# Patient Record
Sex: MALE | Race: WHITE | NOT HISPANIC OR LATINO
[De-identification: names, ages, dates, MRNs, and addresses within clinical notes are randomized per-mention and may not be internally consistent; named-entity substitution may affect disease eponyms.]

---

## 2017-03-01 ENCOUNTER — RX RENEWAL (OUTPATIENT)
Age: 57
End: 2017-03-01

## 2017-03-08 ENCOUNTER — NON-APPOINTMENT (OUTPATIENT)
Age: 57
End: 2017-03-08

## 2017-03-08 ENCOUNTER — APPOINTMENT (OUTPATIENT)
Dept: CARDIOLOGY | Facility: CLINIC | Age: 57
End: 2017-03-08

## 2017-03-08 VITALS
HEART RATE: 72 BPM | BODY MASS INDEX: 37.6 KG/M2 | OXYGEN SATURATION: 95 % | SYSTOLIC BLOOD PRESSURE: 130 MMHG | WEIGHT: 293 LBS | HEIGHT: 74 IN | DIASTOLIC BLOOD PRESSURE: 80 MMHG

## 2017-04-27 ENCOUNTER — MED ADMIN CHARGE (OUTPATIENT)
Age: 57
End: 2017-04-27

## 2017-06-09 ENCOUNTER — RX RENEWAL (OUTPATIENT)
Age: 57
End: 2017-06-09

## 2017-06-09 ENCOUNTER — MEDICATION RENEWAL (OUTPATIENT)
Age: 57
End: 2017-06-09

## 2017-07-21 ENCOUNTER — RX RENEWAL (OUTPATIENT)
Age: 57
End: 2017-07-21

## 2017-07-26 ENCOUNTER — NON-APPOINTMENT (OUTPATIENT)
Age: 57
End: 2017-07-26

## 2017-07-26 ENCOUNTER — APPOINTMENT (OUTPATIENT)
Dept: CARDIOLOGY | Facility: CLINIC | Age: 57
End: 2017-07-26

## 2017-07-26 VITALS
DIASTOLIC BLOOD PRESSURE: 76 MMHG | WEIGHT: 281 LBS | BODY MASS INDEX: 36.06 KG/M2 | SYSTOLIC BLOOD PRESSURE: 114 MMHG | HEIGHT: 74 IN | OXYGEN SATURATION: 97 % | HEART RATE: 81 BPM

## 2017-09-05 ENCOUNTER — RX RENEWAL (OUTPATIENT)
Age: 57
End: 2017-09-05

## 2017-09-15 ENCOUNTER — RX RENEWAL (OUTPATIENT)
Age: 57
End: 2017-09-15

## 2017-10-20 ENCOUNTER — RX RENEWAL (OUTPATIENT)
Age: 57
End: 2017-10-20

## 2017-10-26 ENCOUNTER — APPOINTMENT (OUTPATIENT)
Dept: CARDIOLOGY | Facility: CLINIC | Age: 57
End: 2017-10-26
Payer: COMMERCIAL

## 2017-10-26 PROCEDURE — 93306 TTE W/DOPPLER COMPLETE: CPT

## 2017-11-15 ENCOUNTER — NON-APPOINTMENT (OUTPATIENT)
Age: 57
End: 2017-11-15

## 2017-11-15 ENCOUNTER — LABORATORY RESULT (OUTPATIENT)
Age: 57
End: 2017-11-15

## 2017-11-15 ENCOUNTER — APPOINTMENT (OUTPATIENT)
Dept: CARDIOLOGY | Facility: CLINIC | Age: 57
End: 2017-11-15
Payer: COMMERCIAL

## 2017-11-15 VITALS
SYSTOLIC BLOOD PRESSURE: 118 MMHG | HEIGHT: 74 IN | WEIGHT: 280 LBS | BODY MASS INDEX: 35.94 KG/M2 | HEART RATE: 78 BPM | OXYGEN SATURATION: 95 % | DIASTOLIC BLOOD PRESSURE: 77 MMHG

## 2017-11-15 PROCEDURE — 99214 OFFICE O/P EST MOD 30 MIN: CPT | Mod: 25

## 2017-11-15 PROCEDURE — 36415 COLL VENOUS BLD VENIPUNCTURE: CPT

## 2017-11-15 PROCEDURE — 93000 ELECTROCARDIOGRAM COMPLETE: CPT

## 2017-11-16 LAB
25(OH)D3 SERPL-MCNC: 60.3 NG/ML
ALBUMIN SERPL ELPH-MCNC: 4.3 G/DL
ALP BLD-CCNC: 55 U/L
ALT SERPL-CCNC: 32 U/L
ANION GAP SERPL CALC-SCNC: 19 MMOL/L
AST SERPL-CCNC: 32 U/L
BASOPHILS # BLD AUTO: 0.03 K/UL
BASOPHILS NFR BLD AUTO: 0.2 %
BILIRUB SERPL-MCNC: 0.7 MG/DL
BUN SERPL-MCNC: 11 MG/DL
CALCIUM SERPL-MCNC: 10.3 MG/DL
CHLORIDE SERPL-SCNC: 96 MMOL/L
CHOLEST SERPL-MCNC: 173 MG/DL
CHOLEST/HDLC SERPL: 2.5 RATIO
CO2 SERPL-SCNC: 26 MMOL/L
CREAT SERPL-MCNC: 1.12 MG/DL
EOSINOPHIL # BLD AUTO: 0.08 K/UL
EOSINOPHIL NFR BLD AUTO: 0.5 %
GLUCOSE SERPL-MCNC: 100 MG/DL
HBA1C MFR BLD HPLC: 5.8 %
HCT VFR BLD CALC: 51.3 %
HDLC SERPL-MCNC: 70 MG/DL
HGB BLD-MCNC: 17.2 G/DL
IMM GRANULOCYTES NFR BLD AUTO: 0.2 %
LDLC SERPL CALC-MCNC: 83 MG/DL
LYMPHOCYTES # BLD AUTO: 2.53 K/UL
LYMPHOCYTES NFR BLD AUTO: 14.8 %
MAN DIFF?: NORMAL
MCHC RBC-ENTMCNC: 30.7 PG
MCHC RBC-ENTMCNC: 33.5 GM/DL
MCV RBC AUTO: 91.6 FL
MONOCYTES # BLD AUTO: 1.71 K/UL
MONOCYTES NFR BLD AUTO: 10 %
NEUTROPHILS # BLD AUTO: 12.69 K/UL
NEUTROPHILS NFR BLD AUTO: 74.3 %
PLATELET # BLD AUTO: 258 K/UL
POTASSIUM SERPL-SCNC: 4.7 MMOL/L
PROT SERPL-MCNC: 7.9 G/DL
RBC # BLD: 5.6 M/UL
RBC # FLD: 14.5 %
SODIUM SERPL-SCNC: 141 MMOL/L
TRIGL SERPL-MCNC: 101 MG/DL
URATE SERPL-MCNC: 6.7 MG/DL
WBC # FLD AUTO: 17.08 K/UL

## 2017-11-21 LAB
PSA SERPL-MCNC: 0.29 NG/ML
TSH SERPL-ACNC: 1.17 UIU/ML

## 2017-11-22 ENCOUNTER — APPOINTMENT (OUTPATIENT)
Dept: CARDIOLOGY | Facility: CLINIC | Age: 57
End: 2017-11-22
Payer: COMMERCIAL

## 2017-11-22 VITALS
HEIGHT: 74 IN | BODY MASS INDEX: 36.19 KG/M2 | OXYGEN SATURATION: 98 % | RESPIRATION RATE: 18 BRPM | DIASTOLIC BLOOD PRESSURE: 90 MMHG | SYSTOLIC BLOOD PRESSURE: 153 MMHG | HEART RATE: 69 BPM | TEMPERATURE: 98.6 F | WEIGHT: 282 LBS

## 2017-11-22 PROCEDURE — 90471 IMMUNIZATION ADMIN: CPT

## 2017-11-22 PROCEDURE — 93000 ELECTROCARDIOGRAM COMPLETE: CPT

## 2017-11-22 PROCEDURE — 99214 OFFICE O/P EST MOD 30 MIN: CPT | Mod: 25

## 2017-11-22 PROCEDURE — 90686 IIV4 VACC NO PRSV 0.5 ML IM: CPT

## 2017-11-27 ENCOUNTER — MED ADMIN CHARGE (OUTPATIENT)
Age: 57
End: 2017-11-27

## 2017-12-04 ENCOUNTER — RX RENEWAL (OUTPATIENT)
Age: 57
End: 2017-12-04

## 2017-12-12 ENCOUNTER — APPOINTMENT (OUTPATIENT)
Dept: CARDIOLOGY | Facility: CLINIC | Age: 57
End: 2017-12-12
Payer: COMMERCIAL

## 2017-12-12 PROCEDURE — 93880 EXTRACRANIAL BILAT STUDY: CPT

## 2017-12-15 ENCOUNTER — MEDICATION RENEWAL (OUTPATIENT)
Age: 57
End: 2017-12-15

## 2017-12-15 ENCOUNTER — RX RENEWAL (OUTPATIENT)
Age: 57
End: 2017-12-15

## 2018-01-10 ENCOUNTER — RX RENEWAL (OUTPATIENT)
Age: 58
End: 2018-01-10

## 2018-02-06 ENCOUNTER — OUTPATIENT (OUTPATIENT)
Dept: OUTPATIENT SERVICES | Facility: HOSPITAL | Age: 58
LOS: 1 days | Discharge: HOME | End: 2018-02-06
Payer: COMMERCIAL

## 2018-02-06 VITALS
OXYGEN SATURATION: 96 % | HEIGHT: 73 IN | TEMPERATURE: 98 F | DIASTOLIC BLOOD PRESSURE: 82 MMHG | SYSTOLIC BLOOD PRESSURE: 139 MMHG | WEIGHT: 296.08 LBS | HEART RATE: 69 BPM | RESPIRATION RATE: 18 BRPM

## 2018-02-06 DIAGNOSIS — R14.0 ABDOMINAL DISTENSION (GASEOUS): ICD-10-CM

## 2018-02-06 DIAGNOSIS — Z98.61 CORONARY ANGIOPLASTY STATUS: Chronic | ICD-10-CM

## 2018-02-06 DIAGNOSIS — Z98.890 OTHER SPECIFIED POSTPROCEDURAL STATES: Chronic | ICD-10-CM

## 2018-02-06 DIAGNOSIS — Z90.49 ACQUIRED ABSENCE OF OTHER SPECIFIED PARTS OF DIGESTIVE TRACT: Chronic | ICD-10-CM

## 2018-02-06 DIAGNOSIS — R19.8 OTHER SPECIFIED SYMPTOMS AND SIGNS INVOLVING THE DIGESTIVE SYSTEM AND ABDOMEN: ICD-10-CM

## 2018-02-06 LAB
ANION GAP SERPL CALC-SCNC: 7 MMOL/L — SIGNIFICANT CHANGE UP (ref 5–17)
BASOPHILS # BLD AUTO: 0.1 K/UL — SIGNIFICANT CHANGE UP (ref 0–0.2)
BASOPHILS NFR BLD AUTO: 1.1 % — SIGNIFICANT CHANGE UP (ref 0–2)
BUN SERPL-MCNC: 12 MG/DL — SIGNIFICANT CHANGE UP (ref 7–23)
CALCIUM SERPL-MCNC: 8.9 MG/DL — SIGNIFICANT CHANGE UP (ref 8.5–10.1)
CHLORIDE SERPL-SCNC: 102 MMOL/L — SIGNIFICANT CHANGE UP (ref 96–108)
CO2 SERPL-SCNC: 29 MMOL/L — SIGNIFICANT CHANGE UP (ref 22–31)
CREAT SERPL-MCNC: 0.93 MG/DL — SIGNIFICANT CHANGE UP (ref 0.5–1.3)
EOSINOPHIL # BLD AUTO: 0.4 K/UL — SIGNIFICANT CHANGE UP (ref 0–0.5)
EOSINOPHIL NFR BLD AUTO: 4 % — SIGNIFICANT CHANGE UP (ref 0–6)
GLUCOSE SERPL-MCNC: 105 MG/DL — HIGH (ref 70–99)
HCT VFR BLD CALC: 48.7 % — SIGNIFICANT CHANGE UP (ref 39–50)
HGB BLD-MCNC: 16.2 G/DL — SIGNIFICANT CHANGE UP (ref 13–17)
LYMPHOCYTES # BLD AUTO: 2.6 K/UL — SIGNIFICANT CHANGE UP (ref 1–3.3)
LYMPHOCYTES # BLD AUTO: 28.7 % — SIGNIFICANT CHANGE UP (ref 13–44)
MCHC RBC-ENTMCNC: 29.5 PG — SIGNIFICANT CHANGE UP (ref 27–34)
MCHC RBC-ENTMCNC: 33.3 GM/DL — SIGNIFICANT CHANGE UP (ref 32–36)
MCV RBC AUTO: 88.6 FL — SIGNIFICANT CHANGE UP (ref 80–100)
MONOCYTES # BLD AUTO: 0.8 K/UL — SIGNIFICANT CHANGE UP (ref 0–0.9)
MONOCYTES NFR BLD AUTO: 8.4 % — SIGNIFICANT CHANGE UP (ref 2–14)
NEUTROPHILS # BLD AUTO: 5.3 K/UL — SIGNIFICANT CHANGE UP (ref 1.8–7.4)
NEUTROPHILS NFR BLD AUTO: 57.9 % — SIGNIFICANT CHANGE UP (ref 43–77)
PLATELET # BLD AUTO: 265 K/UL — SIGNIFICANT CHANGE UP (ref 150–400)
POTASSIUM SERPL-MCNC: 4.4 MMOL/L — SIGNIFICANT CHANGE UP (ref 3.5–5.3)
POTASSIUM SERPL-SCNC: 4.4 MMOL/L — SIGNIFICANT CHANGE UP (ref 3.5–5.3)
RBC # BLD: 5.5 M/UL — SIGNIFICANT CHANGE UP (ref 4.2–5.8)
RBC # FLD: 12.8 % — SIGNIFICANT CHANGE UP (ref 10.3–14.5)
SODIUM SERPL-SCNC: 138 MMOL/L — SIGNIFICANT CHANGE UP (ref 135–145)
WBC # BLD: 9.2 K/UL — SIGNIFICANT CHANGE UP (ref 3.8–10.5)
WBC # FLD AUTO: 9.2 K/UL — SIGNIFICANT CHANGE UP (ref 3.8–10.5)

## 2018-02-06 PROCEDURE — 93010 ELECTROCARDIOGRAM REPORT: CPT

## 2018-02-06 NOTE — H&P PST ADULT - PMH
Chronic gout without tophus, unspecified cause, unspecified site    Coronary artery disease involving native coronary artery of native heart without angina pectoris    Gastroesophageal reflux disease, esophagitis presence not specified    Hyperlipidemia, unspecified hyperlipidemia type    Myocardial infarction  x 2. last 2008.  Obesity    Retinal tear of right eye

## 2018-02-06 NOTE — H&P PST ADULT - PSH
H/O eye surgery  right eye laser  H/O right inguinal hernia repair    Post PTCA  x 3. 4 stents in place  S/P appendectomy

## 2018-02-06 NOTE — H&P PST ADULT - HISTORY OF PRESENT ILLNESS
58 years old male with change in bowel movements. Denies nausea, vomiting, bloating, abdominal pain or rectal bleed. Planned colonoscopy.

## 2018-02-06 NOTE — H&P PST ADULT - ASSESSMENT
58 years old male present to PST prior to Colonoscopy with Dr. Merritt.  Plan  1. Expect a call from Endoscopy the day before your procedure between 11am and 3pm.  2. Follow the GI doctor's instructions for preparation  and day before procedure activities.  3. Follow the GI doctor's  instructions for medications.

## 2018-02-06 NOTE — H&P PST ADULT - FAMILY HISTORY
Father  Still living? No  Family history of heart failure, Age at diagnosis: Age Unknown  Family history of liver disease, Age at diagnosis: Age Unknown     Mother  Still living? No  Maternal family history of respiratory disease, Age at diagnosis: Age Unknown

## 2018-02-08 ENCOUNTER — OUTPATIENT (OUTPATIENT)
Dept: OUTPATIENT SERVICES | Facility: HOSPITAL | Age: 58
LOS: 1 days | Discharge: HOME | End: 2018-02-08
Payer: COMMERCIAL

## 2018-02-08 ENCOUNTER — RESULT REVIEW (OUTPATIENT)
Age: 58
End: 2018-02-08

## 2018-02-08 VITALS
OXYGEN SATURATION: 96 % | HEART RATE: 80 BPM | WEIGHT: 289.91 LBS | HEIGHT: 73 IN | DIASTOLIC BLOOD PRESSURE: 97 MMHG | TEMPERATURE: 97 F | RESPIRATION RATE: 16 BRPM | SYSTOLIC BLOOD PRESSURE: 138 MMHG

## 2018-02-08 DIAGNOSIS — Z98.890 OTHER SPECIFIED POSTPROCEDURAL STATES: Chronic | ICD-10-CM

## 2018-02-08 DIAGNOSIS — Z98.61 CORONARY ANGIOPLASTY STATUS: Chronic | ICD-10-CM

## 2018-02-08 DIAGNOSIS — Z90.49 ACQUIRED ABSENCE OF OTHER SPECIFIED PARTS OF DIGESTIVE TRACT: Chronic | ICD-10-CM

## 2018-02-08 PROCEDURE — 88305 TISSUE EXAM BY PATHOLOGIST: CPT | Mod: 26

## 2018-02-08 NOTE — ASU PATIENT PROFILE, ADULT - MENTAL HEALTH CONDITIONS/SYMPTOMS, PROFILE
Date: 6/12/2025    Patient Name: Alejandra Garner          To Whom it may concern:    This letter has been written at the patient's request. The above patient was seen at [facility] for treatment of a medical condition.    This patient should be excused from attending work/school from 6/10/25 through 6/20/25.       Sincerely,    Porsha Bolanos, DO     none

## 2018-02-09 LAB — SURGICAL PATHOLOGY FINAL REPORT - CH: SIGNIFICANT CHANGE UP

## 2018-02-14 DIAGNOSIS — Z82.49 FAMILY HISTORY OF ISCHEMIC HEART DISEASE AND OTHER DISEASES OF THE CIRCULATORY SYSTEM: ICD-10-CM

## 2018-02-14 DIAGNOSIS — K21.9 GASTRO-ESOPHAGEAL REFLUX DISEASE WITHOUT ESOPHAGITIS: ICD-10-CM

## 2018-02-14 DIAGNOSIS — Z87.891 PERSONAL HISTORY OF NICOTINE DEPENDENCE: ICD-10-CM

## 2018-02-14 DIAGNOSIS — R19.4 CHANGE IN BOWEL HABIT: ICD-10-CM

## 2018-02-14 DIAGNOSIS — K63.5 POLYP OF COLON: ICD-10-CM

## 2018-02-14 DIAGNOSIS — M1A.9XX0 CHRONIC GOUT, UNSPECIFIED, WITHOUT TOPHUS (TOPHI): ICD-10-CM

## 2018-02-14 DIAGNOSIS — I25.2 OLD MYOCARDIAL INFARCTION: ICD-10-CM

## 2018-02-14 DIAGNOSIS — K63.89 OTHER SPECIFIED DISEASES OF INTESTINE: ICD-10-CM

## 2018-02-14 DIAGNOSIS — E66.01 MORBID (SEVERE) OBESITY DUE TO EXCESS CALORIES: ICD-10-CM

## 2018-02-14 DIAGNOSIS — I25.10 ATHEROSCLEROTIC HEART DISEASE OF NATIVE CORONARY ARTERY WITHOUT ANGINA PECTORIS: ICD-10-CM

## 2018-02-14 DIAGNOSIS — E78.5 HYPERLIPIDEMIA, UNSPECIFIED: ICD-10-CM

## 2018-02-14 DIAGNOSIS — Z95.5 PRESENCE OF CORONARY ANGIOPLASTY IMPLANT AND GRAFT: ICD-10-CM

## 2018-02-14 DIAGNOSIS — K57.30 DIVERTICULOSIS OF LARGE INTESTINE WITHOUT PERFORATION OR ABSCESS WITHOUT BLEEDING: ICD-10-CM

## 2018-02-14 DIAGNOSIS — K64.8 OTHER HEMORRHOIDS: ICD-10-CM

## 2018-03-14 ENCOUNTER — MEDICATION RENEWAL (OUTPATIENT)
Age: 58
End: 2018-03-14

## 2018-03-15 ENCOUNTER — MEDICATION RENEWAL (OUTPATIENT)
Age: 58
End: 2018-03-15

## 2018-03-28 ENCOUNTER — APPOINTMENT (OUTPATIENT)
Dept: CARDIOLOGY | Facility: CLINIC | Age: 58
End: 2018-03-28
Payer: COMMERCIAL

## 2018-03-28 ENCOUNTER — NON-APPOINTMENT (OUTPATIENT)
Age: 58
End: 2018-03-28

## 2018-03-28 VITALS
HEART RATE: 76 BPM | OXYGEN SATURATION: 98 % | WEIGHT: 296 LBS | BODY MASS INDEX: 37.99 KG/M2 | DIASTOLIC BLOOD PRESSURE: 84 MMHG | HEIGHT: 74 IN | SYSTOLIC BLOOD PRESSURE: 146 MMHG

## 2018-03-28 PROCEDURE — 99214 OFFICE O/P EST MOD 30 MIN: CPT | Mod: 25

## 2018-03-28 PROCEDURE — 93000 ELECTROCARDIOGRAM COMPLETE: CPT

## 2018-05-29 ENCOUNTER — RX RENEWAL (OUTPATIENT)
Age: 58
End: 2018-05-29

## 2018-07-11 ENCOUNTER — APPOINTMENT (OUTPATIENT)
Dept: CARDIOLOGY | Facility: CLINIC | Age: 58
End: 2018-07-11
Payer: COMMERCIAL

## 2018-07-11 ENCOUNTER — NON-APPOINTMENT (OUTPATIENT)
Age: 58
End: 2018-07-11

## 2018-07-11 VITALS
HEART RATE: 82 BPM | DIASTOLIC BLOOD PRESSURE: 80 MMHG | BODY MASS INDEX: 37.22 KG/M2 | OXYGEN SATURATION: 98 % | HEIGHT: 74 IN | WEIGHT: 290 LBS | SYSTOLIC BLOOD PRESSURE: 140 MMHG

## 2018-07-11 PROCEDURE — 99214 OFFICE O/P EST MOD 30 MIN: CPT | Mod: 25

## 2018-07-11 PROCEDURE — 93000 ELECTROCARDIOGRAM COMPLETE: CPT

## 2018-07-11 RX ORDER — SILDENAFIL 100 MG/1
100 TABLET, FILM COATED ORAL
Qty: 6 | Refills: 5 | Status: COMPLETED | COMMUNITY
Start: 2018-01-10 | End: 2018-07-11

## 2018-07-11 RX ORDER — SILDENAFIL CITRATE 100 MG/1
100 TABLET, FILM COATED ORAL
Qty: 6 | Refills: 5 | Status: DISCONTINUED | COMMUNITY
Start: 2017-07-26 | End: 2018-07-11

## 2018-07-12 LAB
25(OH)D3 SERPL-MCNC: 41.8 NG/ML
ALBUMIN SERPL ELPH-MCNC: 4.4 G/DL
ALP BLD-CCNC: 48 U/L
ALT SERPL-CCNC: 35 U/L
ANION GAP SERPL CALC-SCNC: 12 MMOL/L
AST SERPL-CCNC: 34 U/L
BASOPHILS # BLD AUTO: 0.03 K/UL
BASOPHILS NFR BLD AUTO: 0.4 %
BILIRUB SERPL-MCNC: 0.2 MG/DL
BUN SERPL-MCNC: 15 MG/DL
CALCIUM SERPL-MCNC: 9.5 MG/DL
CHLORIDE SERPL-SCNC: 104 MMOL/L
CHOLEST SERPL-MCNC: 163 MG/DL
CHOLEST/HDLC SERPL: 2.4 RATIO
CO2 SERPL-SCNC: 27 MMOL/L
CREAT SERPL-MCNC: 0.78 MG/DL
EOSINOPHIL # BLD AUTO: 0.21 K/UL
EOSINOPHIL NFR BLD AUTO: 2.7 %
FOLATE SERPL-MCNC: 19.1 NG/ML
GLUCOSE SERPL-MCNC: 109 MG/DL
HBA1C MFR BLD HPLC: 6 %
HCT VFR BLD CALC: 48.4 %
HDLC SERPL-MCNC: 68 MG/DL
HGB BLD-MCNC: 16.1 G/DL
IMM GRANULOCYTES NFR BLD AUTO: 0.3 %
LDLC SERPL CALC-MCNC: 77 MG/DL
LYMPHOCYTES # BLD AUTO: 2.37 K/UL
LYMPHOCYTES NFR BLD AUTO: 30.6 %
MAN DIFF?: NORMAL
MCHC RBC-ENTMCNC: 30.8 PG
MCHC RBC-ENTMCNC: 33.3 GM/DL
MCV RBC AUTO: 92.5 FL
MONOCYTES # BLD AUTO: 0.69 K/UL
MONOCYTES NFR BLD AUTO: 8.9 %
NEUTROPHILS # BLD AUTO: 4.43 K/UL
NEUTROPHILS NFR BLD AUTO: 57.1 %
PLATELET # BLD AUTO: 249 K/UL
POTASSIUM SERPL-SCNC: 4.8 MMOL/L
PROT SERPL-MCNC: 7.2 G/DL
PSA SERPL-MCNC: 0.35 NG/ML
RBC # BLD: 5.23 M/UL
RBC # FLD: 14.5 %
SODIUM SERPL-SCNC: 143 MMOL/L
TRIGL SERPL-MCNC: 91 MG/DL
TSH SERPL-ACNC: 1.27 UIU/ML
VIT B12 SERPL-MCNC: 599 PG/ML
WBC # FLD AUTO: 7.75 K/UL

## 2018-09-12 ENCOUNTER — MEDICATION RENEWAL (OUTPATIENT)
Age: 58
End: 2018-09-12

## 2018-09-12 ENCOUNTER — RX RENEWAL (OUTPATIENT)
Age: 58
End: 2018-09-12

## 2018-11-29 ENCOUNTER — MEDICATION RENEWAL (OUTPATIENT)
Age: 58
End: 2018-11-29

## 2019-01-07 ENCOUNTER — NON-APPOINTMENT (OUTPATIENT)
Age: 59
End: 2019-01-07

## 2019-01-07 ENCOUNTER — APPOINTMENT (OUTPATIENT)
Dept: CARDIOLOGY | Facility: CLINIC | Age: 59
End: 2019-01-07
Payer: COMMERCIAL

## 2019-01-07 VITALS — DIASTOLIC BLOOD PRESSURE: 91 MMHG | OXYGEN SATURATION: 99 % | HEART RATE: 65 BPM | SYSTOLIC BLOOD PRESSURE: 156 MMHG

## 2019-01-07 VITALS — BODY MASS INDEX: 37.73 KG/M2 | HEIGHT: 74 IN | WEIGHT: 294 LBS

## 2019-01-07 PROCEDURE — 99214 OFFICE O/P EST MOD 30 MIN: CPT | Mod: 25

## 2019-01-07 PROCEDURE — 93000 ELECTROCARDIOGRAM COMPLETE: CPT

## 2019-01-07 NOTE — DISCUSSION/SUMMARY
[Hyperlipidemia] : hyperlipidemia [Diet Modification] : diet modification [Exercise] : exercise [Hypertension] : hypertension [Stable] : stable [Exercise Regimen] : an exercise regimen [Weight Loss] : weight loss [Sodium Restriction] : sodium restriction [Patient] : the patient [de-identified] : continue crestor 5mg ever other day [FreeTextEntry1] : Routine labs ordered \par Pt will continue current meds. He will attempt to lose weight and eat a heart healthy diet. Echo ordered to evaluate LV function . Nuclear stress ordered \par OV in 3 months

## 2019-01-07 NOTE — REVIEW OF SYSTEMS
[see HPI] : see HPI [Abdominal Pain] : abdominal pain [Change In The Stool] : change in stool [Negative] : Heme/Lymph [Nausea] : no nausea [Vomiting] : no vomiting [Heartburn] : no heartburn [Change in Appetite] : no change in appetite

## 2019-01-07 NOTE — PHYSICAL EXAM
[General Appearance - Well Developed] : well developed [Normal Appearance] : normal appearance [Well Groomed] : well groomed [General Appearance - Well Nourished] : well nourished [No Deformities] : no deformities [General Appearance - In No Acute Distress] : no acute distress [Normal Conjunctiva] : the conjunctiva exhibited no abnormalities [Normal Oral Mucosa] : normal oral mucosa [] : no respiratory distress [Respiration, Rhythm And Depth] : normal respiratory rhythm and effort [Exaggerated Use Of Accessory Muscles For Inspiration] : no accessory muscle use [Auscultation Breath Sounds / Voice Sounds] : lungs were clear to auscultation bilaterally [Heart Rate And Rhythm] : heart rate and rhythm were normal [Heart Sounds] : normal S1 and S2 [Bowel Sounds] : normal bowel sounds [Abdomen Soft] : soft [Abdomen Tenderness] : non-tender [Abnormal Walk] : normal gait [Skin Color & Pigmentation] : normal skin color and pigmentation [Oriented To Time, Place, And Person] : oriented to person, place, and time [FreeTextEntry1] : No LE Edema

## 2019-01-07 NOTE — HISTORY OF PRESENT ILLNESS
[FreeTextEntry1] : 58 year old male presents to clinic today routine follow up PMH: HTN, HLD, CAD\par \par Pt denies chest pain or shortness of breath. He has gained weight . Diet was reviewed and pt admitted. to dietary indiscretion. . From cardiac standpoint he is asymptomatic \par \par

## 2019-01-16 ENCOUNTER — APPOINTMENT (OUTPATIENT)
Dept: ORTHOPEDIC SURGERY | Facility: CLINIC | Age: 59
End: 2019-01-16
Payer: COMMERCIAL

## 2019-01-16 VITALS
HEIGHT: 74 IN | HEART RATE: 69 BPM | BODY MASS INDEX: 37.99 KG/M2 | WEIGHT: 296 LBS | SYSTOLIC BLOOD PRESSURE: 145 MMHG | DIASTOLIC BLOOD PRESSURE: 94 MMHG

## 2019-01-16 PROCEDURE — 73564 X-RAY EXAM KNEE 4 OR MORE: CPT | Mod: 50

## 2019-01-16 PROCEDURE — 99203 OFFICE O/P NEW LOW 30 MIN: CPT

## 2019-01-16 NOTE — HISTORY OF PRESENT ILLNESS
[de-identified] : This patient presents with a history of bilateral knee pain intermittently for the last 10-15 years. He states it initially the right knee was worse. He did have a history of cortisone injections to the right knee which seemed to alleviate the symptoms. He is now complaining of worsening constant pain in the left knee. Pain is localized to the medial joint. Pain is worse with activity such as walking or climbing stairs. Patient also notes some swelling about the knee. She has been using naproxen for the pain. Pain level is 7-8/10 at its worse with activity. Pain is improved with rest. The right knee has mild discomfort with climbing stairs.

## 2019-01-16 NOTE — PHYSICAL EXAM
[de-identified] : The patient appears well nourished  and in no apparent distress.  The patient is alert and oriented to person, place, and time.   Affect and mood appear normal.    The head is normocephalic and atraumatic.  The eyes reveal normal sclera and extra ocular muscles are intact.   The neck appears normal with no jugular venous distention or masses noted.   Skin shows normal turgor with no evidence of eczema or psoriasis.  No respiratory distress noted.  The patient ambulates with an antalgic gait.\par \par The left knee has range of motion from 0-130°. There is significant pain with terminal flexion localized to the medial meniscus. There is tenderness about the medial joint line. There is a positive Jefferson sign. There is a small effusion in left knee.There is no soft tissue swelling, warmth, or erythema.   There is a negative Lachman sign.  There is no instability to varus/valgus stress or anterior/posterior drawer.  There is normal strength in the in the quadriceps and hamstring muscles.  Sensation is intact to the lower estremity. There are normal pulses distally and good capillary refill. No edema or lymphadenopathy noted.  \par \par The right knee has normal range of motion.  Mild crepitations and pain noted with terminal flexion. There is no joint line tenderness. There is a negative Jefferson sign. There is no effusion. There is no soft tissue swelling, warmth, or erythema.   There is a negative Lachman sign.  There is no instability to varus/valgus stress or anterior/posterior drawer.  There is normal strength in the in the quadriceps and hamstring muscles.  Sensation is intact to the lower estremity. There are normal pulses distally and good capillary refill. No edema or lymphadenopathy noted.  \par \par \par  [de-identified] : AP, lateral, tunnel, and merchant views of the right knee were obtained. There is moderate medial joint narrowing. There is patella vika with mild to moderate patellofemoral narrowing and mild patella osteophytes. The alignment of the knee is normal.  No fractures or dislocations are noted.\par \par AP, lateral, tunnel, and merchant views of the left knee were obtained. There is mild medial joint narrowing and marginal osteophyte formation noted on the tunnel view. This patella vika with mild to moderate patellofemoral narrowing and mild osteophytes. The alignment of the knee is normal.  No fractures or dislocations are noted.\par

## 2019-01-16 NOTE — DISCUSSION/SUMMARY
[de-identified] : Patient has evidence of degenerative arthritis of both knees right worse than the left. However he has significant meniscal signs in the left knee. He has tenderness about the medial joint, positive Violetta sign, antalgic gait, pain with terminal flexion. Due to the patient's symptoms which have not improved with anti-inflammatories rest and modification of activities I have recommended an MRI left knee to rule out medial meniscal tear. I will see him back after the MRI for followup and review. He should reduce his activities and avoid excessive walking, standing, or climbing stairs

## 2019-01-17 ENCOUNTER — FORM ENCOUNTER (OUTPATIENT)
Age: 59
End: 2019-01-17

## 2019-01-18 ENCOUNTER — APPOINTMENT (OUTPATIENT)
Dept: MRI IMAGING | Facility: CLINIC | Age: 59
End: 2019-01-18
Payer: COMMERCIAL

## 2019-01-18 ENCOUNTER — OUTPATIENT (OUTPATIENT)
Dept: OUTPATIENT SERVICES | Facility: HOSPITAL | Age: 59
LOS: 1 days | End: 2019-01-18
Payer: COMMERCIAL

## 2019-01-18 DIAGNOSIS — Z90.49 ACQUIRED ABSENCE OF OTHER SPECIFIED PARTS OF DIGESTIVE TRACT: Chronic | ICD-10-CM

## 2019-01-18 DIAGNOSIS — Z00.8 ENCOUNTER FOR OTHER GENERAL EXAMINATION: ICD-10-CM

## 2019-01-18 DIAGNOSIS — Z98.61 CORONARY ANGIOPLASTY STATUS: Chronic | ICD-10-CM

## 2019-01-18 DIAGNOSIS — Z98.890 OTHER SPECIFIED POSTPROCEDURAL STATES: Chronic | ICD-10-CM

## 2019-01-18 PROCEDURE — 73721 MRI JNT OF LWR EXTRE W/O DYE: CPT | Mod: 26,LT

## 2019-01-18 PROCEDURE — 73721 MRI JNT OF LWR EXTRE W/O DYE: CPT

## 2019-01-22 ENCOUNTER — APPOINTMENT (OUTPATIENT)
Dept: CARDIOLOGY | Facility: CLINIC | Age: 59
End: 2019-01-22
Payer: COMMERCIAL

## 2019-01-22 PROCEDURE — 36415 COLL VENOUS BLD VENIPUNCTURE: CPT

## 2019-01-22 PROCEDURE — 93306 TTE W/DOPPLER COMPLETE: CPT

## 2019-01-25 LAB
25(OH)D3 SERPL-MCNC: 43.8 NG/ML
ALBUMIN SERPL ELPH-MCNC: 4.4 G/DL
ALP BLD-CCNC: 43 U/L
ALT SERPL-CCNC: 45 U/L
ANION GAP SERPL CALC-SCNC: 13 MMOL/L
AST SERPL-CCNC: 38 U/L
BASOPHILS # BLD AUTO: 0.04 K/UL
BASOPHILS NFR BLD AUTO: 0.5 %
BILIRUB SERPL-MCNC: 0.4 MG/DL
BUN SERPL-MCNC: 10 MG/DL
CALCIUM SERPL-MCNC: 9.6 MG/DL
CHLORIDE SERPL-SCNC: 100 MMOL/L
CHOLEST SERPL-MCNC: 151 MG/DL
CHOLEST/HDLC SERPL: 2.5 RATIO
CK SERPL-CCNC: 557 U/L
CO2 SERPL-SCNC: 29 MMOL/L
CREAT SERPL-MCNC: 0.83 MG/DL
EOSINOPHIL # BLD AUTO: 0.45 K/UL
EOSINOPHIL NFR BLD AUTO: 5.5 %
GLUCOSE SERPL-MCNC: 110 MG/DL
HBA1C MFR BLD HPLC: 5.9 %
HCT VFR BLD CALC: 49.4 %
HDLC SERPL-MCNC: 60 MG/DL
HGB BLD-MCNC: 16.7 G/DL
IMM GRANULOCYTES NFR BLD AUTO: 1.1 %
LDLC SERPL CALC-MCNC: 53 MG/DL
LYMPHOCYTES # BLD AUTO: 2.82 K/UL
LYMPHOCYTES NFR BLD AUTO: 34.5 %
MAN DIFF?: NORMAL
MCHC RBC-ENTMCNC: 30.4 PG
MCHC RBC-ENTMCNC: 33.8 GM/DL
MCV RBC AUTO: 90 FL
MONOCYTES # BLD AUTO: 0.93 K/UL
MONOCYTES NFR BLD AUTO: 11.4 %
NEUTROPHILS # BLD AUTO: 3.85 K/UL
NEUTROPHILS NFR BLD AUTO: 47 %
PLATELET # BLD AUTO: 242 K/UL
POTASSIUM SERPL-SCNC: 4.7 MMOL/L
PROT SERPL-MCNC: 7.1 G/DL
RBC # BLD: 5.49 M/UL
RBC # FLD: 13.6 %
SODIUM SERPL-SCNC: 142 MMOL/L
T3FREE SERPL-MCNC: 3.54 PG/ML
T4 FREE SERPL-MCNC: 1.2 NG/DL
TRIGL SERPL-MCNC: 190 MG/DL
TSH SERPL-ACNC: 1.58 UIU/ML
URATE SERPL-MCNC: 6.5 MG/DL
WBC # FLD AUTO: 8.18 K/UL

## 2019-02-12 ENCOUNTER — APPOINTMENT (OUTPATIENT)
Dept: ORTHOPEDIC SURGERY | Facility: CLINIC | Age: 59
End: 2019-02-12
Payer: COMMERCIAL

## 2019-02-12 VITALS
SYSTOLIC BLOOD PRESSURE: 144 MMHG | HEART RATE: 88 BPM | WEIGHT: 296 LBS | BODY MASS INDEX: 37.99 KG/M2 | HEIGHT: 74 IN | DIASTOLIC BLOOD PRESSURE: 96 MMHG

## 2019-02-12 PROCEDURE — 99214 OFFICE O/P EST MOD 30 MIN: CPT

## 2019-02-18 ENCOUNTER — RX RENEWAL (OUTPATIENT)
Age: 59
End: 2019-02-18

## 2019-02-18 ENCOUNTER — MEDICATION RENEWAL (OUTPATIENT)
Age: 59
End: 2019-02-18

## 2019-02-19 ENCOUNTER — APPOINTMENT (OUTPATIENT)
Dept: CARDIOLOGY | Facility: CLINIC | Age: 59
End: 2019-02-19
Payer: COMMERCIAL

## 2019-02-19 ENCOUNTER — OUTPATIENT (OUTPATIENT)
Dept: OUTPATIENT SERVICES | Facility: HOSPITAL | Age: 59
LOS: 1 days | End: 2019-02-19
Payer: COMMERCIAL

## 2019-02-19 VITALS — HEART RATE: 70 BPM | DIASTOLIC BLOOD PRESSURE: 95 MMHG | OXYGEN SATURATION: 96 % | SYSTOLIC BLOOD PRESSURE: 142 MMHG

## 2019-02-19 VITALS
HEART RATE: 73 BPM | HEIGHT: 73 IN | TEMPERATURE: 98 F | WEIGHT: 285.06 LBS | DIASTOLIC BLOOD PRESSURE: 91 MMHG | OXYGEN SATURATION: 96 % | SYSTOLIC BLOOD PRESSURE: 137 MMHG | RESPIRATION RATE: 16 BRPM

## 2019-02-19 VITALS — HEIGHT: 74 IN | WEIGHT: 292 LBS | BODY MASS INDEX: 37.47 KG/M2

## 2019-02-19 DIAGNOSIS — Z90.49 ACQUIRED ABSENCE OF OTHER SPECIFIED PARTS OF DIGESTIVE TRACT: Chronic | ICD-10-CM

## 2019-02-19 DIAGNOSIS — Z98.890 OTHER SPECIFIED POSTPROCEDURAL STATES: Chronic | ICD-10-CM

## 2019-02-19 DIAGNOSIS — Z01.818 ENCOUNTER FOR OTHER PREPROCEDURAL EXAMINATION: ICD-10-CM

## 2019-02-19 DIAGNOSIS — Z98.61 CORONARY ANGIOPLASTY STATUS: Chronic | ICD-10-CM

## 2019-02-19 DIAGNOSIS — I48.92 UNSPECIFIED ATRIAL FLUTTER: ICD-10-CM

## 2019-02-19 DIAGNOSIS — M25.562 PAIN IN LEFT KNEE: ICD-10-CM

## 2019-02-19 DIAGNOSIS — S83.249A OTHER TEAR OF MEDIAL MENISCUS, CURRENT INJURY, UNSPECIFIED KNEE, INITIAL ENCOUNTER: ICD-10-CM

## 2019-02-19 LAB
ALBUMIN SERPL ELPH-MCNC: 3.6 G/DL — SIGNIFICANT CHANGE UP (ref 3.3–5)
ALP SERPL-CCNC: 49 U/L — SIGNIFICANT CHANGE UP (ref 30–120)
ALT FLD-CCNC: 60 U/L DA — SIGNIFICANT CHANGE UP (ref 10–60)
ANION GAP SERPL CALC-SCNC: 8 MMOL/L — SIGNIFICANT CHANGE UP (ref 5–17)
AST SERPL-CCNC: 40 U/L — SIGNIFICANT CHANGE UP (ref 10–40)
BILIRUB SERPL-MCNC: 0.3 MG/DL — SIGNIFICANT CHANGE UP (ref 0.2–1.2)
BUN SERPL-MCNC: 16 MG/DL — SIGNIFICANT CHANGE UP (ref 7–23)
CALCIUM SERPL-MCNC: 9.5 MG/DL — SIGNIFICANT CHANGE UP (ref 8.4–10.5)
CHLORIDE SERPL-SCNC: 99 MMOL/L — SIGNIFICANT CHANGE UP (ref 96–108)
CO2 SERPL-SCNC: 30 MMOL/L — SIGNIFICANT CHANGE UP (ref 22–31)
CREAT SERPL-MCNC: 1.01 MG/DL — SIGNIFICANT CHANGE UP (ref 0.5–1.3)
GLUCOSE SERPL-MCNC: 112 MG/DL — HIGH (ref 70–99)
HCT VFR BLD CALC: 50.4 % — HIGH (ref 39–50)
HGB BLD-MCNC: 17.1 G/DL — HIGH (ref 13–17)
MCHC RBC-ENTMCNC: 30.2 PG — SIGNIFICANT CHANGE UP (ref 27–34)
MCHC RBC-ENTMCNC: 33.9 GM/DL — SIGNIFICANT CHANGE UP (ref 32–36)
MCV RBC AUTO: 88.9 FL — SIGNIFICANT CHANGE UP (ref 80–100)
NRBC # BLD: 0 /100 WBCS — SIGNIFICANT CHANGE UP (ref 0–0)
PLATELET # BLD AUTO: 320 K/UL — SIGNIFICANT CHANGE UP (ref 150–400)
POTASSIUM SERPL-MCNC: 4.7 MMOL/L — SIGNIFICANT CHANGE UP (ref 3.5–5.3)
POTASSIUM SERPL-SCNC: 4.7 MMOL/L — SIGNIFICANT CHANGE UP (ref 3.5–5.3)
PROT SERPL-MCNC: 7.8 G/DL — SIGNIFICANT CHANGE UP (ref 6–8.3)
RBC # BLD: 5.67 M/UL — SIGNIFICANT CHANGE UP (ref 4.2–5.8)
RBC # FLD: 12.9 % — SIGNIFICANT CHANGE UP (ref 10.3–14.5)
SODIUM SERPL-SCNC: 137 MMOL/L — SIGNIFICANT CHANGE UP (ref 135–145)
WBC # BLD: 8.83 K/UL — SIGNIFICANT CHANGE UP (ref 3.8–10.5)
WBC # FLD AUTO: 8.83 K/UL — SIGNIFICANT CHANGE UP (ref 3.8–10.5)

## 2019-02-19 PROCEDURE — 80053 COMPREHEN METABOLIC PANEL: CPT

## 2019-02-19 PROCEDURE — 93010 ELECTROCARDIOGRAM REPORT: CPT

## 2019-02-19 PROCEDURE — 93005 ELECTROCARDIOGRAM TRACING: CPT

## 2019-02-19 PROCEDURE — 85027 COMPLETE CBC AUTOMATED: CPT

## 2019-02-19 PROCEDURE — 36415 COLL VENOUS BLD VENIPUNCTURE: CPT

## 2019-02-19 PROCEDURE — 99215 OFFICE O/P EST HI 40 MIN: CPT

## 2019-02-19 PROCEDURE — G0463: CPT

## 2019-02-19 RX ORDER — OMEPRAZOLE 10 MG/1
1 CAPSULE, DELAYED RELEASE ORAL
Qty: 0 | Refills: 0 | COMMUNITY

## 2019-02-19 RX ORDER — ROSUVASTATIN CALCIUM 5 MG/1
10 TABLET ORAL
Qty: 0 | Refills: 0 | COMMUNITY

## 2019-02-19 NOTE — H&P PST ADULT - PROBLEM SELECTOR PLAN 1
"Arthroscopy partial menisectomy and any other indicated procedures left knee" on 2/28/19  Diagnostics ordered  Pending medical/cardiac clearance  Instructions reviewed and signed  Contact info given  Best wishes offered    Pending medical/cardiac clearance  Patient

## 2019-02-19 NOTE — H&P PST ADULT - NEGATIVE ENMT SYMPTOMS
no nasal congestion/no dysphagia/no hearing difficulty/no nose bleeds/no recurrent cold sores/no tinnitus/no nasal obstruction/no post-nasal discharge/no abnormal taste sensation/no gum bleeding/no vertigo/no sinus symptoms/no nasal discharge/no throat pain/no dry mouth/no ear pain

## 2019-02-19 NOTE — H&P PST ADULT - ASSESSMENT
58 yo male is scheduled for arthroscopy partial menisectomy and any other indicated procedures left knee on 2/28/19 with Dr Addison.

## 2019-02-19 NOTE — HISTORY OF PRESENT ILLNESS
[FreeTextEntry1] : Seven Jose is a 59-year-old man with a history of hypertension with moderate LVH, hyperlipidemia, coronary artery disease, LAD stent, past myocardial infarction, elevated hemoglobin A1c, mildly dilated aortic root and ascending aorta (4 cm) who presents for cardiology evaluation of an abnormal ECG. The patient was seen earlier today in the presurgical testing area of [facility] in anticipation of knee surgery and found to have atrial flutter on his resting 12-lead ECG -- subsequently sent to our office for further evaluation and management.  There is no history of atrial arrhythmia and ECG performed in this office on January 7, 2019 by Dr. Kathleen demonstrated sinus rhythm.  Describes occasional, mild fatigue / dyspnea at the onset of exercise that resolves with continued exertion.   He denies palpitations or change in exercise tolerance.

## 2019-02-19 NOTE — H&P PST ADULT - MUSCULOSKELETAL
detailed exam no joint warmth/decreased ROM due to pain/no joint swelling/no joint erythema details…

## 2019-02-19 NOTE — REVIEW OF SYSTEMS
[see HPI] : see HPI [Chest Pain] : no chest pain [Palpitations] : no palpitations [Joint Pain] : joint pain [Easy Bleeding] : no tendency for easy bleeding [Easy Bruising] : no tendency for easy bruising

## 2019-02-19 NOTE — H&P PST ADULT - NEGATIVE ALLERGY TYPES
no reactions to animals/no reactions to medicines/no outdoor environmental allergies/no indoor environmental allergies/no reactions to food

## 2019-02-19 NOTE — H&P PST ADULT - PMH
At risk for sleep apnea    Atrial flutter by electrocardiogram    BMI 37.0-37.9, adult    CAD (coronary artery disease)    Dyslipidemia    Gout    Left knee pain    Myocardial infarction  2005, 2008  Obesity (BMI 30-39.9)    Stented coronary artery  X3

## 2019-02-19 NOTE — H&P PST ADULT - PROBLEM SELECTOR PLAN 2
Spoke to Suraj Haq cardiology, instructed patient to go to 's office right now.  patient will follow up with cardiologist and will call me today with report  Asymptomatic  Questions answered and patient reassured

## 2019-02-19 NOTE — PROGRESS NOTE ADULT - SUBJECTIVE AND OBJECTIVE BOX
Spoke to patient, went jordana cardiologist, started Eliquis.  pending cardiology clearance 2/22/19  Patient will call back with outcomes.

## 2019-02-19 NOTE — PHYSICAL EXAM
[Normal Appearance] : normal appearance [Well Groomed] : well groomed [General Appearance - In No Acute Distress] : no acute distress [Eyelids - No Xanthelasma] : the eyelids demonstrated no xanthelasmas [No Oral Pallor] : no oral pallor [Respiration, Rhythm And Depth] : normal respiratory rhythm and effort [Auscultation Breath Sounds / Voice Sounds] : lungs were clear to auscultation bilaterally [Edema] : no peripheral edema present [FreeTextEntry1] : Regular [Abdomen Soft] : soft [Abdomen Tenderness] : non-tender [Abnormal Walk] : normal gait [Nail Clubbing] : no clubbing of the fingernails [Cyanosis, Localized] : no localized cyanosis [] : no rash [Oriented To Time, Place, And Person] : oriented to person, place, and time [Impaired Insight] : insight and judgment were intact [Affect] : the affect was normal [Mood] : the mood was normal

## 2019-02-19 NOTE — DISCUSSION/SUMMARY
[___ Week(s)] : [unfilled] week(s) [With ___] : with [unfilled] [FreeTextEntry1] : \par Atrial flutter: New-onset; minimal symptoms (if any) and noted incidentally during a preoperative ECG.  CHADS VASC score = 2 (HTN and vascular disease); I recommended anticoagulation and prescribed Eliquis.  Rhythm will be reassessed with returns to see Dr. Kathleen later this week.  We discussed the role of anticoagulants in great detail (indications, benefits, risks, and alternatives).\par \par Hypertension: Blood pressure has been mildly elevated on recent measurements (lower in 2017).\par \par Coronary artery disease: Stable and asymptomatic; no angina; continue aspirin and Crestor.

## 2019-02-19 NOTE — H&P PST ADULT - HISTORY OF PRESENT ILLNESS
60 yo male presents to [facility] for scheduled arthroscopy partial menisectomy and any other indicated procedures left knee on 2/28/19 with Avel Addison MD.    Reports left knee injury 11/2018 with pain and swelling occasionally.  Pain worst with descending stairs and rates 5/10.

## 2019-02-19 NOTE — H&P PST ADULT - PSH
H/O eye surgery  right eye retinal tear, 2017  H/O right inguinal hernia repair  age 13  History of appendectomy  1991  Post PTCA  x 3. 4 stents in place

## 2019-02-20 PROBLEM — K21.9 GASTRO-ESOPHAGEAL REFLUX DISEASE WITHOUT ESOPHAGITIS: Chronic | Status: INACTIVE | Noted: 2018-02-06 | Resolved: 2019-02-19

## 2019-02-20 PROBLEM — I21.9 ACUTE MYOCARDIAL INFARCTION, UNSPECIFIED: Chronic | Status: ACTIVE | Noted: 2018-02-06

## 2019-02-20 PROBLEM — E78.5 HYPERLIPIDEMIA, UNSPECIFIED: Chronic | Status: INACTIVE | Noted: 2018-02-06 | Resolved: 2019-02-19

## 2019-02-20 PROBLEM — E66.9 OBESITY, UNSPECIFIED: Chronic | Status: INACTIVE | Noted: 2018-02-06 | Resolved: 2019-02-19

## 2019-02-20 PROBLEM — H33.311 HORSESHOE TEAR OF RETINA WITHOUT DETACHMENT, RIGHT EYE: Chronic | Status: INACTIVE | Noted: 2018-02-06 | Resolved: 2019-02-19

## 2019-02-20 PROBLEM — M1A.9XX0 CHRONIC GOUT, UNSPECIFIED, WITHOUT TOPHUS (TOPHI): Chronic | Status: INACTIVE | Noted: 2018-02-06 | Resolved: 2019-02-19

## 2019-02-20 PROBLEM — I25.10 ATHEROSCLEROTIC HEART DISEASE OF NATIVE CORONARY ARTERY WITHOUT ANGINA PECTORIS: Chronic | Status: INACTIVE | Noted: 2018-02-06 | Resolved: 2019-02-19

## 2019-02-22 ENCOUNTER — APPOINTMENT (OUTPATIENT)
Dept: CARDIOLOGY | Facility: CLINIC | Age: 59
End: 2019-02-22
Payer: COMMERCIAL

## 2019-02-22 ENCOUNTER — NON-APPOINTMENT (OUTPATIENT)
Age: 59
End: 2019-02-22

## 2019-02-22 VITALS
HEART RATE: 76 BPM | DIASTOLIC BLOOD PRESSURE: 83 MMHG | BODY MASS INDEX: 37.47 KG/M2 | WEIGHT: 292 LBS | HEIGHT: 74 IN | SYSTOLIC BLOOD PRESSURE: 133 MMHG | OXYGEN SATURATION: 97 %

## 2019-02-22 PROBLEM — Z91.89 OTHER SPECIFIED PERSONAL RISK FACTORS, NOT ELSEWHERE CLASSIFIED: Chronic | Status: ACTIVE | Noted: 2019-02-19

## 2019-02-22 PROBLEM — M25.562 PAIN IN LEFT KNEE: Chronic | Status: ACTIVE | Noted: 2019-02-19

## 2019-02-22 PROBLEM — I48.92 UNSPECIFIED ATRIAL FLUTTER: Chronic | Status: ACTIVE | Noted: 2019-02-19

## 2019-02-22 PROBLEM — M10.9 GOUT, UNSPECIFIED: Chronic | Status: ACTIVE | Noted: 2019-02-19

## 2019-02-22 PROBLEM — I25.10 ATHEROSCLEROTIC HEART DISEASE OF NATIVE CORONARY ARTERY WITHOUT ANGINA PECTORIS: Chronic | Status: ACTIVE | Noted: 2019-02-19

## 2019-02-22 PROBLEM — E78.5 HYPERLIPIDEMIA, UNSPECIFIED: Chronic | Status: ACTIVE | Noted: 2019-02-19

## 2019-02-22 PROCEDURE — 99214 OFFICE O/P EST MOD 30 MIN: CPT | Mod: 25

## 2019-02-22 PROCEDURE — 93000 ELECTROCARDIOGRAM COMPLETE: CPT

## 2019-02-22 NOTE — DISCUSSION/SUMMARY
[FreeTextEntry1] : New onset AF with GRISELDA Vasc score of 2:\par remain on oral AC,\par will begin toprol XL 12.5 MG QD.\par Nuclear stress test\par EP consult possible Ablation/LINQ\par Sleep study advised \par Recent labs reviewed\par Defer knee surgery at this time\par \par CAD: No angina continue with ASA/Statin

## 2019-02-22 NOTE — HISTORY OF PRESENT ILLNESS
[FreeTextEntry1] : 60 Y/O gentleman PMH:  HTN, HLD, CAD/MI 2010 LAD stent,  moderate LVH  elevated hemoglobin A1c, mildly dilated aortic root and ascending aorta (4 cm) who presents today with recent diagnosis of new onset AF that was noted during [facility] Pre op knee surgery and found to have atrial flutter on his resting EKG  He was seen by Dr Nava  and started on Eliquis 5 MG BID\par \par Claims to be asymptomatic from cardiac standpoint. States he is still going to the gym exercising W/O limitations or restrictions. Does C/O fatigue\par \par EKG today reveals Atrial Flutter with septal Q's\par \par Echo: 1/22/19, Normal left ventricular systolic function. Moderate LVH. Normal right ventricular size and function. Mild left atrial enlargement. Mild mitral regurgitation. Aortic valve sclerosis. Mildly dilated aortic root and ascending aorta. Mild pulmonic regurgitation. Borderline pulmonary hypertension. EF 56%.

## 2019-02-22 NOTE — REASON FOR VISIT
[Follow-Up - Clinic] : a clinic follow-up of [Anticoagulation] : anticoagulation [Atrial Fibrillation] : atrial fibrillation [Coronary Artery Disease] : coronary artery disease [Hyperlipidemia] : hyperlipidemia [Hypertension] : hypertension [Medication Management] : Medication management [FreeTextEntry1] : Obesity

## 2019-02-28 ENCOUNTER — APPOINTMENT (OUTPATIENT)
Dept: ORTHOPEDIC SURGERY | Facility: HOSPITAL | Age: 59
End: 2019-02-28

## 2019-02-28 ENCOUNTER — APPOINTMENT (OUTPATIENT)
Dept: CARDIOLOGY | Facility: CLINIC | Age: 59
End: 2019-02-28
Payer: COMMERCIAL

## 2019-02-28 PROCEDURE — A9500: CPT

## 2019-02-28 PROCEDURE — 78452 HT MUSCLE IMAGE SPECT MULT: CPT

## 2019-02-28 PROCEDURE — 93015 CV STRESS TEST SUPVJ I&R: CPT

## 2019-03-01 ENCOUNTER — TRANSCRIPTION ENCOUNTER (OUTPATIENT)
Age: 59
End: 2019-03-01

## 2019-03-15 ENCOUNTER — NON-APPOINTMENT (OUTPATIENT)
Age: 59
End: 2019-03-15

## 2019-03-15 ENCOUNTER — APPOINTMENT (OUTPATIENT)
Dept: ELECTROPHYSIOLOGY | Facility: CLINIC | Age: 59
End: 2019-03-15
Payer: COMMERCIAL

## 2019-03-15 VITALS — HEART RATE: 88 BPM | SYSTOLIC BLOOD PRESSURE: 148 MMHG | DIASTOLIC BLOOD PRESSURE: 93 MMHG | HEIGHT: 62 IN

## 2019-03-15 PROCEDURE — 93000 ELECTROCARDIOGRAM COMPLETE: CPT

## 2019-03-15 PROCEDURE — 99205 OFFICE O/P NEW HI 60 MIN: CPT

## 2019-03-29 ENCOUNTER — OUTPATIENT (OUTPATIENT)
Dept: OUTPATIENT SERVICES | Facility: HOSPITAL | Age: 59
LOS: 1 days | Discharge: HOME | End: 2019-03-29
Payer: COMMERCIAL

## 2019-03-29 VITALS
DIASTOLIC BLOOD PRESSURE: 91 MMHG | RESPIRATION RATE: 18 BRPM | SYSTOLIC BLOOD PRESSURE: 137 MMHG | HEART RATE: 82 BPM | TEMPERATURE: 98 F | OXYGEN SATURATION: 97 %

## 2019-03-29 DIAGNOSIS — Z98.890 OTHER SPECIFIED POSTPROCEDURAL STATES: Chronic | ICD-10-CM

## 2019-03-29 DIAGNOSIS — Z90.49 ACQUIRED ABSENCE OF OTHER SPECIFIED PARTS OF DIGESTIVE TRACT: Chronic | ICD-10-CM

## 2019-03-29 DIAGNOSIS — Z98.61 CORONARY ANGIOPLASTY STATUS: Chronic | ICD-10-CM

## 2019-03-29 LAB
ANION GAP SERPL CALC-SCNC: 5 MMOL/L — SIGNIFICANT CHANGE UP (ref 5–17)
APPEARANCE UR: CLEAR — SIGNIFICANT CHANGE UP
BACTERIA # UR AUTO: ABNORMAL
BASOPHILS # BLD AUTO: 0.05 K/UL — SIGNIFICANT CHANGE UP (ref 0–0.2)
BASOPHILS NFR BLD AUTO: 0.6 % — SIGNIFICANT CHANGE UP (ref 0–2)
BILIRUB UR-MCNC: NEGATIVE — SIGNIFICANT CHANGE UP
BUN SERPL-MCNC: 16 MG/DL — SIGNIFICANT CHANGE UP (ref 7–23)
CALCIUM SERPL-MCNC: 9.1 MG/DL — SIGNIFICANT CHANGE UP (ref 8.5–10.1)
CHLORIDE SERPL-SCNC: 102 MMOL/L — SIGNIFICANT CHANGE UP (ref 96–108)
CO2 SERPL-SCNC: 29 MMOL/L — SIGNIFICANT CHANGE UP (ref 22–31)
COLOR SPEC: YELLOW — SIGNIFICANT CHANGE UP
CREAT SERPL-MCNC: 1.08 MG/DL — SIGNIFICANT CHANGE UP (ref 0.5–1.3)
DIFF PNL FLD: ABNORMAL
EOSINOPHIL # BLD AUTO: 0.21 K/UL — SIGNIFICANT CHANGE UP (ref 0–0.5)
EOSINOPHIL NFR BLD AUTO: 2.4 % — SIGNIFICANT CHANGE UP (ref 0–6)
EPI CELLS # UR: SIGNIFICANT CHANGE UP
GLUCOSE SERPL-MCNC: 100 MG/DL — HIGH (ref 70–99)
GLUCOSE UR QL: NEGATIVE MG/DL — SIGNIFICANT CHANGE UP
HCT VFR BLD CALC: 49.3 % — SIGNIFICANT CHANGE UP (ref 39–50)
HGB BLD-MCNC: 16.4 G/DL — SIGNIFICANT CHANGE UP (ref 13–17)
IMM GRANULOCYTES NFR BLD AUTO: 0.3 % — SIGNIFICANT CHANGE UP (ref 0–1.5)
KETONES UR-MCNC: ABNORMAL
LEUKOCYTE ESTERASE UR-ACNC: NEGATIVE — SIGNIFICANT CHANGE UP
LYMPHOCYTES # BLD AUTO: 2.97 K/UL — SIGNIFICANT CHANGE UP (ref 1–3.3)
LYMPHOCYTES # BLD AUTO: 34.4 % — SIGNIFICANT CHANGE UP (ref 13–44)
MCHC RBC-ENTMCNC: 29.9 PG — SIGNIFICANT CHANGE UP (ref 27–34)
MCHC RBC-ENTMCNC: 33.3 GM/DL — SIGNIFICANT CHANGE UP (ref 32–36)
MCV RBC AUTO: 89.8 FL — SIGNIFICANT CHANGE UP (ref 80–100)
MONOCYTES # BLD AUTO: 0.76 K/UL — SIGNIFICANT CHANGE UP (ref 0–0.9)
MONOCYTES NFR BLD AUTO: 8.8 % — SIGNIFICANT CHANGE UP (ref 2–14)
NEUTROPHILS # BLD AUTO: 4.62 K/UL — SIGNIFICANT CHANGE UP (ref 1.8–7.4)
NEUTROPHILS NFR BLD AUTO: 53.5 % — SIGNIFICANT CHANGE UP (ref 43–77)
NITRITE UR-MCNC: NEGATIVE — SIGNIFICANT CHANGE UP
NRBC # BLD: 0 /100 WBCS — SIGNIFICANT CHANGE UP (ref 0–0)
PH UR: 5 — SIGNIFICANT CHANGE UP (ref 5–8)
PLATELET # BLD AUTO: 267 K/UL — SIGNIFICANT CHANGE UP (ref 150–400)
POTASSIUM SERPL-MCNC: 3.8 MMOL/L — SIGNIFICANT CHANGE UP (ref 3.5–5.3)
POTASSIUM SERPL-SCNC: 3.8 MMOL/L — SIGNIFICANT CHANGE UP (ref 3.5–5.3)
PROT UR-MCNC: 15 MG/DL
RBC # BLD: 5.49 M/UL — SIGNIFICANT CHANGE UP (ref 4.2–5.8)
RBC # FLD: 13.2 % — SIGNIFICANT CHANGE UP (ref 10.3–14.5)
RBC CASTS # UR COMP ASSIST: ABNORMAL /HPF (ref 0–4)
SODIUM SERPL-SCNC: 136 MMOL/L — SIGNIFICANT CHANGE UP (ref 135–145)
SP GR SPEC: 1.02 — SIGNIFICANT CHANGE UP (ref 1.01–1.02)
UROBILINOGEN FLD QL: NEGATIVE MG/DL — SIGNIFICANT CHANGE UP
WBC # BLD: 8.64 K/UL — SIGNIFICANT CHANGE UP (ref 3.8–10.5)
WBC # FLD AUTO: 8.64 K/UL — SIGNIFICANT CHANGE UP (ref 3.8–10.5)
WBC UR QL: SIGNIFICANT CHANGE UP

## 2019-03-29 PROCEDURE — 93010 ELECTROCARDIOGRAM REPORT: CPT

## 2019-03-29 PROCEDURE — 71046 X-RAY EXAM CHEST 2 VIEWS: CPT | Mod: 26

## 2019-03-29 RX ORDER — NIACIN 50 MG
0 TABLET ORAL
Qty: 0 | Refills: 0 | COMMUNITY

## 2019-03-29 RX ORDER — ROSUVASTATIN CALCIUM 5 MG/1
1 TABLET ORAL
Qty: 0 | Refills: 0 | COMMUNITY

## 2019-03-29 NOTE — H&P PST ADULT - FUNCTIONAL SCREEN CURRENT LEVEL: EATING, MLM
Pt. needs refill on Warfarin 5mg medication and he has only 1 week of supply left. Please call his [facility] at [phone]   Please call pt. back at [phone] to notify him that the medication was refilled.    0 = independent

## 2019-03-29 NOTE — H&P PST ADULT - NSICDXPASTMEDICALHX_GEN_ALL_CORE_FT
PAST MEDICAL HISTORY:  At risk for sleep apnea     Atrial flutter by electrocardiogram     BMI 37.0-37.9, adult     CAD (coronary artery disease)     Dyslipidemia     Gout     HTN (hypertension)     Hyperlipidemia     Left knee pain     Myocardial infarction 2005, 2008    Obesity (BMI 30-39.9)     Stented coronary artery X4- last 2010

## 2019-03-29 NOTE — H&P PST ADULT - HISTORY OF PRESENT ILLNESS
59 y.o male presents for PST with hx of going for PST for left knee arthroscopy, EKG noted to be Aflutter, followed with cardio, hx significant for MI, CAD-stents x4, HTN, Hyperlipidemia, now scheduled for EPS w/Aflutter Ablation with anesthesia

## 2019-03-29 NOTE — H&P PST ADULT - NSICDXFAMILYHX_GEN_ALL_CORE_FT
FAMILY HISTORY:  Father  Still living? No  Family history of heart failure, Age at diagnosis: Age Unknown  Family history of liver disease, Age at diagnosis: Age Unknown    Mother  Still living? No  Maternal family history of respiratory disease, Age at diagnosis: Age Unknown

## 2019-03-29 NOTE — ASU PATIENT PROFILE, ADULT - PMH
At risk for sleep apnea    Atrial flutter by electrocardiogram    BMI 37.0-37.9, adult    CAD (coronary artery disease)    Dyslipidemia    Gout    HTN (hypertension)    Hyperlipidemia    Left knee pain    Myocardial infarction  2005, 2008  Obesity (BMI 30-39.9)    Stented coronary artery  X4- last 2010

## 2019-03-29 NOTE — H&P PST ADULT - TEACHING/LEARNING LEARNING PREFERENCES
written material/group instruction/verbal instruction/audio/computer/internet/individual instruction/pictorial/skill demonstration/video

## 2019-03-29 NOTE — H&P PST ADULT - NSICDXPASTSURGICALHX_GEN_ALL_CORE_FT
PAST SURGICAL HISTORY:  H/O eye surgery right eye retinal tear, 2017    H/O right inguinal hernia repair age 13    History of appendectomy 1991    Post PTCA x 4 stents in place- last 2010

## 2019-03-29 NOTE — H&P PST ADULT - ASSESSMENT
59 y.o male scheduled  EPS w/Aflutter Ablation with anesthesia 59 y.o male scheduled  EPS w/Aflutter Ablation with anesthesia   Instructions as per Cardio  NPO after midnight except for medication  May continue all routine meds with sip of water including morning of procedure  NOAC instructions; Hold Eliquis 48hrs prior to procedure

## 2019-03-29 NOTE — ASU PATIENT PROFILE, ADULT - PSH
H/O eye surgery  right eye retinal tear, 2017  H/O right inguinal hernia repair  age 13  History of appendectomy  1991  Post PTCA  x 4 stents in place- last 2010

## 2019-03-30 PROBLEM — Z95.5 PRESENCE OF CORONARY ANGIOPLASTY IMPLANT AND GRAFT: Chronic | Status: ACTIVE | Noted: 2019-02-19

## 2019-03-30 LAB
MRSA PCR RESULT.: SIGNIFICANT CHANGE UP
S AUREUS DNA NOSE QL NAA+PROBE: SIGNIFICANT CHANGE UP

## 2019-04-01 ENCOUNTER — OUTPATIENT (OUTPATIENT)
Dept: OUTPATIENT SERVICES | Facility: HOSPITAL | Age: 59
LOS: 1 days | End: 2019-04-01
Payer: COMMERCIAL

## 2019-04-01 VITALS
HEART RATE: 74 BPM | OXYGEN SATURATION: 100 % | DIASTOLIC BLOOD PRESSURE: 95 MMHG | TEMPERATURE: 97 F | HEIGHT: 73 IN | RESPIRATION RATE: 18 BRPM | SYSTOLIC BLOOD PRESSURE: 138 MMHG | WEIGHT: 285.06 LBS

## 2019-04-01 DIAGNOSIS — I48.92 UNSPECIFIED ATRIAL FLUTTER: ICD-10-CM

## 2019-04-01 DIAGNOSIS — Z90.49 ACQUIRED ABSENCE OF OTHER SPECIFIED PARTS OF DIGESTIVE TRACT: Chronic | ICD-10-CM

## 2019-04-01 DIAGNOSIS — I25.2 OLD MYOCARDIAL INFARCTION: ICD-10-CM

## 2019-04-01 DIAGNOSIS — E78.5 HYPERLIPIDEMIA, UNSPECIFIED: ICD-10-CM

## 2019-04-01 DIAGNOSIS — Z95.5 PRESENCE OF CORONARY ANGIOPLASTY IMPLANT AND GRAFT: ICD-10-CM

## 2019-04-01 DIAGNOSIS — Z98.890 OTHER SPECIFIED POSTPROCEDURAL STATES: Chronic | ICD-10-CM

## 2019-04-01 DIAGNOSIS — E66.9 OBESITY, UNSPECIFIED: ICD-10-CM

## 2019-04-01 DIAGNOSIS — Z98.61 CORONARY ANGIOPLASTY STATUS: Chronic | ICD-10-CM

## 2019-04-01 DIAGNOSIS — I10 ESSENTIAL (PRIMARY) HYPERTENSION: ICD-10-CM

## 2019-04-01 DIAGNOSIS — I25.10 ATHEROSCLEROTIC HEART DISEASE OF NATIVE CORONARY ARTERY WITHOUT ANGINA PECTORIS: ICD-10-CM

## 2019-04-01 DIAGNOSIS — M10.9 GOUT, UNSPECIFIED: ICD-10-CM

## 2019-04-01 PROCEDURE — 93613 INTRACARDIAC EPHYS 3D MAPG: CPT

## 2019-04-01 PROCEDURE — 93621 COMP EP EVL L PAC&REC C SINS: CPT | Mod: 26

## 2019-04-01 PROCEDURE — 93653 COMPRE EP EVAL TX SVT: CPT

## 2019-04-01 PROCEDURE — 93010 ELECTROCARDIOGRAM REPORT: CPT

## 2019-04-01 RX ORDER — ACETAMINOPHEN 500 MG
650 TABLET ORAL EVERY 6 HOURS
Qty: 0 | Refills: 0 | Status: DISCONTINUED | OUTPATIENT
Start: 2019-04-01 | End: 2019-04-02

## 2019-04-01 RX ORDER — ASPIRIN/CALCIUM CARB/MAGNESIUM 324 MG
81 TABLET ORAL DAILY
Qty: 0 | Refills: 0 | Status: DISCONTINUED | OUTPATIENT
Start: 2019-04-01 | End: 2019-04-02

## 2019-04-01 RX ORDER — ALLOPURINOL 300 MG
100 TABLET ORAL AT BEDTIME
Qty: 0 | Refills: 0 | Status: DISCONTINUED | OUTPATIENT
Start: 2019-04-01 | End: 2019-04-02

## 2019-04-01 RX ORDER — APIXABAN 2.5 MG/1
5 TABLET, FILM COATED ORAL EVERY 12 HOURS
Qty: 0 | Refills: 0 | Status: DISCONTINUED | OUTPATIENT
Start: 2019-04-01 | End: 2019-04-02

## 2019-04-01 RX ORDER — ATORVASTATIN CALCIUM 80 MG/1
20 TABLET, FILM COATED ORAL AT BEDTIME
Qty: 0 | Refills: 0 | Status: DISCONTINUED | OUTPATIENT
Start: 2019-04-01 | End: 2019-04-02

## 2019-04-01 RX ORDER — METOPROLOL TARTRATE 50 MG
12.5 TABLET ORAL DAILY
Qty: 0 | Refills: 0 | Status: DISCONTINUED | OUTPATIENT
Start: 2019-04-01 | End: 2019-04-02

## 2019-04-01 RX ADMIN — Medication 100 MILLIGRAM(S): at 21:52

## 2019-04-01 RX ADMIN — APIXABAN 5 MILLIGRAM(S): 2.5 TABLET, FILM COATED ORAL at 21:51

## 2019-04-01 RX ADMIN — ATORVASTATIN CALCIUM 20 MILLIGRAM(S): 80 TABLET, FILM COATED ORAL at 21:51

## 2019-04-01 NOTE — PACU DISCHARGE NOTE - COMMENTS
Vss, awake and alert and oriented. no complaints of any pain. S/p ablation with right groin dressing clean, dry and intact. No evidence of any bleeding or hematoma. Patient stable for discharge to [facility]. Report given to Brittney KNAPP and michael placed onto telemetry monitor. Rhythm confirmed by Brittney KNAPP on [facility] telemetry monitor. Post-procedure education provided and all questions and concerns addressed appropriately.

## 2019-04-02 ENCOUNTER — TRANSCRIPTION ENCOUNTER (OUTPATIENT)
Age: 59
End: 2019-04-02

## 2019-04-02 VITALS
SYSTOLIC BLOOD PRESSURE: 140 MMHG | HEART RATE: 74 BPM | OXYGEN SATURATION: 95 % | TEMPERATURE: 98 F | DIASTOLIC BLOOD PRESSURE: 82 MMHG | RESPIRATION RATE: 18 BRPM

## 2019-04-02 RX ADMIN — APIXABAN 5 MILLIGRAM(S): 2.5 TABLET, FILM COATED ORAL at 05:33

## 2019-04-02 RX ADMIN — Medication 12.5 MILLIGRAM(S): at 05:33

## 2019-04-02 NOTE — DISCHARGE NOTE NURSING/CASE MANAGEMENT/SOCIAL WORK - NSDCPEEMAIL_GEN_ALL_CORE
[facility] for Tobacco Control email [email or web address]

## 2019-04-02 NOTE — ASSESSMENT
[FreeTextEntry1] : This is a 59-year-old gentleman with typical type I atrial flutter. He has some symptoms related to fatigue and exercise intolerance. Discussed at length. The options for management of his arrhythmia including rate control and anticoagulation versus rhythm control with antiarrhythmic drugs as well as catheter ablation. Explained the risks, benefits, alternatives, and expected outcomes of catheter ablation, as well as the other therapies, and he consents for ablation.

## 2019-04-02 NOTE — DISCHARGE NOTE PROVIDER - CARE PROVIDER_API CALL
Phi Bazan (MD)  Cardiac Electrophysiology; Cardiovascular Disease  [address] [place] [address]  Phone: [phone]  Fax: [phone]  Follow Up Time:

## 2019-04-02 NOTE — PHYSICAL EXAM
[General Appearance - Well Developed] : well developed [Normal Appearance] : normal appearance [Well Groomed] : well groomed [General Appearance - Well Nourished] : well nourished [No Deformities] : no deformities [General Appearance - In No Acute Distress] : no acute distress [Normal Conjunctiva] : the conjunctiva exhibited no abnormalities [Eyelids - No Xanthelasma] : the eyelids demonstrated no xanthelasmas [Normal Oral Mucosa] : normal oral mucosa [No Oral Pallor] : no oral pallor [No Oral Cyanosis] : no oral cyanosis [Normal Jugular Venous A Waves Present] : normal jugular venous A waves present [Normal Jugular Venous V Waves Present] : normal jugular venous V waves present [No Jugular Venous Black A Waves] : no jugular venous black A waves [Normal] : normal [Normal Rate] : normal [Rhythm Regular] : regular [Normal S1] : normal S1 [Normal S2] : normal S2 [S3] : no S3 [S4] : no S4 [II] : a grade 2 [Right Carotid Bruit] : no bruit heard over the right carotid [Left Carotid Bruit] : no bruit heard over the left carotid [Right Femoral Bruit] : no bruit heard over the right femoral artery [Left Femoral Bruit] : no bruit heard over the left femoral artery [2+] : left 2+ [No Abnormalities] : the abdominal aorta was not enlarged and no bruit was heard [No Pitting Edema] : no pitting edema present [Respiration, Rhythm And Depth] : normal respiratory rhythm and effort [Exaggerated Use Of Accessory Muscles For Inspiration] : no accessory muscle use [Auscultation Breath Sounds / Voice Sounds] : lungs were clear to auscultation bilaterally [Abdomen Soft] : soft [Abdomen Tenderness] : non-tender [Abdomen Mass (___ Cm)] : no abdominal mass palpated [Abnormal Walk] : normal gait [Gait - Sufficient For Exercise Testing] : the gait was sufficient for exercise testing [Nail Clubbing] : no clubbing of the fingernails [Cyanosis, Localized] : no localized cyanosis [Petechial Hemorrhages (___cm)] : no petechial hemorrhages [Skin Color & Pigmentation] : normal skin color and pigmentation [] : no rash [No Venous Stasis] : no venous stasis [Skin Lesions] : no skin lesions [No Skin Ulcers] : no skin ulcer [No Xanthoma] : no  xanthoma was observed [Oriented To Time, Place, And Person] : oriented to person, place, and time [Affect] : the affect was normal [Mood] : the mood was normal [No Anxiety] : not feeling anxious

## 2019-04-02 NOTE — DISCHARGE NOTE NURSING/CASE MANAGEMENT/SOCIAL WORK - NSDCPEWEB_GEN_ALL_CORE
[facility] for Tobacco Control website --- [email or web address] website --- [email or web address]

## 2019-04-02 NOTE — DISCHARGE NOTE PROVIDER - NSDCCPCAREPLAN_GEN_ALL_CORE_FT
PRINCIPAL DISCHARGE DIAGNOSIS  Diagnosis: Typical atrial flutter  Assessment and Plan of Treatment: Pt to continue with present medications and to have f/u in 2 weeks in the office

## 2019-04-02 NOTE — DISCHARGE NOTE PROVIDER - HOSPITAL COURSE
59yMale with known CAD S/P PCI x 4 who presents with At. Flutter        [id number] At Flutter with VR 74 BPM ( 4:1 AV Block),  Post procedure :  SR 80 BPM with First    Degree HB. TELE:  SR with First Degree HB        MEDICATIONS  (STANDING):    allopurinol 100 milliGRAM(s) Oral at bedtime    apixaban 5 milliGRAM(s) Oral every 12 hours    aspirin  chewable 81 milliGRAM(s) Oral daily    atorvastatin 20 milliGRAM(s) Oral at bedtime    metoprolol succinate ER 12.5 milliGRAM(s) Oral daily        MEDICATIONS  (PRN):    acetaminophen   Tablet .. 650 milliGRAM(s) Oral every 6 hours PRN Mild Pain (1 - 3        No Known Allergies                Vital Signs Last 24 Hrs    T(C): 36.7 (2019 05:32), Max: 36.8 (2019 20:43)    T(F): 98.1 (2019 05:32), Max: 98.2 (2019 20:43)    HR: 78 (2019 05:32) (74 - 94)    BP: 146/93 (2019 05:32) (124/97 - 157/84)    BP(mean): --    RR: 18 (2019 05:32) (16 - 20)    SpO2: 96% (2019 05:32) (96% - 100%)        PHYSICAL EXAMINATION:        GENERAL APPEARANCE:  Pt. is not currently dyspneic, in no distress. Pt. is alert, oriented, and pleasant.    HEENT:  Pupils are normal and react normally. No icterus. Mucous membranes well colored.    NECK:  Supple. No lymphadenopathy. Jugular venous pressure not elevated. Carotids equal.     HEART:   The cardiac impulse has a normal quality. There are no murmurs, rubs or gallops noted    CHEST:  Chest is clear to auscultation. Normal respiratory effort.    ABDOMEN:  Soft and nontender.     EXTREMITIES:  There is no edema, no hematoma, no bruits    SKIN:  No rash or significant lesions are noted.            ASSESSMENT & PLAN: Pt is stable for discharge    To continue with Eliquis and Toprol    Will have f/u appointment in 2 weeks    Discharge instuctions given to pt and questions answered

## 2019-04-02 NOTE — CARDIOLOGY SUMMARY
[___] : [unfilled] [LVEF ___%] : LVEF [unfilled]% [None] : normal LV function [Enlarged] : enlarged LA size [Mild] : mild mitral regurgitation

## 2019-04-02 NOTE — REASON FOR VISIT
[Consultation] : a consultation regarding [Supraventricular Tachycardia] : supraventricular tachycardia [FreeTextEntry1] : Atrial flutter

## 2019-04-02 NOTE — HISTORY OF PRESENT ILLNESS
[FreeTextEntry1] : This is a 59-year-old gentleman with a history of hypertension, hyperlipidemia, coronary artery disease, status post a stent who presents with new onset typical atrial flutter. She was found to be in his arrhythmia during presurgical testing for knee surgery. He was started on anticoagulant. He is minimally symptomatic with his arrhythmia though he does report a decrease in his exercise capacity at the gym and increasing fatigue. STANISLAV GRAMMERSTORF  denies chest pain, chest pressure, shortness of breath, lightheadedness, dizziness, palpitations, syncope, presyncope, orthopnea, PND, or edema.

## 2019-04-02 NOTE — DISCHARGE NOTE NURSING/CASE MANAGEMENT/SOCIAL WORK - NSDCDPATPORTLINK_GEN_ALL_CORE
You can access the [facility] Portal, offered by [facility], by registering with the following website: [email or web address]

## 2019-04-04 ENCOUNTER — APPOINTMENT (OUTPATIENT)
Dept: CARDIOLOGY | Facility: CLINIC | Age: 59
End: 2019-04-04

## 2019-04-05 DIAGNOSIS — I48.92 UNSPECIFIED ATRIAL FLUTTER: ICD-10-CM

## 2019-04-18 ENCOUNTER — RX RENEWAL (OUTPATIENT)
Age: 59
End: 2019-04-18

## 2019-05-02 ENCOUNTER — APPOINTMENT (OUTPATIENT)
Dept: ELECTROPHYSIOLOGY | Facility: CLINIC | Age: 59
End: 2019-05-02
Payer: COMMERCIAL

## 2019-05-02 ENCOUNTER — NON-APPOINTMENT (OUTPATIENT)
Age: 59
End: 2019-05-02

## 2019-05-02 VITALS — DIASTOLIC BLOOD PRESSURE: 87 MMHG | SYSTOLIC BLOOD PRESSURE: 133 MMHG | HEART RATE: 73 BPM | OXYGEN SATURATION: 98 %

## 2019-05-02 PROCEDURE — 99214 OFFICE O/P EST MOD 30 MIN: CPT

## 2019-05-02 PROCEDURE — 93000 ELECTROCARDIOGRAM COMPLETE: CPT

## 2019-05-02 NOTE — HISTORY OF PRESENT ILLNESS
[FreeTextEntry1] : This is a 59-year-old gentleman with a history of hypertension, hyperlipidemia, coronary artery disease, status post a stent who presents with new onset typical atrial flutter. She was found to be in his arrhythmia during presurgical testing for knee surgery. He was started on anticoagulant. He is minimally symptomatic with his arrhythmia though he does report a decrease in his exercise capacity at the gym and increasing fatigue. \par  He feels well after his ablation increasing his activity. \par \par STANISLAV GRAMMERSTORF  denies chest pain, chest pressure, shortness of breath, lightheadedness, dizziness, palpitations, syncope, presyncope, orthopnea, PND, or edema. \par

## 2019-05-02 NOTE — PHYSICAL EXAM
[Normal Appearance] : normal appearance [Well Groomed] : well groomed [General Appearance - Well Developed] : well developed [No Deformities] : no deformities [General Appearance - Well Nourished] : well nourished [General Appearance - In No Acute Distress] : no acute distress [Normal Conjunctiva] : the conjunctiva exhibited no abnormalities [Eyelids - No Xanthelasma] : the eyelids demonstrated no xanthelasmas [No Oral Pallor] : no oral pallor [Normal Oral Mucosa] : normal oral mucosa [No Oral Cyanosis] : no oral cyanosis [Normal Jugular Venous A Waves Present] : normal jugular venous A waves present [Normal Jugular Venous V Waves Present] : normal jugular venous V waves present [No Jugular Venous Black A Waves] : no jugular venous black A waves [Respiration, Rhythm And Depth] : normal respiratory rhythm and effort [Auscultation Breath Sounds / Voice Sounds] : lungs were clear to auscultation bilaterally [Exaggerated Use Of Accessory Muscles For Inspiration] : no accessory muscle use [Abdomen Soft] : soft [Abdomen Tenderness] : non-tender [Abdomen Mass (___ Cm)] : no abdominal mass palpated [Gait - Sufficient For Exercise Testing] : the gait was sufficient for exercise testing [Abnormal Walk] : normal gait [Nail Clubbing] : no clubbing of the fingernails [Cyanosis, Localized] : no localized cyanosis [Petechial Hemorrhages (___cm)] : no petechial hemorrhages [Skin Color & Pigmentation] : normal skin color and pigmentation [] : no rash [No Venous Stasis] : no venous stasis [Skin Lesions] : no skin lesions [No Skin Ulcers] : no skin ulcer [No Xanthoma] : no  xanthoma was observed [Oriented To Time, Place, And Person] : oriented to person, place, and time [Mood] : the mood was normal [Affect] : the affect was normal [No Anxiety] : not feeling anxious [Normal] : normal [Normal Rate] : normal [Rhythm Regular] : regular [Normal S1] : normal S1 [S3] : no S3 [S4] : no S4 [Normal S2] : normal S2 [II] : a grade 2 [Right Carotid Bruit] : no bruit heard over the right carotid [Left Carotid Bruit] : no bruit heard over the left carotid [Right Femoral Bruit] : no bruit heard over the right femoral artery [Left Femoral Bruit] : no bruit heard over the left femoral artery [2+] : left 2+ [No Pitting Edema] : no pitting edema present [No Abnormalities] : the abdominal aorta was not enlarged and no bruit was heard

## 2019-05-02 NOTE — ASSESSMENT
[FreeTextEntry1] : This is a 59-year-old gentleman with typical type I atrial flutter. He has some symptoms related to fatigue and exercise intolerance. \par S/p ablation with improvement in symptoms. \par \par Will continue anticoagulation for 3 months.

## 2019-05-29 ENCOUNTER — RX RENEWAL (OUTPATIENT)
Age: 59
End: 2019-05-29

## 2019-05-30 ENCOUNTER — NON-APPOINTMENT (OUTPATIENT)
Age: 59
End: 2019-05-30

## 2019-05-30 ENCOUNTER — MEDICATION RENEWAL (OUTPATIENT)
Age: 59
End: 2019-05-30

## 2019-05-30 ENCOUNTER — APPOINTMENT (OUTPATIENT)
Dept: CARDIOLOGY | Facility: CLINIC | Age: 59
End: 2019-05-30
Payer: COMMERCIAL

## 2019-05-30 VITALS
OXYGEN SATURATION: 94 % | DIASTOLIC BLOOD PRESSURE: 71 MMHG | HEART RATE: 74 BPM | RESPIRATION RATE: 18 BRPM | SYSTOLIC BLOOD PRESSURE: 130 MMHG | WEIGHT: 288 LBS | HEIGHT: 74 IN | BODY MASS INDEX: 36.96 KG/M2

## 2019-05-30 PROCEDURE — 99214 OFFICE O/P EST MOD 30 MIN: CPT | Mod: 25

## 2019-05-30 PROCEDURE — 93000 ELECTROCARDIOGRAM COMPLETE: CPT

## 2019-06-13 NOTE — HISTORY OF PRESENT ILLNESS
[FreeTextEntry1] : 60 Y/O gentleman PMH:  HTN, HLD, CAD/MI 2010 LAD stent,  moderate LVH  elevated hemoglobin A1c, mildly dilated aortic root and ascending aorta (4 cm) who presents today with recent diagnosis of atrial flutter which was ablated by Dr. Bazan Pt is exercising at the gym 4 x week. \par \par Echo: 1/22/19, Normal left ventricular systolic function. Moderate LVH. Normal right ventricular size and function. Mild left atrial enlargement. Mild mitral regurgitation. Aortic valve sclerosis. Mildly dilated aortic root and ascending aorta. Mild pulmonic regurgitation. Borderline pulmonary hypertension. EF 56%. \par \par Pt is anticipating knee surgery soon and requests clearance.

## 2019-06-13 NOTE — DISCUSSION/SUMMARY
[FreeTextEntry1] : Pt will continue to try to lose weight and eat a heart healthy diet. \par \par CAD: No angina continue with ASA/Statin\par \par There are no cardiology or medical  contraindication for planned knee surgery. He will stop Eliquis for 48 hours prior to the surgery. He will restart Eliquis as soon as possible after surgery.

## 2019-06-17 ENCOUNTER — OUTPATIENT (OUTPATIENT)
Dept: OUTPATIENT SERVICES | Facility: HOSPITAL | Age: 59
LOS: 1 days | End: 2019-06-17
Payer: COMMERCIAL

## 2019-06-17 VITALS
WEIGHT: 291.89 LBS | TEMPERATURE: 99 F | OXYGEN SATURATION: 98 % | HEIGHT: 73 IN | HEART RATE: 68 BPM | SYSTOLIC BLOOD PRESSURE: 142 MMHG | RESPIRATION RATE: 18 BRPM | DIASTOLIC BLOOD PRESSURE: 88 MMHG

## 2019-06-17 DIAGNOSIS — S83.249A OTHER TEAR OF MEDIAL MENISCUS, CURRENT INJURY, UNSPECIFIED KNEE, INITIAL ENCOUNTER: ICD-10-CM

## 2019-06-17 DIAGNOSIS — Z98.890 OTHER SPECIFIED POSTPROCEDURAL STATES: Chronic | ICD-10-CM

## 2019-06-17 DIAGNOSIS — Z90.49 ACQUIRED ABSENCE OF OTHER SPECIFIED PARTS OF DIGESTIVE TRACT: Chronic | ICD-10-CM

## 2019-06-17 DIAGNOSIS — Z01.818 ENCOUNTER FOR OTHER PREPROCEDURAL EXAMINATION: ICD-10-CM

## 2019-06-17 DIAGNOSIS — Z98.61 CORONARY ANGIOPLASTY STATUS: Chronic | ICD-10-CM

## 2019-06-17 LAB
ANION GAP SERPL CALC-SCNC: 7 MMOL/L — SIGNIFICANT CHANGE UP (ref 5–17)
APTT BLD: 34.8 SEC — SIGNIFICANT CHANGE UP (ref 28.5–37)
BUN SERPL-MCNC: 15 MG/DL — SIGNIFICANT CHANGE UP (ref 7–23)
CALCIUM SERPL-MCNC: 9.5 MG/DL — SIGNIFICANT CHANGE UP (ref 8.4–10.5)
CHLORIDE SERPL-SCNC: 101 MMOL/L — SIGNIFICANT CHANGE UP (ref 96–108)
CO2 SERPL-SCNC: 30 MMOL/L — SIGNIFICANT CHANGE UP (ref 22–31)
CREAT SERPL-MCNC: 0.94 MG/DL — SIGNIFICANT CHANGE UP (ref 0.5–1.3)
GLUCOSE SERPL-MCNC: 97 MG/DL — SIGNIFICANT CHANGE UP (ref 70–99)
HCT VFR BLD CALC: 45.1 % — SIGNIFICANT CHANGE UP (ref 39–50)
HGB BLD-MCNC: 15.3 G/DL — SIGNIFICANT CHANGE UP (ref 13–17)
INR BLD: 1.19 RATIO — HIGH (ref 0.88–1.16)
MCHC RBC-ENTMCNC: 30.5 PG — SIGNIFICANT CHANGE UP (ref 27–34)
MCHC RBC-ENTMCNC: 33.9 GM/DL — SIGNIFICANT CHANGE UP (ref 32–36)
MCV RBC AUTO: 89.8 FL — SIGNIFICANT CHANGE UP (ref 80–100)
NRBC # BLD: 0 /100 WBCS — SIGNIFICANT CHANGE UP (ref 0–0)
PLATELET # BLD AUTO: 240 K/UL — SIGNIFICANT CHANGE UP (ref 150–400)
POTASSIUM SERPL-MCNC: 3.9 MMOL/L — SIGNIFICANT CHANGE UP (ref 3.5–5.3)
POTASSIUM SERPL-SCNC: 3.9 MMOL/L — SIGNIFICANT CHANGE UP (ref 3.5–5.3)
PROTHROM AB SERPL-ACNC: 13 SEC — HIGH (ref 10–12.9)
RBC # BLD: 5.02 M/UL — SIGNIFICANT CHANGE UP (ref 4.2–5.8)
RBC # FLD: 13.9 % — SIGNIFICANT CHANGE UP (ref 10.3–14.5)
SODIUM SERPL-SCNC: 138 MMOL/L — SIGNIFICANT CHANGE UP (ref 135–145)
WBC # BLD: 8.75 K/UL — SIGNIFICANT CHANGE UP (ref 3.8–10.5)
WBC # FLD AUTO: 8.75 K/UL — SIGNIFICANT CHANGE UP (ref 3.8–10.5)

## 2019-06-17 PROCEDURE — 85027 COMPLETE CBC AUTOMATED: CPT

## 2019-06-17 PROCEDURE — 80048 BASIC METABOLIC PNL TOTAL CA: CPT

## 2019-06-17 PROCEDURE — G0463: CPT

## 2019-06-17 PROCEDURE — 85610 PROTHROMBIN TIME: CPT

## 2019-06-17 PROCEDURE — 36415 COLL VENOUS BLD VENIPUNCTURE: CPT

## 2019-06-17 PROCEDURE — 85730 THROMBOPLASTIN TIME PARTIAL: CPT

## 2019-06-17 PROCEDURE — 84550 ASSAY OF BLOOD/URIC ACID: CPT

## 2019-06-17 RX ORDER — METOPROLOL TARTRATE 50 MG
0.5 TABLET ORAL
Qty: 0 | Refills: 0 | DISCHARGE

## 2019-06-17 RX ORDER — ROSUVASTATIN CALCIUM 5 MG/1
1 TABLET ORAL
Qty: 0 | Refills: 0 | DISCHARGE

## 2019-06-17 NOTE — H&P PST ADULT - NSICDXPASTMEDICALHX_GEN_ALL_CORE_FT
PAST MEDICAL HISTORY:  At risk for sleep apnea     Atrial flutter by electrocardiogram     CAD (coronary artery disease)     Class 2 obesity with body mass index (BMI) of 38.0 to 38.9 in adult     Dyslipidemia     Gout     HTN (hypertension)     Hyperlipidemia     Left knee pain     Myocardial infarction 2005, 2008    Stented coronary artery X4- last 2010 PAST MEDICAL HISTORY:  At risk for sleep apnea     Atrial flutter by electrocardiogram     CAD (coronary artery disease)     Class 2 obesity with body mass index (BMI) of 38.0 to 38.9 in adult     Dyslipidemia     Gout     HTN (hypertension)     Hyperlipidemia     Left knee pain     Myocardial infarction 2005, 2008    Osteoarthritis     Stented coronary artery X4- last 2010    Vitamin D deficiency PAST MEDICAL HISTORY:  At risk for sleep apnea     Atrial fibrillation     Atrial flutter by electrocardiogram     CAD (coronary artery disease)     Class 2 obesity with body mass index (BMI) of 38.0 to 38.9 in adult     Dyslipidemia     Gout     HTN (hypertension)     Hyperlipidemia     Left knee pain     Myocardial infarction 2005, 2008    Osteoarthritis     Stented coronary artery X4- last 2010    Vitamin D deficiency

## 2019-06-17 NOTE — H&P PST ADULT - ASSESSMENT
58yo male patient scheduled for surgery on 6/21/19. He has obtained Medical/Cardiac Clearance from his PMD.He will hold Eliquis for 48yrs pre-op. He will discuss continuing Aspirin with him. He will be NPO as per Anesthesia and will not take any meds on AM of surgery. All other pre-op instructions reviewed with patient. 58yo male patient scheduled for surgery on 6/21/19. He has obtained Medical/Cardiac Clearance from his PMD.He will hold Eliquis for 48yrs pre-op. He will discuss continuing Aspirin with him. He will be NPO as per Anesthesia and will not take any meds on AM of surgery. All other pre-op instructions reviewed with patient. Pts STOP BANG score if 5. He has a sleep study upcoming. Will request HOPE precautions be observed.

## 2019-06-17 NOTE — H&P PST ADULT - NSANTHOSAYNRD_GEN_A_CORE
No. HOPE screening performed.  STOP BANG Legend: 0-2 = LOW Risk; 3-4 = INTERMEDIATE Risk; 5-8 = HIGH Risk/sleep study scheduled

## 2019-06-17 NOTE — H&P PST ADULT - HISTORY OF PRESENT ILLNESS
58yo male patient with approximately 6 month history of progressively worsening left knee pain. He states that he has some knee pain and then it worsened acutely when he kicked something sideways and it was fixed rather than movable. He rates the pain at 0/10 at rest, but it can go as high as 9/10 with certain movements. He is not taking anything for pain. He has not had any treatment yet. His MRI revealed a torn meniscus and surgery was recommended. He presents today for PSTs.

## 2019-06-18 LAB — URATE SERPL-MCNC: 6.1 MG/DL — SIGNIFICANT CHANGE UP (ref 3.4–8.8)

## 2019-06-20 ENCOUNTER — TRANSCRIPTION ENCOUNTER (OUTPATIENT)
Age: 59
End: 2019-06-20

## 2019-06-20 NOTE — ASU PATIENT PROFILE, ADULT - PMH
At risk for sleep apnea    Atrial fibrillation    Atrial flutter by electrocardiogram    CAD (coronary artery disease)    Class 2 obesity with body mass index (BMI) of 38.0 to 38.9 in adult    Dyslipidemia    Gout    HTN (hypertension)    Hyperlipidemia    Left knee pain    Myocardial infarction  2005, 2008  Osteoarthritis    Stented coronary artery  X4- last 2010  Vitamin D deficiency

## 2019-06-21 ENCOUNTER — APPOINTMENT (OUTPATIENT)
Dept: ORTHOPEDIC SURGERY | Facility: HOSPITAL | Age: 59
End: 2019-06-21

## 2019-06-21 ENCOUNTER — RESULT REVIEW (OUTPATIENT)
Age: 59
End: 2019-06-21

## 2019-06-21 ENCOUNTER — OUTPATIENT (OUTPATIENT)
Dept: OUTPATIENT SERVICES | Facility: HOSPITAL | Age: 59
LOS: 1 days | End: 2019-06-21
Payer: COMMERCIAL

## 2019-06-21 VITALS
OXYGEN SATURATION: 100 % | HEART RATE: 61 BPM | DIASTOLIC BLOOD PRESSURE: 83 MMHG | RESPIRATION RATE: 16 BRPM | SYSTOLIC BLOOD PRESSURE: 143 MMHG

## 2019-06-21 VITALS
OXYGEN SATURATION: 100 % | RESPIRATION RATE: 13 BRPM | TEMPERATURE: 98 F | HEART RATE: 66 BPM | HEIGHT: 78 IN | WEIGHT: 284.4 LBS | DIASTOLIC BLOOD PRESSURE: 88 MMHG | SYSTOLIC BLOOD PRESSURE: 161 MMHG

## 2019-06-21 DIAGNOSIS — Z98.61 CORONARY ANGIOPLASTY STATUS: Chronic | ICD-10-CM

## 2019-06-21 DIAGNOSIS — Z01.818 ENCOUNTER FOR OTHER PREPROCEDURAL EXAMINATION: ICD-10-CM

## 2019-06-21 DIAGNOSIS — Z90.49 ACQUIRED ABSENCE OF OTHER SPECIFIED PARTS OF DIGESTIVE TRACT: Chronic | ICD-10-CM

## 2019-06-21 DIAGNOSIS — Z98.890 OTHER SPECIFIED POSTPROCEDURAL STATES: Chronic | ICD-10-CM

## 2019-06-21 DIAGNOSIS — S83.249A OTHER TEAR OF MEDIAL MENISCUS, CURRENT INJURY, UNSPECIFIED KNEE, INITIAL ENCOUNTER: ICD-10-CM

## 2019-06-21 LAB
APTT BLD: 31.7 SEC — SIGNIFICANT CHANGE UP (ref 28.5–37)
INR BLD: 1.07 RATIO — SIGNIFICANT CHANGE UP (ref 0.88–1.16)
PROTHROM AB SERPL-ACNC: 11.7 SEC — SIGNIFICANT CHANGE UP (ref 10–12.9)

## 2019-06-21 PROCEDURE — 85730 THROMBOPLASTIN TIME PARTIAL: CPT

## 2019-06-21 PROCEDURE — 85610 PROTHROMBIN TIME: CPT

## 2019-06-21 PROCEDURE — 88304 TISSUE EXAM BY PATHOLOGIST: CPT | Mod: 26

## 2019-06-21 PROCEDURE — 29881 ARTHRS KNE SRG MNISECTMY M/L: CPT | Mod: LT

## 2019-06-21 PROCEDURE — 97161 PT EVAL LOW COMPLEX 20 MIN: CPT

## 2019-06-21 PROCEDURE — 88304 TISSUE EXAM BY PATHOLOGIST: CPT

## 2019-06-21 PROCEDURE — 36415 COLL VENOUS BLD VENIPUNCTURE: CPT

## 2019-06-21 RX ORDER — HYDROMORPHONE HYDROCHLORIDE 2 MG/ML
0.5 INJECTION INTRAMUSCULAR; INTRAVENOUS; SUBCUTANEOUS
Refills: 0 | Status: DISCONTINUED | OUTPATIENT
Start: 2019-06-21 | End: 2019-06-21

## 2019-06-21 RX ORDER — OMEGA-3 ACID ETHYL ESTERS 1 G
1 CAPSULE ORAL
Qty: 0 | Refills: 0 | DISCHARGE

## 2019-06-21 RX ORDER — CEFAZOLIN SODIUM 1 G
3000 VIAL (EA) INJECTION ONCE
Refills: 0 | Status: COMPLETED | OUTPATIENT
Start: 2019-06-21 | End: 2019-06-21

## 2019-06-21 RX ORDER — ONDANSETRON 8 MG/1
4 TABLET, FILM COATED ORAL ONCE
Refills: 0 | Status: DISCONTINUED | OUTPATIENT
Start: 2019-06-21 | End: 2019-06-21

## 2019-06-21 RX ORDER — CEFAZOLIN SODIUM 1 G
2000 VIAL (EA) INJECTION ONCE
Refills: 0 | Status: DISCONTINUED | OUTPATIENT
Start: 2019-06-21 | End: 2019-06-21

## 2019-06-21 RX ORDER — CHLORHEXIDINE GLUCONATE 213 G/1000ML
1 SOLUTION TOPICAL ONCE
Refills: 0 | Status: COMPLETED | OUTPATIENT
Start: 2019-06-21 | End: 2019-06-21

## 2019-06-21 RX ORDER — OXYCODONE HYDROCHLORIDE 5 MG/1
5 TABLET ORAL ONCE
Refills: 0 | Status: DISCONTINUED | OUTPATIENT
Start: 2019-06-21 | End: 2019-06-21

## 2019-06-21 RX ORDER — SODIUM CHLORIDE 9 MG/ML
1000 INJECTION, SOLUTION INTRAVENOUS
Refills: 0 | Status: DISCONTINUED | OUTPATIENT
Start: 2019-06-21 | End: 2019-06-21

## 2019-06-21 RX ADMIN — CHLORHEXIDINE GLUCONATE 1 APPLICATION(S): 213 SOLUTION TOPICAL at 14:44

## 2019-06-21 NOTE — PHYSICAL THERAPY INITIAL EVALUATION ADULT - RANGE OF MOTION EXAMINATION, REHAB EVAL
bilateral lower extremity ROM was WFL (within functional limits)/bilateral upper extremity ROM was WFL (within functional limits)/except left knee flexion observed to 70 degrees

## 2019-06-21 NOTE — ASU DISCHARGE PLAN (ADULT/PEDIATRIC) - CALL YOUR DOCTOR IF YOU HAVE ANY OF THE FOLLOWING:
Wound/Surgical Site with redness, or foul smelling discharge or pus/Numbness, tingling, color or temperature change to extremity/Pain not relieved by Medications/Bleeding that does not stop

## 2019-06-21 NOTE — PHYSICAL THERAPY INITIAL EVALUATION ADULT - MANUAL MUSCLE TESTING RESULTS, REHAB EVAL
Pt c/o suprapubic pain and vomiting since yesterday, denies diarrhea, denies pain/burning upon urination grossly assessed due to/s/p above surgery; LLE grossly 3-/5, RLE grossly 5/5

## 2019-06-21 NOTE — ASU DISCHARGE PLAN (ADULT/PEDIATRIC) - ASU DC SPECIAL INSTRUCTIONSFT
Call MD for severe pain/fever/chills    Keep knee elevated to decrease swelling    Ice to knee 20 min on and off first 48 hrs while awake to decrease swelling    Take pain medication as needed.  Increase fluids and take senna or colace while taking pain medication to decrease constipation

## 2019-06-21 NOTE — PHYSICAL THERAPY INITIAL EVALUATION ADULT - ADDITIONAL COMMENTS
Pt lives in a house with 2 dogs and a cat. There are 2 steps to enter without a handrail and 12 steps inside. Pt was independent with all mobility prior to surgery and didn't use an AD.

## 2019-06-21 NOTE — PHYSICAL THERAPY INITIAL EVALUATION ADULT - GAIT DEVIATIONS NOTED, PT EVAL
decreased step length/decreased stride length/decreased weight-shifting ability/decreased steffen/increased time in double stance

## 2019-06-21 NOTE — ASU DISCHARGE PLAN (ADULT/PEDIATRIC) - CARE PROVIDER_API CALL
Avel Addison)  Orthopaedic Surgery  [address] [facility], [facility] [address]  [place] [address]  Phone: [phone]  Fax: [phone]  Follow Up Time:

## 2019-06-25 LAB — SURGICAL PATHOLOGY STUDY: SIGNIFICANT CHANGE UP

## 2019-06-27 PROBLEM — M19.90 UNSPECIFIED OSTEOARTHRITIS, UNSPECIFIED SITE: Chronic | Status: ACTIVE | Noted: 2019-06-17

## 2019-06-27 PROBLEM — I48.91 UNSPECIFIED ATRIAL FIBRILLATION: Chronic | Status: ACTIVE | Noted: 2019-06-17

## 2019-06-27 PROBLEM — E66.9 OBESITY, UNSPECIFIED: Chronic | Status: ACTIVE | Noted: 2019-06-17

## 2019-06-27 PROBLEM — E55.9 VITAMIN D DEFICIENCY, UNSPECIFIED: Chronic | Status: ACTIVE | Noted: 2019-06-17

## 2019-07-01 ENCOUNTER — APPOINTMENT (OUTPATIENT)
Dept: ORTHOPEDIC SURGERY | Facility: CLINIC | Age: 59
End: 2019-07-01
Payer: COMMERCIAL

## 2019-07-01 VITALS
SYSTOLIC BLOOD PRESSURE: 111 MMHG | DIASTOLIC BLOOD PRESSURE: 68 MMHG | WEIGHT: 288 LBS | BODY MASS INDEX: 36.96 KG/M2 | HEART RATE: 65 BPM | HEIGHT: 74 IN

## 2019-07-01 PROCEDURE — 99024 POSTOP FOLLOW-UP VISIT: CPT

## 2019-07-01 PROCEDURE — 73562 X-RAY EXAM OF KNEE 3: CPT | Mod: LT

## 2019-08-06 ENCOUNTER — APPOINTMENT (OUTPATIENT)
Dept: ELECTROPHYSIOLOGY | Facility: CLINIC | Age: 59
End: 2019-08-06
Payer: COMMERCIAL

## 2019-08-06 VITALS — WEIGHT: 288 LBS | BODY MASS INDEX: 36.98 KG/M2

## 2019-08-06 VITALS — DIASTOLIC BLOOD PRESSURE: 84 MMHG | SYSTOLIC BLOOD PRESSURE: 124 MMHG | HEIGHT: 74 IN | HEART RATE: 66 BPM

## 2019-08-06 DIAGNOSIS — Z86.79 PERSONAL HISTORY OF OTHER DISEASES OF THE CIRCULATORY SYSTEM: ICD-10-CM

## 2019-08-06 PROCEDURE — 93000 ELECTROCARDIOGRAM COMPLETE: CPT

## 2019-08-06 PROCEDURE — 99215 OFFICE O/P EST HI 40 MIN: CPT

## 2019-08-06 RX ORDER — APIXABAN 5 MG/1
5 TABLET, FILM COATED ORAL
Qty: 180 | Refills: 3 | Status: DISCONTINUED | COMMUNITY
Start: 2019-02-19 | End: 2019-08-06

## 2019-08-06 RX ORDER — CEFUROXIME AXETIL 500 MG/1
500 TABLET ORAL
Qty: 20 | Refills: 0 | Status: DISCONTINUED | COMMUNITY
Start: 2019-04-26 | End: 2019-08-06

## 2019-08-06 RX ORDER — PREDNISONE 10 MG/1
10 TABLET ORAL
Qty: 20 | Refills: 0 | Status: DISCONTINUED | COMMUNITY
Start: 2019-04-26 | End: 2019-08-06

## 2019-08-06 RX ORDER — AMOXICILLIN 875 MG/1
875 TABLET, FILM COATED ORAL
Qty: 20 | Refills: 0 | Status: DISCONTINUED | COMMUNITY
Start: 2018-12-23 | End: 2019-08-06

## 2019-08-09 ENCOUNTER — APPOINTMENT (OUTPATIENT)
Dept: INTERNAL MEDICINE | Facility: CLINIC | Age: 59
End: 2019-08-09
Payer: COMMERCIAL

## 2019-08-09 VITALS
HEART RATE: 68 BPM | WEIGHT: 280 LBS | SYSTOLIC BLOOD PRESSURE: 120 MMHG | HEIGHT: 74 IN | DIASTOLIC BLOOD PRESSURE: 72 MMHG | TEMPERATURE: 98.3 F | RESPIRATION RATE: 18 BRPM | OXYGEN SATURATION: 96 % | BODY MASS INDEX: 35.94 KG/M2

## 2019-08-09 DIAGNOSIS — Z23 ENCOUNTER FOR IMMUNIZATION: ICD-10-CM

## 2019-08-09 DIAGNOSIS — Z87.19 PERSONAL HISTORY OF OTHER DISEASES OF THE DIGESTIVE SYSTEM: ICD-10-CM

## 2019-08-09 PROCEDURE — 94060 EVALUATION OF WHEEZING: CPT

## 2019-08-09 PROCEDURE — 94727 GAS DIL/WSHOT DETER LNG VOL: CPT

## 2019-08-09 PROCEDURE — 90715 TDAP VACCINE 7 YRS/> IM: CPT

## 2019-08-09 PROCEDURE — 99204 OFFICE O/P NEW MOD 45 MIN: CPT | Mod: 25

## 2019-08-09 PROCEDURE — ZZZZZ: CPT

## 2019-08-09 PROCEDURE — 94729 DIFFUSING CAPACITY: CPT

## 2019-08-09 PROCEDURE — 90471 IMMUNIZATION ADMIN: CPT

## 2019-08-09 NOTE — PHYSICAL EXAM
[General Appearance - Well Developed] : well developed [Normal Appearance] : normal appearance [Well Groomed] : well groomed [General Appearance - Well Nourished] : well nourished [General Appearance - In No Acute Distress] : no acute distress [No Deformities] : no deformities [Normal Conjunctiva] : the conjunctiva exhibited no abnormalities [Normal Oropharynx] : normal oropharynx [Eyelids - No Xanthelasma] : the eyelids demonstrated no xanthelasmas [Neck Appearance] : the appearance of the neck was normal [Neck Cervical Mass (___cm)] : no neck mass was observed [Thyroid Diffuse Enlargement] : the thyroid was not enlarged [Jugular Venous Distention Increased] : there was no jugular-venous distention [Heart Rate And Rhythm] : heart rate and rhythm were normal [Thyroid Nodule] : there were no palpable thyroid nodules [Heart Sounds] : normal S1 and S2 [Murmurs] : no murmurs present [Respiration, Rhythm And Depth] : normal respiratory rhythm and effort [Exaggerated Use Of Accessory Muscles For Inspiration] : no accessory muscle use [Abdomen Soft] : soft [Auscultation Breath Sounds / Voice Sounds] : lungs were clear to auscultation bilaterally [Abdomen Tenderness] : non-tender [Abdomen Mass (___ Cm)] : no abdominal mass palpated [Gait - Sufficient For Exercise Testing] : the gait was sufficient for exercise testing [Abnormal Walk] : normal gait [Nail Clubbing] : no clubbing of the fingernails [Petechial Hemorrhages (___cm)] : no petechial hemorrhages [Cyanosis, Localized] : no localized cyanosis [Skin Color & Pigmentation] : normal skin color and pigmentation [] : no rash [No Venous Stasis] : no venous stasis [No Skin Ulcers] : no skin ulcer [No Xanthoma] : no  xanthoma was observed [Skin Lesions] : no skin lesions [Deep Tendon Reflexes (DTR)] : deep tendon reflexes were 2+ and symmetric [Sensation] : the sensory exam was normal to light touch and pinprick [No Focal Deficits] : no focal deficits [Oriented To Time, Place, And Person] : oriented to person, place, and time [Affect] : the affect was normal [Impaired Insight] : insight and judgment were intact

## 2019-08-14 ENCOUNTER — RX RENEWAL (OUTPATIENT)
Age: 59
End: 2019-08-14

## 2019-08-15 PROBLEM — Z86.79 HISTORY OF ATRIAL FLUTTER: Status: RESOLVED | Noted: 2019-02-19 | Resolved: 2019-08-15

## 2019-08-15 NOTE — PHYSICAL EXAM
[General Appearance - Well Developed] : well developed [Normal Appearance] : normal appearance [Well Groomed] : well groomed [General Appearance - Well Nourished] : well nourished [No Deformities] : no deformities [General Appearance - In No Acute Distress] : no acute distress [Normal Conjunctiva] : the conjunctiva exhibited no abnormalities [Eyelids - No Xanthelasma] : the eyelids demonstrated no xanthelasmas [Normal Oral Mucosa] : normal oral mucosa [No Oral Pallor] : no oral pallor [No Oral Cyanosis] : no oral cyanosis [Normal Jugular Venous A Waves Present] : normal jugular venous A waves present [Normal Jugular Venous V Waves Present] : normal jugular venous V waves present [No Jugular Venous Black A Waves] : no jugular venous black A waves [Respiration, Rhythm And Depth] : normal respiratory rhythm and effort [Exaggerated Use Of Accessory Muscles For Inspiration] : no accessory muscle use [Auscultation Breath Sounds / Voice Sounds] : lungs were clear to auscultation bilaterally [Abdomen Soft] : soft [Abdomen Tenderness] : non-tender [Abdomen Mass (___ Cm)] : no abdominal mass palpated [Abnormal Walk] : normal gait [Gait - Sufficient For Exercise Testing] : the gait was sufficient for exercise testing [Nail Clubbing] : no clubbing of the fingernails [Cyanosis, Localized] : no localized cyanosis [Petechial Hemorrhages (___cm)] : no petechial hemorrhages [Skin Color & Pigmentation] : normal skin color and pigmentation [] : no rash [No Venous Stasis] : no venous stasis [Skin Lesions] : no skin lesions [No Skin Ulcers] : no skin ulcer [No Xanthoma] : no  xanthoma was observed [Oriented To Time, Place, And Person] : oriented to person, place, and time [Affect] : the affect was normal [Mood] : the mood was normal [No Anxiety] : not feeling anxious [Normal] : normal [Normal Rate] : normal [Rhythm Regular] : regular [Normal S1] : normal S1 [Normal S2] : normal S2 [II] : a grade 2 [2+] : left 2+ [No Abnormalities] : the abdominal aorta was not enlarged and no bruit was heard [No Pitting Edema] : no pitting edema present [S3] : no S3 [S4] : no S4 [Right Carotid Bruit] : no bruit heard over the right carotid [Left Carotid Bruit] : no bruit heard over the left carotid [Right Femoral Bruit] : no bruit heard over the right femoral artery [Left Femoral Bruit] : no bruit heard over the left femoral artery

## 2019-08-15 NOTE — HISTORY OF PRESENT ILLNESS
[FreeTextEntry1] : This is a 59-year-old gentleman with a history of hypertension, hyperlipidemia, coronary artery disease, status post a stent who presents with new onset typical atrial flutter. He was found to be in his arrhythmia during presurgical testing for knee surgery. He was started on anticoagulant. He is minimally symptomatic with his arrhythmia though he does report a decrease in his exercise capacity at the gym and increasing fatigue. \par  He feels well after his ablation increasing his activity. \par STANISLAV GRAMMERSTORF  denies chest pain, chest pressure, shortness of breath, lightheadedness, dizziness, palpitations, syncope, presyncope, orthopnea, PND, or edema. \par

## 2019-08-15 NOTE — REASON FOR VISIT
[Follow-Up - Clinic] : a clinic follow-up of [Supraventricular Tachycardia] : supraventricular tachycardia [FreeTextEntry1] : Atrial flutter

## 2019-08-15 NOTE — ASSESSMENT
[FreeTextEntry1] : This is a 59-year-old gentleman with typical type I atrial flutter. He has some symptoms related to fatigue and exercise intolerance. \par S/p ablation with improvement in symptoms. \par He has had no recurrent atrial flutter. Can discontinue Eliquis. \par He is following up with a sleep/pulmonary specialist regarding his sleep apnea. \par \par

## 2019-08-29 ENCOUNTER — RX RENEWAL (OUTPATIENT)
Age: 59
End: 2019-08-29

## 2019-09-17 ENCOUNTER — APPOINTMENT (OUTPATIENT)
Dept: ORTHOPEDIC SURGERY | Facility: CLINIC | Age: 59
End: 2019-09-17
Payer: COMMERCIAL

## 2019-09-17 VITALS — HEIGHT: 74 IN | WEIGHT: 280 LBS | BODY MASS INDEX: 35.94 KG/M2

## 2019-09-17 PROCEDURE — 99024 POSTOP FOLLOW-UP VISIT: CPT

## 2019-09-25 ENCOUNTER — APPOINTMENT (OUTPATIENT)
Dept: ORTHOPEDIC SURGERY | Facility: CLINIC | Age: 59
End: 2019-09-25
Payer: COMMERCIAL

## 2019-09-25 PROCEDURE — 20610 DRAIN/INJ JOINT/BURSA W/O US: CPT | Mod: LT

## 2019-09-26 RX ORDER — HYALURONATE SODIUM 30 MG/2 ML
30 SYRINGE (ML) INTRAARTICULAR
Refills: 0 | Status: COMPLETED | OUTPATIENT
Start: 2019-09-26

## 2019-09-26 RX ADMIN — Medication 2 MG/2ML: at 00:00

## 2019-10-07 ENCOUNTER — APPOINTMENT (OUTPATIENT)
Dept: ORTHOPEDIC SURGERY | Facility: CLINIC | Age: 59
End: 2019-10-07
Payer: COMMERCIAL

## 2019-10-07 VITALS — HEART RATE: 61 BPM | SYSTOLIC BLOOD PRESSURE: 143 MMHG | DIASTOLIC BLOOD PRESSURE: 91 MMHG

## 2019-10-07 PROCEDURE — 20610 DRAIN/INJ JOINT/BURSA W/O US: CPT | Mod: LT

## 2019-10-07 RX ADMIN — Medication 2 MG/2ML: at 00:00

## 2019-10-11 RX ORDER — HYALURONATE SODIUM 20 MG/2 ML
20 SYRINGE (ML) INTRAARTICULAR
Refills: 0 | Status: COMPLETED | OUTPATIENT
Start: 2019-10-07

## 2019-10-11 RX ORDER — HYALURONATE SODIUM 30 MG/2 ML
30 SYRINGE (ML) INTRAARTICULAR
Refills: 0 | Status: COMPLETED | OUTPATIENT
Start: 2019-10-07

## 2019-10-21 ENCOUNTER — APPOINTMENT (OUTPATIENT)
Dept: ORTHOPEDIC SURGERY | Facility: CLINIC | Age: 59
End: 2019-10-21
Payer: COMMERCIAL

## 2019-10-21 VITALS — DIASTOLIC BLOOD PRESSURE: 83 MMHG | HEART RATE: 55 BPM | SYSTOLIC BLOOD PRESSURE: 124 MMHG

## 2019-10-21 PROCEDURE — 20610 DRAIN/INJ JOINT/BURSA W/O US: CPT | Mod: LT

## 2019-10-21 RX ADMIN — Medication 2 MG/2ML: at 00:00

## 2019-10-24 RX ORDER — HYALURONATE SODIUM 30 MG/2 ML
30 SYRINGE (ML) INTRAARTICULAR
Refills: 0 | Status: COMPLETED | OUTPATIENT
Start: 2019-10-21

## 2019-10-28 ENCOUNTER — APPOINTMENT (OUTPATIENT)
Dept: ORTHOPEDIC SURGERY | Facility: CLINIC | Age: 59
End: 2019-10-28
Payer: COMMERCIAL

## 2019-10-28 VITALS
HEIGHT: 74 IN | SYSTOLIC BLOOD PRESSURE: 129 MMHG | BODY MASS INDEX: 35.94 KG/M2 | WEIGHT: 280 LBS | HEART RATE: 95 BPM | DIASTOLIC BLOOD PRESSURE: 78 MMHG

## 2019-10-28 PROCEDURE — 20610 DRAIN/INJ JOINT/BURSA W/O US: CPT | Mod: LT

## 2019-10-28 PROCEDURE — 99214 OFFICE O/P EST MOD 30 MIN: CPT | Mod: 25

## 2019-10-28 RX ORDER — HYALURONATE SODIUM 30 MG/2 ML
30 SYRINGE (ML) INTRAARTICULAR
Refills: 0 | Status: COMPLETED | OUTPATIENT
Start: 2019-10-28

## 2019-10-28 RX ADMIN — Medication 2 MG/2ML: at 00:00

## 2019-11-13 ENCOUNTER — APPOINTMENT (OUTPATIENT)
Dept: UROLOGY | Facility: CLINIC | Age: 59
End: 2019-11-13
Payer: COMMERCIAL

## 2019-11-13 VITALS
SYSTOLIC BLOOD PRESSURE: 127 MMHG | DIASTOLIC BLOOD PRESSURE: 84 MMHG | HEIGHT: 74 IN | OXYGEN SATURATION: 98 % | TEMPERATURE: 98.4 F | WEIGHT: 267 LBS | HEART RATE: 57 BPM | BODY MASS INDEX: 34.27 KG/M2 | RESPIRATION RATE: 18 BRPM

## 2019-11-13 PROCEDURE — 99244 OFF/OP CNSLTJ NEW/EST MOD 40: CPT

## 2019-11-13 NOTE — PHYSICAL EXAM
[General Appearance - Well Developed] : well developed [General Appearance - Well Nourished] : well nourished [Normal Appearance] : normal appearance [Well Groomed] : well groomed [General Appearance - In No Acute Distress] : no acute distress [Edema] : no peripheral edema [Respiration, Rhythm And Depth] : normal respiratory rhythm and effort [Exaggerated Use Of Accessory Muscles For Inspiration] : no accessory muscle use [Abdomen Tenderness] : non-tender [Abdomen Soft] : soft [Costovertebral Angle Tenderness] : no ~M costovertebral angle tenderness [Urethral Meatus] : meatus normal [Scrotum] : the scrotum was normal [Urinary Bladder Findings] : the bladder was normal on palpation [Testes Mass (___cm)] : there were no testicular masses [No Prostate Nodules] : no prostate nodules [Normal Station and Gait] : the gait and station were normal for the patient's age [] : no rash [No Focal Deficits] : no focal deficits [Oriented To Time, Place, And Person] : oriented to person, place, and time [Affect] : the affect was normal [Mood] : the mood was normal [Not Anxious] : not anxious [No Palpable Adenopathy] : no palpable adenopathy [FreeTextEntry1] : The prostate is small and palpably benign

## 2019-11-13 NOTE — END OF VISIT
[FreeTextEntry3] : I advised him to try taking 100 mg of Viagra and blood and urine studies are ordered. He will follow up with a duplex scan of the penis with plans to follow

## 2019-11-13 NOTE — HISTORY OF PRESENT ILLNESS
[FreeTextEntry1] : This patient presents for the evaluation of intermittent but progressive erectile dysfunction noted over the past several months. He is in the middle of a divorce and developing a new relationship. He has had experience with Viagra 50 mg and states that this does not make much of a difference. He states he is voiding well and is not concerned with genitourinary issues at this

## 2019-11-14 ENCOUNTER — APPOINTMENT (OUTPATIENT)
Dept: INTERNAL MEDICINE | Facility: CLINIC | Age: 59
End: 2019-11-14

## 2019-11-15 ENCOUNTER — LABORATORY RESULT (OUTPATIENT)
Age: 59
End: 2019-11-15

## 2019-11-16 LAB
APPEARANCE: CLEAR
BACTERIA: NEGATIVE
BILIRUBIN URINE: NEGATIVE
BLOOD URINE: ABNORMAL
CALCIUM OXALATE CRYSTALS: ABNORMAL
COLOR: YELLOW
GLUCOSE QUALITATIVE U: NEGATIVE
HYALINE CASTS: 0 /LPF
KETONES URINE: NEGATIVE
LEUKOCYTE ESTERASE URINE: NEGATIVE
MICROSCOPIC-UA: NORMAL
NITRITE URINE: NEGATIVE
PH URINE: 6
PROLACTIN SERPL-MCNC: 6.6 NG/ML
PROTEIN URINE: NORMAL
RED BLOOD CELLS URINE: 2 /HPF
SPECIFIC GRAVITY URINE: 1.03
SQUAMOUS EPITHELIAL CELLS: 0 /HPF
TESTOST SERPL-MCNC: 224 NG/DL
UROBILINOGEN URINE: NORMAL
WHITE BLOOD CELLS URINE: 1 /HPF

## 2019-11-17 LAB
25(OH)D3 SERPL-MCNC: 52.6 NG/ML
ALBUMIN SERPL ELPH-MCNC: 4.4 G/DL
ALP BLD-CCNC: 56 U/L
ALT SERPL-CCNC: 28 U/L
ANION GAP SERPL CALC-SCNC: 13 MMOL/L
AST SERPL-CCNC: 31 U/L
BASOPHILS # BLD AUTO: 0.05 K/UL
BASOPHILS NFR BLD AUTO: 0.5 %
BILIRUB SERPL-MCNC: 0.3 MG/DL
BUN SERPL-MCNC: 18 MG/DL
CALCIUM SERPL-MCNC: 9.8 MG/DL
CHLORIDE SERPL-SCNC: 97 MMOL/L
CHOLEST SERPL-MCNC: 158 MG/DL
CHOLEST/HDLC SERPL: 3.2 RATIO
CO2 SERPL-SCNC: 28 MMOL/L
CREAT SERPL-MCNC: 0.94 MG/DL
EOSINOPHIL # BLD AUTO: 0.31 K/UL
EOSINOPHIL NFR BLD AUTO: 3.4 %
ESTIMATED AVERAGE GLUCOSE: 120 MG/DL
FOLATE SERPL-MCNC: 16.8 NG/ML
GLUCOSE SERPL-MCNC: 94 MG/DL
HBA1C MFR BLD HPLC: 5.8 %
HCT VFR BLD CALC: 48.3 %
HDLC SERPL-MCNC: 50 MG/DL
HGB BLD-MCNC: 15.9 G/DL
IMM GRANULOCYTES NFR BLD AUTO: 0.3 %
LDLC SERPL CALC-MCNC: 45 MG/DL
LYMPHOCYTES # BLD AUTO: 2.61 K/UL
LYMPHOCYTES NFR BLD AUTO: 28.4 %
MAN DIFF?: NORMAL
MCHC RBC-ENTMCNC: 30.2 PG
MCHC RBC-ENTMCNC: 32.9 GM/DL
MCV RBC AUTO: 91.8 FL
MONOCYTES # BLD AUTO: 0.85 K/UL
MONOCYTES NFR BLD AUTO: 9.2 %
NEUTROPHILS # BLD AUTO: 5.34 K/UL
NEUTROPHILS NFR BLD AUTO: 58.2 %
PLATELET # BLD AUTO: 261 K/UL
POTASSIUM SERPL-SCNC: 4.7 MMOL/L
PROT SERPL-MCNC: 7 G/DL
RBC # BLD: 5.26 M/UL
RBC # FLD: 13.6 %
SODIUM SERPL-SCNC: 138 MMOL/L
T3FREE SERPL-MCNC: 2.95 PG/ML
T4 FREE SERPL-MCNC: 1.2 NG/DL
TRIGL SERPL-MCNC: 315 MG/DL
VIT B12 SERPL-MCNC: 541 PG/ML
WBC # FLD AUTO: 9.19 K/UL

## 2019-11-18 ENCOUNTER — MEDICATION RENEWAL (OUTPATIENT)
Age: 59
End: 2019-11-18

## 2019-11-18 ENCOUNTER — RX RENEWAL (OUTPATIENT)
Age: 59
End: 2019-11-18

## 2019-11-19 LAB — URINE CYTOLOGY: NORMAL

## 2019-11-26 ENCOUNTER — RX RENEWAL (OUTPATIENT)
Age: 59
End: 2019-11-26

## 2019-12-03 ENCOUNTER — APPOINTMENT (OUTPATIENT)
Dept: UROLOGY | Facility: CLINIC | Age: 59
End: 2019-12-03
Payer: COMMERCIAL

## 2019-12-03 VITALS
SYSTOLIC BLOOD PRESSURE: 124 MMHG | WEIGHT: 267 LBS | HEART RATE: 64 BPM | OXYGEN SATURATION: 96 % | BODY MASS INDEX: 34.27 KG/M2 | TEMPERATURE: 98 F | HEIGHT: 74 IN | DIASTOLIC BLOOD PRESSURE: 94 MMHG

## 2019-12-03 PROCEDURE — 93980 PENILE VASCULAR STUDY: CPT

## 2019-12-03 PROCEDURE — 54235 NJX CORPORA CAVERNOSA RX AGT: CPT

## 2019-12-11 ENCOUNTER — APPOINTMENT (OUTPATIENT)
Dept: UROLOGY | Facility: CLINIC | Age: 59
End: 2019-12-11
Payer: COMMERCIAL

## 2019-12-11 PROCEDURE — 96372 THER/PROPH/DIAG INJ SC/IM: CPT | Mod: 59

## 2019-12-11 PROCEDURE — 54235 NJX CORPORA CAVERNOSA RX AGT: CPT

## 2019-12-11 RX ORDER — TESTOSTERONE CYPIONATE 200 MG/ML
200 INJECTION, SOLUTION INTRAMUSCULAR
Refills: 0 | Status: COMPLETED | OUTPATIENT
Start: 2019-12-11

## 2019-12-11 RX ADMIN — TESTOSTERONE CYPIONATE 0 MG/ML: 200 INJECTION INTRAMUSCULAR at 00:00

## 2019-12-16 ENCOUNTER — MEDICATION RENEWAL (OUTPATIENT)
Age: 59
End: 2019-12-16

## 2020-02-11 ENCOUNTER — APPOINTMENT (OUTPATIENT)
Dept: UROLOGY | Facility: CLINIC | Age: 60
End: 2020-02-11
Payer: COMMERCIAL

## 2020-02-11 PROCEDURE — 96372 THER/PROPH/DIAG INJ SC/IM: CPT

## 2020-02-12 ENCOUNTER — APPOINTMENT (OUTPATIENT)
Dept: ELECTROPHYSIOLOGY | Facility: CLINIC | Age: 60
End: 2020-02-12
Payer: COMMERCIAL

## 2020-02-12 ENCOUNTER — NON-APPOINTMENT (OUTPATIENT)
Age: 60
End: 2020-02-12

## 2020-02-12 VITALS
HEIGHT: 74 IN | SYSTOLIC BLOOD PRESSURE: 132 MMHG | HEART RATE: 75 BPM | BODY MASS INDEX: 22.07 KG/M2 | WEIGHT: 172 LBS | OXYGEN SATURATION: 95 % | DIASTOLIC BLOOD PRESSURE: 78 MMHG

## 2020-02-12 DIAGNOSIS — Z98.890 OTHER SPECIFIED POSTPROCEDURAL STATES: ICD-10-CM

## 2020-02-12 DIAGNOSIS — Z86.79 OTHER SPECIFIED POSTPROCEDURAL STATES: ICD-10-CM

## 2020-02-12 PROCEDURE — 99215 OFFICE O/P EST HI 40 MIN: CPT

## 2020-02-12 PROCEDURE — 93000 ELECTROCARDIOGRAM COMPLETE: CPT

## 2020-02-12 RX ORDER — NIACIN 100 MG
100 TABLET ORAL
Refills: 0 | Status: DISCONTINUED | COMMUNITY
End: 2020-02-12

## 2020-02-12 RX ORDER — TESTOSTERONE CYPIONATE 200 MG/ML
200 INJECTION, SOLUTION INTRAMUSCULAR
Refills: 0 | Status: COMPLETED | COMMUNITY
Start: 2020-02-11

## 2020-02-12 RX ORDER — MELOXICAM 15 MG/1
15 TABLET ORAL DAILY
Qty: 30 | Refills: 0 | Status: DISCONTINUED | COMMUNITY
Start: 2019-09-17 | End: 2020-02-12

## 2020-02-19 PROBLEM — Z98.890 STATUS POST ABLATION OF ATRIAL FIBRILLATION: Status: RESOLVED | Noted: 2019-08-09 | Resolved: 2020-02-19

## 2020-02-19 PROBLEM — Z98.890 STATUS POST CATHETER ABLATION OF ATRIAL FLUTTER: Status: ACTIVE | Noted: 2020-02-19

## 2020-02-19 NOTE — PHYSICAL EXAM
[General Appearance - Well Developed] : well developed [Normal Appearance] : normal appearance [Well Groomed] : well groomed [No Deformities] : no deformities [General Appearance - Well Nourished] : well nourished [General Appearance - In No Acute Distress] : no acute distress [Normal Conjunctiva] : the conjunctiva exhibited no abnormalities [Eyelids - No Xanthelasma] : the eyelids demonstrated no xanthelasmas [Normal Oral Mucosa] : normal oral mucosa [No Oral Pallor] : no oral pallor [No Oral Cyanosis] : no oral cyanosis [Normal Jugular Venous A Waves Present] : normal jugular venous A waves present [No Jugular Venous Black A Waves] : no jugular venous black A waves [Normal Jugular Venous V Waves Present] : normal jugular venous V waves present [Respiration, Rhythm And Depth] : normal respiratory rhythm and effort [Exaggerated Use Of Accessory Muscles For Inspiration] : no accessory muscle use [Auscultation Breath Sounds / Voice Sounds] : lungs were clear to auscultation bilaterally [Abdomen Tenderness] : non-tender [Abdomen Soft] : soft [Gait - Sufficient For Exercise Testing] : the gait was sufficient for exercise testing [Abdomen Mass (___ Cm)] : no abdominal mass palpated [Abnormal Walk] : normal gait [Nail Clubbing] : no clubbing of the fingernails [Cyanosis, Localized] : no localized cyanosis [Skin Color & Pigmentation] : normal skin color and pigmentation [Petechial Hemorrhages (___cm)] : no petechial hemorrhages [] : no rash [Skin Lesions] : no skin lesions [No Venous Stasis] : no venous stasis [No Xanthoma] : no  xanthoma was observed [Oriented To Time, Place, And Person] : oriented to person, place, and time [No Skin Ulcers] : no skin ulcer [Mood] : the mood was normal [Affect] : the affect was normal [Normal Rate] : normal [Normal] : normal [No Anxiety] : not feeling anxious [Rhythm Regular] : regular [Normal S1] : normal S1 [Normal S2] : normal S2 [II] : a grade 2 [2+] : left 2+ [No Pitting Edema] : no pitting edema present [No Abnormalities] : the abdominal aorta was not enlarged and no bruit was heard [S3] : no S3 [S4] : no S4 [Right Carotid Bruit] : no bruit heard over the right carotid [Left Carotid Bruit] : no bruit heard over the left carotid [Left Femoral Bruit] : no bruit heard over the left femoral artery [Right Femoral Bruit] : no bruit heard over the right femoral artery

## 2020-02-19 NOTE — HISTORY OF PRESENT ILLNESS
[FreeTextEntry1] : This is a 60-year-old gentleman with a history of hypertension, hyperlipidemia, coronary artery disease, status post a stent who presents with new onset typical atrial flutter. He was found to be in his arrhythmia during presurgical testing for knee surgery. He was started on anticoagulant. He is minimally symptomatic with his arrhythmia though he does report a decrease in his exercise capacity at the gym and increasing fatigue. \par  He feels well after his ablation increasing his activity.  He recently started testosterone. \par \par STANISLAV GRAMMERSTORF  denies chest pain, chest pressure, shortness of breath, lightheadedness, dizziness, palpitations, syncope, presyncope, orthopnea, PND, or edema. \par

## 2020-02-19 NOTE — ASSESSMENT
[FreeTextEntry1] : This is a 60 -year-old gentleman with typical type I atrial flutter. He had some symptoms related to fatigue and exercise intolerance. \par S/p ablation with improvement in symptoms. He has been exercising \par He has had no recurrent atrial flutter.\par \par

## 2020-03-17 LAB
25(OH)D3 SERPL-MCNC: 54.1 NG/ML
ALBUMIN SERPL ELPH-MCNC: 4.3 G/DL
ALP BLD-CCNC: 51 U/L
ALT SERPL-CCNC: 28 U/L
ANION GAP SERPL CALC-SCNC: 12 MMOL/L
AST SERPL-CCNC: 29 U/L
BASOPHILS # BLD AUTO: 0.05 K/UL
BASOPHILS NFR BLD AUTO: 0.5 %
BILIRUB SERPL-MCNC: 0.3 MG/DL
BUN SERPL-MCNC: 14 MG/DL
CALCIUM SERPL-MCNC: 9.7 MG/DL
CHLORIDE SERPL-SCNC: 101 MMOL/L
CHOLEST SERPL-MCNC: 157 MG/DL
CHOLEST/HDLC SERPL: 2.9 RATIO
CK SERPL-CCNC: 370 U/L
CO2 SERPL-SCNC: 28 MMOL/L
CREAT SERPL-MCNC: 0.95 MG/DL
EOSINOPHIL # BLD AUTO: 0.22 K/UL
EOSINOPHIL NFR BLD AUTO: 2.2 %
ESTIMATED AVERAGE GLUCOSE: 117 MG/DL
GLUCOSE SERPL-MCNC: 86 MG/DL
HBA1C MFR BLD HPLC: 5.7 %
HCT VFR BLD CALC: 50 %
HDLC SERPL-MCNC: 55 MG/DL
HGB BLD-MCNC: 16.6 G/DL
IMM GRANULOCYTES NFR BLD AUTO: 0.3 %
LDLC SERPL CALC-MCNC: 51 MG/DL
LYMPHOCYTES # BLD AUTO: 3.17 K/UL
LYMPHOCYTES NFR BLD AUTO: 32.1 %
MAN DIFF?: NORMAL
MCHC RBC-ENTMCNC: 30.2 PG
MCHC RBC-ENTMCNC: 33.2 GM/DL
MCV RBC AUTO: 91.1 FL
MONOCYTES # BLD AUTO: 0.88 K/UL
MONOCYTES NFR BLD AUTO: 8.9 %
NEUTROPHILS # BLD AUTO: 5.53 K/UL
NEUTROPHILS NFR BLD AUTO: 56 %
PLATELET # BLD AUTO: 251 K/UL
POTASSIUM SERPL-SCNC: 4.8 MMOL/L
PROT SERPL-MCNC: 7 G/DL
RBC # BLD: 5.49 M/UL
RBC # FLD: 13.7 %
SODIUM SERPL-SCNC: 141 MMOL/L
TESTOST SERPL-MCNC: 302 NG/DL
TRIGL SERPL-MCNC: 253 MG/DL
TSH SERPL-ACNC: 1.42 UIU/ML
URATE SERPL-MCNC: 6.3 MG/DL
WBC # FLD AUTO: 9.88 K/UL

## 2020-03-26 ENCOUNTER — EMERGENCY (EMERGENCY)
Facility: HOSPITAL | Age: 60
LOS: 0 days | Discharge: HOME | End: 2020-03-26
Attending: EMERGENCY MEDICINE
Payer: COMMERCIAL

## 2020-03-26 VITALS
RESPIRATION RATE: 16 BRPM | TEMPERATURE: 98 F | DIASTOLIC BLOOD PRESSURE: 82 MMHG | OXYGEN SATURATION: 98 % | HEART RATE: 88 BPM | SYSTOLIC BLOOD PRESSURE: 140 MMHG

## 2020-03-26 VITALS — WEIGHT: 220.02 LBS | HEIGHT: 74 IN

## 2020-03-26 DIAGNOSIS — I48.92 UNSPECIFIED ATRIAL FLUTTER: ICD-10-CM
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
## 2020-03-26 DIAGNOSIS — S09.90XA UNSPECIFIED INJURY OF HEAD, INITIAL ENCOUNTER: ICD-10-CM

## 2020-03-26 DIAGNOSIS — E78.5 HYPERLIPIDEMIA, UNSPECIFIED: ICD-10-CM

## 2020-03-26 DIAGNOSIS — Z98.890 OTHER SPECIFIED POSTPROCEDURAL STATES: Chronic | ICD-10-CM

## 2020-03-26 DIAGNOSIS — M19.90 UNSPECIFIED OSTEOARTHRITIS, UNSPECIFIED SITE: ICD-10-CM

## 2020-03-26 DIAGNOSIS — E66.9 OBESITY, UNSPECIFIED: ICD-10-CM

## 2020-03-26 DIAGNOSIS — I25.2 OLD MYOCARDIAL INFARCTION: ICD-10-CM

## 2020-03-26 DIAGNOSIS — Z79.82 LONG TERM (CURRENT) USE OF ASPIRIN: ICD-10-CM

## 2020-03-26 DIAGNOSIS — Z95.5 PRESENCE OF CORONARY ANGIOPLASTY IMPLANT AND GRAFT: ICD-10-CM
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
## 2020-03-26 DIAGNOSIS — I48.91 UNSPECIFIED ATRIAL FIBRILLATION: ICD-10-CM

## 2020-03-26 DIAGNOSIS — I25.10 ATHEROSCLEROTIC HEART DISEASE OF NATIVE CORONARY ARTERY WITHOUT ANGINA PECTORIS: ICD-10-CM

## 2020-03-26 DIAGNOSIS — Z90.49 ACQUIRED ABSENCE OF OTHER SPECIFIED PARTS OF DIGESTIVE TRACT: Chronic | ICD-10-CM

## 2020-03-26 DIAGNOSIS — S01.01XA LACERATION WITHOUT FOREIGN BODY OF SCALP, INITIAL ENCOUNTER: ICD-10-CM

## 2020-03-26 DIAGNOSIS — Z98.61 CORONARY ANGIOPLASTY STATUS: Chronic | ICD-10-CM

## 2020-03-26 DIAGNOSIS — Z79.01 LONG TERM (CURRENT) USE OF ANTICOAGULANTS: ICD-10-CM

## 2020-03-26 DIAGNOSIS — I10 ESSENTIAL (PRIMARY) HYPERTENSION: ICD-10-CM

## 2020-03-26 DIAGNOSIS — M10.9 GOUT, UNSPECIFIED: ICD-10-CM

## 2020-03-26 PROCEDURE — 99283 EMERGENCY DEPT VISIT LOW MDM: CPT

## 2020-03-26 PROCEDURE — 12001 RPR S/N/AX/GEN/TRNK 2.5CM/<: CPT

## 2020-03-26 PROCEDURE — 70450 CT HEAD/BRAIN W/O DYE: CPT

## 2020-03-26 PROCEDURE — 70450 CT HEAD/BRAIN W/O DYE: CPT | Mod: 26

## 2020-03-26 PROCEDURE — 99284 EMERGENCY DEPT VISIT MOD MDM: CPT | Mod: 25

## 2020-03-26 NOTE — ED ADULT NURSE NOTE - CHIEF COMPLAINT QUOTE
Pt presents to ER s/p metal object falling on head 45 minutes PTA. Pt reports object fell a few feet onto head. Denies LOC. 1.5cm lac on top of head, bleeding controlled. On Aspirin

## 2020-03-26 NOTE — ED ADULT NURSE NOTE - CHPI ED NUR SYMPTOMS NEG
no loss of consciousness/no vomiting/no blurred vision/no change in level of consciousness/no dizziness

## 2020-03-26 NOTE — ED PROVIDER NOTE - SKIN, MLM
Skin normal color for race, warm, dry; 1.5 cm laceration to top of head.  Superficial and not bleeding.

## 2020-03-26 NOTE — ED PROVIDER NOTE - OBJECTIVE STATEMENT
61 yo male with h/o afib, CAD, hNT, HLD, MI c/o head injury. Pt was moving a piece of exercise equipment when a piece of metal fell against his head.  No LOC.  +laceration to the top of his head.  +bleeding stopped.  Tetanus UTD.  No headache.  No n/v

## 2020-03-26 NOTE — ED PROVIDER NOTE - NSFOLLOWUPINSTRUCTIONS_ED_ALL_ED_FT
Follow up with your PMD this week  Ice to reduce swelling  Tylenol 1 gram every 6 hours for pain if needed  Keep wound dry x 24 hours, then keep as clean as possible.  Do not pick at the glue  Return to ED for any severe pain, vomiting, or signs of infection, or any other concerns    Laceration    A laceration is a cut that goes through all of the layers of the skin and into the tissue that is right under the skin. Some lacerations heal on their own. Others need to be closed with skin adhesive strips, skin glue, stitches (sutures), or staples. Proper laceration care minimizes the risk of infection and helps the laceration to heal better.  If non-absorbable stitches or staples have been placed, they must be taken out within the time frame instructed by your healthcare provider.    SEEK IMMEDIATE MEDICAL CARE IF YOU HAVE ANY OF THE FOLLOWING SYMPTOMS: swelling around the wound, worsening pain, drainage from the wound, red streaking going away from your wound, inability to move finger or toe near the laceration, or discoloration of skin near the laceration.

## 2020-03-26 NOTE — ED PROVIDER NOTE - PATIENT PORTAL LINK FT
You can access the FollowMyHealth Patient Portal offered by [facility] by registering at the following website: [email or web address]. By joining [facility]’s FollowMyHealth portal, you will also be able to view your health information using other applications (apps) compatible with our system.

## 2020-05-07 ENCOUNTER — APPOINTMENT (OUTPATIENT)
Dept: INTERNAL MEDICINE | Facility: CLINIC | Age: 60
End: 2020-05-07
Payer: COMMERCIAL

## 2020-05-07 PROCEDURE — 99213 OFFICE O/P EST LOW 20 MIN: CPT | Mod: 95

## 2020-05-07 NOTE — ASSESSMENT
[FreeTextEntry1] : Mr. Jose is a 51-year-old male presents for a pulmonary evaluation. Home polysomnography examination demonstrates mild obstructive sleep apnea. The apnea/hypoxia index was 11 events per hour. Complete pulmonary function tests show no evidence of significant restrictive or obstructive lung disease. There is no sawtooth pattern on flow volume loop. The patient will be referred to orthodontist for evaluation for an oral dental appliance as treatment for his obstructive sleep apnea. He will followup in 2 months.

## 2020-05-07 NOTE — HISTORY OF PRESENT ILLNESS
[FreeTextEntry1] : Mr. Jose is a 51-year-old male who presents for initial pulmonary evaluation. Patient has a history of atrial flutter and is status post cardiac ablation in March 2019. He does have some symptoms of daytime tiredness. Mr. Jose had a home polysomnography examination which demonstrated mild obstructive sleep apnea. He at least has had apnea/hypopnea index was 11 events per hour. Patient denies any chest pains or palpitations. He does get some shortness of breath with exertion, however, symptoms resolved with rest.

## 2020-05-07 NOTE — REASON FOR VISIT
[Home] : at home, [unfilled] , at the time of the visit. [Medical Office: ([facility])___] : at the medical office located in  [Patient] : the patient [Self] : self

## 2020-05-14 ENCOUNTER — APPOINTMENT (OUTPATIENT)
Dept: CARDIOLOGY | Facility: CLINIC | Age: 60
End: 2020-05-14
Payer: COMMERCIAL

## 2020-05-14 PROCEDURE — 99214 OFFICE O/P EST MOD 30 MIN: CPT | Mod: 95

## 2020-05-19 NOTE — HISTORY OF PRESENT ILLNESS
[Home] : at home, [unfilled] , at the time of the visit. [Other Location: e.g. Home (Enter Location, City,State)___] : at [unfilled] [Patient] : the patient [Self] : self [FreeTextEntry1] : 59 Y/O gentleman PMH: HTN, HLD, AF S/P ablation followed with EP, CAD/MI 2010 LAD stent, moderate LVH, HOPE, mildly dilated aortic root \par \par Medications/history reviewed \par Pressure 128/80 HR 70's [FreeTextEntry2] : Seven Grammerstorf

## 2020-05-19 NOTE — DISCUSSION/SUMMARY
[FreeTextEntry1] : HTN: Controlled \par \par HLD: Continue statin recent labs show adequate lipid control\par \par AF: S/P ablation followed with EP\par \par CAD/MI:  2010 LAD stent no active cardiac complaints \par \par LVH: Moderate by Echo\par \par HOPE: utilizing CPAP followed with Pulmonary\par \par Dilated AO: mildly dilated aortic root will return for F/U Echo/US Aorta when covid crisis eases \par \par Total time spent with patient 23 Min\par \par TEB 3 months

## 2020-05-21 ENCOUNTER — APPOINTMENT (OUTPATIENT)
Dept: CARDIOLOGY | Facility: CLINIC | Age: 60
End: 2020-05-21

## 2020-06-16 NOTE — HISTORY OF PRESENT ILLNESS
[TextBox_4] : Mr. Jose has a history of obstructive sleep apnea. He is currently not on CPAP therapy. Patient feels well however, He Does have some daytime tiredness.

## 2020-06-16 NOTE — DISCUSSION/SUMMARY
[FreeTextEntry1] : Patient has a history of obstructive sleep apnea. He is currently not on CPAP therapy. Patient will be started on auto Pap therapy based on results of previous polysomnography examination. Followup is scheduled.

## 2020-07-06 ENCOUNTER — RX RENEWAL (OUTPATIENT)
Age: 60
End: 2020-07-06

## 2020-07-16 ENCOUNTER — APPOINTMENT (OUTPATIENT)
Dept: UROLOGY | Facility: CLINIC | Age: 60
End: 2020-07-16
Payer: COMMERCIAL

## 2020-07-16 VITALS
HEIGHT: 74 IN | TEMPERATURE: 97.5 F | WEIGHT: 268 LBS | HEART RATE: 58 BPM | DIASTOLIC BLOOD PRESSURE: 88 MMHG | SYSTOLIC BLOOD PRESSURE: 132 MMHG | OXYGEN SATURATION: 96 % | BODY MASS INDEX: 34.39 KG/M2

## 2020-07-16 PROCEDURE — 96372 THER/PROPH/DIAG INJ SC/IM: CPT

## 2020-07-16 PROCEDURE — 99212 OFFICE O/P EST SF 10 MIN: CPT | Mod: 25

## 2020-07-16 RX ADMIN — TESTOSTERONE CYPIONATE 0 MG/ML: 200 INJECTION, SOLUTION INTRAMUSCULAR at 00:00

## 2020-07-16 NOTE — ASSESSMENT
[FreeTextEntry1] : I advised him to keep his medications and the actual syringes and put them in the freezer for more longevity.

## 2020-07-16 NOTE — HISTORY OF PRESENT ILLNESS
[FreeTextEntry1] : This patient is here for a testosterone injection and has questions regarding his penile injection medication

## 2020-07-16 NOTE — REVIEW OF SYSTEMS
[see HPI] : see HPI [Joint Pain] : joint pain [Joint Swelling] : joint swelling [Joint Stiffness] : joint stiffness [Negative] : Endocrine

## 2020-07-16 NOTE — PHYSICAL EXAM
[Normal Appearance] : normal appearance [General Appearance - Well Developed] : well developed [General Appearance - Well Nourished] : well nourished [General Appearance - In No Acute Distress] : no acute distress [Abdomen Soft] : soft [Well Groomed] : well groomed [Costovertebral Angle Tenderness] : no ~M costovertebral angle tenderness [Abdomen Tenderness] : non-tender [Urethral Meatus] : meatus normal [Testes Mass (___cm)] : there were no testicular masses [No Prostate Nodules] : no prostate nodules [Scrotum] : the scrotum was normal [Urinary Bladder Findings] : the bladder was normal on palpation [FreeTextEntry1] : The prostate is small and palpably benign [Edema] : no peripheral edema [Respiration, Rhythm And Depth] : normal respiratory rhythm and effort [Exaggerated Use Of Accessory Muscles For Inspiration] : no accessory muscle use [] : no respiratory distress [Mood] : the mood was normal [Affect] : the affect was normal [Oriented To Time, Place, And Person] : oriented to person, place, and time [Not Anxious] : not anxious [Normal Station and Gait] : the gait and station were normal for the patient's age [No Palpable Adenopathy] : no palpable adenopathy [No Focal Deficits] : no focal deficits

## 2020-09-08 ENCOUNTER — RX RENEWAL (OUTPATIENT)
Age: 60
End: 2020-09-08

## 2020-10-12 DIAGNOSIS — Z00.00 ENCOUNTER FOR GENERAL ADULT MEDICAL EXAMINATION W/OUT ABNORMAL FINDINGS: ICD-10-CM

## 2020-10-15 LAB
ALBUMIN SERPL ELPH-MCNC: 4.7 G/DL
ALP BLD-CCNC: 47 U/L
ALT SERPL-CCNC: 32 U/L
ANION GAP SERPL CALC-SCNC: 13 MMOL/L
AST SERPL-CCNC: 31 U/L
BASOPHILS # BLD AUTO: 0.04 K/UL
BASOPHILS NFR BLD AUTO: 0.5 %
BILIRUB SERPL-MCNC: 0.5 MG/DL
BUN SERPL-MCNC: 17 MG/DL
CALCIUM SERPL-MCNC: 9.6 MG/DL
CHLORIDE SERPL-SCNC: 98 MMOL/L
CHOLEST SERPL-MCNC: 168 MG/DL
CHOLEST/HDLC SERPL: 2.8 RATIO
CO2 SERPL-SCNC: 26 MMOL/L
CREAT SERPL-MCNC: 0.87 MG/DL
EOSINOPHIL # BLD AUTO: 0.18 K/UL
EOSINOPHIL NFR BLD AUTO: 2.3 %
ESTIMATED AVERAGE GLUCOSE: 131 MG/DL
GLUCOSE SERPL-MCNC: 103 MG/DL
HBA1C MFR BLD HPLC: 6.2 %
HCT VFR BLD CALC: 50.9 %
HDLC SERPL-MCNC: 61 MG/DL
HGB BLD-MCNC: 17.2 G/DL
IMM GRANULOCYTES NFR BLD AUTO: 0.3 %
LDLC SERPL CALC-MCNC: 84 MG/DL
LYMPHOCYTES # BLD AUTO: 2.43 K/UL
LYMPHOCYTES NFR BLD AUTO: 30.8 %
MAN DIFF?: NORMAL
MCHC RBC-ENTMCNC: 30.2 PG
MCHC RBC-ENTMCNC: 33.8 GM/DL
MCV RBC AUTO: 89.3 FL
MONOCYTES # BLD AUTO: 0.76 K/UL
MONOCYTES NFR BLD AUTO: 9.6 %
NEUTROPHILS # BLD AUTO: 4.47 K/UL
NEUTROPHILS NFR BLD AUTO: 56.5 %
PLATELET # BLD AUTO: 247 K/UL
POTASSIUM SERPL-SCNC: 4.8 MMOL/L
PROT SERPL-MCNC: 7.2 G/DL
RBC # BLD: 5.7 M/UL
RBC # FLD: 13.3 %
SODIUM SERPL-SCNC: 137 MMOL/L
TRIGL SERPL-MCNC: 115 MG/DL
URATE SERPL-MCNC: 6.5 MG/DL
WBC # FLD AUTO: 7.9 K/UL

## 2020-10-16 ENCOUNTER — NON-APPOINTMENT (OUTPATIENT)
Age: 60
End: 2020-10-16

## 2020-10-16 ENCOUNTER — APPOINTMENT (OUTPATIENT)
Dept: CARDIOLOGY | Facility: CLINIC | Age: 60
End: 2020-10-16
Payer: COMMERCIAL

## 2020-10-16 VITALS
RESPIRATION RATE: 18 BRPM | HEIGHT: 74 IN | BODY MASS INDEX: 35.04 KG/M2 | SYSTOLIC BLOOD PRESSURE: 128 MMHG | DIASTOLIC BLOOD PRESSURE: 84 MMHG | WEIGHT: 273 LBS | OXYGEN SATURATION: 96 % | TEMPERATURE: 98.3 F | HEART RATE: 69 BPM

## 2020-10-16 LAB
25(OH)D3 SERPL-MCNC: 56.3 NG/ML
TSH SERPL-ACNC: 1.3 UIU/ML

## 2020-10-16 PROCEDURE — 99214 OFFICE O/P EST MOD 30 MIN: CPT | Mod: 25

## 2020-10-16 PROCEDURE — 93000 ELECTROCARDIOGRAM COMPLETE: CPT

## 2020-10-19 NOTE — HISTORY OF PRESENT ILLNESS
[FreeTextEntry1] : 59 Y/O gentleman PMH: HTN, HLD, AF S/P ablation followed with EP, CAD/MI 2010 LAD stent, moderate LVH, HOPE, mildly dilated aortic root \par \par Recent labs reviewed LDL mildly elevated\par Cardiac testing reviewed \par Patient is feeling well

## 2020-10-19 NOTE — PHYSICAL EXAM
[General Appearance - Well Developed] : well developed [Normal Appearance] : normal appearance [Well Groomed] : well groomed [No Deformities] : no deformities [General Appearance - Well Nourished] : well nourished [General Appearance - In No Acute Distress] : no acute distress [Normal Conjunctiva] : the conjunctiva exhibited no abnormalities [Exaggerated Use Of Accessory Muscles For Inspiration] : no accessory muscle use [Respiration, Rhythm And Depth] : normal respiratory rhythm and effort [] : no respiratory distress [Auscultation Breath Sounds / Voice Sounds] : lungs were clear to auscultation bilaterally [Heart Rate And Rhythm] : heart rate and rhythm were normal [Heart Sounds] : normal S1 and S2 [Abdomen Soft] : soft [Abnormal Walk] : normal gait [Oriented To Time, Place, And Person] : oriented to person, place, and time [Skin Color & Pigmentation] : normal skin color and pigmentation [FreeTextEntry1] : No LE Edema

## 2020-10-22 ENCOUNTER — TRANSCRIPTION ENCOUNTER (OUTPATIENT)
Age: 60
End: 2020-10-22

## 2020-10-27 ENCOUNTER — APPOINTMENT (OUTPATIENT)
Dept: ORTHOPEDIC SURGERY | Facility: CLINIC | Age: 60
End: 2020-10-27
Payer: COMMERCIAL

## 2020-10-27 VITALS — DIASTOLIC BLOOD PRESSURE: 88 MMHG | SYSTOLIC BLOOD PRESSURE: 132 MMHG | TEMPERATURE: 97.7 F | HEART RATE: 82 BPM

## 2020-10-27 PROCEDURE — 99072 ADDL SUPL MATRL&STAF TM PHE: CPT

## 2020-10-27 PROCEDURE — 73080 X-RAY EXAM OF ELBOW: CPT | Mod: LT

## 2020-10-27 PROCEDURE — 99215 OFFICE O/P EST HI 40 MIN: CPT

## 2020-10-27 RX ORDER — CLINDAMYCIN HYDROCHLORIDE 300 MG/1
300 CAPSULE ORAL
Qty: 21 | Refills: 0 | Status: DISCONTINUED | COMMUNITY
Start: 2020-06-05

## 2020-11-03 LAB — TESTOST SERPL-MCNC: 236 NG/DL

## 2020-11-06 NOTE — DISCUSSION/SUMMARY
----- Message from Belle Singh CNM sent at 11/5/2020  4:31 PM CST -----  Please call this patient and notify her that she has bacterial vaginosis.  This is a shift in the good and bad bacteria that naturally exist in the vagina.    Please treat her with metronizazole 500mg, 1 po BID x 7 days.    Please advise the patient to take the medication with food.    Thank you!    Belle   [FreeTextEntry1] : HTN: Controlled advised weight reduction and low sodium diet \par \par HLD: LDL sub optimal control with LDL of 84 in setting of CAD he will change statin to QD ( wad taking QOD ) repeat labs 6 weeks  \par \par AF: Noted during PST for knee surgery, S/P Ablation currently SR, followed with EP.  Took oral  AC for 3 months and was stopped VIA EP.  Continue ASA \par \par CAD/MI: 2010 LAD stent no active cardiac complaints \par \par LVH: Moderate by Echo.  Advised blood pressure control/weigh loss.  F/U Echo ordered \par \par HOPE: utilizing CPAP followed with Pulmonary\par \par Dilated AO: mildly dilated aortic root will return for F/U Echo/US Aorta

## 2020-11-10 ENCOUNTER — APPOINTMENT (OUTPATIENT)
Dept: CARDIOLOGY | Facility: CLINIC | Age: 60
End: 2020-11-10

## 2020-11-16 ENCOUNTER — NON-APPOINTMENT (OUTPATIENT)
Age: 60
End: 2020-11-16

## 2020-11-21 ENCOUNTER — EMERGENCY (EMERGENCY)
Facility: HOSPITAL | Age: 60
LOS: 0 days | Discharge: HOME | End: 2020-11-21
Attending: EMERGENCY MEDICINE
Payer: COMMERCIAL

## 2020-11-21 VITALS
DIASTOLIC BLOOD PRESSURE: 71 MMHG | TEMPERATURE: 98 F | RESPIRATION RATE: 18 BRPM | HEART RATE: 65 BPM | SYSTOLIC BLOOD PRESSURE: 121 MMHG | OXYGEN SATURATION: 95 %

## 2020-11-21 VITALS — HEIGHT: 74 IN | WEIGHT: 210.1 LBS

## 2020-11-21 DIAGNOSIS — K40.90 UNILATERAL INGUINAL HERNIA, WITHOUT OBSTRUCTION OR GANGRENE, NOT SPECIFIED AS RECURRENT: ICD-10-CM

## 2020-11-21 DIAGNOSIS — I48.91 UNSPECIFIED ATRIAL FIBRILLATION: ICD-10-CM

## 2020-11-21 DIAGNOSIS — E66.9 OBESITY, UNSPECIFIED: ICD-10-CM

## 2020-11-21 DIAGNOSIS — Z90.49 ACQUIRED ABSENCE OF OTHER SPECIFIED PARTS OF DIGESTIVE TRACT: Chronic | ICD-10-CM

## 2020-11-21 DIAGNOSIS — Z79.01 LONG TERM (CURRENT) USE OF ANTICOAGULANTS: ICD-10-CM

## 2020-11-21 DIAGNOSIS — I25.2 OLD MYOCARDIAL INFARCTION: ICD-10-CM

## 2020-11-21 DIAGNOSIS — I10 ESSENTIAL (PRIMARY) HYPERTENSION: ICD-10-CM

## 2020-11-21 DIAGNOSIS — E78.5 HYPERLIPIDEMIA, UNSPECIFIED: ICD-10-CM

## 2020-11-21 DIAGNOSIS — R10.32 LEFT LOWER QUADRANT PAIN: ICD-10-CM

## 2020-11-21 DIAGNOSIS — Z98.890 OTHER SPECIFIED POSTPROCEDURAL STATES: Chronic | ICD-10-CM

## 2020-11-21 DIAGNOSIS — Z95.5 PRESENCE OF CORONARY ANGIOPLASTY IMPLANT AND GRAFT: ICD-10-CM

## 2020-11-21 DIAGNOSIS — I25.10 ATHEROSCLEROTIC HEART DISEASE OF NATIVE CORONARY ARTERY WITHOUT ANGINA PECTORIS: ICD-10-CM

## 2020-11-21 DIAGNOSIS — Z98.61 CORONARY ANGIOPLASTY STATUS: Chronic | ICD-10-CM

## 2020-11-21 DIAGNOSIS — K57.32 DIVERTICULITIS OF LARGE INTESTINE WITHOUT PERFORATION OR ABSCESS WITHOUT BLEEDING: ICD-10-CM

## 2020-11-21 DIAGNOSIS — M10.9 GOUT, UNSPECIFIED: ICD-10-CM

## 2020-11-21 DIAGNOSIS — I48.92 UNSPECIFIED ATRIAL FLUTTER: ICD-10-CM

## 2020-11-21 LAB
ALBUMIN SERPL ELPH-MCNC: 4.1 G/DL — SIGNIFICANT CHANGE UP (ref 3.3–5)
ALP SERPL-CCNC: 47 U/L — SIGNIFICANT CHANGE UP (ref 40–120)
ALT FLD-CCNC: 52 U/L — SIGNIFICANT CHANGE UP (ref 12–78)
ANION GAP SERPL CALC-SCNC: 2 MMOL/L — LOW (ref 5–17)
APPEARANCE UR: CLEAR — SIGNIFICANT CHANGE UP
AST SERPL-CCNC: 51 U/L — HIGH (ref 15–37)
BASOPHILS # BLD AUTO: 0.06 K/UL — SIGNIFICANT CHANGE UP (ref 0–0.2)
BASOPHILS NFR BLD AUTO: 0.7 % — SIGNIFICANT CHANGE UP (ref 0–2)
BILIRUB SERPL-MCNC: 0.5 MG/DL — SIGNIFICANT CHANGE UP (ref 0.2–1.2)
BILIRUB UR-MCNC: NEGATIVE — SIGNIFICANT CHANGE UP
BUN SERPL-MCNC: 16 MG/DL — SIGNIFICANT CHANGE UP (ref 7–23)
CALCIUM SERPL-MCNC: 9 MG/DL — SIGNIFICANT CHANGE UP (ref 8.5–10.1)
CHLORIDE SERPL-SCNC: 106 MMOL/L — SIGNIFICANT CHANGE UP (ref 96–108)
CO2 SERPL-SCNC: 30 MMOL/L — SIGNIFICANT CHANGE UP (ref 22–31)
COLOR SPEC: YELLOW — SIGNIFICANT CHANGE UP
CREAT SERPL-MCNC: 1.03 MG/DL — SIGNIFICANT CHANGE UP (ref 0.5–1.3)
DIFF PNL FLD: ABNORMAL
EOSINOPHIL # BLD AUTO: 0.24 K/UL — SIGNIFICANT CHANGE UP (ref 0–0.5)
EOSINOPHIL NFR BLD AUTO: 2.7 % — SIGNIFICANT CHANGE UP (ref 0–6)
GLUCOSE SERPL-MCNC: 89 MG/DL — SIGNIFICANT CHANGE UP (ref 70–99)
GLUCOSE UR QL: NEGATIVE MG/DL — SIGNIFICANT CHANGE UP
HCT VFR BLD CALC: 47.1 % — SIGNIFICANT CHANGE UP (ref 39–50)
HGB BLD-MCNC: 15.7 G/DL — SIGNIFICANT CHANGE UP (ref 13–17)
IMM GRANULOCYTES NFR BLD AUTO: 0.3 % — SIGNIFICANT CHANGE UP (ref 0–1.5)
KETONES UR-MCNC: NEGATIVE — SIGNIFICANT CHANGE UP
LEUKOCYTE ESTERASE UR-ACNC: NEGATIVE — SIGNIFICANT CHANGE UP
LYMPHOCYTES # BLD AUTO: 2.81 K/UL — SIGNIFICANT CHANGE UP (ref 1–3.3)
LYMPHOCYTES # BLD AUTO: 31.4 % — SIGNIFICANT CHANGE UP (ref 13–44)
MCHC RBC-ENTMCNC: 30 PG — SIGNIFICANT CHANGE UP (ref 27–34)
MCHC RBC-ENTMCNC: 33.3 GM/DL — SIGNIFICANT CHANGE UP (ref 32–36)
MCV RBC AUTO: 89.9 FL — SIGNIFICANT CHANGE UP (ref 80–100)
MONOCYTES # BLD AUTO: 0.88 K/UL — SIGNIFICANT CHANGE UP (ref 0–0.9)
MONOCYTES NFR BLD AUTO: 9.8 % — SIGNIFICANT CHANGE UP (ref 2–14)
NEUTROPHILS # BLD AUTO: 4.94 K/UL — SIGNIFICANT CHANGE UP (ref 1.8–7.4)
NEUTROPHILS NFR BLD AUTO: 55.1 % — SIGNIFICANT CHANGE UP (ref 43–77)
NITRITE UR-MCNC: NEGATIVE — SIGNIFICANT CHANGE UP
PH UR: 6 — SIGNIFICANT CHANGE UP (ref 5–8)
PLATELET # BLD AUTO: 224 K/UL — SIGNIFICANT CHANGE UP (ref 150–400)
POTASSIUM SERPL-MCNC: 4.1 MMOL/L — SIGNIFICANT CHANGE UP (ref 3.5–5.3)
POTASSIUM SERPL-SCNC: 4.1 MMOL/L — SIGNIFICANT CHANGE UP (ref 3.5–5.3)
PROT SERPL-MCNC: 7.9 GM/DL — SIGNIFICANT CHANGE UP (ref 6–8.3)
PROT UR-MCNC: 15 MG/DL
RBC # BLD: 5.24 M/UL — SIGNIFICANT CHANGE UP (ref 4.2–5.8)
RBC # FLD: 13.7 % — SIGNIFICANT CHANGE UP (ref 10.3–14.5)
SODIUM SERPL-SCNC: 138 MMOL/L — SIGNIFICANT CHANGE UP (ref 135–145)
SP GR SPEC: 1.02 — SIGNIFICANT CHANGE UP (ref 1.01–1.02)
UROBILINOGEN FLD QL: NEGATIVE MG/DL — SIGNIFICANT CHANGE UP
WBC # BLD: 8.96 K/UL — SIGNIFICANT CHANGE UP (ref 3.8–10.5)
WBC # FLD AUTO: 8.96 K/UL — SIGNIFICANT CHANGE UP (ref 3.8–10.5)

## 2020-11-21 PROCEDURE — 36415 COLL VENOUS BLD VENIPUNCTURE: CPT

## 2020-11-21 PROCEDURE — 81001 URINALYSIS AUTO W/SCOPE: CPT

## 2020-11-21 PROCEDURE — 85025 COMPLETE CBC W/AUTO DIFF WBC: CPT

## 2020-11-21 PROCEDURE — 99284 EMERGENCY DEPT VISIT MOD MDM: CPT | Mod: 25

## 2020-11-21 PROCEDURE — 80053 COMPREHEN METABOLIC PANEL: CPT

## 2020-11-21 PROCEDURE — 99283 EMERGENCY DEPT VISIT LOW MDM: CPT

## 2020-11-21 PROCEDURE — 74177 CT ABD & PELVIS W/CONTRAST: CPT | Mod: 26

## 2020-11-21 PROCEDURE — 74177 CT ABD & PELVIS W/CONTRAST: CPT

## 2020-11-21 NOTE — ED STATDOCS - PHYSICAL EXAMINATION
GEN - NAD; well appearing; A+O x3   HEAD - NC/AT     EYES - EOMI, no conjunctival pallor, no scleral icterus  ENT -   mucous membranes  moist , no discharge      NECK - Neck supple  PULM - CTA b/l,  symmetric breath sounds  COR -  RRR, S1 S2, no murmurs  ABD - , ND, NT, soft, no guarding, no rebound, no masses   - normal  exam, no e/o hernia, no rash, no erythema   BACK - no CVA tenderness, nontender spine     EXTREMS - no edema, no deformity, warm and well perfused    SKIN - no rash or bruising      NEUROLOGIC - alert, sensation nl, motor 5/5 RUE/LUE/RLE/LLE

## 2020-11-21 NOTE — ED STATDOCS - NS ED ROS FT
Gen: No fever, normal appetite  Eyes: No eye irritation or discharge  ENT: No ear pain, congestion, sore throat  Resp: No cough or trouble breathing  Cardiovascular: No chest pain or palpitation  Gastroenteric: groin discomfort   : groin pain   MS: No joint or muscle pain  Skin: No rashes  Neuro: No headache; no abnormal movements  Remainder negative, except as per the HPI

## 2020-11-21 NOTE — ED STATDOCS - OBJECTIVE STATEMENT
61yo m pmh CAD, aflutter, p/w left groin pain. intermittent pain x 3 days. pain described as sharp, burning. 59yo m pmh CAD, aflutter, p/w left groin pain. intermittent pain x 3 days. pain described as sharp, burning, intermittent however becoming more frequent. pt also describes some numbness to area Pt recently started testosterone injections, however injected on the right thigh.  Pt also notes taking Cialis more frequently this week as well.  The patient denies any back pain, weakness, or parasthesias in the legs,  IVDU, fever, chills, weight loss, history of cancer, urinary or fecal incontinence, trauma or predominant night-time pain.

## 2020-11-21 NOTE — ED STATDOCS - CARE PLAN
Principal Discharge DX:	Diverticulitis of large intestine without perforation or abscess without bleeding  Secondary Diagnosis:	Unilateral inguinal hernia without obstruction or gangrene, recurrence not specified

## 2020-11-21 NOTE — ED STATDOCS - PROGRESS NOTE DETAILS
59 y/o M presents with groin pain x 3 days. Pt reports intermittent burning pain to L inguinal area that started 3 days ago. Pt also reports warmth and numbness to the area. Denies fever/chills, n/v/d, trauma, urinary retention/incontience, saddle anesthesias, weakness, or other complaints at this time  : No erythema or rashes. No inguinal hernia. Mildly ttp L inguinal ligamanet. Dec sensation of L inguinal ligament compared with R. Remainder of sensation intact. LE muscle strength equal B/L. -Alberto Alcaraz PA-C Pt signed out to me by CHIN Alcaraz. CT reports diverticulitis and left inguinal hernia. Patient made aware. Will dc with augmentin and surgical follow up. - Josemanuel Andersen PA-C

## 2020-11-21 NOTE — ED ADULT NURSE NOTE - NSIMPLEMENTINTERV_GEN_ALL_ED
Implemented All Universal Safety Interventions:  [place] to call system. Call bell, personal items and telephone within reach. Instruct patient to call for assistance. Room bathroom lighting operational. Non-slip footwear when patient is off stretcher. Physically safe environment: no spills, clutter or unnecessary equipment. Stretcher in lowest position, wheels locked, appropriate side rails in place.

## 2020-11-21 NOTE — ED STATDOCS - PATIENT PORTAL LINK FT
You can access the FollowMyHealth Patient Portal offered by [facility] by registering at the following website: [email or web address]. By joining [facility]’s FollowMyHealth portal, you will also be able to view your health information using other applications (apps) compatible with our system.

## 2020-11-21 NOTE — ED ADULT TRIAGE NOTE - CHIEF COMPLAINT QUOTE
PT to ed for intermittent groin pain that radiates to upper abdomen. PT reports 2 episodes of pain over the last week. Denies NV, Chest pain, SOB , fever and chills. PT ambulatory w/o distress.

## 2020-11-21 NOTE — ED STATDOCS - NSFOLLOWUPINSTRUCTIONS_ED_ALL_ED_FT
TAKE ANTIBIOTIC TO COMPLETION. FOLLOW UP WITH SURGERY FOR MANAGEMENT OF HERNIA. RETURN TO ED IF SYMPTOMS WORSEN.     Diverticulitis    WHAT YOU NEED TO KNOW:    Diverticulitis is a condition that causes small pockets along your intestine called diverticula to become inflamed or infected. This is caused by hard bowel movements, food, or bacteria that get stuck in the pockets.         DISCHARGE INSTRUCTIONS:    Return to the emergency department if:     You have bowel movement or foul-smelling discharge leaking from your vagina or in your urine.      You have severe diarrhea.      You urinate less than usual or not at all.      You are not able to have a bowel movement.      You cannot stop vomiting.       You have severe abdominal pain, a fever, and your abdomen is larger than usual.       You have new or increased blood in your bowel movements.     Contact your healthcare provider if:     You have pain when you urinate.      Your symptoms get worse or do not go away.       You have questions or concerns about your condition or care.     Medicines:     Antibiotics may be given to help treat a bacterial infection.      Prescription pain medicine may be given. Ask your healthcare provider how to take this medicine safely. Some prescription pain medicines contain acetaminophen. Do not take other medicines that contain acetaminophen without talking to your healthcare provider. Too much acetaminophen may cause liver damage. Prescription pain medicine may cause constipation. Ask your healthcare provider how to prevent or treat constipation.       Take your medicine as directed. Contact your healthcare provider if you think your medicine is not helping or if you have side effects. Tell him or her if you are allergic to any medicine. Keep a list of the medicines, vitamins, and herbs you take. Include the amounts, and when and why you take them. Bring the list or the pill bottles to follow-up visits. Carry your medicine list with you in case of an emergency.    Clear liquid diet: A clear liquid diet includes any liquids that you can see through. Examples include water, ginger-ranjan, cranberry or apple juice, frozen fruit ice, or broth. Stay on a clear liquid diet until your symptoms are gone, or as directed.     Follow up with your healthcare provider as directed: You may need to return for a colonoscopy. When your symptoms are gone, you may need a low-fat, high-fiber diet to prevent diverticulitis from developing again. Your healthcare provider or dietitian can help you create meal plans. Write down your questions so you remember to ask them during your visits.      Inguinal Hernia    WHAT YOU NEED TO KNOW:    What is an inguinal hernia? An inguinal hernia happens when organs or abdominal tissue push through a weak spot in the abdominal wall. The abdominal wall is made of fat and muscle. It holds the intestines in place. The hernia may contain fluid, tissue from the abdomen, or part of an organ (such as an intestine).     Inguinal Hernia         What causes an inguinal hernia? The cause of your hernia may not be known. You may have been born with a weak spot or opening in the abdominal wall. The area may have become weak from surgery or an injury. You may get a hernia after you lift something heavy or strain during a bowel movement. Your risk for a hernia may be increased if you smoke or you are overweight. Inguinal hernias are more common in males. A family history of hernias increases your risk for an inguinal hernia.     What are the signs and symptoms of an inguinal hernia? A hernia may happen over time or it may happen suddenly. Some movements can make symptoms worse. Examples include when you cough, sneeze, strain to have a bowel movement, lift, or stand for a long time. You may have any of the following:   •A soft lump or bulge in your groin, lower abdomen, or scrotum       •Pain or burning in your abdomen       How is an inguinal hernia diagnosed? Your healthcare provider may ask you to bend or cough to see if he can feel your hernia. You may need blood or urine tests to check your kidney function or find signs of infection. X-ray, MRI, CT scan, or ultrasound pictures may show blockage in the intestines or lack of blood flow to organs. You may be given contrast liquid to help the organs show up better in the pictures. Tell the healthcare provider if you have ever had an allergic reaction to contrast liquid. Do not enter the MRI room with anything metal. Metal can cause serious injury. Tell the healthcare provider if you have any metal in or on your body.     How is an inguinal hernia treated?   •A manual reduction of the hernia may be needed. Manual reduction means your healthcare provider will use his hands to put firm, steady pressure on your hernia. He will continue until the hernia disappears inside the abdominal wall.       •Surgery may be needed if the hernia stops blood flow to any of the organs. Surgery may also be needed if the hernia causes a hole in the intestines, or blocks the intestines.       How can I manage my symptoms and prevent another hernia?   •Do not lift anything heavy. Heavy lifting can make your hernia worse or cause another hernia. Ask your healthcare provider how much is safe for you to lift.       •Drink liquids as directed. Liquids may prevent constipation and straining during a bowel movement. Ask how much liquid to drink each day and which liquids are best for you.       •Eat foods high in fiber. Fiber may prevent constipation and straining during a bowel movement. Foods that contain fiber include fruits, vegetables, beans, lentils, and whole grains.       •Maintain a healthy weight. If you are overweight, weight loss may prevent your hernia from getting worse. It may also prevent another hernia. Talk to your healthcare provider about exercise and how to lose weight safely if you are overweight.       •Do not smoke. Nicotine and other chemicals in cigarettes and cigars can weaken the abdominal wall. This may increase your risk for another hernia. Ask your healthcare provider for information if you currently smoke and need help to quit. E-cigarettes or smokeless tobacco still contain nicotine. Talk to your healthcare provider before you use these products.       •Take NSAIDs as directed. NSAIDs, such as ibuprofen, help decrease swelling, pain, and fever. NSAIDs can cause stomach bleeding or kidney problems in certain people. If you take blood thinner medicine, always ask your healthcare provider if NSAIDs are safe for you. Always read the medicine label and follow directions.       When should I seek immediate care?   •You have severe abdominal pain with nausea and vomiting.       •Your abdomen is larger than usual.       •Your hernia gets bigger or is purple or blue.       •You see blood in your bowel movements.      •You feel weak, dizzy, or faint.       When should I contact my healthcare provider?   •You have a fever.      •You have questions or concerns about your condition or care.      CARE AGREEMENT:    You have the right to help plan your care. Learn about your health condition and how it may be treated. Discuss treatment options with your healthcare providers to decide what care you want to receive. You always have the right to refuse treatment.        © Copyright [employer] 2020

## 2020-11-23 ENCOUNTER — NON-APPOINTMENT (OUTPATIENT)
Age: 60
End: 2020-11-23

## 2020-12-15 ENCOUNTER — APPOINTMENT (OUTPATIENT)
Dept: INTERNAL MEDICINE | Facility: CLINIC | Age: 60
End: 2020-12-15
Payer: COMMERCIAL

## 2020-12-15 ENCOUNTER — OUTPATIENT (OUTPATIENT)
Dept: OUTPATIENT SERVICES | Facility: HOSPITAL | Age: 60
LOS: 1 days | End: 2020-12-15
Payer: COMMERCIAL

## 2020-12-15 VITALS
DIASTOLIC BLOOD PRESSURE: 88 MMHG | HEART RATE: 59 BPM | HEIGHT: 74 IN | OXYGEN SATURATION: 99 % | RESPIRATION RATE: 18 BRPM | TEMPERATURE: 97.2 F | SYSTOLIC BLOOD PRESSURE: 140 MMHG | BODY MASS INDEX: 36.57 KG/M2 | WEIGHT: 285 LBS

## 2020-12-15 DIAGNOSIS — Z98.890 OTHER SPECIFIED POSTPROCEDURAL STATES: Chronic | ICD-10-CM

## 2020-12-15 DIAGNOSIS — Z90.49 ACQUIRED ABSENCE OF OTHER SPECIFIED PARTS OF DIGESTIVE TRACT: Chronic | ICD-10-CM

## 2020-12-15 DIAGNOSIS — Z98.61 CORONARY ANGIOPLASTY STATUS: Chronic | ICD-10-CM

## 2020-12-15 DIAGNOSIS — Z01.818 ENCOUNTER FOR OTHER PREPROCEDURAL EXAMINATION: ICD-10-CM

## 2020-12-15 DIAGNOSIS — K40.90 UNILATERAL INGUINAL HERNIA, WITHOUT OBSTRUCTION OR GANGRENE, NOT SPECIFIED AS RECURRENT: ICD-10-CM

## 2020-12-15 LAB
ANION GAP SERPL CALC-SCNC: 3 MMOL/L — LOW (ref 5–17)
APTT BLD: 31.6 SEC — SIGNIFICANT CHANGE UP (ref 27.5–35.5)
BASOPHILS # BLD AUTO: 0.05 K/UL — SIGNIFICANT CHANGE UP (ref 0–0.2)
BASOPHILS NFR BLD AUTO: 0.6 % — SIGNIFICANT CHANGE UP (ref 0–2)
BUN SERPL-MCNC: 15 MG/DL — SIGNIFICANT CHANGE UP (ref 7–23)
CALCIUM SERPL-MCNC: 9.5 MG/DL — SIGNIFICANT CHANGE UP (ref 8.5–10.1)
CHLORIDE SERPL-SCNC: 103 MMOL/L — SIGNIFICANT CHANGE UP (ref 96–108)
CO2 SERPL-SCNC: 31 MMOL/L — SIGNIFICANT CHANGE UP (ref 22–31)
CREAT SERPL-MCNC: 1 MG/DL — SIGNIFICANT CHANGE UP (ref 0.5–1.3)
EOSINOPHIL # BLD AUTO: 0.28 K/UL — SIGNIFICANT CHANGE UP (ref 0–0.5)
EOSINOPHIL NFR BLD AUTO: 3.2 % — SIGNIFICANT CHANGE UP (ref 0–6)
GLUCOSE SERPL-MCNC: 91 MG/DL — SIGNIFICANT CHANGE UP (ref 70–99)
HCT VFR BLD CALC: 50.9 % — HIGH (ref 39–50)
HGB BLD-MCNC: 17 G/DL — SIGNIFICANT CHANGE UP (ref 13–17)
IMM GRANULOCYTES NFR BLD AUTO: 0.2 % — SIGNIFICANT CHANGE UP (ref 0–1.5)
INR BLD: 1.04 RATIO — SIGNIFICANT CHANGE UP (ref 0.88–1.16)
LYMPHOCYTES # BLD AUTO: 2.65 K/UL — SIGNIFICANT CHANGE UP (ref 1–3.3)
LYMPHOCYTES # BLD AUTO: 30.3 % — SIGNIFICANT CHANGE UP (ref 13–44)
MCHC RBC-ENTMCNC: 30.4 PG — SIGNIFICANT CHANGE UP (ref 27–34)
MCHC RBC-ENTMCNC: 33.4 GM/DL — SIGNIFICANT CHANGE UP (ref 32–36)
MCV RBC AUTO: 91.1 FL — SIGNIFICANT CHANGE UP (ref 80–100)
MONOCYTES # BLD AUTO: 0.93 K/UL — HIGH (ref 0–0.9)
MONOCYTES NFR BLD AUTO: 10.6 % — SIGNIFICANT CHANGE UP (ref 2–14)
NEUTROPHILS # BLD AUTO: 4.81 K/UL — SIGNIFICANT CHANGE UP (ref 1.8–7.4)
NEUTROPHILS NFR BLD AUTO: 55.1 % — SIGNIFICANT CHANGE UP (ref 43–77)
PLATELET # BLD AUTO: 235 K/UL — SIGNIFICANT CHANGE UP (ref 150–400)
POTASSIUM SERPL-MCNC: 4.4 MMOL/L — SIGNIFICANT CHANGE UP (ref 3.5–5.3)
POTASSIUM SERPL-SCNC: 4.4 MMOL/L — SIGNIFICANT CHANGE UP (ref 3.5–5.3)
PROTHROM AB SERPL-ACNC: 12 SEC — SIGNIFICANT CHANGE UP (ref 10.6–13.6)
RBC # BLD: 5.59 M/UL — SIGNIFICANT CHANGE UP (ref 4.2–5.8)
RBC # FLD: 14.2 % — SIGNIFICANT CHANGE UP (ref 10.3–14.5)
SODIUM SERPL-SCNC: 137 MMOL/L — SIGNIFICANT CHANGE UP (ref 135–145)
WBC # BLD: 8.74 K/UL — SIGNIFICANT CHANGE UP (ref 3.8–10.5)
WBC # FLD AUTO: 8.74 K/UL — SIGNIFICANT CHANGE UP (ref 3.8–10.5)

## 2020-12-15 PROCEDURE — 85610 PROTHROMBIN TIME: CPT

## 2020-12-15 PROCEDURE — 86900 BLOOD TYPING SEROLOGIC ABO: CPT

## 2020-12-15 PROCEDURE — 80048 BASIC METABOLIC PNL TOTAL CA: CPT

## 2020-12-15 PROCEDURE — 85025 COMPLETE CBC W/AUTO DIFF WBC: CPT

## 2020-12-15 PROCEDURE — 93010 ELECTROCARDIOGRAM REPORT: CPT

## 2020-12-15 PROCEDURE — 99215 OFFICE O/P EST HI 40 MIN: CPT

## 2020-12-15 PROCEDURE — 86850 RBC ANTIBODY SCREEN: CPT

## 2020-12-15 PROCEDURE — 36415 COLL VENOUS BLD VENIPUNCTURE: CPT

## 2020-12-15 PROCEDURE — 86901 BLOOD TYPING SEROLOGIC RH(D): CPT

## 2020-12-15 PROCEDURE — 99072 ADDL SUPL MATRL&STAF TM PHE: CPT

## 2020-12-15 PROCEDURE — 85730 THROMBOPLASTIN TIME PARTIAL: CPT

## 2020-12-15 PROCEDURE — 93005 ELECTROCARDIOGRAM TRACING: CPT

## 2020-12-15 RX ORDER — HYALURONATE SODIUM 30 MG/2 ML
30 SYRINGE (ML) INTRAARTICULAR
Qty: 2 | Refills: 0 | Status: DISCONTINUED | OUTPATIENT
Start: 2019-09-18 | End: 2020-12-15

## 2020-12-15 RX ORDER — DICLOFENAC SODIUM 10 MG/G
1 GEL TOPICAL DAILY
Qty: 1 | Refills: 6 | Status: COMPLETED | COMMUNITY
Start: 2019-09-17 | End: 2020-12-15

## 2020-12-15 RX ORDER — APIXABAN 2.5 MG/1
1 TABLET, FILM COATED ORAL
Qty: 0 | Refills: 0 | DISCHARGE

## 2020-12-15 RX ORDER — NIACIN 50 MG
0 TABLET ORAL
Qty: 0 | Refills: 0 | DISCHARGE

## 2020-12-15 RX ORDER — IBUPROFEN 600 MG/1
600 TABLET, FILM COATED ORAL
Qty: 20 | Refills: 0 | Status: COMPLETED | COMMUNITY
Start: 2020-06-05 | End: 2020-12-15

## 2020-12-15 RX ORDER — MELOXICAM 15 MG/1
15 TABLET ORAL DAILY
Qty: 30 | Refills: 0 | Status: COMPLETED | COMMUNITY
Start: 2020-10-27 | End: 2020-12-15

## 2020-12-15 NOTE — CHART NOTE - NSCHARTNOTEFT_GEN_A_CORE
60 year old man with history of cad, HTN. presents to PST for preprocedure exam. Patient is for planned open inguinal hernia repair with mesh with Dr Marques. Patient complaining of some discomfort on inguinal hernia worse when he is walking. Pain started last month. He presented to the ED a few weeks ago for abdominal pain and was treated for diverticulitis with oral antibiotic and he was also found to have the hernia on imaging.     Vital Signs Last 24 Hrs    T(F): -- 98.3  HR: -- 86  BP: -- 146/86  BP(mean): --  RR: -- 16  SpO2: -- 98        Plan:  - PST instructions given ; NPO status instructions to be given by ASU .  - Pt instructed to take following meds with sip of water : does not take meds in am  - Pt instructed to take routine evening medications unless indicated .  -  Stop NSAIDS ( Aspirin Alev Motrin Mobic Diclofenac), herbal supplements , MVI , Vitamin fish oil 7 days prior to surgery  unless   directed by surgeon or cardiologist;   - Medical Optimization  with Dr Person   - EZ wash instructions given & mupirocin instructions given.  - Labs EKG  as per surgeon request.   -  Pt instructed to self quarantine .  - Covid Testing scheduled on _____12/18____.  Pt notified and aware.  - Pt denies covid symptoms shortness of breath fever cough .

## 2020-12-15 NOTE — ASU PATIENT PROFILE, ADULT - MEDICATIONS BROUGHT TO HOSPITAL, PROFILE
Alondra was last seen by Dr. Keating on 2/1 for breast cancer.  Patient received C1D1 of Carboplatin & Taxol that day.  She did not receive Neulasta.  Per patient, she has been previously treated for breast cancer in 2010 and had similar side effects with Neulasta so she was not receiving it this time.      Telephone call placed to Alondra regarding pain.  She states that around 1600 on Saturday 2/3 the pains that she had got really bad.  She called the answering service and talked to the  On call.  She was told to alternate Tylenol (2 tabs) and ibuprofen (3 tabs) and she had no relief.  When she had a ride, she went to the ER for evaluation.  She received a one time dose of IV morphine and was sent home with a script for Vicodin 1 tab every 6 hours prn #12.  When she got home, she got nauseous so she had to take her Compazine.  Since, Alondra has been taking Vicodin and Compazine together.  She still feels the pain but it is manageable.  She wants Dr. Keating to know for future.  She has a follow up and C2 of chemo scheduled on 2/22.        
Patient had first chemo last Thursday and stated that she had a terrible weekend. She ended up going to the ER 2/4.    They D/C her with morphine to help with the pain.     She would like the RN to give her a call back to see if she should see Dr. Cavazos sooner than 2/22 or what she should do about her health.     She can be reached at [phone]  
Reviewed with Dr. Keating.  No additional orders at this time.  Plan will be addressed at upcoming appt.    
no

## 2020-12-15 NOTE — ASU PATIENT PROFILE, ADULT - PSH
H/O cardiac radiofrequency ablation    H/O eye surgery  right eye retinal tear, 2017  H/O right inguinal hernia repair  age 13  History of appendectomy  1991  Post PTCA  x 4 stents in place- last 2010  S/P meniscectomy

## 2020-12-15 NOTE — ASU PATIENT PROFILE, ADULT - PMH
At risk for sleep apnea    Atrial fibrillation    Atrial flutter by electrocardiogram    CAD (coronary artery disease)    Class 2 obesity with body mass index (BMI) of 38.0 to 38.9 in adult    Diverticulitis    Dyslipidemia    Gout    Hernia  inguinal hernia  HTN (hypertension)    Hyperlipidemia    Left knee pain    Myocardial infarction  2005, 2008  Osteoarthritis    Sleep apnea, unspecified type    Stented coronary artery  X4- last 2010  Vitamin D deficiency

## 2020-12-15 NOTE — PHYSICAL EXAM
[No Acute Distress] : no acute distress [Well Nourished] : well nourished [Well Developed] : well developed [Normal Oropharynx] : the oropharynx was normal [Normal TMs] : both tympanic membranes were normal [No Lymphadenopathy] : no lymphadenopathy [Supple] : supple [No Respiratory Distress] : no respiratory distress  [No Accessory Muscle Use] : no accessory muscle use [Clear to Auscultation] : lungs were clear to auscultation bilaterally [Normal Rate] : normal rate  [Regular Rhythm] : with a regular rhythm [Normal S1, S2] : normal S1 and S2 [Pedal Pulses Present] : the pedal pulses are present [No Extremity Clubbing/Cyanosis] : no extremity clubbing/cyanosis [Soft] : abdomen soft [Non Tender] : non-tender [Non-distended] : non-distended [Normal Bowel Sounds] : normal bowel sounds [Normal Posterior Cervical Nodes] : no posterior cervical lymphadenopathy [Normal Anterior Cervical Nodes] : no anterior cervical lymphadenopathy [Coordination Grossly Intact] : coordination grossly intact [No Focal Deficits] : no focal deficits [Normal Gait] : normal gait [Normal Affect] : the affect was normal [Alert and Oriented x3] : oriented to person, place, and time [Normal Insight/Judgement] : insight and judgment were intact

## 2020-12-16 ENCOUNTER — NON-APPOINTMENT (OUTPATIENT)
Age: 60
End: 2020-12-16

## 2020-12-16 DIAGNOSIS — Z01.818 ENCOUNTER FOR OTHER PREPROCEDURAL EXAMINATION: ICD-10-CM

## 2020-12-16 DIAGNOSIS — K40.90 UNILATERAL INGUINAL HERNIA, WITHOUT OBSTRUCTION OR GANGRENE, NOT SPECIFIED AS RECURRENT: ICD-10-CM

## 2020-12-16 PROBLEM — K57.92 DIVERTICULITIS OF INTESTINE, PART UNSPECIFIED, WITHOUT PERFORATION OR ABSCESS WITHOUT BLEEDING: Chronic | Status: ACTIVE | Noted: 2020-12-15

## 2020-12-16 PROBLEM — K46.9 UNSPECIFIED ABDOMINAL HERNIA WITHOUT OBSTRUCTION OR GANGRENE: Chronic | Status: ACTIVE | Noted: 2020-12-15

## 2020-12-16 PROBLEM — G47.30 SLEEP APNEA, UNSPECIFIED: Chronic | Status: ACTIVE | Noted: 2020-12-15

## 2020-12-16 NOTE — HISTORY OF PRESENT ILLNESS
[Coronary Artery Disease] : coronary artery disease [Sleep Apnea] : sleep apnea [No Adverse Anesthesia Reaction] : no adverse anesthesia reaction in self or family member [Chronic Anticoagulation] : chronic anticoagulation [(Patient denies any chest pain, claudication, dyspnea on exertion, orthopnea, palpitations or syncope)] : Patient denies any chest pain, claudication, dyspnea on exertion, orthopnea, palpitations or syncope [Aortic Stenosis] : no aortic stenosis [Atrial Fibrillation] : no atrial fibrillation [Recent Myocardial Infarction] : no recent myocardial infarction [Implantable Device/Pacemaker] : no implantable device/pacemaker [Asthma] : no asthma [COPD] : no COPD [Smoker] : not a smoker [Chronic Kidney Disease] : no chronic kidney disease [Diabetes] : no diabetes [FreeTextEntry1] : inguinal hernia surgery  [FreeTextEntry2] : 12/21/20 [FreeTextEntry3] : Dr. Marques  [FreeTextEntry4] : Pt is a 60 yr. old .male with a h/ of HTN, GERD, Cardiac stents, BPH , sleep apnea ( does not use CPAP). He is scheduled for planed open inguinal hernia repair with mesh on  12/21/20 with Dr. Marques . Pt complains of some discomfort especially when walking  x 1 month. \par He feels well. Denies shortness of breath, wheezing, cough, chest tightness , abdominal pain, nausea or vomiting.  [FreeTextEntry8] : Able to walk 1 flight of steps without difficulty.

## 2020-12-16 NOTE — RESULTS/DATA
[] : not indicated [de-identified] : WBC 8.74, HBG 17.0, HCT 50.9 [de-identified] : INR- 1.04, PT 17.0, PTT 31.6 [de-identified] : , K=4.4, Creat 1.0  [de-identified] : RSR 61, incomplete right bundle branch,  new changes in septal leads- reviewed by Dr. Person  [de-identified] : Pt needs cardiac clearance per / Zahida , call ed pt to inform and called Dr. Marques office , spoke to Naty

## 2020-12-16 NOTE — ASSESSMENT
[As per surgery] : as per surgery [FreeTextEntry4] : COVID PCR per surgery\par \par Advised to hold all vitamins , NSAIDS, including Motrin , Advil, Aleve, ASA, fish oil, supplements 7 days prior to surgery. \par \par pending cardiac clearance with Dr. Kathleen , friday 12/18  [FreeTextEntry2] : Close  airway monitoring and oxygen saturation is required.

## 2020-12-16 NOTE — RESULTS/DATA
[] : not indicated [de-identified] : WBC 8.74, HBG 17.0, HCT 50.9 [de-identified] : INR- 1.04, PT 17.0, PTT 31.6 [de-identified] : , K=4.4, Creat 1.0  [de-identified] : RSR 61, incomplete right bundle branch,  new changes in septal leads- reviewed by Dr. Person  [de-identified] : Pt needs cardiac clearance per / Zahida , call ed pt to inform and called Dr. Marques office , spoke to Naty

## 2020-12-18 ENCOUNTER — OUTPATIENT (OUTPATIENT)
Dept: OUTPATIENT SERVICES | Facility: HOSPITAL | Age: 60
LOS: 1 days | End: 2020-12-18
Payer: COMMERCIAL

## 2020-12-18 ENCOUNTER — APPOINTMENT (OUTPATIENT)
Dept: CARDIOLOGY | Facility: CLINIC | Age: 60
End: 2020-12-18
Payer: COMMERCIAL

## 2020-12-18 ENCOUNTER — NON-APPOINTMENT (OUTPATIENT)
Age: 60
End: 2020-12-18

## 2020-12-18 VITALS
OXYGEN SATURATION: 96 % | SYSTOLIC BLOOD PRESSURE: 142 MMHG | HEART RATE: 69 BPM | TEMPERATURE: 98.6 F | HEIGHT: 74 IN | WEIGHT: 284 LBS | BODY MASS INDEX: 36.45 KG/M2 | DIASTOLIC BLOOD PRESSURE: 85 MMHG

## 2020-12-18 DIAGNOSIS — Z98.890 OTHER SPECIFIED POSTPROCEDURAL STATES: Chronic | ICD-10-CM

## 2020-12-18 DIAGNOSIS — Z98.61 CORONARY ANGIOPLASTY STATUS: Chronic | ICD-10-CM

## 2020-12-18 DIAGNOSIS — Z90.49 ACQUIRED ABSENCE OF OTHER SPECIFIED PARTS OF DIGESTIVE TRACT: Chronic | ICD-10-CM

## 2020-12-18 DIAGNOSIS — Z20.828 CONTACT WITH AND (SUSPECTED) EXPOSURE TO OTHER VIRAL COMMUNICABLE DISEASES: ICD-10-CM

## 2020-12-18 LAB — SARS-COV-2 RNA SPEC QL NAA+PROBE: SIGNIFICANT CHANGE UP

## 2020-12-18 PROCEDURE — U0003: CPT

## 2020-12-18 PROCEDURE — 99072 ADDL SUPL MATRL&STAF TM PHE: CPT

## 2020-12-18 PROCEDURE — 93000 ELECTROCARDIOGRAM COMPLETE: CPT

## 2020-12-18 PROCEDURE — 99214 OFFICE O/P EST MOD 30 MIN: CPT

## 2020-12-18 RX ORDER — SODIUM CHLORIDE 9 MG/ML
1000 INJECTION, SOLUTION INTRAVENOUS
Refills: 0 | Status: DISCONTINUED | OUTPATIENT
Start: 2020-12-21 | End: 2020-12-21

## 2020-12-18 RX ORDER — SILDENAFIL 100 MG/1
100 TABLET, FILM COATED ORAL
Qty: 6 | Refills: 5 | Status: DISCONTINUED | COMMUNITY
Start: 2019-09-29 | End: 2020-12-18

## 2020-12-18 RX ORDER — ALBUTEROL SULFATE 90 UG/1
108 (90 BASE) AEROSOL, METERED RESPIRATORY (INHALATION) EVERY 6 HOURS
Qty: 1 | Refills: 5 | Status: DISCONTINUED | COMMUNITY
Start: 2019-08-09 | End: 2020-12-18

## 2020-12-18 RX ORDER — HYDROMORPHONE HYDROCHLORIDE 2 MG/ML
0.5 INJECTION INTRAMUSCULAR; INTRAVENOUS; SUBCUTANEOUS
Refills: 0 | Status: DISCONTINUED | OUTPATIENT
Start: 2020-12-21 | End: 2020-12-21

## 2020-12-18 RX ORDER — FENTANYL CITRATE 50 UG/ML
50 INJECTION INTRAVENOUS
Refills: 0 | Status: DISCONTINUED | OUTPATIENT
Start: 2020-12-21 | End: 2020-12-21

## 2020-12-18 RX ORDER — TADALAFIL 5 MG/1
5 TABLET ORAL
Qty: 90 | Refills: 3 | Status: DISCONTINUED | COMMUNITY
Start: 2020-07-29 | End: 2020-12-18

## 2020-12-18 RX ORDER — OXYCODONE HYDROCHLORIDE 5 MG/1
5 TABLET ORAL ONCE
Refills: 0 | Status: DISCONTINUED | OUTPATIENT
Start: 2020-12-21 | End: 2020-12-21

## 2020-12-18 NOTE — PHYSICAL EXAM
[General Appearance - Well Developed] : well developed [Normal Appearance] : normal appearance [Well Groomed] : well groomed [General Appearance - Well Nourished] : well nourished [No Deformities] : no deformities [General Appearance - In No Acute Distress] : no acute distress [Normal Conjunctiva] : the conjunctiva exhibited no abnormalities [Normal Oral Mucosa] : normal oral mucosa [] : no respiratory distress [Respiration, Rhythm And Depth] : normal respiratory rhythm and effort [Exaggerated Use Of Accessory Muscles For Inspiration] : no accessory muscle use [Auscultation Breath Sounds / Voice Sounds] : lungs were clear to auscultation bilaterally [Heart Rate And Rhythm] : heart rate and rhythm were normal [Heart Sounds] : normal S1 and S2 [Abdomen Soft] : soft [Abnormal Walk] : normal gait [FreeTextEntry1] : No lE Edema  [Skin Color & Pigmentation] : normal skin color and pigmentation [Oriented To Time, Place, And Person] : oriented to person, place, and time

## 2020-12-18 NOTE — HISTORY OF PRESENT ILLNESS
[Preoperative Visit] : for a medical evaluation prior to surgery [Scheduled Procedure ___] : a [unfilled] [Date of Surgery ___] : on [unfilled] [Surgeon Name ___] : surgeon: [unfilled] [Good] : Good [Fever] : no fever [Chills] : no chills [Fatigue] : no fatigue [Chest Pain] : no chest pain [Cough] : no cough [Dyspnea] : no dyspnea [Dysuria] : no dysuria [Urinary Frequency] : no urinary frequency [Nausea] : no nausea [Vomiting] : no vomiting [Diarrhea] : no diarrhea [Abdominal Pain] : no abdominal pain [Easy Bruising] : no easy bruising [Lower Extremity Swelling] : no lower extremity swelling [Poor Exercise Tolerance] : no poor exercise tolerance [Diabetes] : no diabetes [Cardiovascular Disease] : cardiovascular disease [Pulmonary Disease] : no pulmonary disease [Anti-Platelet Agents] : anti-platelet agents [Nicotine Dependence] : no nicotine dependence [Alcohol Use] : no  alcohol use [Renal Disease] : no renal disease [GI Disease] : no gastrointestinal disease [Sleep Apnea] : no sleep apnea [Thromboembolic Problems] : no thromboembolic problems [Frequent use of NSAIDs] : no use of NSAIDs [Transfusion Reaction] : no transfusion reaction [Impaired Immunity] : no impaired immunity [Steroid Use in Last 6 Months] : no steroid use in the last six months [Frequent Aspirin Use] : no frequent aspirin use [Prior Anesthesia] : Prior anesthesia [Prev Anesthesia Reaction] : no previous anesthesia reaction [Electrocardiogram] : ~T an ECG ~C was performed [Echocardiogram] : ~T an echocardiogram ~C was performed [Cardiac Catheterization  (Diagnostic)] : cardiac catheterization ~T ~C was performed [Cardiovascular Stress Test] : a cardiac stress test ~T ~C was performed [Anesthesia Reaction] : no anesthesia reaction [Sudden Death] : no sudden death [Clotting Disorder] : no clotting disorder [Bleeding Disorder] : no bleeding disorder

## 2020-12-19 DIAGNOSIS — Z20.828 CONTACT WITH AND (SUSPECTED) EXPOSURE TO OTHER VIRAL COMMUNICABLE DISEASES: ICD-10-CM

## 2020-12-21 ENCOUNTER — OUTPATIENT (OUTPATIENT)
Dept: INPATIENT UNIT | Facility: HOSPITAL | Age: 60
LOS: 1 days | Discharge: HOME | End: 2020-12-21
Payer: COMMERCIAL

## 2020-12-21 ENCOUNTER — NON-APPOINTMENT (OUTPATIENT)
Age: 60
End: 2020-12-21

## 2020-12-21 ENCOUNTER — RESULT REVIEW (OUTPATIENT)
Age: 60
End: 2020-12-21

## 2020-12-21 VITALS
HEART RATE: 66 BPM | DIASTOLIC BLOOD PRESSURE: 84 MMHG | RESPIRATION RATE: 16 BRPM | TEMPERATURE: 98 F | OXYGEN SATURATION: 97 % | SYSTOLIC BLOOD PRESSURE: 140 MMHG

## 2020-12-21 VITALS
OXYGEN SATURATION: 100 % | RESPIRATION RATE: 16 BRPM | SYSTOLIC BLOOD PRESSURE: 133 MMHG | TEMPERATURE: 98 F | HEIGHT: 74 IN | DIASTOLIC BLOOD PRESSURE: 88 MMHG | HEART RATE: 58 BPM | WEIGHT: 283.96 LBS

## 2020-12-21 DIAGNOSIS — Z90.49 ACQUIRED ABSENCE OF OTHER SPECIFIED PARTS OF DIGESTIVE TRACT: Chronic | ICD-10-CM

## 2020-12-21 DIAGNOSIS — Z98.890 OTHER SPECIFIED POSTPROCEDURAL STATES: Chronic | ICD-10-CM

## 2020-12-21 DIAGNOSIS — K40.90 UNILATERAL INGUINAL HERNIA, WITHOUT OBSTRUCTION OR GANGRENE, NOT SPECIFIED AS RECURRENT: ICD-10-CM

## 2020-12-21 DIAGNOSIS — Z98.61 CORONARY ANGIOPLASTY STATUS: Chronic | ICD-10-CM

## 2020-12-21 PROCEDURE — C9290: CPT

## 2020-12-21 PROCEDURE — C1781: CPT

## 2020-12-21 PROCEDURE — 88304 TISSUE EXAM BY PATHOLOGIST: CPT | Mod: 26

## 2020-12-21 PROCEDURE — 88304 TISSUE EXAM BY PATHOLOGIST: CPT

## 2020-12-21 RX ORDER — IBUPROFEN 200 MG
1 TABLET ORAL
Qty: 20 | Refills: 0
Start: 2020-12-21

## 2020-12-21 RX ORDER — OXYCODONE HYDROCHLORIDE 5 MG/1
1 TABLET ORAL
Qty: 12 | Refills: 0
Start: 2020-12-21 | End: 2020-12-22

## 2020-12-21 RX ORDER — ACETAMINOPHEN 500 MG
2 TABLET ORAL
Qty: 30 | Refills: 0
Start: 2020-12-21

## 2020-12-21 RX ADMIN — FENTANYL CITRATE 50 MICROGRAM(S): 50 INJECTION INTRAVENOUS at 13:36

## 2020-12-21 RX ADMIN — OXYCODONE HYDROCHLORIDE 5 MILLIGRAM(S): 5 TABLET ORAL at 14:06

## 2020-12-21 RX ADMIN — FENTANYL CITRATE 50 MICROGRAM(S): 50 INJECTION INTRAVENOUS at 14:06

## 2020-12-21 RX ADMIN — OXYCODONE HYDROCHLORIDE 5 MILLIGRAM(S): 5 TABLET ORAL at 13:45

## 2020-12-21 RX ADMIN — SODIUM CHLORIDE 75 MILLILITER(S): 9 INJECTION, SOLUTION INTRAVENOUS at 13:37

## 2020-12-21 NOTE — ASU DISCHARGE PLAN (ADULT/PEDIATRIC) - CARE PROVIDER_API CALL
[facility]  [address] [place] [address]  Phone: [phone]  Fax: [phone]  Follow Up Time: 2 weeks

## 2020-12-21 NOTE — ASU DISCHARGE PLAN (ADULT/PEDIATRIC) - NURSING INSTRUCTIONS
Begin with liquids and light food ( tea, toast, Jello, soups). Advance to what you normally eat. Liquids should taken in adequate amounts today.Refer to multi color post op discharge instruction sheet     CALL the DOCTOR:    -Fever greater than  101F  - Signs  of infection such as : increase pain,swelling,redness,or a bad  smell coming from the wound.  -Excessive amount of bleeding.  - Any pain that appears to be getting worse.  - Vomiting  -  If you have  not urinated 8 hours after surgery or have any difficulty urinating.     A responsible adult should be with you for the rest of the day and night for your safety and to help you if you needed.    Review attached FACT SHEET if applicable. Review home care instructions For any problems or concerns,contact your doctor. [facility] patients should call the [facility]. If you cannot reach the doctor or clinic, call [facility] Emergency Department at [phone] or go to your local Emergency Department.  A responsible adult should be with you for the rest of the day and night for your safety and to help you if you needed. Resume your medications as listed on the attached Medication Record.

## 2020-12-28 DIAGNOSIS — Z95.5 PRESENCE OF CORONARY ANGIOPLASTY IMPLANT AND GRAFT: ICD-10-CM

## 2020-12-28 DIAGNOSIS — N52.9 MALE ERECTILE DYSFUNCTION, UNSPECIFIED: ICD-10-CM

## 2020-12-28 DIAGNOSIS — I25.10 ATHEROSCLEROTIC HEART DISEASE OF NATIVE CORONARY ARTERY WITHOUT ANGINA PECTORIS: ICD-10-CM

## 2020-12-28 DIAGNOSIS — I10 ESSENTIAL (PRIMARY) HYPERTENSION: ICD-10-CM

## 2020-12-28 DIAGNOSIS — K40.90 UNILATERAL INGUINAL HERNIA, WITHOUT OBSTRUCTION OR GANGRENE, NOT SPECIFIED AS RECURRENT: ICD-10-CM

## 2020-12-28 DIAGNOSIS — D17.6 BENIGN LIPOMATOUS NEOPLASM OF SPERMATIC CORD: ICD-10-CM

## 2020-12-28 DIAGNOSIS — K21.9 GASTRO-ESOPHAGEAL REFLUX DISEASE WITHOUT ESOPHAGITIS: ICD-10-CM

## 2020-12-28 DIAGNOSIS — Z87.891 PERSONAL HISTORY OF NICOTINE DEPENDENCE: ICD-10-CM

## 2020-12-28 DIAGNOSIS — E66.9 OBESITY, UNSPECIFIED: ICD-10-CM

## 2020-12-28 DIAGNOSIS — I25.2 OLD MYOCARDIAL INFARCTION: ICD-10-CM

## 2020-12-28 DIAGNOSIS — G47.33 OBSTRUCTIVE SLEEP APNEA (ADULT) (PEDIATRIC): ICD-10-CM

## 2020-12-28 DIAGNOSIS — M10.9 GOUT, UNSPECIFIED: ICD-10-CM

## 2020-12-28 DIAGNOSIS — Z79.82 LONG TERM (CURRENT) USE OF ASPIRIN: ICD-10-CM

## 2020-12-28 DIAGNOSIS — E78.5 HYPERLIPIDEMIA, UNSPECIFIED: ICD-10-CM

## 2020-12-28 DIAGNOSIS — M17.0 BILATERAL PRIMARY OSTEOARTHRITIS OF KNEE: ICD-10-CM

## 2020-12-28 DIAGNOSIS — N40.0 BENIGN PROSTATIC HYPERPLASIA WITHOUT LOWER URINARY TRACT SYMPTOMS: ICD-10-CM

## 2021-01-04 ENCOUNTER — APPOINTMENT (OUTPATIENT)
Dept: CARDIOLOGY | Facility: CLINIC | Age: 61
End: 2021-01-04
Payer: COMMERCIAL

## 2021-01-04 PROCEDURE — 93306 TTE W/DOPPLER COMPLETE: CPT

## 2021-01-04 PROCEDURE — 99072 ADDL SUPL MATRL&STAF TM PHE: CPT

## 2021-01-27 ENCOUNTER — APPOINTMENT (OUTPATIENT)
Dept: UROLOGY | Facility: CLINIC | Age: 61
End: 2021-01-27
Payer: COMMERCIAL

## 2021-01-27 DIAGNOSIS — R10.30 LOWER ABDOMINAL PAIN, UNSPECIFIED: ICD-10-CM

## 2021-01-27 PROCEDURE — 99213 OFFICE O/P EST LOW 20 MIN: CPT

## 2021-01-27 PROCEDURE — 99072 ADDL SUPL MATRL&STAF TM PHE: CPT

## 2021-01-27 NOTE — HISTORY OF PRESENT ILLNESS
[FreeTextEntry1] : This patient recently had left inguinal hernia surgery and is experiencing some testicular pain and inguinal discomfort with sexual activity. He states it is intermittent but not too bothersome. He has no other urinary or other complaints.

## 2021-01-27 NOTE — ASSESSMENT
[FreeTextEntry1] : Patient was recommended to take over the counter pain medications and follow up as needed.

## 2021-01-27 NOTE — PHYSICAL EXAM
[General Appearance - Well Developed] : well developed [General Appearance - Well Nourished] : well nourished [Normal Appearance] : normal appearance [Well Groomed] : well groomed [Abdomen Soft] : soft [General Appearance - In No Acute Distress] : no acute distress [Abdomen Tenderness] : non-tender [Costovertebral Angle Tenderness] : no ~M costovertebral angle tenderness [Urethral Meatus] : meatus normal [Urinary Bladder Findings] : the bladder was normal on palpation [Scrotum] : the scrotum was normal [Testes Tenderness] : no tenderness of the testes [Testes Mass (___cm)] : there were no testicular masses [FreeTextEntry1] : Left inguinal incision scar healing well.  [Edema] : no peripheral edema [] : no respiratory distress [Respiration, Rhythm And Depth] : normal respiratory rhythm and effort [Exaggerated Use Of Accessory Muscles For Inspiration] : no accessory muscle use [Oriented To Time, Place, And Person] : oriented to person, place, and time [Affect] : the affect was normal [Mood] : the mood was normal [Not Anxious] : not anxious [Normal Station and Gait] : the gait and station were normal for the patient's age [No Focal Deficits] : no focal deficits [No Palpable Adenopathy] : no palpable adenopathy

## 2021-02-11 ENCOUNTER — APPOINTMENT (OUTPATIENT)
Dept: CARDIOLOGY | Facility: CLINIC | Age: 61
End: 2021-02-11
Payer: COMMERCIAL

## 2021-02-11 ENCOUNTER — NON-APPOINTMENT (OUTPATIENT)
Age: 61
End: 2021-02-11

## 2021-02-11 VITALS
HEIGHT: 74 IN | HEART RATE: 77 BPM | BODY MASS INDEX: 35.94 KG/M2 | SYSTOLIC BLOOD PRESSURE: 165 MMHG | DIASTOLIC BLOOD PRESSURE: 94 MMHG | WEIGHT: 280 LBS | OXYGEN SATURATION: 97 %

## 2021-02-11 PROCEDURE — 93000 ELECTROCARDIOGRAM COMPLETE: CPT

## 2021-02-11 PROCEDURE — 99214 OFFICE O/P EST MOD 30 MIN: CPT

## 2021-02-11 PROCEDURE — 99072 ADDL SUPL MATRL&STAF TM PHE: CPT

## 2021-02-11 NOTE — DISCUSSION/SUMMARY
[FreeTextEntry1] : Pt presents with atypical chest discomfort. ETT in 19 revealed no ischemia. Echo reviewed which revealed normal EF without segmental abnormalities. Pt will notify us if there is a recurrence. He will follow up in 3 months. PMH: HTN, HLD, AF S/P ablation followed with EP, CAD/MI 2010 LAD stent, moderate LVH, HOPE, mildly dilated aortic root

## 2021-02-11 NOTE — HISTORY OF PRESENT ILLNESS
[FreeTextEntry1] : 60 yo male presents for cardiac evaluation. Pt reports episodes of chest band like discomfort which seems musculoskeletal to the patient. Pt denies exertional discomfort. He denies shortness of breath. Past ETT noted from 2019. PMH: HTN, HLD, AF S/P ablation followed with EP, CAD/MI 2010 LAD stent, moderate LVH, HOPE, mildly dilated aortic root

## 2021-02-18 ENCOUNTER — NON-APPOINTMENT (OUTPATIENT)
Age: 61
End: 2021-02-18

## 2021-02-26 ENCOUNTER — NON-APPOINTMENT (OUTPATIENT)
Age: 61
End: 2021-02-26

## 2021-03-20 ENCOUNTER — INPATIENT (INPATIENT)
Facility: HOSPITAL | Age: 61
LOS: 1 days | Discharge: ANOTHER HOSPITAL | DRG: 281 | End: 2021-03-22
Attending: INTERNAL MEDICINE | Admitting: INTERNAL MEDICINE
Payer: COMMERCIAL

## 2021-03-20 VITALS — WEIGHT: 279.99 LBS | HEIGHT: 74 IN

## 2021-03-20 DIAGNOSIS — I25.110 ATHEROSCLEROTIC HEART DISEASE OF NATIVE CORONARY ARTERY WITH UNSTABLE ANGINA PECTORIS: ICD-10-CM

## 2021-03-20 DIAGNOSIS — I21.4 NON-ST ELEVATION (NSTEMI) MYOCARDIAL INFARCTION: ICD-10-CM

## 2021-03-20 DIAGNOSIS — I10 ESSENTIAL (PRIMARY) HYPERTENSION: ICD-10-CM

## 2021-03-20 DIAGNOSIS — Z98.890 OTHER SPECIFIED POSTPROCEDURAL STATES: Chronic | ICD-10-CM

## 2021-03-20 DIAGNOSIS — Z90.49 ACQUIRED ABSENCE OF OTHER SPECIFIED PARTS OF DIGESTIVE TRACT: Chronic | ICD-10-CM

## 2021-03-20 DIAGNOSIS — Z98.61 CORONARY ANGIOPLASTY STATUS: Chronic | ICD-10-CM

## 2021-03-20 DIAGNOSIS — R94.31 ABNORMAL ELECTROCARDIOGRAM [ECG] [EKG]: ICD-10-CM

## 2021-03-20 LAB
A1C WITH ESTIMATED AVERAGE GLUCOSE RESULT: 6.1 % — HIGH (ref 4–5.6)
ALBUMIN SERPL ELPH-MCNC: 3.6 G/DL — SIGNIFICANT CHANGE UP (ref 3.3–5)
ALP SERPL-CCNC: 56 U/L — SIGNIFICANT CHANGE UP (ref 40–120)
ALT FLD-CCNC: 45 U/L — SIGNIFICANT CHANGE UP (ref 12–78)
ANION GAP SERPL CALC-SCNC: 6 MMOL/L — SIGNIFICANT CHANGE UP (ref 5–17)
APPEARANCE UR: CLEAR — SIGNIFICANT CHANGE UP
APTT BLD: 34.1 SEC — SIGNIFICANT CHANGE UP (ref 27.5–35.5)
APTT BLD: 36.9 SEC — HIGH (ref 27.5–35.5)
APTT BLD: 60 SEC — HIGH (ref 27.5–35.5)
AST SERPL-CCNC: 41 U/L — HIGH (ref 15–37)
BASOPHILS # BLD AUTO: 0.05 K/UL — SIGNIFICANT CHANGE UP (ref 0–0.2)
BASOPHILS NFR BLD AUTO: 0.6 % — SIGNIFICANT CHANGE UP (ref 0–2)
BILIRUB SERPL-MCNC: 0.4 MG/DL — SIGNIFICANT CHANGE UP (ref 0.2–1.2)
BILIRUB UR-MCNC: NEGATIVE — SIGNIFICANT CHANGE UP
BUN SERPL-MCNC: 14 MG/DL — SIGNIFICANT CHANGE UP (ref 7–23)
CALCIUM SERPL-MCNC: 8.7 MG/DL — SIGNIFICANT CHANGE UP (ref 8.5–10.1)
CHLORIDE SERPL-SCNC: 105 MMOL/L — SIGNIFICANT CHANGE UP (ref 96–108)
CHOLEST SERPL-MCNC: 142 MG/DL — SIGNIFICANT CHANGE UP
CO2 SERPL-SCNC: 27 MMOL/L — SIGNIFICANT CHANGE UP (ref 22–31)
COLOR SPEC: YELLOW — SIGNIFICANT CHANGE UP
CREAT SERPL-MCNC: 0.97 MG/DL — SIGNIFICANT CHANGE UP (ref 0.5–1.3)
DIFF PNL FLD: ABNORMAL
EOSINOPHIL # BLD AUTO: 0.35 K/UL — SIGNIFICANT CHANGE UP (ref 0–0.5)
EOSINOPHIL NFR BLD AUTO: 4.4 % — SIGNIFICANT CHANGE UP (ref 0–6)
ESTIMATED AVERAGE GLUCOSE: 128 MG/DL — HIGH (ref 68–114)
GLUCOSE SERPL-MCNC: 102 MG/DL — HIGH (ref 70–99)
GLUCOSE UR QL: NEGATIVE MG/DL — SIGNIFICANT CHANGE UP
HCT VFR BLD CALC: 50 % — SIGNIFICANT CHANGE UP (ref 39–50)
HCT VFR BLD CALC: 50 % — SIGNIFICANT CHANGE UP (ref 39–50)
HCT VFR BLD CALC: 50.3 % — HIGH (ref 39–50)
HCV AB S/CO SERPL IA: 0.37 S/CO — SIGNIFICANT CHANGE UP (ref 0–0.99)
HCV AB SERPL-IMP: SIGNIFICANT CHANGE UP
HDLC SERPL-MCNC: 55 MG/DL — SIGNIFICANT CHANGE UP
HGB BLD-MCNC: 16.7 G/DL — SIGNIFICANT CHANGE UP (ref 13–17)
HGB BLD-MCNC: 17.1 G/DL — HIGH (ref 13–17)
HGB BLD-MCNC: 17.2 G/DL — HIGH (ref 13–17)
IMM GRANULOCYTES NFR BLD AUTO: 0.3 % — SIGNIFICANT CHANGE UP (ref 0–1.5)
INR BLD: 1.05 RATIO — SIGNIFICANT CHANGE UP (ref 0.88–1.16)
KETONES UR-MCNC: NEGATIVE — SIGNIFICANT CHANGE UP
LEUKOCYTE ESTERASE UR-ACNC: NEGATIVE — SIGNIFICANT CHANGE UP
LIPID PNL WITH DIRECT LDL SERPL: 76 MG/DL — SIGNIFICANT CHANGE UP
LYMPHOCYTES # BLD AUTO: 2.26 K/UL — SIGNIFICANT CHANGE UP (ref 1–3.3)
LYMPHOCYTES # BLD AUTO: 28.4 % — SIGNIFICANT CHANGE UP (ref 13–44)
MAGNESIUM SERPL-MCNC: 2.1 MG/DL — SIGNIFICANT CHANGE UP (ref 1.6–2.6)
MCHC RBC-ENTMCNC: 29.6 PG — SIGNIFICANT CHANGE UP (ref 27–34)
MCHC RBC-ENTMCNC: 30.1 PG — SIGNIFICANT CHANGE UP (ref 27–34)
MCHC RBC-ENTMCNC: 30.1 PG — SIGNIFICANT CHANGE UP (ref 27–34)
MCHC RBC-ENTMCNC: 33.4 GM/DL — SIGNIFICANT CHANGE UP (ref 32–36)
MCHC RBC-ENTMCNC: 34.2 GM/DL — SIGNIFICANT CHANGE UP (ref 32–36)
MCHC RBC-ENTMCNC: 34.2 GM/DL — SIGNIFICANT CHANGE UP (ref 32–36)
MCV RBC AUTO: 87.9 FL — SIGNIFICANT CHANGE UP (ref 80–100)
MCV RBC AUTO: 88.1 FL — SIGNIFICANT CHANGE UP (ref 80–100)
MCV RBC AUTO: 88.7 FL — SIGNIFICANT CHANGE UP (ref 80–100)
MONOCYTES # BLD AUTO: 0.89 K/UL — SIGNIFICANT CHANGE UP (ref 0–0.9)
MONOCYTES NFR BLD AUTO: 11.2 % — SIGNIFICANT CHANGE UP (ref 2–14)
NEUTROPHILS # BLD AUTO: 4.39 K/UL — SIGNIFICANT CHANGE UP (ref 1.8–7.4)
NEUTROPHILS NFR BLD AUTO: 55.1 % — SIGNIFICANT CHANGE UP (ref 43–77)
NITRITE UR-MCNC: NEGATIVE — SIGNIFICANT CHANGE UP
NON HDL CHOLESTEROL: 87 MG/DL — SIGNIFICANT CHANGE UP
PH UR: 6.5 — SIGNIFICANT CHANGE UP (ref 5–8)
PHOSPHATE SERPL-MCNC: 3.2 MG/DL — SIGNIFICANT CHANGE UP (ref 2.5–4.5)
PLATELET # BLD AUTO: 243 K/UL — SIGNIFICANT CHANGE UP (ref 150–400)
PLATELET # BLD AUTO: 254 K/UL — SIGNIFICANT CHANGE UP (ref 150–400)
PLATELET # BLD AUTO: 264 K/UL — SIGNIFICANT CHANGE UP (ref 150–400)
POTASSIUM SERPL-MCNC: 4.2 MMOL/L — SIGNIFICANT CHANGE UP (ref 3.5–5.3)
POTASSIUM SERPL-SCNC: 4.2 MMOL/L — SIGNIFICANT CHANGE UP (ref 3.5–5.3)
PROT SERPL-MCNC: 7.6 GM/DL — SIGNIFICANT CHANGE UP (ref 6–8.3)
PROT UR-MCNC: 15 MG/DL
PROTHROM AB SERPL-ACNC: 12.2 SEC — SIGNIFICANT CHANGE UP (ref 10.6–13.6)
RBC # BLD: 5.64 M/UL — SIGNIFICANT CHANGE UP (ref 4.2–5.8)
RBC # BLD: 5.69 M/UL — SIGNIFICANT CHANGE UP (ref 4.2–5.8)
RBC # BLD: 5.71 M/UL — SIGNIFICANT CHANGE UP (ref 4.2–5.8)
RBC # FLD: 13.2 % — SIGNIFICANT CHANGE UP (ref 10.3–14.5)
RBC # FLD: 13.3 % — SIGNIFICANT CHANGE UP (ref 10.3–14.5)
RBC # FLD: 13.3 % — SIGNIFICANT CHANGE UP (ref 10.3–14.5)
SARS-COV-2 RNA SPEC QL NAA+PROBE: SIGNIFICANT CHANGE UP
SODIUM SERPL-SCNC: 138 MMOL/L — SIGNIFICANT CHANGE UP (ref 135–145)
SP GR SPEC: 1.01 — SIGNIFICANT CHANGE UP (ref 1.01–1.02)
TRIGL SERPL-MCNC: 53 MG/DL — SIGNIFICANT CHANGE UP
TROPONIN I SERPL-MCNC: 0.05 NG/ML — HIGH (ref 0.01–0.04)
TROPONIN I SERPL-MCNC: 0.07 NG/ML — HIGH (ref 0.01–0.04)
TSH SERPL-MCNC: 1.17 UU/ML — SIGNIFICANT CHANGE UP (ref 0.34–4.82)
UROBILINOGEN FLD QL: NEGATIVE MG/DL — SIGNIFICANT CHANGE UP
WBC # BLD: 7.96 K/UL — SIGNIFICANT CHANGE UP (ref 3.8–10.5)
WBC # BLD: 8.04 K/UL — SIGNIFICANT CHANGE UP (ref 3.8–10.5)
WBC # BLD: 8.69 K/UL — SIGNIFICANT CHANGE UP (ref 3.8–10.5)
WBC # FLD AUTO: 7.96 K/UL — SIGNIFICANT CHANGE UP (ref 3.8–10.5)
WBC # FLD AUTO: 8.04 K/UL — SIGNIFICANT CHANGE UP (ref 3.8–10.5)
WBC # FLD AUTO: 8.69 K/UL — SIGNIFICANT CHANGE UP (ref 3.8–10.5)

## 2021-03-20 PROCEDURE — C1769: CPT

## 2021-03-20 PROCEDURE — 84484 ASSAY OF TROPONIN QUANT: CPT

## 2021-03-20 PROCEDURE — 71045 X-RAY EXAM CHEST 1 VIEW: CPT | Mod: 26

## 2021-03-20 PROCEDURE — 99152 MOD SED SAME PHYS/QHP 5/>YRS: CPT

## 2021-03-20 PROCEDURE — 99497 ADVNCD CARE PLAN 30 MIN: CPT | Mod: 25

## 2021-03-20 PROCEDURE — 80048 BASIC METABOLIC PNL TOTAL CA: CPT

## 2021-03-20 PROCEDURE — 81001 URINALYSIS AUTO W/SCOPE: CPT

## 2021-03-20 PROCEDURE — U0003: CPT

## 2021-03-20 PROCEDURE — 84100 ASSAY OF PHOSPHORUS: CPT

## 2021-03-20 PROCEDURE — 86900 BLOOD TYPING SEROLOGIC ABO: CPT

## 2021-03-20 PROCEDURE — U0005: CPT

## 2021-03-20 PROCEDURE — 83735 ASSAY OF MAGNESIUM: CPT

## 2021-03-20 PROCEDURE — 85027 COMPLETE CBC AUTOMATED: CPT

## 2021-03-20 PROCEDURE — 99223 1ST HOSP IP/OBS HIGH 75: CPT

## 2021-03-20 PROCEDURE — 86850 RBC ANTIBODY SCREEN: CPT

## 2021-03-20 PROCEDURE — 36415 COLL VENOUS BLD VENIPUNCTURE: CPT

## 2021-03-20 PROCEDURE — 99291 CRITICAL CARE FIRST HOUR: CPT

## 2021-03-20 PROCEDURE — 12345: CPT | Mod: NC

## 2021-03-20 PROCEDURE — C1894: CPT

## 2021-03-20 PROCEDURE — 86901 BLOOD TYPING SEROLOGIC RH(D): CPT

## 2021-03-20 PROCEDURE — 93010 ELECTROCARDIOGRAM REPORT: CPT

## 2021-03-20 PROCEDURE — 84443 ASSAY THYROID STIM HORMONE: CPT

## 2021-03-20 PROCEDURE — 93306 TTE W/DOPPLER COMPLETE: CPT

## 2021-03-20 PROCEDURE — 83036 HEMOGLOBIN GLYCOSYLATED A1C: CPT

## 2021-03-20 PROCEDURE — 80061 LIPID PANEL: CPT

## 2021-03-20 PROCEDURE — C1887: CPT

## 2021-03-20 PROCEDURE — 85610 PROTHROMBIN TIME: CPT

## 2021-03-20 PROCEDURE — 99153 MOD SED SAME PHYS/QHP EA: CPT

## 2021-03-20 PROCEDURE — 93005 ELECTROCARDIOGRAM TRACING: CPT

## 2021-03-20 PROCEDURE — 93306 TTE W/DOPPLER COMPLETE: CPT | Mod: 26

## 2021-03-20 PROCEDURE — 86803 HEPATITIS C AB TEST: CPT

## 2021-03-20 PROCEDURE — 85730 THROMBOPLASTIN TIME PARTIAL: CPT

## 2021-03-20 PROCEDURE — 93458 L HRT ARTERY/VENTRICLE ANGIO: CPT

## 2021-03-20 RX ORDER — CHOLECALCIFEROL (VITAMIN D3) 125 MCG
1000 CAPSULE ORAL DAILY
Refills: 0 | Status: DISCONTINUED | OUTPATIENT
Start: 2021-03-20 | End: 2021-03-22

## 2021-03-20 RX ORDER — HEPARIN SODIUM 5000 [USP'U]/ML
5000 INJECTION INTRAVENOUS; SUBCUTANEOUS ONCE
Refills: 0 | Status: COMPLETED | OUTPATIENT
Start: 2021-03-20 | End: 2021-03-20

## 2021-03-20 RX ORDER — HEPARIN SODIUM 5000 [USP'U]/ML
INJECTION INTRAVENOUS; SUBCUTANEOUS
Qty: 25000 | Refills: 0 | Status: DISCONTINUED | OUTPATIENT
Start: 2021-03-20 | End: 2021-03-22

## 2021-03-20 RX ORDER — FAMOTIDINE 10 MG/ML
20 INJECTION INTRAVENOUS DAILY
Refills: 0 | Status: DISCONTINUED | OUTPATIENT
Start: 2021-03-20 | End: 2021-03-22

## 2021-03-20 RX ORDER — ONDANSETRON 8 MG/1
4 TABLET, FILM COATED ORAL EVERY 6 HOURS
Refills: 0 | Status: DISCONTINUED | OUTPATIENT
Start: 2021-03-20 | End: 2021-03-22

## 2021-03-20 RX ORDER — ATORVASTATIN CALCIUM 80 MG/1
20 TABLET, FILM COATED ORAL AT BEDTIME
Refills: 0 | Status: DISCONTINUED | OUTPATIENT
Start: 2021-03-20 | End: 2021-03-22

## 2021-03-20 RX ORDER — HEPARIN SODIUM 5000 [USP'U]/ML
INJECTION INTRAVENOUS; SUBCUTANEOUS
Qty: 25000 | Refills: 0 | Status: DISCONTINUED | OUTPATIENT
Start: 2021-03-20 | End: 2021-03-20

## 2021-03-20 RX ORDER — METOPROLOL TARTRATE 50 MG
12.5 TABLET ORAL DAILY
Refills: 0 | Status: DISCONTINUED | OUTPATIENT
Start: 2021-03-21 | End: 2021-03-22

## 2021-03-20 RX ORDER — SODIUM CHLORIDE 9 MG/ML
1000 INJECTION INTRAMUSCULAR; INTRAVENOUS; SUBCUTANEOUS ONCE
Refills: 0 | Status: COMPLETED | OUTPATIENT
Start: 2021-03-20 | End: 2021-03-20

## 2021-03-20 RX ORDER — OMEGA-3 ACID ETHYL ESTERS 1 G
1 CAPSULE ORAL DAILY
Refills: 0 | Status: DISCONTINUED | OUTPATIENT
Start: 2021-03-20 | End: 2021-03-22

## 2021-03-20 RX ORDER — NITROGLYCERIN 6.5 MG
0.4 CAPSULE, EXTENDED RELEASE ORAL
Refills: 0 | Status: DISCONTINUED | OUTPATIENT
Start: 2021-03-20 | End: 2021-03-22

## 2021-03-20 RX ORDER — HEPARIN SODIUM 5000 [USP'U]/ML
6000 INJECTION INTRAVENOUS; SUBCUTANEOUS EVERY 6 HOURS
Refills: 0 | Status: DISCONTINUED | OUTPATIENT
Start: 2021-03-20 | End: 2021-03-20

## 2021-03-20 RX ORDER — MULTIVIT-MIN/FERROUS GLUCONATE 9 MG/15 ML
1 LIQUID (ML) ORAL DAILY
Refills: 0 | Status: DISCONTINUED | OUTPATIENT
Start: 2021-03-20 | End: 2021-03-22

## 2021-03-20 RX ORDER — ACETAMINOPHEN 500 MG
650 TABLET ORAL EVERY 6 HOURS
Refills: 0 | Status: DISCONTINUED | OUTPATIENT
Start: 2021-03-20 | End: 2021-03-22

## 2021-03-20 RX ORDER — HEPARIN SODIUM 5000 [USP'U]/ML
6000 INJECTION INTRAVENOUS; SUBCUTANEOUS EVERY 6 HOURS
Refills: 0 | Status: DISCONTINUED | OUTPATIENT
Start: 2021-03-20 | End: 2021-03-22

## 2021-03-20 RX ORDER — ZINC SULFATE TAB 220 MG (50 MG ZINC EQUIVALENT) 220 (50 ZN) MG
220 TAB ORAL DAILY
Refills: 0 | Status: DISCONTINUED | OUTPATIENT
Start: 2021-03-20 | End: 2021-03-22

## 2021-03-20 RX ORDER — AMOXICILLIN 250 MG/5ML
500 SUSPENSION, RECONSTITUTED, ORAL (ML) ORAL THREE TIMES A DAY
Refills: 0 | Status: DISCONTINUED | OUTPATIENT
Start: 2021-03-20 | End: 2021-03-22

## 2021-03-20 RX ORDER — ALLOPURINOL 300 MG
100 TABLET ORAL AT BEDTIME
Refills: 0 | Status: DISCONTINUED | OUTPATIENT
Start: 2021-03-20 | End: 2021-03-22

## 2021-03-20 RX ORDER — ASPIRIN/CALCIUM CARB/MAGNESIUM 324 MG
324 TABLET ORAL ONCE
Refills: 0 | Status: COMPLETED | OUTPATIENT
Start: 2021-03-20 | End: 2021-03-20

## 2021-03-20 RX ORDER — ASPIRIN/CALCIUM CARB/MAGNESIUM 324 MG
81 TABLET ORAL DAILY
Refills: 0 | Status: DISCONTINUED | OUTPATIENT
Start: 2021-03-21 | End: 2021-03-22

## 2021-03-20 RX ORDER — HEPARIN SODIUM 5000 [USP'U]/ML
5000 INJECTION INTRAVENOUS; SUBCUTANEOUS ONCE
Refills: 0 | Status: DISCONTINUED | OUTPATIENT
Start: 2021-03-20 | End: 2021-03-20

## 2021-03-20 RX ADMIN — ZINC SULFATE TAB 220 MG (50 MG ZINC EQUIVALENT) 220 MILLIGRAM(S): 220 (50 ZN) TAB at 09:47

## 2021-03-20 RX ADMIN — HEPARIN SODIUM 1300 UNIT(S)/HR: 5000 INJECTION INTRAVENOUS; SUBCUTANEOUS at 14:09

## 2021-03-20 RX ADMIN — HEPARIN SODIUM 5000 UNIT(S): 5000 INJECTION INTRAVENOUS; SUBCUTANEOUS at 06:44

## 2021-03-20 RX ADMIN — Medication 324 MILLIGRAM(S): at 04:21

## 2021-03-20 RX ADMIN — HEPARIN SODIUM 1300 UNIT(S)/HR: 5000 INJECTION INTRAVENOUS; SUBCUTANEOUS at 21:24

## 2021-03-20 RX ADMIN — Medication 1 GRAM(S): at 09:47

## 2021-03-20 RX ADMIN — Medication 500 MILLIGRAM(S): at 09:50

## 2021-03-20 RX ADMIN — Medication 500 MILLIGRAM(S): at 21:24

## 2021-03-20 RX ADMIN — Medication 30 MILLILITER(S): at 13:42

## 2021-03-20 RX ADMIN — Medication 1000 UNIT(S): at 09:47

## 2021-03-20 RX ADMIN — HEPARIN SODIUM 1000 UNIT(S)/HR: 5000 INJECTION INTRAVENOUS; SUBCUTANEOUS at 06:42

## 2021-03-20 RX ADMIN — HEPARIN SODIUM 6000 UNIT(S): 5000 INJECTION INTRAVENOUS; SUBCUTANEOUS at 14:10

## 2021-03-20 RX ADMIN — Medication 500 MILLIGRAM(S): at 14:09

## 2021-03-20 RX ADMIN — Medication 650 MILLIGRAM(S): at 14:15

## 2021-03-20 RX ADMIN — SODIUM CHLORIDE 1000 MILLILITER(S): 9 INJECTION INTRAMUSCULAR; INTRAVENOUS; SUBCUTANEOUS at 04:50

## 2021-03-20 RX ADMIN — Medication 30 MILLILITER(S): at 18:57

## 2021-03-20 RX ADMIN — Medication 100 MILLIGRAM(S): at 21:24

## 2021-03-20 RX ADMIN — ATORVASTATIN CALCIUM 20 MILLIGRAM(S): 80 TABLET, FILM COATED ORAL at 21:24

## 2021-03-20 RX ADMIN — FAMOTIDINE 20 MILLIGRAM(S): 10 INJECTION INTRAVENOUS at 21:24

## 2021-03-20 RX ADMIN — Medication 1 TABLET(S): at 09:47

## 2021-03-20 NOTE — H&P ADULT - NSICDXPASTMEDICALHX_GEN_ALL_CORE_FT
PAST MEDICAL HISTORY:  At risk for sleep apnea     Atrial fibrillation     Atrial flutter by electrocardiogram     CAD (coronary artery disease)     Class 2 obesity with body mass index (BMI) of 38.0 to 38.9 in adult     Diverticulitis     Dyslipidemia     Gout     Hernia inguinal hernia    HTN (hypertension)     Hyperlipidemia     Left knee pain     Myocardial infarction 2005, 2008    Osteoarthritis     Sleep apnea, unspecified type     Stented coronary artery X4- last 2010    Vitamin D deficiency

## 2021-03-20 NOTE — ED PROVIDER NOTE - PROGRESS NOTE DETAILS
pt with elevated troponin, will start heparin, nstemi, spoke with Dr. Lei Silver DO spoke with cardiology Dr. Nava, pt cards is Dr. Erwin agrees with plan B Nadeem DWYER

## 2021-03-20 NOTE — CONSULT NOTE ADULT - PROBLEM SELECTOR RECOMMENDATION 9
Recent exertional chest discomfort with more severe episode prior to admission; minimally elevated serum troponin but no significant increase on serial assays so far; recommend coronary angiography; reasonable to continue heparin + antiplt agents; monitor on telemetry.

## 2021-03-20 NOTE — ED PROVIDER NOTE - PHYSICAL EXAMINATION
Gen:  Well appearing in NAD  Head:  NC/AT  Cardiac: S1S2, RRR  Abd: Soft, non tender  Resp: No distress, CTA   Ext: no deformities  Skin: warm and dry as visualized

## 2021-03-20 NOTE — ED PROVIDER NOTE - CRITICAL CARE ATTENDING CONTRIBUTION TO CARE
taking more detailed history 15 minutes, discussing  diagnosis with pt 10 minutes, discussing with other physicians 6 minutes B Nadeem DWYER

## 2021-03-20 NOTE — ED ADULT TRIAGE NOTE - CHIEF COMPLAINT QUOTE
Pt presents to ED c/o chest pain. pain started 4 hrs PTA, took 4 chewable ASA 1 hr PTA and Metropolol 30min PTA. pt states his BP was elevated to 220/138. HX 4 stents and two MI

## 2021-03-20 NOTE — CONSULT NOTE ADULT - PROBLEM SELECTOR RECOMMENDATION 2
BP elevated on presentation; he only takes a low dose of Toprol XL at home; observe - titrate over next 24-48 hours as appropriate.

## 2021-03-20 NOTE — H&P ADULT - HISTORY OF PRESENT ILLNESS
Pt is a 62 yo male with a pmh/o gout, obesity, retinal tear, inguinal hernia, a.flutter s/p ablation, MI, CAD s/p PCI x 4 stents, HTN, HLD, who presented to ED due to chest pain which has now been present for several hours which is centralized, pressure like in nature, non radiating, not quantifiable on scale, no a/a factors. Pt states prior to arrival he was hypertensive to 220/138 and took four baby aspirin and double his dose of metoprolol however states machine may be unreliable. On arrival pt hypertensive to 160/100. Currently denies any active chest pain or pressure. Denies diaphoresis, cough, fever, chills, n/v/d, constipation, abdominal pain, HA, paresthesias, leg swelling.

## 2021-03-20 NOTE — PROGRESS NOTE ADULT - ASSESSMENT
61M hx gout, obesity, retinal tear, inguinal hernia, a.flutter s/p ablation, MI, CAD s/p PCI x 4 stents, HTN, HLD, who presented to ED due to chest pain, admitted for c/f NSTEMI.    #NSTEMI  -trops downtrended  -no CP currently  -A1c 6.1, lipids done  -tele  -echo as above  -heparin drip, ASA, statin, BB  -appreciate cards input  -cath Monday likely  -known to Dr. Kathleen  -lipid panel ordered  -f/u mg, phos, coags, cbc, bmp in AM  -cardiology consult  -TTE ordered  -SCD for dvt ppx    #Hypertensive urgency  -improved  -continue home meds    #Urinary frequency- UA neg for infection    #HL- statin    #Dental implant  -cont abx to total 10 days as directed    #DVT ppx- full a/c with hep drip    #dispo- pending cath Monday  -PCP: Dr. Person

## 2021-03-20 NOTE — CONSULT NOTE ADULT - SUBJECTIVE AND OBJECTIVE BOX
CHIEF COMPLAINT: Patient is a 61y old  Male who presents with a chief complaint of chest discomfort    HPI:  61 year old man with a history of CAD, MI, multiple coronary stents, HTN, HLD, obesity, gout, retinal tear, inguinal hernia, AFL s/p ablation, who presented to the ER with complaints of chest discomfort that started earlier this day during sex.  He describes pain at lower aspect of chest in the midline that is non-radiating and not associated with dyspnea; unable to identify exacerbating or alleviating factors.   He describes similar, less intense discomfort during exertion in recent months (for example, during yard work).  He presently feels well.  He was found to have a minimally elevated troponin on initial labs and was prescribed IV heparin by ER physician.    PAST MEDICAL & SURGICAL HISTORY:  Diverticulitis  Hernia inguinal hernia  Sleep apnea, unspecified type  Atrial fibrillation  Vitamin D deficiency  Osteoarthritis  Class 2 obesity with body mass index (BMI) of 38.0 to 38.9 in adult  Hyperlipidemia  HTN (hypertension)  Atrial flutter by electrocardiogram  At risk for sleep apnea  Stented coronary artery X4- last 2010  Left knee pain  Gout  Dyslipidemia  CAD (coronary artery disease)  Myocardial infarction 2005, 2008  S/P meniscectomy  H/O cardiac radiofrequency ablation  History of [id number]  H/O eye surgery right eye retinal tear, 2017  H/O right inguinal hernia repair age 13  Post PTCA x 4 stents in place- last 2010    SOCIAL HISTORY:  Nonsmoker    FAMILY HISTORY:   Maternal family history of respiratory disease (Mother)  Family history of liver disease (Father)  Family history of heart failure (Father)    MEDICATIONS  (STANDING):  allopurinol 100 milliGRAM(s) Oral at bedtime  amoxicillin 500 milliGRAM(s) Oral three times a day  atorvastatin 20 milliGRAM(s) Oral at bedtime  cholecalciferol 1000 Unit(s) Oral daily  heparin  Infusion.  Unit(s)/Hr (10 mL/Hr) IV Continuous <Continuous>  multivitamin/minerals 1 Tablet(s) Oral daily  omega-3-Acid Ethyl Esters 1 Gram(s) Oral daily  zinc sulfate 220 milliGRAM(s) Oral daily    MEDICATIONS  (PRN):  acetaminophen   Tablet .. 650 milliGRAM(s) Oral every 6 hours PRN Temp greater or equal to 38C (100.4F), Mild Pain (1 - 3)  heparin   Injectable 6000 Unit(s) IV Push every 6 hours PRN For aPTT less than 40  nitroglycerin     SubLingual 0.4 milliGRAM(s) SubLingual every 5 minutes PRN Chest Pain  ondansetron Injectable 4 milliGRAM(s) IV Push every 6 hours PRN Nausea    Allergies: No Known Allergies    REVIEW OF SYSTEMS:  CONSTITUTIONAL: No fevers or chills  Eyes: No visual changes  NECK: No pain or stiffness  RESPIRATORY: No cough, wheezing, hemoptysis; No shortness of breath  CARDIOVASCULAR: + chest pain (see HPI)  GASTROINTESTINAL: No abdominal pain. No nausea, vomiting  GENITOURINARY: No dysuria, frequency or hematuria  NEUROLOGICAL: No numbness.  SKIN: No itching or rash  All other review of systems is negative unless indicated above    VITAL SIGNS:   Vital Signs Last 24 Hrs  T(C): 36.9 (20 Mar 2021 06:52), Max: 37.2 (20 Mar 2021 03:43)  T(F): 98.5 (20 Mar 2021 06:52), Max: 99 (20 Mar 2021 03:43)  HR: 70 (20 Mar 2021 06:52) (65 - 70)  BP: 153/89 (20 Mar 2021 06:52) (153/89 - 167/100)  BP(mean): 104 (20 Mar 2021 06:52) (104 - 121)  RR: 22 (20 Mar 2021 06:52) (14 - 22)  SpO2: 96% (20 Mar 2021 06:52) (96% - 98%)    PHYSICAL EXAM:  Constitutional: NAD, awake and alert  HEENT:  EOMI,  Pupils round, No oral cyanosis.  Pulmonary: Non-labored, breath sounds are clear bilaterally, No wheezing, rales or rhonchi  Cardiovascular: S1 and S2, regular rate and rhythm, no Murmurs, gallops or rubs  Gastrointestinal: Bowel Sounds present, soft, nontender. Central obesity  Lymph: No peripheral edema. No cervical lymphadenopathy.  Neurological: Alert, no focal deficits  Skin: No rashes.  Psych:  Mood & affect appropriate    LABS:                      17.1   7.96  )-----------( 243      ( 20 Mar 2021 03:55 )             50.0     138    |  105    |  14     ----------------------------<  102    4.2     |  27     |  0.97     Ca    8.7        20 Mar 2021 03:55    TPro  7.6    /  Alb  3.6    /  TBili  0.4    /  DBili  x      /  AST  41     /  ALT  45     /  AlkPhos  56     20 Mar 2021 03:55    PT/INR - ( 20 Mar 2021 06:03 )   PT: 12.2 sec;   INR: 1.05 ratio    PTT - ( 20 Mar 2021 06:03 )  PTT:34.1 sec    CARDIAC MARKERS ( 20 Mar 2021 06:03 ) 0.054 ng/mL / x     / x     / x     / x      CARDIAC MARKERS ( 20 Mar 2021 03:55 ) 0.065 ng/mL / x     / x     / x     / x        ECG:  Sinus rhythm, septal infarct, nonspecific ST/T abnormalities

## 2021-03-20 NOTE — H&P ADULT - ASSESSMENT
62 yo male with a pmh/o gout, obesity, retinal tear, inguinal hernia, a.flutter s/p ablation, MI, CAD s/p PCI x 4 stents, HTN, HLD, who presented to ED due to chest pain, admitted with:    NSTEMI  -admit to telemetry  -bed rest  -serial troponin  -NTG sl prn cp  -EKG prn cp  -NPO except meds  -BB, ASA, statin continued  -coags ordered STAT  -obtain true weight  -heparin gtt  -lipid panel ordered  -f/u mg, phos, coags, cbc, bmp in AM  -cardiology consult  -TTE ordered  -SCD for dvt ppx    HTN urgency  -slow titration of bp, reported 220/138->167/100  -pt s/p metoprololx2 at home 1 hr prior to arrival, hold further and monitor  -recieved 1L IVF in ED, hold further IVF  -cont antiHtN med    HLD, mild transaminitis  -f/u lipid panel  -cont statin every other day as taken at home  -pt states AST at his baseline    Hyperglycemia, Obesity  -f/u HbA1c  -once diet advanced, would start low fat/DASH/TLC diet    Dental implant  -cont abx to total 10 days as directed

## 2021-03-20 NOTE — H&P ADULT - NSICDXPASTSURGICALHX_GEN_ALL_CORE_FT
PAST SURGICAL HISTORY:  H/O cardiac radiofrequency ablation     H/O eye surgery right eye retinal tear, 2017    H/O right inguinal hernia repair age 13    History of appendectomy 1991    Post PTCA x 4 stents in place- last 2010    S/P meniscectomy

## 2021-03-21 LAB
ANION GAP SERPL CALC-SCNC: 6 MMOL/L — SIGNIFICANT CHANGE UP (ref 5–17)
APTT BLD: 33 SEC — SIGNIFICANT CHANGE UP (ref 27.5–35.5)
APTT BLD: 52 SEC — HIGH (ref 27.5–35.5)
APTT BLD: 52.3 SEC — HIGH (ref 27.5–35.5)
BLD GP AB SCN SERPL QL: SIGNIFICANT CHANGE UP
BUN SERPL-MCNC: 14 MG/DL — SIGNIFICANT CHANGE UP (ref 7–23)
CALCIUM SERPL-MCNC: 9.3 MG/DL — SIGNIFICANT CHANGE UP (ref 8.5–10.1)
CHLORIDE SERPL-SCNC: 103 MMOL/L — SIGNIFICANT CHANGE UP (ref 96–108)
CO2 SERPL-SCNC: 27 MMOL/L — SIGNIFICANT CHANGE UP (ref 22–31)
CREAT SERPL-MCNC: 0.99 MG/DL — SIGNIFICANT CHANGE UP (ref 0.5–1.3)
GLUCOSE SERPL-MCNC: 115 MG/DL — HIGH (ref 70–99)
HCT VFR BLD CALC: 53.5 % — HIGH (ref 39–50)
HGB BLD-MCNC: 18 G/DL — HIGH (ref 13–17)
MCHC RBC-ENTMCNC: 30.4 PG — SIGNIFICANT CHANGE UP (ref 27–34)
MCHC RBC-ENTMCNC: 33.6 GM/DL — SIGNIFICANT CHANGE UP (ref 32–36)
MCV RBC AUTO: 90.4 FL — SIGNIFICANT CHANGE UP (ref 80–100)
PLATELET # BLD AUTO: 221 K/UL — SIGNIFICANT CHANGE UP (ref 150–400)
POTASSIUM SERPL-MCNC: 4 MMOL/L — SIGNIFICANT CHANGE UP (ref 3.5–5.3)
POTASSIUM SERPL-SCNC: 4 MMOL/L — SIGNIFICANT CHANGE UP (ref 3.5–5.3)
RBC # BLD: 5.92 M/UL — HIGH (ref 4.2–5.8)
RBC # FLD: 13.5 % — SIGNIFICANT CHANGE UP (ref 10.3–14.5)
SARS-COV-2 RNA SPEC QL NAA+PROBE: SIGNIFICANT CHANGE UP
SODIUM SERPL-SCNC: 136 MMOL/L — SIGNIFICANT CHANGE UP (ref 135–145)
WBC # BLD: 6.59 K/UL — SIGNIFICANT CHANGE UP (ref 3.8–10.5)
WBC # FLD AUTO: 6.59 K/UL — SIGNIFICANT CHANGE UP (ref 3.8–10.5)

## 2021-03-21 PROCEDURE — 99232 SBSQ HOSP IP/OBS MODERATE 35: CPT

## 2021-03-21 PROCEDURE — 99233 SBSQ HOSP IP/OBS HIGH 50: CPT

## 2021-03-21 RX ADMIN — HEPARIN SODIUM 2000 UNIT(S)/HR: 5000 INJECTION INTRAVENOUS; SUBCUTANEOUS at 18:32

## 2021-03-21 RX ADMIN — Medication 12.5 MILLIGRAM(S): at 09:58

## 2021-03-21 RX ADMIN — Medication 1 TABLET(S): at 09:58

## 2021-03-21 RX ADMIN — Medication 100 MILLIGRAM(S): at 22:37

## 2021-03-21 RX ADMIN — Medication 500 MILLIGRAM(S): at 22:37

## 2021-03-21 RX ADMIN — HEPARIN SODIUM 6000 UNIT(S): 5000 INJECTION INTRAVENOUS; SUBCUTANEOUS at 18:36

## 2021-03-21 RX ADMIN — ATORVASTATIN CALCIUM 20 MILLIGRAM(S): 80 TABLET, FILM COATED ORAL at 22:37

## 2021-03-21 RX ADMIN — Medication 500 MILLIGRAM(S): at 06:21

## 2021-03-21 RX ADMIN — Medication 81 MILLIGRAM(S): at 09:59

## 2021-03-21 RX ADMIN — HEPARIN SODIUM 1500 UNIT(S)/HR: 5000 INJECTION INTRAVENOUS; SUBCUTANEOUS at 04:28

## 2021-03-21 RX ADMIN — ZINC SULFATE TAB 220 MG (50 MG ZINC EQUIVALENT) 220 MILLIGRAM(S): 220 (50 ZN) TAB at 09:58

## 2021-03-21 RX ADMIN — FAMOTIDINE 20 MILLIGRAM(S): 10 INJECTION INTRAVENOUS at 09:59

## 2021-03-21 RX ADMIN — HEPARIN SODIUM 1700 UNIT(S)/HR: 5000 INJECTION INTRAVENOUS; SUBCUTANEOUS at 11:16

## 2021-03-21 RX ADMIN — Medication 1 GRAM(S): at 09:58

## 2021-03-21 RX ADMIN — Medication 500 MILLIGRAM(S): at 13:39

## 2021-03-21 RX ADMIN — Medication 1000 UNIT(S): at 09:58

## 2021-03-21 NOTE — PROGRESS NOTE ADULT - ASSESSMENT
61M hx gout, obesity, retinal tear, inguinal hernia, a.flutter s/p ablation, MI, CAD s/p PCI x 4 stents, HTN, HLD, who presented to ED due to chest pain, admitted for c/f NSTEMI.    #NSTEMI  -trops downtrended  -no CP currently  -A1c 6.1, lipids done  -tele  -echo as above  -heparin drip, ASA, statin, BB  -appreciate cards input  -cath Monday  -known to Dr. Kathleen    #Hypertensive urgency  -improved  -continue home meds    #Urinary frequency- UA neg for infection    #HL- statin    #Dental implant  -cont abx to total 10 days as directed    #DVT ppx- full a/c with hep drip    #dispo- pending cath Monday  -PCP: Dr. Person

## 2021-03-22 ENCOUNTER — TRANSCRIPTION ENCOUNTER (OUTPATIENT)
Age: 61
End: 2021-03-22

## 2021-03-22 ENCOUNTER — INPATIENT (INPATIENT)
Facility: HOSPITAL | Age: 61
LOS: 6 days | Discharge: HOME | DRG: 235 | End: 2021-03-29
Attending: THORACIC SURGERY (CARDIOTHORACIC VASCULAR SURGERY) | Admitting: THORACIC SURGERY (CARDIOTHORACIC VASCULAR SURGERY)
Payer: COMMERCIAL

## 2021-03-22 VITALS
DIASTOLIC BLOOD PRESSURE: 93 MMHG | RESPIRATION RATE: 17 BRPM | HEART RATE: 70 BPM | TEMPERATURE: 98 F | HEIGHT: 74 IN | SYSTOLIC BLOOD PRESSURE: 150 MMHG | OXYGEN SATURATION: 95 % | WEIGHT: 281.97 LBS

## 2021-03-22 VITALS
DIASTOLIC BLOOD PRESSURE: 96 MMHG | OXYGEN SATURATION: 98 % | HEART RATE: 74 BPM | TEMPERATURE: 99 F | SYSTOLIC BLOOD PRESSURE: 136 MMHG | RESPIRATION RATE: 19 BRPM

## 2021-03-22 DIAGNOSIS — Z98.890 OTHER SPECIFIED POSTPROCEDURAL STATES: Chronic | ICD-10-CM

## 2021-03-22 DIAGNOSIS — I25.10 ATHEROSCLEROTIC HEART DISEASE OF NATIVE CORONARY ARTERY WITHOUT ANGINA PECTORIS: ICD-10-CM

## 2021-03-22 DIAGNOSIS — K08.9 DISORDER OF TEETH AND SUPPORTING STRUCTURES, UNSPECIFIED: ICD-10-CM

## 2021-03-22 DIAGNOSIS — Z79.01 LONG TERM (CURRENT) USE OF ANTICOAGULANTS: ICD-10-CM

## 2021-03-22 DIAGNOSIS — M10.9 GOUT, UNSPECIFIED: ICD-10-CM

## 2021-03-22 DIAGNOSIS — Z98.61 CORONARY ANGIOPLASTY STATUS: Chronic | ICD-10-CM

## 2021-03-22 DIAGNOSIS — Z90.49 ACQUIRED ABSENCE OF OTHER SPECIFIED PARTS OF DIGESTIVE TRACT: Chronic | ICD-10-CM

## 2021-03-22 DIAGNOSIS — G47.30 SLEEP APNEA, UNSPECIFIED: ICD-10-CM

## 2021-03-22 DIAGNOSIS — Z29.9 ENCOUNTER FOR PROPHYLACTIC MEASURES, UNSPECIFIED: ICD-10-CM

## 2021-03-22 DIAGNOSIS — Z01.818 ENCOUNTER FOR OTHER PREPROCEDURAL EXAMINATION: ICD-10-CM

## 2021-03-22 LAB
ANION GAP SERPL CALC-SCNC: 4 MMOL/L — LOW (ref 5–17)
APTT BLD: 102.9 SEC — HIGH (ref 27.5–35.5)
APTT BLD: 60.7 SEC — HIGH (ref 27.5–35.5)
BLD GP AB SCN SERPL QL: SIGNIFICANT CHANGE UP
BUN SERPL-MCNC: 14 MG/DL — SIGNIFICANT CHANGE UP (ref 7–23)
CALCIUM SERPL-MCNC: 9.3 MG/DL — SIGNIFICANT CHANGE UP (ref 8.5–10.1)
CHLORIDE SERPL-SCNC: 104 MMOL/L — SIGNIFICANT CHANGE UP (ref 96–108)
CO2 SERPL-SCNC: 29 MMOL/L — SIGNIFICANT CHANGE UP (ref 22–31)
CREAT SERPL-MCNC: 1 MG/DL — SIGNIFICANT CHANGE UP (ref 0.5–1.3)
GLUCOSE SERPL-MCNC: 99 MG/DL — SIGNIFICANT CHANGE UP (ref 70–99)
HCT VFR BLD CALC: 52.7 % — HIGH (ref 39–50)
HGB BLD-MCNC: 17.4 G/DL — HIGH (ref 13–17)
INR BLD: 1.05 RATIO — SIGNIFICANT CHANGE UP (ref 0.88–1.16)
MCHC RBC-ENTMCNC: 29.6 PG — SIGNIFICANT CHANGE UP (ref 27–34)
MCHC RBC-ENTMCNC: 33 GM/DL — SIGNIFICANT CHANGE UP (ref 32–36)
MCV RBC AUTO: 89.8 FL — SIGNIFICANT CHANGE UP (ref 80–100)
PLATELET # BLD AUTO: 211 K/UL — SIGNIFICANT CHANGE UP (ref 150–400)
POTASSIUM SERPL-MCNC: 4.6 MMOL/L — SIGNIFICANT CHANGE UP (ref 3.5–5.3)
POTASSIUM SERPL-SCNC: 4.6 MMOL/L — SIGNIFICANT CHANGE UP (ref 3.5–5.3)
PROTHROM AB SERPL-ACNC: 12.1 SEC — SIGNIFICANT CHANGE UP (ref 10.6–13.6)
RBC # BLD: 5.87 M/UL — HIGH (ref 4.2–5.8)
RBC # FLD: 13.2 % — SIGNIFICANT CHANGE UP (ref 10.3–14.5)
SODIUM SERPL-SCNC: 137 MMOL/L — SIGNIFICANT CHANGE UP (ref 135–145)
WBC # BLD: 7.68 K/UL — SIGNIFICANT CHANGE UP (ref 3.8–10.5)
WBC # FLD AUTO: 7.68 K/UL — SIGNIFICANT CHANGE UP (ref 3.8–10.5)

## 2021-03-22 PROCEDURE — 99222 1ST HOSP IP/OBS MODERATE 55: CPT

## 2021-03-22 PROCEDURE — 99238 HOSP IP/OBS DSCHRG MGMT 30/<: CPT

## 2021-03-22 PROCEDURE — 93010 ELECTROCARDIOGRAM REPORT: CPT

## 2021-03-22 PROCEDURE — 71045 X-RAY EXAM CHEST 1 VIEW: CPT | Mod: 26

## 2021-03-22 PROCEDURE — 99233 SBSQ HOSP IP/OBS HIGH 50: CPT

## 2021-03-22 RX ORDER — HEPARIN SODIUM 5000 [USP'U]/ML
1000 INJECTION INTRAVENOUS; SUBCUTANEOUS
Qty: 25000 | Refills: 0 | Status: DISCONTINUED | OUTPATIENT
Start: 2021-03-22 | End: 2021-03-23

## 2021-03-22 RX ORDER — METOPROLOL TARTRATE 50 MG
12.5 TABLET ORAL DAILY
Refills: 0 | Status: DISCONTINUED | OUTPATIENT
Start: 2021-03-22 | End: 2021-03-22

## 2021-03-22 RX ORDER — ALLOPURINOL 300 MG
1 TABLET ORAL
Qty: 0 | Refills: 0 | DISCHARGE

## 2021-03-22 RX ORDER — ALLOPURINOL 300 MG
100 TABLET ORAL AT BEDTIME
Refills: 0 | Status: DISCONTINUED | OUTPATIENT
Start: 2021-03-22 | End: 2021-03-24

## 2021-03-22 RX ORDER — ACETAMINOPHEN 500 MG
650 TABLET ORAL EVERY 6 HOURS
Refills: 0 | Status: DISCONTINUED | OUTPATIENT
Start: 2021-03-22 | End: 2021-03-24

## 2021-03-22 RX ORDER — ASPIRIN/CALCIUM CARB/MAGNESIUM 324 MG
1 TABLET ORAL
Qty: 0 | Refills: 0 | DISCHARGE
Start: 2021-03-22

## 2021-03-22 RX ORDER — FAMOTIDINE 10 MG/ML
20 INJECTION INTRAVENOUS DAILY
Refills: 0 | Status: DISCONTINUED | OUTPATIENT
Start: 2021-03-22 | End: 2021-03-24

## 2021-03-22 RX ORDER — ACETAMINOPHEN 500 MG
2 TABLET ORAL
Qty: 0 | Refills: 0 | DISCHARGE
Start: 2021-03-22

## 2021-03-22 RX ORDER — ATORVASTATIN CALCIUM 80 MG/1
1 TABLET, FILM COATED ORAL
Qty: 0 | Refills: 0 | DISCHARGE
Start: 2021-03-22

## 2021-03-22 RX ORDER — METOPROLOL TARTRATE 50 MG
12.5 TABLET ORAL
Qty: 0 | Refills: 0 | DISCHARGE
Start: 2021-03-22

## 2021-03-22 RX ORDER — ATORVASTATIN CALCIUM 80 MG/1
20 TABLET, FILM COATED ORAL AT BEDTIME
Refills: 0 | Status: DISCONTINUED | OUTPATIENT
Start: 2021-03-22 | End: 2021-03-24

## 2021-03-22 RX ORDER — METOPROLOL TARTRATE 50 MG
0.5 TABLET ORAL
Qty: 0 | Refills: 0 | DISCHARGE

## 2021-03-22 RX ORDER — ASPIRIN/CALCIUM CARB/MAGNESIUM 324 MG
81 TABLET ORAL DAILY
Refills: 0 | Status: DISCONTINUED | OUTPATIENT
Start: 2021-03-22 | End: 2021-03-24

## 2021-03-22 RX ORDER — SODIUM CHLORIDE 9 MG/ML
3 INJECTION INTRAMUSCULAR; INTRAVENOUS; SUBCUTANEOUS EVERY 8 HOURS
Refills: 0 | Status: DISCONTINUED | OUTPATIENT
Start: 2021-03-22 | End: 2021-03-24

## 2021-03-22 RX ORDER — MAGNESIUM CARBONATE 54 MG/5 ML
0 LIQUID (ML) ORAL
Qty: 0 | Refills: 0 | DISCHARGE

## 2021-03-22 RX ORDER — ONDANSETRON 8 MG/1
4 TABLET, FILM COATED ORAL
Qty: 0 | Refills: 0 | DISCHARGE
Start: 2021-03-22

## 2021-03-22 RX ORDER — HEPARIN SODIUM 5000 [USP'U]/ML
1000 INJECTION INTRAVENOUS; SUBCUTANEOUS
Qty: 0 | Refills: 0 | DISCHARGE
Start: 2021-03-22

## 2021-03-22 RX ORDER — ALLOPURINOL 300 MG
1 TABLET ORAL
Qty: 0 | Refills: 0 | DISCHARGE
Start: 2021-03-22

## 2021-03-22 RX ORDER — ROSUVASTATIN CALCIUM 5 MG/1
1 TABLET ORAL
Qty: 0 | Refills: 0 | DISCHARGE

## 2021-03-22 RX ORDER — HEPARIN SODIUM 5000 [USP'U]/ML
INJECTION INTRAVENOUS; SUBCUTANEOUS
Qty: 25000 | Refills: 0 | Status: DISCONTINUED | OUTPATIENT
Start: 2021-03-22 | End: 2021-03-22

## 2021-03-22 RX ORDER — NITROGLYCERIN 6.5 MG
0.4 CAPSULE, EXTENDED RELEASE ORAL
Qty: 0 | Refills: 0 | DISCHARGE
Start: 2021-03-22

## 2021-03-22 RX ORDER — ASPIRIN/CALCIUM CARB/MAGNESIUM 324 MG
1 TABLET ORAL
Qty: 0 | Refills: 0 | DISCHARGE

## 2021-03-22 RX ORDER — METOPROLOL TARTRATE 50 MG
12.5 TABLET ORAL DAILY
Refills: 0 | Status: DISCONTINUED | OUTPATIENT
Start: 2021-03-22 | End: 2021-03-24

## 2021-03-22 RX ORDER — FAMOTIDINE 10 MG/ML
1 INJECTION INTRAVENOUS
Qty: 0 | Refills: 0 | DISCHARGE
Start: 2021-03-22

## 2021-03-22 RX ADMIN — HEPARIN SODIUM 1700 UNIT(S)/HR: 5000 INJECTION INTRAVENOUS; SUBCUTANEOUS at 02:46

## 2021-03-22 RX ADMIN — Medication 12.5 MILLIGRAM(S): at 10:11

## 2021-03-22 RX ADMIN — HEPARIN SODIUM 10 UNIT(S)/HR: 5000 INJECTION INTRAVENOUS; SUBCUTANEOUS at 23:43

## 2021-03-22 RX ADMIN — FAMOTIDINE 20 MILLIGRAM(S): 10 INJECTION INTRAVENOUS at 10:12

## 2021-03-22 RX ADMIN — Medication 500 MILLIGRAM(S): at 15:31

## 2021-03-22 RX ADMIN — HEPARIN SODIUM 1000 UNIT(S)/HR: 5000 INJECTION INTRAVENOUS; SUBCUTANEOUS at 17:11

## 2021-03-22 RX ADMIN — Medication 30 MILLILITER(S): at 20:31

## 2021-03-22 RX ADMIN — SODIUM CHLORIDE 3 MILLILITER(S): 9 INJECTION INTRAMUSCULAR; INTRAVENOUS; SUBCUTANEOUS at 23:43

## 2021-03-22 RX ADMIN — Medication 81 MILLIGRAM(S): at 10:11

## 2021-03-22 RX ADMIN — Medication 500 MILLIGRAM(S): at 05:56

## 2021-03-22 RX ADMIN — HEPARIN SODIUM 0 UNIT(S)/HR: 5000 INJECTION INTRAVENOUS; SUBCUTANEOUS at 01:36

## 2021-03-22 NOTE — DISCHARGE NOTE NURSING/CASE MANAGEMENT/SOCIAL WORK - PATIENT PORTAL LINK FT
You can access the FollowMyHealth Patient Portal offered by [facility] by registering at the following website: [email or web address]. By joining [facility]’s FollowMyHealth portal, you will also be able to view your health information using other applications (apps) compatible with our system.

## 2021-03-22 NOTE — PROGRESS NOTE ADULT - SUBJECTIVE AND OBJECTIVE BOX
HPI:  62 yo male with a pmh/o gout, obesity, retinal tear, inguinal hernia, a.flutter s/p ablation, MI 2005, CAD s/p PCI x 4 stents, HTN, HLD, who presented to ED due to chest pain which has now been present for several hours which is centralized, pressure like in nature, non radiating, not quantifiable on scale, no a/a factors. Pt states prior to arrival he was hypertensive to 220/138 and took four baby aspirin and double his dose of metoprolol however states machine may be unreliable.   In ED, Troponin mildly elevated at 0.065, no significant ST or T wave changes.  Pt brought to cath lab for further ischemic evaluation.     Now, Pt is s/p LHC, revealed severe 3VD.   During recovery time, Pt reported Lt vision changes and B/L LE tightness. Pt was assessed with Dr. Deleon, full strength on B/L LE and B/L UE with normal motor and sensory function on quick exam, no focal neuro deficit noted, VSS. Eye vision changes improved (almost back to normal) after IV hydration.     ROS: as above, denies chest pain/ pressure, SOB or palpitation     Vital Signs;  T(C): 36.6 (03-22-21 @ 09:32), Max: 36.7 (03-22-21 @ 06:01)  HR: 63 (03-22-21 @ 14:25) (63 - 84)  BP: 150/82 (03-22-21 @ 14:25) (141/84 - 169/97)  RR: 18 (03-22-21 @ 14:25) (17 - 18)  SpO2: 97% (03-22-21 @ 14:25) (95% - 100%)    Physical Exam:   General: awake, no acute distress   HEENT: NCAT, neck supple   CV: RRR, normal S1S2, no murmur/ rub   Pulmonary: clear, no wheezing or rales   GI: +BS, soft, non-tender, non-distended   : voiding freely   Extremities: no edema, 2+ pedal pulses   Skin: no rashes or lesion. Rt. radial access site (s/p radial band off at 2:30pm): no hematoma or bleeding     LABS: All Labs Reviewed:                        17.4   7.68  )-----------( 211      ( 22 Mar 2021 08:35 )             52.7     03-22    137  |  104  |  14  ----------------------------<  99  4.6   |  29  |  1.00    Ca    9.3      22 Mar 2021 08:35  PT/INR - ( 22 Mar 2021 08:35 )   PT: 12.1 sec;   INR: 1.05 ratio    PTT - ( 22 Mar 2021 08:35 )  PTT:60.7 sec    Cath report: pending official report    Medications:  acetaminophen   Tablet .. 650 milliGRAM(s) Oral every 6 hours PRN  allopurinol 100 milliGRAM(s) Oral at bedtime  aluminum hydroxide/magnesium hydroxide/simethicone Suspension 30 milliLiter(s) Oral every 4 hours PRN  amoxicillin 500 milliGRAM(s) Oral three times a day  aspirin enteric coated 81 milliGRAM(s) Oral daily  atorvastatin 20 milliGRAM(s) Oral at bedtime  cholecalciferol 1000 Unit(s) Oral daily  famotidine    Tablet 20 milliGRAM(s) Oral daily  heparin   Injectable 6000 Unit(s) IV Push every 6 hours PRN  heparin  Infusion.  Unit(s)/Hr IV Continuous <Continuous>  metoprolol succinate ER 12.5 milliGRAM(s) Oral daily  multivitamin/minerals 1 Tablet(s) Oral daily  nitroglycerin     SubLingual 0.4 milliGRAM(s) SubLingual every 5 minutes PRN  omega-3-Acid Ethyl Esters 1 Gram(s) Oral daily  ondansetron Injectable 4 milliGRAM(s) IV Push every 6 hours PRN  zinc sulfate 220 milliGRAM(s) Oral daily    # s/p LHC: severe 3VD  - return to his unit  - continue ASA 81 mg daily   - start Heparin gtt at 1000 units/ hr at 4: 30 pm   - continue BB  - continue statin  - post procedure, outcome and follow up care reviewed with patient/Dr. Deleon  - Plan for transferring to [facility] for surgical evaluation with Dr. Thomason.   - transfer form signed and placed in his chart  - follow up with Dr. Zaragoza      Discussed the plan with Dr. Deleon, Dr. Cook, Pt and cath RN 
61 year old man with a history of CAD, MI, multiple coronary stents, HTN, HLD, obesity, gout, retinal tear, inguinal hernia, AFL s/p ablation, who presented to the ER with complaints of chest discomfort that started earlier this day during sex.  He describes pain at lower aspect of chest in the midline that is non-radiating and not associated with dyspnea; unable to identify exacerbating or alleviating factors.   He describes similar, less intense discomfort during exertion in recent months (for example, during yard work).  He presently feels well.  He was found to have a minimally elevated troponin on initial labs and was prescribed IV heparin by ER physician.    3/21/21:  Feels well; no recurrence of chest pain.  3/22/'21: no chest pain overnight. discussed indication for cath.    MEDICATIONS:  OUTPATIENT  Home Medications:  allopurinol 100 mg oral tablet: 1 tab(s) orally once a day (at bedtime) (20 Mar 2021 06:09)  aspirin 81 mg oral tablet: 1 tab(s) orally once a day (20 Mar 2021 06:09)  Centrum Adults oral tablet: 1 tab(s) orally once a day (20 Mar 2021 06:09)  Co Q-10 100 mg oral capsule: 3 cap(s) orally once a day (20 Mar 2021 06:09)  Crestor 5 mg oral tablet: 1 tab(s) orally once a day (at bedtime)EVERY OTHER DAY (20 Mar 2021 06:09)  Fish Oil 1000 mg oral capsule: 1 cap(s) orally once a day (20 Mar 2021 06:09)  magnesium carbonate 250 mg oral capsule: orally once a day (20 Mar 2021 06:09)  Metoprolol Succinate ER 25 mg oral tablet, extended release: 0.5 tab(s) orally once a day (at bedtime) (20 Mar 2021 06:09)  Vitamin D3 1000 intl units oral capsule: 1 cap(s) orally once a day (20 Mar 2021 06:09)  Zinc 140 mg (as elemental zinc 50 mg) oral tablet: 1 tab(s) orally once a day (20 Mar 2021 06:09)      INPATIENT  MEDICATIONS  (STANDING):  allopurinol 100 milliGRAM(s) Oral at bedtime  amoxicillin 500 milliGRAM(s) Oral three times a day  aspirin enteric coated 81 milliGRAM(s) Oral daily  atorvastatin 20 milliGRAM(s) Oral at bedtime  cholecalciferol 1000 Unit(s) Oral daily  famotidine    Tablet 20 milliGRAM(s) Oral daily  heparin  Infusion.  Unit(s)/Hr (10 mL/Hr) IV Continuous <Continuous>  metoprolol succinate ER 12.5 milliGRAM(s) Oral daily  multivitamin/minerals 1 Tablet(s) Oral daily  omega-3-Acid Ethyl Esters 1 Gram(s) Oral daily  zinc sulfate 220 milliGRAM(s) Oral daily    MEDICATIONS  (PRN):  acetaminophen   Tablet .. 650 milliGRAM(s) Oral every 6 hours PRN Temp greater or equal to 38C (100.4F), Mild Pain (1 - 3)  aluminum hydroxide/magnesium hydroxide/simethicone Suspension 30 milliLiter(s) Oral every 4 hours PRN Dyspepsia  heparin   Injectable 6000 Unit(s) IV Push every 6 hours PRN For aPTT less than 40  nitroglycerin     SubLingual 0.4 milliGRAM(s) SubLingual every 5 minutes PRN Chest Pain  ondansetron Injectable 4 milliGRAM(s) IV Push every 6 hours PRN Nausea    Vital Signs Last 24 Hrs  T(C): 36.6 (22 Mar 2021 09:32), Max: 36.7 (22 Mar 2021 06:01)  T(F): 97.8 (22 Mar 2021 09:32), Max: 98 (22 Mar 2021 06:01)  HR: 64 (22 Mar 2021 09:32) (64 - 84)  BP: 169/97 (22 Mar 2021 09:32) (141/84 - 169/97)  BP(mean): 96 (22 Mar 2021 06:01) (96 - 96)  RR: 18 (22 Mar 2021 09:32) (17 - 18)  SpO2: 100% (22 Mar 2021 09:32) (95% - 100%)Daily     Daily I&O's Summary    PHYSICAL EXAM:  Constitutional: NAD, awake and alert, appears well  Pulmonary: Non-labored, breath sounds are clear bilaterally, No wheezing, rales or rhonchi  Cardiovascular: S1 and S2, regular rate and rhythm  Gastrointestinal: Bowel Sounds present, soft, nontender. Central obesity  Psych:  Mood & affect appropriate      LABS: All Labs Reviewed:                        17.4   7.68  )-----------( 211      ( 22 Mar 2021 08:35 )             52.7                         18.0   6.59  )-----------( 221      ( 21 Mar 2021 10:29 )             53.5                         16.7   8.04  )-----------( 254      ( 20 Mar 2021 12:50 )             50.0     22 Mar 2021 08:35    137    |  104    |  14     ----------------------------<  99     4.6     |  29     |  1.00   21 Mar 2021 10:29    136    |  103    |  14     ----------------------------<  115    4.0     |  27     |  0.99   20 Mar 2021 08:00    138    |  106    |  12     ----------------------------<  107    4.4     |  22     |  0.96     Ca    9.3        22 Mar 2021 08:35  Ca    9.3        21 Mar 2021 10:29  Ca    9.1        20 Mar 2021 08:00  Phos  3.2       20 Mar 2021 08:00  Mg     2.1       20 Mar 2021 08:00    TPro  7.6    /  Alb  3.6    /  TBili  0.4    /  DBili  x      /  AST  41     /  ALT  45     /  AlkPhos  56     20 Mar 2021 03:55    PT/INR - ( 22 Mar 2021 08:35 )   PT: 12.1 sec;   INR: 1.05 ratio    PTT - ( 22 Mar 2021 08:35 )  PTT:60.7 sec      Blood Culture:     03-20 @ 08:00  TSH: 1.17      ECG:  Sinus rhythm, septal infarct, nonspecific ST/T abnormalities    Tele: SR    TTE Echo Complete w/o Contrast w/ Doppler (03.20.21 @ 10:31) >   The left ventricle is normal in size, wall thickness, wall motion and   contractility. The apex appears mildly hypokinetic. Estimated left   ventricular ejection fraction is 55%.   Mild mitral regurgitation.   Mild aortic regurgitation.   Mild tricuspid regurgitation.   Borderline pulmonary hypertension.
HOSPITALIST ATTENDING PROGRESS NOTE    Chart and meds reviewed.  Patient seen and examined.    CC: CP    Subjective:  3/20/21: Admitted this am for CP, c/f NSTEMI. Pt denies CP currently, likely cath Monday.    All 10 systems reviewed and found to be negative with the exception of what has been described above.    MEDICATIONS  (STANDING):  allopurinol 100 milliGRAM(s) Oral at bedtime  amoxicillin 500 milliGRAM(s) Oral three times a day  atorvastatin 20 milliGRAM(s) Oral at bedtime  cholecalciferol 1000 Unit(s) Oral daily  famotidine    Tablet 20 milliGRAM(s) Oral daily  heparin  Infusion.  Unit(s)/Hr (10 mL/Hr) IV Continuous <Continuous>  multivitamin/minerals 1 Tablet(s) Oral daily  omega-3-Acid Ethyl Esters 1 Gram(s) Oral daily  zinc sulfate 220 milliGRAM(s) Oral daily    MEDICATIONS  (PRN):  acetaminophen   Tablet .. 650 milliGRAM(s) Oral every 6 hours PRN Temp greater or equal to 38C (100.4F), Mild Pain (1 - 3)  aluminum hydroxide/magnesium hydroxide/simethicone Suspension 30 milliLiter(s) Oral every 4 hours PRN Dyspepsia  heparin   Injectable 6000 Unit(s) IV Push every 6 hours PRN For aPTT less than 40  nitroglycerin     SubLingual 0.4 milliGRAM(s) SubLingual every 5 minutes PRN Chest Pain  ondansetron Injectable 4 milliGRAM(s) IV Push every 6 hours PRN Nausea      VITALS:  T(F): 97.4 (21 @ 17:30), Max: 99 (21 @ 03:43)  HR: 69 (21 @ 17:30) (65 - 77)  BP: 149/93 (21 @ 17:30) (142/80 - 167/100)  RR: 16 (21 @ 17:30) (13 - 22)  SpO2: 96% (21 @ 17:30) (96% - 98%)  Wt(kg): --    I&O's Summary      CAPILLARY BLOOD GLUCOSE          PHYSICAL EXAM:    HEENT:  pupils equal and reactive, EOMI, no oropharyngeal lesions, erythema, exudates, oral thrush  NECK:   supple, no carotid bruits, no palpable lymph nodes, no thyromegaly  CV:  +S1, +S2, regular, no murmurs or rubs  RESP:   lungs clear to auscultation bilaterally, no wheezing, rales, rhonchi, good air entry bilaterally  BREAST:  not examined  GI:  abdomen soft, non-tender, non-distended, normal BS, no bruits, no abdominal masses, no palpable masses  RECTAL:  not examined  :  not examined  MSK:   normal muscle tone, no atrophy, no rigidity, no contractions  EXT:  no clubbing, no cyanosis, no edema, no calf pain, swelling or erythema  VASCULAR:  pulses equal and symmetric in the upper and lower extremities  NEURO:  AAOX3, no focal neurological deficits, follows all commands, able to move extremities spontaneously  SKIN:  no ulcers, lesions or rashes    LABS:                            16.7   8.04  )-----------( 254      ( 20 Mar 2021 12:50 )             50.0     03-20    138  |  106  |  12  ----------------------------<  107<H>  4.4   |  22  |  0.96    Ca    9.1      20 Mar 2021 08:00  Phos  3.2     03-20  Mg     2.1     03-20    TPro  7.6  /  Alb  3.6  /  TBili  0.4  /  DBili  x   /  AST  41<H>  /  ALT  45  /  AlkPhos  56  03-20    CARDIAC MARKERS ( 20 Mar 2021 08:00 )  0.047 ng/mL / x     / x     / x     / x      CARDIAC MARKERS ( 20 Mar 2021 06:03 )  0.054 ng/mL / x     / x     / x     / x      CARDIAC MARKERS ( 20 Mar 2021 03:55 )  0.065 ng/mL / x     / x     / x     / x          LIVER FUNCTIONS - ( 20 Mar 2021 03:55 )  Alb: 3.6 g/dL / Pro: 7.6 gm/dL / ALK PHOS: 56 U/L / ALT: 45 U/L / AST: 41 U/L / GGT: x           PT/INR - ( 20 Mar 2021 06:03 )   PT: 12.2 sec;   INR: 1.05 ratio         PTT - ( 20 Mar 2021 20:21 )  PTT:60.0 sec  Urinalysis Basic - ( 20 Mar 2021 15:45 )    Color: Yellow / Appearance: Clear / S.015 / pH: x  Gluc: x / Ketone: Negative  / Bili: Negative / Urobili: Negative mg/dL   Blood: x / Protein: 15 mg/dL / Nitrite: Negative   Leuk Esterase: Negative / RBC: 11-25 /HPF / WBC Negative   Sq Epi: x / Non Sq Epi: Occasional / Bacteria: Negative          Blood, Urine: Moderate (03-20 @ 15:45)    CULTURES:  Blood, Urine: Moderate (03-20 @ 15:45)      Additional results/Imaging:  Echo 3/20/21:  The left ventricle is normal in size, wall thickness, wall motion and   contractility. The apex appears mildly hypokinetic. Estimated left   ventricular ejection fraction is 55%.   Mild mitral regurgitation.   Mild aortic regurgitation.   Mild tricuspid regurgitation.   Borderline pulmonary hypertension.    CXR 3/20/21: Clear lungs.
HOSPITALIST ATTENDING PROGRESS NOTE    Chart and meds reviewed.  Patient seen and examined.  CC: CP    Subjective:  3/20/21: Admitted this am for CP, c/f NSTEMI. Pt denies CP currently, likely cath Monday.    3/21/21: Fleeting twinges of CP, no sustained CP or chest pressure. Of note, pt returned from trip to [place] on 3/14/21. Planned for cath tmrw.    All 10 systems reviewed and found to be negative with the exception of what has been described above.    MEDICATIONS  (STANDING):  allopurinol 100 milliGRAM(s) Oral at bedtime  amoxicillin 500 milliGRAM(s) Oral three times a day  aspirin enteric coated 81 milliGRAM(s) Oral daily  atorvastatin 20 milliGRAM(s) Oral at bedtime  cholecalciferol 1000 Unit(s) Oral daily  famotidine    Tablet 20 milliGRAM(s) Oral daily  heparin  Infusion.  Unit(s)/Hr (10 mL/Hr) IV Continuous <Continuous>  metoprolol succinate ER 12.5 milliGRAM(s) Oral daily  multivitamin/minerals 1 Tablet(s) Oral daily  omega-3-Acid Ethyl Esters 1 Gram(s) Oral daily  zinc sulfate 220 milliGRAM(s) Oral daily    MEDICATIONS  (PRN):  acetaminophen   Tablet .. 650 milliGRAM(s) Oral every 6 hours PRN Temp greater or equal to 38C (100.4F), Mild Pain (1 - 3)  aluminum hydroxide/magnesium hydroxide/simethicone Suspension 30 milliLiter(s) Oral every 4 hours PRN Dyspepsia  heparin   Injectable 6000 Unit(s) IV Push every 6 hours PRN For aPTT less than 40  nitroglycerin     SubLingual 0.4 milliGRAM(s) SubLingual every 5 minutes PRN Chest Pain  ondansetron Injectable 4 milliGRAM(s) IV Push every 6 hours PRN Nausea      VITALS:  T(F): 98 (21 @ 08:31), Max: 98.1 (21 @ 21:34)  HR: 62 (21 @ 08:31) (62 - 69)  BP: 139/86 (21 @ 08:31) (139/86 - 149/93)  RR: 20 (21 @ 08:31) (16 - 20)  SpO2: 98% (03-21-21 @ 08:31) (96% - 98%)  Wt(kg): --    I&O's Summary      CAPILLARY BLOOD GLUCOSE          PHYSICAL EXAM:    HEENT:  pupils equal and reactive, EOMI, no oropharyngeal lesions, erythema, exudates, oral thrush  NECK:   supple, no carotid bruits, no palpable lymph nodes, no thyromegaly  CV:  +S1, +S2, regular, no murmurs or rubs  RESP:   lungs clear to auscultation bilaterally, no wheezing, rales, rhonchi, good air entry bilaterally  BREAST:  not examined  GI:  abdomen soft, non-tender, non-distended, normal BS, no bruits, no abdominal masses, no palpable masses  RECTAL:  not examined  :  not examined  MSK:   normal muscle tone, no atrophy, no rigidity, no contractions  EXT:  no clubbing, no cyanosis, no edema, no calf pain, swelling or erythema  VASCULAR:  pulses equal and symmetric in the upper and lower extremities  NEURO:  AAOX3, no focal neurological deficits, follows all commands, able to move extremities spontaneously  SKIN:  no ulcers, lesions or rashes    LABS:                            18.0   6.59  )-----------( 221      ( 21 Mar 2021 10:29 )             53.5     03-21    136  |  103  |  14  ----------------------------<  115<H>  4.0   |  27  |  0.99    Ca    9.3      21 Mar 2021 10:29  Phos  3.2     03-20  Mg     2.1     03-20    TPro  7.6  /  Alb  3.6  /  TBili  0.4  /  DBili  x   /  AST  41<H>  /  ALT  45  /  AlkPhos  56  03-20    CARDIAC MARKERS ( 20 Mar 2021 08:00 )  0.047 ng/mL / x     / x     / x     / x      CARDIAC MARKERS ( 20 Mar 2021 06:03 )  0.054 ng/mL / x     / x     / x     / x      CARDIAC MARKERS ( 20 Mar 2021 03:55 )  0.065 ng/mL / x     / x     / x     / x          LIVER FUNCTIONS - ( 20 Mar 2021 03:55 )  Alb: 3.6 g/dL / Pro: 7.6 gm/dL / ALK PHOS: 56 U/L / ALT: 45 U/L / AST: 41 U/L / GGT: x           PT/INR - ( 20 Mar 2021 06:03 )   PT: 12.2 sec;   INR: 1.05 ratio         PTT - ( 21 Mar 2021 10:29 )  PTT:52.0 sec  Urinalysis Basic - ( 20 Mar 2021 15:45 )    Color: Yellow / Appearance: Clear / S.015 / pH: x  Gluc: x / Ketone: Negative  / Bili: Negative / Urobili: Negative mg/dL   Blood: x / Protein: 15 mg/dL / Nitrite: Negative   Leuk Esterase: Negative / RBC: 11-25 /HPF / WBC Negative   Sq Epi: x / Non Sq Epi: Occasional / Bacteria: Negative          Blood, Urine: Moderate (- @ 15:45)        CULTURES:  Blood, Urine: Moderate (- @ 15:45)    Additional results/Imaging:  Echo 3/20/21:  The left ventricle is normal in size, wall thickness, wall motion and   contractility. The apex appears mildly hypokinetic. Estimated left   ventricular ejection fraction is 55%.   Mild mitral regurgitation.   Mild aortic regurgitation.   Mild tricuspid regurgitation.   Borderline pulmonary hypertension.    CXR 3/20/21: Clear lungs.    
  REASON FOR VISIT:  CP, Elev troponin    HPI:  61 year old man with a history of CAD, MI, multiple coronary stents, HTN, HLD, obesity, gout, retinal tear, inguinal hernia, AFL s/p ablation, who presented to the ER with complaints of chest discomfort that started earlier this day during sex.  He describes pain at lower aspect of chest in the midline that is non-radiating and not associated with dyspnea; unable to identify exacerbating or alleviating factors.   He describes similar, less intense discomfort during exertion in recent months (for example, during yard work).  He presently feels well.  He was found to have a minimally elevated troponin on initial labs and was prescribed IV heparin by ER physician.    3/21/21:  Feels well; no recurrence of chest pain.    MEDICATIONS  (STANDING):  allopurinol 100 milliGRAM(s) Oral at bedtime  amoxicillin 500 milliGRAM(s) Oral three times a day  aspirin enteric coated 81 milliGRAM(s) Oral daily  atorvastatin 20 milliGRAM(s) Oral at bedtime  cholecalciferol 1000 Unit(s) Oral daily  famotidine    Tablet 20 milliGRAM(s) Oral daily  heparin  Infusion.  Unit(s)/Hr (10 mL/Hr) IV Continuous <Continuous>  metoprolol succinate ER 12.5 milliGRAM(s) Oral daily  multivitamin/minerals 1 Tablet(s) Oral daily  omega-3-Acid Ethyl Esters 1 Gram(s) Oral daily  zinc sulfate 220 milliGRAM(s) Oral daily    Vital Signs Last 24 Hrs  T(C): 36.7 (20 Mar 2021 21:34), Max: 37.1 (20 Mar 2021 12:15)  T(F): 98.1 (20 Mar 2021 21:34), Max: 98.7 (20 Mar 2021 12:15)  HR: 64 (20 Mar 2021 21:34) (64 - 77)  BP: 143/75 (20 Mar 2021 21:34) (142/80 - 153/101)  BP(mean): 92 (20 Mar 2021 21:34) (92 - 112)  RR: 18 (20 Mar 2021 21:34) (13 - 18)  SpO2: 96% (20 Mar 2021 21:34) (96% - 96%)    PHYSICAL EXAM:  Constitutional: NAD, awake and alert, appears well  Pulmonary: Non-labored, breath sounds are clear bilaterally, No wheezing, rales or rhonchi  Cardiovascular: S1 and S2, regular rate and rhythm  Gastrointestinal: Bowel Sounds present, soft, nontender. Central obesity  Psych:  Mood & affect appropriate    LABS:           CARDIAC MARKERS ( 20 Mar 2021 08:00 ) 0.047 ng/mL / x     / x     / x     / x      CARDIAC MARKERS ( 20 Mar 2021 06:03 ) 0.054 ng/mL / x     / x     / x     / x      CARDIAC MARKERS ( 20 Mar 2021 03:55 ) 0.065 ng/mL / x     / x     / x     / x                           16.7   8.04  )-----------( 254      ( 20 Mar 2021 12:50 )             50.0     138  |  106  |  12  ----------------------------<  107<H>  4.4   |  22  |  0.96    Ca    9.1      20 Mar 2021 08:00  Phos  3.2     03-20  Mg     2.1     03-20    TPro  7.6  /  Alb  3.6  /  TBili  0.4  /  DBili  x   /  AST  41<H>  /  ALT  45  /  AlkPhos  56  03-20    ECG:  Sinus rhythm, septal infarct, nonspecific ST/T abnormalities    Tele: SR    TTE Echo Complete w/o Contrast w/ Doppler (03.20.21 @ 10:31) >   The left ventricle is normal in size, wall thickness, wall motion and   contractility. The apex appears mildly hypokinetic. Estimated left   ventricular ejection fraction is 55%.   Mild mitral regurgitation.   Mild aortic regurgitation.   Mild tricuspid regurgitation.   Borderline pulmonary hypertension.

## 2021-03-22 NOTE — PACU DISCHARGE NOTE - COMMENTS
Report given to Edith KNAPP.  Pt. instructed not to lift, push or pull with right hand.  No further c/o blurriness in left eye. No c/o chest pain or SOB. Dsg to right wrist intact, no s/s bleeding noted.  Pt placed on telemetry and confirmed with 3E. Pt transferred to  via stretcher in Stable condition.

## 2021-03-22 NOTE — PROGRESS NOTE ADULT - PROBLEM SELECTOR PLAN 3
ECG abnormal but similar to prior tracings; repeat today.
ECG abnormal but similar to prior tracings; repeat today.

## 2021-03-22 NOTE — H&P ADULT - HISTORY OF PRESENT ILLNESS
61M hx gout, obesity, retinal tear, inguinal hernia, a.flutter s/p ablation, MI, CAD s/p PCI x 4 stents, HTN, HLD, who presented to ED due to chest pain, admitted for c/f NSTEMI. Managed with heparin drip, ASA, statin, BB. Trops downtrended, underwent cath on 3/22/21, showing 3 vessel disease, transferred to [facility] for surgical eval. 62 yo male with a pmh/o gout, obesity, retinal tear, inguinal hernia, a.flutter s/p ablation, MI, CAD s/p PCI x 4 stents, HTN, HLD, who presented to ED due to chest pain which has now been present for several hours which is centralized, pressure like in nature, non radiating, not quantifiable on scale, no a/a factors and hypertensive to 160/100. Admitted for c/f NSTEMI. Managed with heparin drip, ASA, statin, BB. Trops downtrended, underwent cath on 3/22/21, showing 3 vessel disease, transferred to [facility] for surgical eval.  Currently denies any active chest pain or pressure. Denies diaphoresis, cough, fever, chills, n/v/d, constipation, abdominal pain, HA, paresthesias, leg swelling. 62 yo male with a pmh/o gout, obesity, retinal tear, inguinal hernia, a.flutter s/p ablation, MI, CAD s/p PCI x 4 stents, HTN, HLD, who presented to ED due to chest pain which has now been present for several hours which is centralized, pressure like in nature, non radiating, not quantifiable on scale, no a/a factors and hypertensive to 160/100. Admitted for c/f NSTEMI. Managed with heparin drip, ASA, statin, BB. Trops downtrended, underwent cath on 3/22/21, showing 3 vessel disease. During post procedure recovery, Pt reported Lt vision changes and B/L LE tightness. Pt was assessed with Dr. Deleon, full strength on B/L LE and B/L UE with normal motor and sensory function on quick exam, no focal neuro deficit noted,  Eye vision changes improved (almost back to normal) after IV hydration.  He was transferred to [facility] for surgical eval with Dr. Thomason. Currently denies any active chest pain or pressure. Denies diaphoresis, cough, fever, chills, n/v/d, constipation, abdominal pain, HA, paresthesias, leg swelling, vision changes. 60 yo male with a pmh/o gout, obesity, retinal tear, inguinal hernia, a.flutter s/p ablation, MI, CAD s/p PCI x 4 stents, HTN, HLD, who presented to ED due to chest pain which has now been present for several hours which is centralized, pressure like in nature, non radiating, not quantifiable on scale, no a/a factors and hypertensive to 160/100. Admitted for c/f NSTEMI. Managed with heparin drip, ASA, statin, BB. Trops downtrended, underwent cath on 3/22/21, showing 3 vessel disease. During post procedure recovery, Pt reported Lt vision changes and B/L LE tightness. Pt was assessed with Dr. Deleon, full strength on B/L LE and B/L UE with normal motor and sensory function on quick exam, no focal neuro deficit noted,  Eye vision changes improved (almost back to normal) after IV hydration.  He was transferred to [facility] for surgical eval with Dr. Thomason. Currently denies any active chest pain or pressure. Denies diaphoresis, cough, fever, chills, n/v/d, constipation, abdominal pain, HA, paresthesias, leg swelling, vision changes.    Dental implant 3/16  Received Pfizer COVID vaccine #1

## 2021-03-22 NOTE — PROGRESS NOTE ADULT - ATTENDING COMMENTS
Eitan Avilez M.D.  Cardiology, [facility]  Cell: [phone]  Offices:    ([facility] Office)  [phone] ([facility] Office)

## 2021-03-22 NOTE — PROGRESS NOTE ADULT - PROBLEM SELECTOR PLAN 1
No chest pain during this hospitalization -- downtrending (but still elevated) troponin (?  recent event); he has multivessel CAD and prior MI; cardiac cath tomorrow; continue IV heparin, aspirin
No chest pain during this hospitalization -- downtrending (but still elevated) troponin (?  recent event); he has multivessel CAD and prior MI;  Plan for cath today. cont. ASA, statin, BB, hold hep gtt prior to cath.

## 2021-03-22 NOTE — H&P ADULT - NSHPSOCIALHISTORY_GEN_ALL_CORE
SOCIAL HISTORY:  Smoker: [ ] Yes  [ ] No        PACK YEARS:          Quit date:  ETOH use: [ ] Yes  [ ] No              FREQUENCY / QUANTITY:  Ilicit Drug use:  [ ] Yes  [ ] No  Occupation:   Living arrangements:   Assist device use: SOCIAL HISTORY:  Smoker: [ ] Yes  [ x] No        PACK YEARS:          Quit date: 2000  ETOH use: [ ] Yes  [x ] No              FREQUENCY / QUANTITY:  Ilicit Drug use:  [ ] Yes  [ x] No  Occupation: Business owner: [employer]  Living arrangements: With girlfriend  Assist device use: none

## 2021-03-22 NOTE — DISCHARGE NOTE PROVIDER - NSDCMRMEDTOKEN_GEN_ALL_CORE_FT
acetaminophen 325 mg oral tablet: 2 tab(s) orally every 6 hours, As needed, Temp greater or equal to 38C (100.4F), Mild Pain (1 - 3)  allopurinol 100 mg oral tablet: 1 tab(s) orally once a day (at bedtime)  aluminum hydroxide-magnesium hydroxide 200 mg-200 mg/5 mL oral suspension: 30 milliliter(s) orally every 4 hours, As needed, Dyspepsia  aspirin 81 mg oral delayed release tablet: 1 tab(s) orally once a day  atorvastatin 20 mg oral tablet: 1 tab(s) orally once a day (at bedtime)  Centrum Adults oral tablet: 1 tab(s) orally once a day  Co Q-10 100 mg oral capsule: 3 cap(s) orally once a day  famotidine 20 mg oral tablet: 1 tab(s) orally once a day  Fish Oil 1000 mg oral capsule: 1 cap(s) orally once a day  heparin 100 units/mL-D5% intravenous solution: 1000 unit(s) intravenous every hour  metoprolol: 12.5 milligram(s) orally once a day  nitroglycerin: 0.4 milligram(s) sublingual every 5 minutes  ondansetron 2 mg/mL injectable solution: 4 milligram(s) injectable every 6 hours, As Needed  Vitamin D3 1000 intl units oral capsule: 1 cap(s) orally once a day  Zinc 140 mg (as elemental zinc 50 mg) oral tablet: 1 tab(s) orally once a day

## 2021-03-22 NOTE — H&P ADULT - NSHPREVIEWOFSYSTEMS_GEN_ALL_CORE
Review of Systems  Constitutional: [ ] fever, [ ]weight loss,  [ ]fatigue  Eyes: [ ] visual changes  Respiratory: [x ]shortness of breath;  [ ] cough, [ ]wheezing, [ ]chills, [ ]hemoptysis  Cardiovascular: [x ] chest pain, [ ]palpitations, [ ]dizziness,  [ ]leg swelling[ ]orthopnea[ ]PND  Gastrointestinal: [ ] abdominal pain, [ ]nausea, [ ]vomiting,  [ ]diarrhea   Genitourinary: [ ] dysuria, [ ] hematuria  Neurologic: [ ] headaches [ ] tremors[ ]weakness  Skin: [ ] itching, [ ]burning, [ ] rashes  Endocrine: [ ] heat or cold intolerance  Musculoskeletal: [ ] joint pain or swelling; [ ] muscle, back, or extremity pain  Psychiatric: [ ] depression, [ ]anxiety, [ ]mood swings, or [ ]difficulty sleeping  Hematologic: [ ] easy bruising, [ ] bleeding gums  All other systems negative except as noted Review of Systems  Constitutional: [ ] fever, [ ]weight loss,  [ ]fatigue  Eyes: [ ] visual changes  ENT: R ear sensation of fluid/fullness  Respiratory: [x ]shortness of breath;  [ ] cough, [ ]wheezing, [ ]chills, [ ]hemoptysis  Cardiovascular: [x ] chest pain, [ ]palpitations, [ ]dizziness,  [ ]leg swelling[ ]orthopnea[ ]PND  Gastrointestinal: [ ] abdominal pain, [ ]nausea, [ ]vomiting,  [ ]diarrhea   Genitourinary: [ ] dysuria, [ ] hematuria  Neurologic: [ ] headaches [ ] tremors[ ]weakness  Skin: [ ] itching, [ ]burning, [ ] rashes  Endocrine: [ ] heat or cold intolerance  Musculoskeletal: [ ] joint pain or swelling; [ ] muscle, back, or extremity pain  Psychiatric: [ ] depression, [ ]anxiety, [ ]mood swings, or [ ]difficulty sleeping  Hematologic: [ ] easy bruising, [ ] bleeding gums  All other systems negative except as noted

## 2021-03-22 NOTE — DISCHARGE NOTE PROVIDER - NSDCCPCAREPLAN_GEN_ALL_CORE_FT
PRINCIPAL DISCHARGE DIAGNOSIS  Diagnosis: NSTEMI (non-ST elevated myocardial infarction)  Assessment and Plan of Treatment:

## 2021-03-22 NOTE — DISCHARGE NOTE PROVIDER - HOSPITAL COURSE
CC: CP    HPI and Hospital Course:  61M hx gout, obesity, retinal tear, inguinal hernia, a.flutter s/p ablation, MI, CAD s/p PCI x 4 stents, HTN, HLD, who presented to ED due to chest pain, admitted for c/f NSTEMI. Managed with heparin drip, ASA, statin, BB. Trops downtrended, underwent cath on 3/22/21, showing 3 vessel disease, transferred to [facility] for surgical eval.    VITALS:  T(F): 97.8 (03-22-21 @ 09:32), Max: 98 (03-22-21 @ 06:01)  HR: 63 (03-22-21 @ 14:25) (63 - 84)  BP: 150/82 (03-22-21 @ 14:25) (141/84 - 169/97)  RR: 18 (03-22-21 @ 14:25) (17 - 18)  SpO2: 97% (03-22-21 @ 14:25) (95% - 100%)    PHYSICAL EXAM:  General: NAD, lying in bed  HEENT:  pupils equal and reactive, EOMI, no oropharyngeal lesions, erythema, exudates, oral thrush  NECK:   supple, no carotid bruits, no palpable lymph nodes, no thyromegaly  CV:  +S1, +S2, regular, no murmurs or rubs  RESP:   lungs clear to auscultation bilaterally, no wheezing, rales, rhonchi, good air entry bilaterally  BREAST:  not examined  GI:  abdomen soft, non-tender, non-distended, normal BS, no bruits, no abdominal masses, no palpable masses  RECTAL:  not examined  :  not examined  MSK:   normal muscle tone, no atrophy, no rigidity, no contractions  EXT:  no clubbing, no cyanosis, no edema, no calf pain, swelling or erythema  VASCULAR:  pulses equal and symmetric in the upper and lower extremities  NEURO:  AAOX3, no focal neurological deficits, follows all commands, able to move extremities spontaneously  SKIN:  no ulcers, lesions or rashes    Echo 3/20/21:  The left ventricle is normal in size, wall thickness, wall motion and   contractility. The apex appears mildly hypokinetic. Estimated left   ventricular ejection fraction is 55%.   Mild mitral regurgitation.   Mild aortic regurgitation.   Mild tricuspid regurgitation.   Borderline pulmonary hypertension.    #NSTEMI  -trops downtrended  -no CP currently  -A1c 6.1, lipids done  -tele  -echo as above  -heparin drip, ASA, statin, BB  -appreciate cards input  -cath with 3 vessel disease  -known to Dr. Kathleen    #Hypertensive urgency  -improved  -continue home meds    #Urinary frequency- UA neg for infection    #HL- statin    #Dental implant  -cont abx to total 10 days as directed (completed 3/22)    #DVT ppx- full a/c with hep drip    #dispo- transfer for surgical eval  -PCP: Dr. Person   CC: CP    HPI and Hospital Course:  61M hx gout, obesity, retinal tear, inguinal hernia, a.flutter s/p ablation, MI, CAD s/p PCI x 4 stents, HTN, HLD, who presented to ED due to chest pain, admitted for c/f NSTEMI. Managed with heparin drip, ASA, statin, BB. Trops downtrended, underwent cath on 3/22/21, showing 3 vessel disease, transferred to [facility] for surgical eval.    VITALS:  T(F): 97.8 (03-22-21 @ 09:32), Max: 98 (03-22-21 @ 06:01)  HR: 63 (03-22-21 @ 14:25) (63 - 84)  BP: 150/82 (03-22-21 @ 14:25) (141/84 - 169/97)  RR: 18 (03-22-21 @ 14:25) (17 - 18)  SpO2: 97% (03-22-21 @ 14:25) (95% - 100%)    PHYSICAL EXAM:  General: NAD, lying in bed  HEENT:  pupils equal and reactive, EOMI, no oropharyngeal lesions, erythema, exudates, oral thrush  NECK:   supple, no carotid bruits, no palpable lymph nodes, no thyromegaly  CV:  +S1, +S2, regular, no murmurs or rubs  RESP:   lungs clear to auscultation bilaterally, no wheezing, rales, rhonchi, good air entry bilaterally  BREAST:  not examined  GI:  abdomen soft, non-tender, non-distended, normal BS, no bruits, no abdominal masses, no palpable masses  RECTAL:  not examined  :  not examined  MSK:   normal muscle tone, no atrophy, no rigidity, no contractions  EXT:  no clubbing, no cyanosis, no edema, no calf pain, swelling or erythema  VASCULAR:  pulses equal and symmetric in the upper and lower extremities  NEURO:  AAOX3, no focal neurological deficits, follows all commands, able to move extremities spontaneously  SKIN:  no ulcers, lesions or rashes    Echo 3/20/21:  The left ventricle is normal in size, wall thickness, wall motion and   contractility. The apex appears mildly hypokinetic. Estimated left   ventricular ejection fraction is 55%.   Mild mitral regurgitation.   Mild aortic regurgitation.   Mild tricuspid regurgitation.   Borderline pulmonary hypertension.    #NSTEMI  -trops downtrended  -no CP currently  -A1c 6.1, lipids done  -tele  -echo as above  -heparin drip, ASA, statin, BB  -appreciate cards input  -cath with 3 vessel disease  -known to Dr. Kathleen    #Hypertensive urgency  -improved  -continue home meds    #Urinary frequency- UA neg for infection    #HL- statin    #Dental implant  -cont abx to total 10 days as directed (completed 3/22)    #DVT ppx- full a/c with hep drip    #dispo- transfer for surgical eval  -PCP: Dr. Person

## 2021-03-22 NOTE — H&P ADULT - NSHPPHYSICALEXAM_GEN_ALL_CORE
PHYSICAL EXAM:  General: NAD, lying in bed  HEENT:  pupils equal and reactive, EOMI, no oropharyngeal lesions, erythema, exudates, oral thrush  NECK:   supple, no carotid bruits, no palpable lymph nodes, no thyromegaly  CV:  +S1, +S2, regular, no murmurs or rubs  RESP:   lungs clear to auscultation bilaterally, no wheezing, rales, rhonchi, good air entry bilaterally  BREAST:  not examined  GI:  abdomen soft, non-tender, non-distended, normal BS, no bruits, no abdominal masses, no palpable masses  MSK:   normal muscle tone, no atrophy, no rigidity, no contractions  EXT:  no clubbing, no cyanosis, no edema, no calf pain, swelling or erythema  VASCULAR:  pulses equal and symmetric in the upper and lower extremities  NEURO:  AAOX3, no focal neurological deficits, follows all commands, able to move extremities spontaneously  SKIN:  no ulcers, lesions or rashes PHYSICAL EXAM:  General: NAD, lying in bed  HEENT:  pupils equal and reactive, EOMI, no oropharyngeal lesions, erythema, exudates, oral thrush  NECK:   supple, no carotid bruits, no palpable lymph nodes, no thyromegaly  CV:  +S1, +S2, regular, no murmurs or rubs  RESP:   lungs clear to auscultation bilaterally, no wheezing, rales, rhonchi, good air entry bilaterally  GI:  abdomen soft, non-tender, non-distended, normal BS, no bruits, no abdominal masses, no palpable masses  MSK:   normal muscle tone, no atrophy, no rigidity, no contractions  EXT:  no clubbing, no cyanosis, no edema, no calf pain, swelling or erythema  VASCULAR:  pulses equal and symmetric in the upper and lower extremities.  R radial cath site CDI.   NEURO:  AAOX3, no focal neurological deficits, follows all commands, able to move extremities spontaneously  SKIN:  no ulcers, lesions or rashes

## 2021-03-22 NOTE — H&P ADULT - PROBLEM SELECTOR PLAN 1
admit to 2 c Telemetry  disc on Dr. Sellers desk  continue beta blocker up titrate as BP and HR allow  continue statin  continue ASA

## 2021-03-22 NOTE — H&P ADULT - ASSESSMENT
61M hx gout, obesity, retinal tear, inguinal hernia, a.flutter s/p ablation, MI, CAD s/p PCI x 4 stents, HTN, HLD, who presented to ED due to chest pain, admitted for c/f NSTEMI. Managed with heparin drip, ASA, statin, BB. Trops downtrended, underwent cath on 3/22/21, showing 3 vessel disease, transferred to [facility] for surgical eval.   61M hx gout, obesity, retinal tear, inguinal hernia, a.flutter s/p ablation, MI, CAD s/p PCI x 4 stents, HTN, HLD, who presented to ED due to chest pain, admitted for c/f NSTEMI. Underwent cath on 3/22/21, showing 3 vessel disease, transferred to [facility] for surgical eval.

## 2021-03-22 NOTE — CHART NOTE - NSCHARTNOTEFT_GEN_A_CORE
61 year old male with PMHx CAD, MI, PCI, Aflutter S/P ablation presented with CP during activities.  + Trop  Scheduled for LHC and possible PCI. Risks and benefits of procedure explained. Informed consent signed by pt    A:Ox3  CV: S1S2 reg  Respiratory: CTAB    T(C): 36.6 (22 Mar 2021 09:32), Max: 36.7 (22 Mar 2021 06:01)  T(F): 97.8 (22 Mar 2021 09:32), Max: 98 (22 Mar 2021 06:01)  HR: 64 (22 Mar 2021 09:32) (64 - 84)  BP: 169/97 (22 Mar 2021 09:32) (141/84 - 169/97)  BP(mean): 96 (22 Mar 2021 06:01) (96 - 96)  RR: 18 (22 Mar 2021 09:32) (17 - 18)  SpO2: 100% (22 Mar 2021 09:32) (95% - 100%)    ASA:II  Bleeding  Risk score:  Creatinine:  GFR: 61 year old male with PMHx CAD, MI, PCI, Aflutter S/P ablation presented with CP during activities.  + Trop  Scheduled for LHC and possible PCI. Risks and benefits of procedure explained. Informed consent signed by pt    A:Ox3  CV: S1S2 reg  Respiratory: CTAB    T(C): 36.6 (22 Mar 2021 09:32), Max: 36.7 (22 Mar 2021 06:01)  T(F): 97.8 (22 Mar 2021 09:32), Max: 98 (22 Mar 2021 06:01)  HR: 64 (22 Mar 2021 09:32) (64 - 84)  BP: 169/97 (22 Mar 2021 09:32) (141/84 - 169/97)  BP(mean): 96 (22 Mar 2021 06:01) (96 - 96)  RR: 18 (22 Mar 2021 09:32) (17 - 18)  SpO2: 100% (22 Mar 2021 09:32) (95% - 100%)    ASA:II  Bleeding  Risk score:1.3%  Creatinine:0.9  GFR:82

## 2021-03-22 NOTE — PROGRESS NOTE ADULT - PROBLEM SELECTOR PROBLEM 1
Coronary artery disease involving native coronary artery of native heart with unstable angina pectoris
Coronary artery disease involving native coronary artery of native heart with unstable angina pectoris

## 2021-03-22 NOTE — H&P ADULT - NSHPLABSRESULTS_GEN_ALL_CORE
17.4   7.68  )-----------( 211      ( 22 Mar 2021 08:35 )             52.7   03-22    137  |  104  |  14  ----------------------------<  99  4.6   |  29  |  1.00    Ca    9.3      22 Mar 2021 08:35    PT/INR - ( 22 Mar 2021 08:35 )   PT: 12.1 sec;   INR: 1.05 ratio    PTT - ( 22 Mar 2021 08:35 )  PTT:60.7 sec    TTE Echo Complete w/o Contrast w/ Doppler (03.20.21 @ 10:31)   The left ventricle is normal in size, wall thickness, wall motion and   contractility. The apex appears mildly hypokinetic. Estimated left   ventricular ejection fraction is 55%.   Mild mitral regurgitation.   Mild aortic regurgitation.   Mild tricuspid regurgitation.   Borderline pulmonary hypertension.

## 2021-03-22 NOTE — DISCHARGE NOTE PROVIDER - CARE PROVIDER_API CALL
Phi Kathleen (MD)  Cardiovascular Disease  [address], [address]  [place] [address]  Phone: [phone]  Fax: [phone]  Follow Up Time: 1 week

## 2021-03-23 ENCOUNTER — NON-APPOINTMENT (OUTPATIENT)
Age: 61
End: 2021-03-23

## 2021-03-23 DIAGNOSIS — I25.10 ATHEROSCLEROTIC HEART DISEASE OF NATIVE CORONARY ARTERY WITHOUT ANGINA PECTORIS: ICD-10-CM

## 2021-03-23 DIAGNOSIS — H92.01 OTALGIA, RIGHT EAR: ICD-10-CM

## 2021-03-23 DIAGNOSIS — H69.80 OTHER SPECIFIED DISORDERS OF EUSTACHIAN TUBE, UNSPECIFIED EAR: ICD-10-CM

## 2021-03-23 LAB
A1C WITH ESTIMATED AVERAGE GLUCOSE RESULT: 6 % — HIGH (ref 4–5.6)
ALBUMIN SERPL ELPH-MCNC: 4 G/DL — SIGNIFICANT CHANGE UP (ref 3.3–5)
ALP SERPL-CCNC: 54 U/L — SIGNIFICANT CHANGE UP (ref 40–120)
ALT FLD-CCNC: 40 U/L — SIGNIFICANT CHANGE UP (ref 10–45)
ANION GAP SERPL CALC-SCNC: 14 MMOL/L — SIGNIFICANT CHANGE UP (ref 5–17)
APPEARANCE UR: CLEAR — SIGNIFICANT CHANGE UP
APTT BLD: 33.7 SEC — SIGNIFICANT CHANGE UP (ref 27.5–35.5)
APTT BLD: 46.9 SEC — HIGH (ref 27.5–35.5)
APTT BLD: 53.8 SEC — HIGH (ref 27.5–35.5)
AST SERPL-CCNC: 38 U/L — SIGNIFICANT CHANGE UP (ref 10–40)
BACTERIA # UR AUTO: NEGATIVE — SIGNIFICANT CHANGE UP
BASOPHILS # BLD AUTO: 0.04 K/UL — SIGNIFICANT CHANGE UP (ref 0–0.2)
BASOPHILS NFR BLD AUTO: 0.5 % — SIGNIFICANT CHANGE UP (ref 0–2)
BILIRUB SERPL-MCNC: 0.3 MG/DL — SIGNIFICANT CHANGE UP (ref 0.2–1.2)
BILIRUB UR-MCNC: NEGATIVE — SIGNIFICANT CHANGE UP
BLD GP AB SCN SERPL QL: NEGATIVE — SIGNIFICANT CHANGE UP
BUN SERPL-MCNC: 20 MG/DL — SIGNIFICANT CHANGE UP (ref 7–23)
CALCIUM SERPL-MCNC: 9.3 MG/DL — SIGNIFICANT CHANGE UP (ref 8.4–10.5)
CHLORIDE SERPL-SCNC: 98 MMOL/L — SIGNIFICANT CHANGE UP (ref 96–108)
CO2 SERPL-SCNC: 23 MMOL/L — SIGNIFICANT CHANGE UP (ref 22–31)
COLOR SPEC: YELLOW — SIGNIFICANT CHANGE UP
CREAT SERPL-MCNC: 0.96 MG/DL — SIGNIFICANT CHANGE UP (ref 0.5–1.3)
DIFF PNL FLD: ABNORMAL
EOSINOPHIL # BLD AUTO: 0.25 K/UL — SIGNIFICANT CHANGE UP (ref 0–0.5)
EOSINOPHIL NFR BLD AUTO: 3 % — SIGNIFICANT CHANGE UP (ref 0–6)
EPI CELLS # UR: 0 /HPF — SIGNIFICANT CHANGE UP
ESTIMATED AVERAGE GLUCOSE: 126 MG/DL — HIGH (ref 68–114)
FIBRINOGEN PPP-MCNC: 424 MG/DL — SIGNIFICANT CHANGE UP (ref 290–520)
GLUCOSE SERPL-MCNC: 113 MG/DL — HIGH (ref 70–99)
GLUCOSE UR QL: NEGATIVE — SIGNIFICANT CHANGE UP
HCT VFR BLD CALC: 48.4 % — SIGNIFICANT CHANGE UP (ref 39–50)
HGB BLD-MCNC: 16.4 G/DL — SIGNIFICANT CHANGE UP (ref 13–17)
HYALINE CASTS # UR AUTO: 0 /LPF — SIGNIFICANT CHANGE UP (ref 0–2)
IMM GRANULOCYTES NFR BLD AUTO: 0.4 % — SIGNIFICANT CHANGE UP (ref 0–1.5)
INR BLD: 1.02 RATIO — SIGNIFICANT CHANGE UP (ref 0.88–1.16)
KETONES UR-MCNC: NEGATIVE — SIGNIFICANT CHANGE UP
LEUKOCYTE ESTERASE UR-ACNC: NEGATIVE — SIGNIFICANT CHANGE UP
LYMPHOCYTES # BLD AUTO: 2.48 K/UL — SIGNIFICANT CHANGE UP (ref 1–3.3)
LYMPHOCYTES # BLD AUTO: 29.8 % — SIGNIFICANT CHANGE UP (ref 13–44)
MCHC RBC-ENTMCNC: 30 PG — SIGNIFICANT CHANGE UP (ref 27–34)
MCHC RBC-ENTMCNC: 33.9 GM/DL — SIGNIFICANT CHANGE UP (ref 32–36)
MCV RBC AUTO: 88.6 FL — SIGNIFICANT CHANGE UP (ref 80–100)
MONOCYTES # BLD AUTO: 0.78 K/UL — SIGNIFICANT CHANGE UP (ref 0–0.9)
MONOCYTES NFR BLD AUTO: 9.4 % — SIGNIFICANT CHANGE UP (ref 2–14)
MRSA PCR RESULT.: SIGNIFICANT CHANGE UP
NEUTROPHILS # BLD AUTO: 4.73 K/UL — SIGNIFICANT CHANGE UP (ref 1.8–7.4)
NEUTROPHILS NFR BLD AUTO: 56.9 % — SIGNIFICANT CHANGE UP (ref 43–77)
NITRITE UR-MCNC: NEGATIVE — SIGNIFICANT CHANGE UP
NRBC # BLD: 0 /100 WBCS — SIGNIFICANT CHANGE UP (ref 0–0)
NT-PROBNP SERPL-SCNC: 214 PG/ML — SIGNIFICANT CHANGE UP (ref 0–300)
PA ADP PRP-ACNC: 199 PRU — SIGNIFICANT CHANGE UP (ref 194–417)
PH UR: 6.5 — SIGNIFICANT CHANGE UP (ref 5–8)
PLATELET # BLD AUTO: 236 K/UL — SIGNIFICANT CHANGE UP (ref 150–400)
POTASSIUM SERPL-MCNC: 3.9 MMOL/L — SIGNIFICANT CHANGE UP (ref 3.5–5.3)
POTASSIUM SERPL-SCNC: 3.9 MMOL/L — SIGNIFICANT CHANGE UP (ref 3.5–5.3)
PROT SERPL-MCNC: 7.3 G/DL — SIGNIFICANT CHANGE UP (ref 6–8.3)
PROT UR-MCNC: ABNORMAL
PROTHROM AB SERPL-ACNC: 12.2 SEC — SIGNIFICANT CHANGE UP (ref 10.6–13.6)
RBC # BLD: 5.46 M/UL — SIGNIFICANT CHANGE UP (ref 4.2–5.8)
RBC # FLD: 13.5 % — SIGNIFICANT CHANGE UP (ref 10.3–14.5)
RBC CASTS # UR COMP ASSIST: 5 /HPF — HIGH (ref 0–4)
RH IG SCN BLD-IMP: POSITIVE — SIGNIFICANT CHANGE UP
S AUREUS DNA NOSE QL NAA+PROBE: SIGNIFICANT CHANGE UP
SARS-COV-2 RNA SPEC QL NAA+PROBE: SIGNIFICANT CHANGE UP
SODIUM SERPL-SCNC: 135 MMOL/L — SIGNIFICANT CHANGE UP (ref 135–145)
SP GR SPEC: 1.03 — HIGH (ref 1.01–1.02)
T4 FREE SERPL-MCNC: 1.7 NG/DL — SIGNIFICANT CHANGE UP (ref 0.9–1.8)
TSH SERPL-MCNC: 3.5 UIU/ML — SIGNIFICANT CHANGE UP (ref 0.27–4.2)
UROBILINOGEN FLD QL: NEGATIVE — SIGNIFICANT CHANGE UP
WBC # BLD: 8.31 K/UL — SIGNIFICANT CHANGE UP (ref 3.8–10.5)
WBC # FLD AUTO: 8.31 K/UL — SIGNIFICANT CHANGE UP (ref 3.8–10.5)
WBC UR QL: 1 /HPF — SIGNIFICANT CHANGE UP (ref 0–5)

## 2021-03-23 PROCEDURE — 93880 EXTRACRANIAL BILAT STUDY: CPT | Mod: 26

## 2021-03-23 PROCEDURE — 99024 POSTOP FOLLOW-UP VISIT: CPT

## 2021-03-23 PROCEDURE — 99254 IP/OBS CNSLTJ NEW/EST MOD 60: CPT

## 2021-03-23 PROCEDURE — 93010 ELECTROCARDIOGRAM REPORT: CPT

## 2021-03-23 RX ORDER — CEFUROXIME AXETIL 250 MG
1500 TABLET ORAL ONCE
Refills: 0 | Status: DISCONTINUED | OUTPATIENT
Start: 2021-03-24 | End: 2021-03-24

## 2021-03-23 RX ORDER — ALPRAZOLAM 0.25 MG
0.5 TABLET ORAL AT BEDTIME
Refills: 0 | Status: DISCONTINUED | OUTPATIENT
Start: 2021-03-23 | End: 2021-03-24

## 2021-03-23 RX ORDER — AMOXICILLIN 250 MG/5ML
500 SUSPENSION, RECONSTITUTED, ORAL (ML) ORAL EVERY 8 HOURS
Refills: 0 | Status: DISCONTINUED | OUTPATIENT
Start: 2021-03-23 | End: 2021-03-24

## 2021-03-23 RX ORDER — FLUTICASONE PROPIONATE 50 MCG
1 SPRAY, SUSPENSION NASAL
Refills: 0 | Status: DISCONTINUED | OUTPATIENT
Start: 2021-03-23 | End: 2021-03-24

## 2021-03-23 RX ORDER — HEPARIN SODIUM 5000 [USP'U]/ML
1500 INJECTION INTRAVENOUS; SUBCUTANEOUS
Qty: 25000 | Refills: 0 | Status: DISCONTINUED | OUTPATIENT
Start: 2021-03-23 | End: 2021-03-24

## 2021-03-23 RX ADMIN — SODIUM CHLORIDE 3 MILLILITER(S): 9 INJECTION INTRAMUSCULAR; INTRAVENOUS; SUBCUTANEOUS at 05:43

## 2021-03-23 RX ADMIN — Medication 12.5 MILLIGRAM(S): at 05:48

## 2021-03-23 RX ADMIN — Medication 100 MILLIGRAM(S): at 22:27

## 2021-03-23 RX ADMIN — Medication 500 MILLIGRAM(S): at 05:48

## 2021-03-23 RX ADMIN — HEPARIN SODIUM 14 UNIT(S)/HR: 5000 INJECTION INTRAVENOUS; SUBCUTANEOUS at 03:02

## 2021-03-23 RX ADMIN — Medication 1 SPRAY(S): at 20:28

## 2021-03-23 RX ADMIN — Medication 0.5 MILLIGRAM(S): at 00:50

## 2021-03-23 RX ADMIN — FAMOTIDINE 20 MILLIGRAM(S): 10 INJECTION INTRAVENOUS at 12:52

## 2021-03-23 RX ADMIN — SODIUM CHLORIDE 3 MILLILITER(S): 9 INJECTION INTRAMUSCULAR; INTRAVENOUS; SUBCUTANEOUS at 14:12

## 2021-03-23 RX ADMIN — ATORVASTATIN CALCIUM 20 MILLIGRAM(S): 80 TABLET, FILM COATED ORAL at 22:27

## 2021-03-23 RX ADMIN — HEPARIN SODIUM 15 UNIT(S)/HR: 5000 INJECTION INTRAVENOUS; SUBCUTANEOUS at 12:53

## 2021-03-23 RX ADMIN — Medication 0.5 MILLIGRAM(S): at 23:05

## 2021-03-23 RX ADMIN — Medication 81 MILLIGRAM(S): at 12:52

## 2021-03-23 RX ADMIN — Medication 500 MILLIGRAM(S): at 14:21

## 2021-03-23 RX ADMIN — Medication 500 MILLIGRAM(S): at 22:27

## 2021-03-23 RX ADMIN — HEPARIN SODIUM 16 UNIT(S)/HR: 5000 INJECTION INTRAVENOUS; SUBCUTANEOUS at 19:17

## 2021-03-23 RX ADMIN — SODIUM CHLORIDE 3 MILLILITER(S): 9 INJECTION INTRAMUSCULAR; INTRAVENOUS; SUBCUTANEOUS at 22:18

## 2021-03-23 NOTE — CONSULT NOTE ADULT - ASSESSMENT
60 y/o male with multiple medical problems admitted for c/f NSTEMI.  ENT consulted as pt c/o of popping of right ear. Patient also noted with recent dental implant 3/16 on the right side. Per Patient, the ear popping/bubbling from the right ear started yesterday, subsided overnight and restarted again this morning. Denies tinnitus, vertigo or hearing loss. On physical exam, pt noted with exostosis bilaterally in the ear canal. Patient with effusion on the right side.    60 y/o male with multiple medical problems admitted for c/f NSTEMI.  ENT consulted as pt c/o of popping of right ear. Patient also noted with recent dental implant 3/16 on the right side. Per Patient, the ear popping/bubbling from the right ear started yesterday, subsided overnight and restarted again this morning. Denies tinnitus, vertigo or hearing loss. On physical exam, pt noted with exostosis bilaterally in the ear canal. No signs of infection. Patient reports nasal congestion

## 2021-03-23 NOTE — PROGRESS NOTE ADULT - PROBLEM SELECTOR PLAN 1
npo after midnight for CABG wednesday  BB npo after midnight for CABG wednesday  BB   f/ up covid stat results

## 2021-03-23 NOTE — PROGRESS NOTE ADULT - ASSESSMENT
61M hx gout, obesity, retinal tear, inguinal hernia, a.flutter s/p ablation, MI, CAD s/p PCI x 4 stents, HTN, HLD, who presented to ED due to chest pain, admitted for c/f NSTEMI. Underwent cath on 3/22/21, showing 3 vessel disease, transferred to [facility] for surgical eval.  3/23   co rt ear pain  ENT cslt called,   carotis study done and negative   covid ordered stat

## 2021-03-23 NOTE — CONSULT NOTE ADULT - ATTENDING COMMENTS
agree with above. pt with ear popping c/w ETD. no evidence of effusion on exam. recommend flonase and f/u as outpatient in 1-2 weeks.

## 2021-03-23 NOTE — CONSULT NOTE ADULT - SUBJECTIVE AND OBJECTIVE BOX
CC: right ear popping     HPI: 60 y/o male with a pmh/o gout, obesity, retinal tear, inguinal hernia, a.flutter s/p ablation, MI, CAD s/p PCI x 4 stents, HTN, HLD, who presented to ED due to chest pain. Admitted for c/f NSTEMI. Managed with heparin drip, ASA, statin, BB. Trops downtrended, underwent cath on 3/22/21, showing 3 vessel disease. During post procedure recovery, Pt reported Lt vision changes and B/L LE tightness. Pt was assessed with Dr. Deleon, full strength on B/L LE and B/L UE with normal motor and sensory function on quick exam, no focal neuro deficit noted,  Eye vision changes improved (almost back to normal) after IV hydration.  He was transferred to [facility] for surgical eval with Dr. Thomason. Currently denies any active chest pain or pressure. Denies diaphoresis, cough, fever, chills, n/v/d, constipation, abdominal pain, HA, paresthesias, leg swelling, vision changes. Patient now c/o of popping of right ear. Dental implant 3/16      PAST MEDICAL & SURGICAL HISTORY:  Diverticulitis    Hernia  inguinal hernia    Sleep apnea, unspecified type    Atrial fibrillation    Vitamin D deficiency    Osteoarthritis    Class 2 obesity with body mass index (BMI) of 38.0 to 38.9 in adult    Hyperlipidemia    HTN (hypertension)    Atrial flutter by electrocardiogram    At risk for sleep apnea    Stented coronary artery  X4- last 2010    Left knee pain    Gout    Dyslipidemia    CAD (coronary artery disease)    Myocardial infarction  2005, 2008    S/P meniscectomy    H/O cardiac radiofrequency ablation    History of appendectomy  1991    H/O eye surgery  right eye retinal tear, 2017    H/O right inguinal hernia repair  age 13    Post PTCA  x 4 stents in place- last 2010      Allergies    No Known Allergies    Intolerances      MEDICATIONS  (STANDING):  allopurinol 100 milliGRAM(s) Oral at bedtime  amoxicillin 500 milliGRAM(s) Oral every 8 hours  aspirin enteric coated 81 milliGRAM(s) Oral daily  atorvastatin 20 milliGRAM(s) Oral at bedtime  famotidine    Tablet 20 milliGRAM(s) Oral daily  heparin  Infusion 1500 Unit(s)/Hr (15 mL/Hr) IV Continuous <Continuous>  metoprolol tartrate 12.5 milliGRAM(s) Oral daily  sodium chloride 0.9% lock flush 3 milliLiter(s) IV Push every 8 hours    MEDICATIONS  (PRN):  acetaminophen   Tablet .. 650 milliGRAM(s) Oral every 6 hours PRN Temp greater or equal to 38.5C (101.3F), Mild Pain (1 - 3)  ALPRAZolam 0.5 milliGRAM(s) Oral at bedtime PRN insomnia  aluminum hydroxide/magnesium hydroxide/simethicone Suspension 30 milliLiter(s) Oral every 4 hours PRN Dyspepsia      Social History: (-) smoking, (-) etoh     Family history: no pertinent family history     ROS:   ENT: all negative except as noted in HPI   CV: denies palpitations  Pulm: denies SOB, cough, hemoptysis  GI: denies change in apetite, indigestion, n/v  : denies pertinent urinary symptoms, urgency  Neuro: denies numbness/tingling, loss of sensation  Psych: denies anxiety  MS: denies muscle weakness, instability  Heme: denies easy bruising or bleeding  Endo: denies heat/cold intolerance, excessive sweating  Vascular: denies LE edema    Vital Signs Last 24 Hrs  T(C): 36.4 (23 Mar 2021 05:19), Max: 37.2 (22 Mar 2021 21:04)  T(F): 97.6 (23 Mar 2021 05:19), Max: 98.9 (22 Mar 2021 21:04)  HR: 60 (23 Mar 2021 05:19) (60 - 80)  BP: 129/83 (23 Mar 2021 05:19) (125/81 - 159/92)  BP(mean): 99 (23 Mar 2021 05:19) (99 - 99)  RR: 18 (23 Mar 2021 05:19) (17 - 19)  SpO2: 95% (23 Mar 2021 05:19) (95% - 98%)                          16.4   8.31  )-----------( 236      ( 23 Mar 2021 00:47 )             48.4    03-23    135  |  98  |  20  ----------------------------<  113<H>  3.9   |  23  |  0.96    Ca    9.3      23 Mar 2021 00:47    TPro  7.3  /  Alb  4.0  /  TBili  0.3  /  DBili  x   /  AST  38  /  ALT  40  /  AlkPhos  54  03-23   PT/INR - ( 23 Mar 2021 00:47 )   PT: 12.2 sec;   INR: 1.02 ratio         PTT - ( 23 Mar 2021 09:13 )  PTT:46.9 sec    PHYSICAL EXAM:  Gen: NAD  Skin: No rashes, bruises, or lesions  Head: Normocephalic, Atraumatic  Face: no edema, erythema, or fluctuance. Parotid glands soft without mass  Eyes: no scleral injection  Ears: Right - ear canal clear, TM intact without effusion or erythema. No evidence of any fluid drainage. No mastoid tenderness, erythema, or ear bulging            Left - ear canal clear, TM intact without effusion or erythema. No evidence of any fluid drainage. No mastoid tenderness, erythema, or ear bulging  Nose: Nares bilaterally patent, no discharge  Mouth: No Stridor / Drooling / Trismus.  Mucosa moist, tongue/uvula midline, oropharynx clear  Neck: Flat, supple, no lymphadenopathy, trachea midline, no masses  Lymphatic: No lymphadenopathy  Resp: breathing easily, no stridor  CV: no peripheral edema/cyanosis  GI: nondistended   Peripheral vascular: no JVD or edema  Neuro: facial nerve intact, no facial droop      IMAGING/ADDITIONAL STUDIES:  CC: right ear popping     HPI: 60 y/o male with a pmh/o gout, obesity, retinal tear, inguinal hernia, a.flutter s/p ablation, MI, CAD s/p PCI x 4 stents, HTN, HLD, who presented to ED due to chest pain. Admitted for c/f NSTEMI. Managed with heparin drip, ASA, statin, BB. Trops downtrended, underwent cath on 3/22/21, showing 3 vessel disease. During post procedure recovery, Pt reported Lt vision changes and B/L LE tightness. Pt was assessed with Dr. Deleon, full strength on B/L LE and B/L UE with normal motor and sensory function on quick exam, no focal neuro deficit noted,  Eye vision changes improved (almost back to normal) after IV hydration.  He was transferred to [facility] for surgical eval with Dr. Thomason. Currently denies any active chest pain or pressure. Denies diaphoresis, cough, fever, chills, n/v/d, constipation, abdominal pain, HA, paresthesias, leg swelling, vision changes. Patient now c/o of popping of right ear. Dental implant 3/16 on the right side. Per Patient, the ear popping/bubbling from the right ear started yesterday, subsided overnight and restarted again this morning. Denies tinnitus, vertigo or hearing loss        PAST MEDICAL & SURGICAL HISTORY:  Diverticulitis    Hernia  inguinal hernia    Sleep apnea, unspecified type    Atrial fibrillation    Vitamin D deficiency    Osteoarthritis    Class 2 obesity with body mass index (BMI) of 38.0 to 38.9 in adult    Hyperlipidemia    HTN (hypertension)    Atrial flutter by electrocardiogram    At risk for sleep apnea    Stented coronary artery  X4- last 2010    Left knee pain    Gout    Dyslipidemia    CAD (coronary artery disease)    Myocardial infarction  2005, 2008    S/P meniscectomy    H/O cardiac radiofrequency ablation    History of appendectomy  1991    H/O eye surgery  right eye retinal tear, 2017    H/O right inguinal hernia repair  age 13    Post PTCA  x 4 stents in place- last 2010      Allergies    No Known Allergies    Intolerances      MEDICATIONS  (STANDING):  allopurinol 100 milliGRAM(s) Oral at bedtime  amoxicillin 500 milliGRAM(s) Oral every 8 hours  aspirin enteric coated 81 milliGRAM(s) Oral daily  atorvastatin 20 milliGRAM(s) Oral at bedtime  famotidine    Tablet 20 milliGRAM(s) Oral daily  heparin  Infusion 1500 Unit(s)/Hr (15 mL/Hr) IV Continuous <Continuous>  metoprolol tartrate 12.5 milliGRAM(s) Oral daily  sodium chloride 0.9% lock flush 3 milliLiter(s) IV Push every 8 hours    MEDICATIONS  (PRN):  acetaminophen   Tablet .. 650 milliGRAM(s) Oral every 6 hours PRN Temp greater or equal to 38.5C (101.3F), Mild Pain (1 - 3)  ALPRAZolam 0.5 milliGRAM(s) Oral at bedtime PRN insomnia  aluminum hydroxide/magnesium hydroxide/simethicone Suspension 30 milliLiter(s) Oral every 4 hours PRN Dyspepsia      Social History: (-) smoking, (-) etoh     Family history: no pertinent family history     ROS:   ENT: all negative except as noted in HPI   CV: denies palpitations  Pulm: denies SOB, cough, hemoptysis  GI: denies change in apetite, indigestion, n/v  : denies pertinent urinary symptoms, urgency  Neuro: denies numbness/tingling, loss of sensation  Psych: denies anxiety  MS: denies muscle weakness, instability  Heme: denies easy bruising or bleeding  Endo: denies heat/cold intolerance, excessive sweating  Vascular: denies LE edema    Vital Signs Last 24 Hrs  T(C): 36.4 (23 Mar 2021 05:19), Max: 37.2 (22 Mar 2021 21:04)  T(F): 97.6 (23 Mar 2021 05:19), Max: 98.9 (22 Mar 2021 21:04)  HR: 60 (23 Mar 2021 05:19) (60 - 80)  BP: 129/83 (23 Mar 2021 05:19) (125/81 - 159/92)  BP(mean): 99 (23 Mar 2021 05:19) (99 - 99)  RR: 18 (23 Mar 2021 05:19) (17 - 19)  SpO2: 95% (23 Mar 2021 05:19) (95% - 98%)                          16.4   8.31  )-----------( 236      ( 23 Mar 2021 00:47 )             48.4    03-23    135  |  98  |  20  ----------------------------<  113<H>  3.9   |  23  |  0.96    Ca    9.3      23 Mar 2021 00:47    TPro  7.3  /  Alb  4.0  /  TBili  0.3  /  DBili  x   /  AST  38  /  ALT  40  /  AlkPhos  54  03-23   PT/INR - ( 23 Mar 2021 00:47 )   PT: 12.2 sec;   INR: 1.02 ratio         PTT - ( 23 Mar 2021 09:13 )  PTT:46.9 sec    PHYSICAL EXAM:  Gen: NAD  Skin: No rashes, bruises, or lesions  Head: Normocephalic, Atraumatic  Face: no edema, erythema, or fluctuance. Parotid glands soft without mass  Eyes: no scleral injection  Ears: Right - ear canal with notable exostosis, no erythema, TM intact with effusion, no erythema. No evidence of any fluid drainage. No mastoid tenderness, erythema, or ear bulging            Left - ear canal with notable exostosis/ slight erythema in the canal, TM intact without effusion or erythema. No evidence of any fluid drainage. No mastoid tenderness, erythema, or ear bulging  Nose: Nares bilaterally patent, no discharge  Mouth: No Stridor / Drooling / Trismus.  Mucosa moist, tongue/uvula midline, oropharynx clear  Neck: Flat, supple, no lymphadenopathy, trachea midline, no masses  Lymphatic: No lymphadenopathy  Resp: breathing easily, no stridor  CV: no peripheral edema/cyanosis  GI: nondistended   Peripheral vascular: no JVD or edema  Neuro: facial nerve intact, no facial droop     CC: right ear popping     HPI: 62 y/o male with a pmh/o gout, obesity, retinal tear, inguinal hernia, a.flutter s/p ablation, MI, CAD s/p PCI x 4 stents, HTN, HLD, who presented to ED due to chest pain. Admitted for c/f NSTEMI. Managed with heparin drip, ASA, statin, BB. Trops downtrended, underwent cath on 3/22/21, showing 3 vessel disease. During post procedure recovery, Pt reported Lt vision changes and B/L LE tightness. Pt was assessed with Dr. Deleon, full strength on B/L LE and B/L UE with normal motor and sensory function on quick exam, no focal neuro deficit noted,  Eye vision changes improved (almost back to normal) after IV hydration.  He was transferred to [facility] for surgical eval with Dr. Thomason. Currently denies any active chest pain or pressure. Denies diaphoresis, cough, fever, chills, n/v/d, constipation, abdominal pain, HA, paresthesias, leg swelling, vision changes. Patient now c/o of popping of right ear. Dental implant 3/16 on the right side. Per Patient, the ear popping/bubbling from the right ear started yesterday, subsided overnight and restarted again this morning. Denies tinnitus, vertigo or hearing loss        PAST MEDICAL & SURGICAL HISTORY:  Diverticulitis    Hernia  inguinal hernia    Sleep apnea, unspecified type    Atrial fibrillation    Vitamin D deficiency    Osteoarthritis    Class 2 obesity with body mass index (BMI) of 38.0 to 38.9 in adult    Hyperlipidemia    HTN (hypertension)    Atrial flutter by electrocardiogram    At risk for sleep apnea    Stented coronary artery  X4- last 2010    Left knee pain    Gout    Dyslipidemia    CAD (coronary artery disease)    Myocardial infarction  2005, 2008    S/P meniscectomy    H/O cardiac radiofrequency ablation    History of appendectomy  1991    H/O eye surgery  right eye retinal tear, 2017    H/O right inguinal hernia repair  age 13    Post PTCA  x 4 stents in place- last 2010      Allergies    No Known Allergies    Intolerances      MEDICATIONS  (STANDING):  allopurinol 100 milliGRAM(s) Oral at bedtime  amoxicillin 500 milliGRAM(s) Oral every 8 hours  aspirin enteric coated 81 milliGRAM(s) Oral daily  atorvastatin 20 milliGRAM(s) Oral at bedtime  famotidine    Tablet 20 milliGRAM(s) Oral daily  heparin  Infusion 1500 Unit(s)/Hr (15 mL/Hr) IV Continuous <Continuous>  metoprolol tartrate 12.5 milliGRAM(s) Oral daily  sodium chloride 0.9% lock flush 3 milliLiter(s) IV Push every 8 hours    MEDICATIONS  (PRN):  acetaminophen   Tablet .. 650 milliGRAM(s) Oral every 6 hours PRN Temp greater or equal to 38.5C (101.3F), Mild Pain (1 - 3)  ALPRAZolam 0.5 milliGRAM(s) Oral at bedtime PRN insomnia  aluminum hydroxide/magnesium hydroxide/simethicone Suspension 30 milliLiter(s) Oral every 4 hours PRN Dyspepsia      Social History: (-) smoking, (-) etoh     Family history: no pertinent family history     ROS:   ENT: all negative except as noted in HPI   CV: denies palpitations  Pulm: denies SOB, cough, hemoptysis  GI: denies change in apetite, indigestion, n/v  : denies pertinent urinary symptoms, urgency  Neuro: denies numbness/tingling, loss of sensation  Psych: denies anxiety  MS: denies muscle weakness, instability  Heme: denies easy bruising or bleeding  Endo: denies heat/cold intolerance, excessive sweating  Vascular: denies LE edema    Vital Signs Last 24 Hrs  T(C): 36.4 (23 Mar 2021 05:19), Max: 37.2 (22 Mar 2021 21:04)  T(F): 97.6 (23 Mar 2021 05:19), Max: 98.9 (22 Mar 2021 21:04)  HR: 60 (23 Mar 2021 05:19) (60 - 80)  BP: 129/83 (23 Mar 2021 05:19) (125/81 - 159/92)  BP(mean): 99 (23 Mar 2021 05:19) (99 - 99)  RR: 18 (23 Mar 2021 05:19) (17 - 19)  SpO2: 95% (23 Mar 2021 05:19) (95% - 98%)                          16.4   8.31  )-----------( 236      ( 23 Mar 2021 00:47 )             48.4    03-23    135  |  98  |  20  ----------------------------<  113<H>  3.9   |  23  |  0.96    Ca    9.3      23 Mar 2021 00:47    TPro  7.3  /  Alb  4.0  /  TBili  0.3  /  DBili  x   /  AST  38  /  ALT  40  /  AlkPhos  54  03-23   PT/INR - ( 23 Mar 2021 00:47 )   PT: 12.2 sec;   INR: 1.02 ratio         PTT - ( 23 Mar 2021 09:13 )  PTT:46.9 sec    PHYSICAL EXAM:  Gen: NAD  Skin: No rashes, bruises, or lesions  Head: Normocephalic, Atraumatic  Face: no edema, erythema, or fluctuance. Parotid glands soft without mass  Eyes: no scleral injection  Ears: Right - ear canal with notable exostosis, no erythema, TM intact without effusion, no erythema. No evidence of any fluid drainage. No mastoid tenderness, erythema, or ear bulging            Left - ear canal with notable exostosis, no erthyema, TM intact without effusion or erythema. No evidence of any fluid drainage. No mastoid tenderness, erythema, or ear bulging  Nose: Nares bilaterally patent, no discharge  Mouth: No Stridor / Drooling / Trismus.  Mucosa moist, tongue/uvula midline, oropharynx clear  Neck: Flat, supple, no lymphadenopathy, trachea midline, no masses  Lymphatic: No lymphadenopathy  Resp: breathing easily, no stridor  CV: no peripheral edema/cyanosis  GI: nondistended   Peripheral vascular: no JVD or edema  Neuro: facial nerve intact, no facial droop

## 2021-03-23 NOTE — PROGRESS NOTE ADULT - SUBJECTIVE AND OBJECTIVE BOX
Cardiac Surgery Pre-op Note:    CC: Patient is a 61y old  Male who presents with a chief complaint of cardiac surgery (23 Mar 2021 12:37)      Referring Physician:                                                                                                           Surgeon:    Procedure: (Date) (Procedure)    Allergies    No Known Allergies    Intolerances        HPI:  60 yo male with a pmh/o gout, obesity, retinal tear, inguinal hernia, a.flutter s/p ablation, MI, CAD s/p PCI x 4 stents, HTN, HLD, who presented to ED due to chest pain which has now been present for several hours which is centralized, pressure like in nature, non radiating, not quantifiable on scale, no a/a factors and hypertensive to 160/100. Admitted for c/f NSTEMI. Managed with heparin drip, ASA, statin, BB. Trops downtrended, underwent cath on 3/22/21, showing 3 vessel disease. During post procedure recovery, Pt reported Lt vision changes and B/L LE tightness. Pt was assessed with Dr. Deleon, full strength on B/L LE and B/L UE with normal motor and sensory function on quick exam, no focal neuro deficit noted,  Eye vision changes improved (almost back to normal) after IV hydration.  He was transferred to [facility] for surgical eval with Dr. Thomason. Currently denies any active chest pain or pressure. Denies diaphoresis, cough, fever, chills, n/v/d, constipation, abdominal pain, HA, paresthesias, leg swelling, vision changes.    Dental implant 3/16  Received Pfizer COVID vaccine #1 (22 Mar 2021 22:25)      PAST MEDICAL & SURGICAL HISTORY:  Diverticulitis    Hernia  inguinal hernia    Sleep apnea, unspecified type    Atrial fibrillation    Vitamin D deficiency    Osteoarthritis    Class 2 obesity with body mass index (BMI) of 38.0 to 38.9 in adult    Hyperlipidemia    HTN (hypertension)    Atrial flutter by electrocardiogram    At risk for sleep apnea    Stented coronary artery  X4- last     Left knee pain    Gout    Dyslipidemia    CAD (coronary artery disease)    Myocardial infarction  ,     S/P meniscectomy    H/O cardiac radiofrequency ablation    History of appendectomy      H/O eye surgery  right eye retinal tear, 2017    H/O right inguinal hernia repair  age 13    Post PTCA  x 4 stents in place- last         MEDICATIONS  (STANDING):  allopurinol 100 milliGRAM(s) Oral at bedtime  amoxicillin 500 milliGRAM(s) Oral every 8 hours  aspirin enteric coated 81 milliGRAM(s) Oral daily  atorvastatin 20 milliGRAM(s) Oral at bedtime  famotidine    Tablet 20 milliGRAM(s) Oral daily  heparin  Infusion 1500 Unit(s)/Hr (15 mL/Hr) IV Continuous <Continuous>  metoprolol tartrate 12.5 milliGRAM(s) Oral daily  sodium chloride 0.9% lock flush 3 milliLiter(s) IV Push every 8 hours    MEDICATIONS  (PRN):  acetaminophen   Tablet .. 650 milliGRAM(s) Oral every 6 hours PRN Temp greater or equal to 38.5C (101.3F), Mild Pain (1 - 3)  ALPRAZolam 0.5 milliGRAM(s) Oral at bedtime PRN insomnia  aluminum hydroxide/magnesium hydroxide/simethicone Suspension 30 milliLiter(s) Oral every 4 hours PRN Dyspepsia        Labs:                        16.4   8.31  )-----------( 236      ( 23 Mar 2021 00:47 )             48.4         135  |  98  |  20  ----------------------------<  113<H>  3.9   |  23  |  0.96    Ca    9.3      23 Mar 2021 00:47    TPro  7.3  /  Alb  4.0  /  TBili  0.3  /  DBili  x   /  AST  38  /  ALT  40  /  AlkPhos  54      PT/INR - ( 23 Mar 2021 00:47 )   PT: 12.2 sec;   INR: 1.02 ratio         PTT - ( 23 Mar 2021 09:13 )  PTT:46.9 sec    Blood Type: ABO Interpretation: A ( @ 00:45)    HGB A1C:   Prealbumin:   Pro-BNP: Serum Pro-Brain Natriuretic Peptide: 214 pg/mL ( @ 00:47)    Thyroid Panel:  @ 08:00/1.17  --/--/--    MRSA: MRSA PCR Result.: NotDetec ( @ 05:35)   / MSSA:   Urinalysis Basic - ( 23 Mar 2021 05:46 )    Color: Yellow / Appearance: Clear / S.035 / pH: x  Gluc: x / Ketone: Negative  / Bili: Negative / Urobili: Negative   Blood: x / Protein: 30 mg/dL / Nitrite: Negative   Leuk Esterase: Negative / RBC: 5 /hpf / WBC 1 /HPF   Sq Epi: x / Non Sq Epi: 0 /hpf / Bacteria: Negative        CXR:     EKG:    Carotid Duplex:      PFT's:    Echocardiogram:    Cardiac catheterization:    Gen: WN/WD NAD  Neuro: AAOx3, nonfocal  Pulm: CTA B/L  CV: RRR, S1S2  Abd: Soft, NT, ND +BS  Ext: No edema, + peripheral pulses      Pt has AICD/PPM  No          Pre-op Beta Blocker ordered within 24 hrs of surgery (CABG ONLY)?  [ ] Yes  Type & Cross  [ ] Yes   NPO after Midnight Yes   Pre-op ABX ordered, to be taped on chart:  [ ] Yes     Hibiclens/Peridex ordered [ ] Yes    Intraop on Hold: PRBCs, CXR, FLORA   Consent obtained  [ ] Yes  [ ] No   Cardiac Surgery Pre-op Note:    CC: Patient is a 61y old  Male who presents with a chief complaint of cardiac surgery (23 Mar 2021 12:37)                                                                                                               Surgeon:  Dr Thomason    Procedure: (Date) (Procedure)  CABG    3/24/21    Allergies    No Known Allergies    Intolerances        HPI:  62 yo male with a pmh/o gout, obesity, retinal tear, inguinal hernia, a.flutter s/p ablation, MI, CAD s/p PCI x 4 stents, HTN, HLD, who presented to ED due to chest pain which has now been present for several hours which is centralized, pressure like in nature, non radiating, not quantifiable on scale, no a/a factors and hypertensive to 160/100. Admitted for c/f NSTEMI. Managed with heparin drip, ASA, statin, BB. Trops downtrended, underwent cath on 3/22/21, showing 3 vessel disease. During post procedure recovery, Pt reported Lt vision changes and B/L LE tightness. Pt was assessed with Dr. Deleon, full strength on B/L LE and B/L UE with normal motor and sensory function on quick exam, no focal neuro deficit noted,  Eye vision changes improved (almost back to normal) after IV hydration.  He was transferred to [facility] for surgical eval with Dr. Thomason. Currently denies any active chest pain or pressure. Denies diaphoresis, cough, fever, chills, n/v/d, constipation, abdominal pain, HA, paresthesias, leg swelling, vision changes.    Dental implant 3/16  Received Pfizer COVID vaccine #1 (22 Mar 2021 22:25)      PAST MEDICAL & SURGICAL HISTORY:  Diverticulitis    Hernia  inguinal hernia    Sleep apnea, unspecified type    Atrial fibrillation    Vitamin D deficiency    Osteoarthritis    Class 2 obesity with body mass index (BMI) of 38.0 to 38.9 in adult    Hyperlipidemia    HTN (hypertension)    Atrial flutter by electrocardiogram    At risk for sleep apnea    Stented coronary artery  X4- last     Left knee pain    Gout    Dyslipidemia    CAD (coronary artery disease)    Myocardial infarction  ,     S/P meniscectomy    H/O cardiac radiofrequency ablation    History of appendectomy      H/O eye surgery  right eye retinal tear, 2017    H/O right inguinal hernia repair  age 13    Post PTCA  x 4 stents in place- last         MEDICATIONS  (STANDING):  allopurinol 100 milliGRAM(s) Oral at bedtime  amoxicillin 500 milliGRAM(s) Oral every 8 hours  aspirin enteric coated 81 milliGRAM(s) Oral daily  atorvastatin 20 milliGRAM(s) Oral at bedtime  famotidine    Tablet 20 milliGRAM(s) Oral daily  heparin  Infusion 1500 Unit(s)/Hr (15 mL/Hr) IV Continuous <Continuous>  metoprolol tartrate 12.5 milliGRAM(s) Oral daily  sodium chloride 0.9% lock flush 3 milliLiter(s) IV Push every 8 hours    MEDICATIONS  (PRN):  acetaminophen   Tablet .. 650 milliGRAM(s) Oral every 6 hours PRN Temp greater or equal to 38.5C (101.3F), Mild Pain (1 - 3)  ALPRAZolam 0.5 milliGRAM(s) Oral at bedtime PRN insomnia  aluminum hydroxide/magnesium hydroxide/simethicone Suspension 30 milliLiter(s) Oral every 4 hours PRN Dyspepsia        Labs:                        16.4   8.31  )-----------( 236      ( 23 Mar 2021 00:47 )             48.4         135  |  98  |  20  ----------------------------<  113<H>  3.9   |  23  |  0.96    Ca    9.3      23 Mar 2021 00:47    TPro  7.3  /  Alb  4.0  /  TBili  0.3  /  DBili  x   /  AST  38  /  ALT  40  /  AlkPhos  54      PT/INR - ( 23 Mar 2021 00:47 )   PT: 12.2 sec;   INR: 1.02 ratio         PTT - ( 23 Mar 2021 09:13 )  PTT:46.9 sec    Blood Type: ABO Interpretation: A ( @ 00:45)    HGB A1C:   Prealbumin:   Pro-BNP: Serum Pro-Brain Natriuretic Peptide: 214 pg/mL ( @ 00:47)    Thyroid Panel:  @ 08:00/1.17  --/--/--    MRSA: MRSA PCR Result.: Nazia ( @ 05:35)   / MSSA:   Urinalysis Basic - ( 23 Mar 2021 05:46 )    Color: Yellow / Appearance: Clear / S.035 / pH: x  Gluc: x / Ketone: Negative  / Bili: Negative / Urobili: Negative   Blood: x / Protein: 30 mg/dL / Nitrite: Negative   Leuk Esterase: Negative / RBC: 5 /hpf / WBC 1 /HPF   Sq Epi: x / Non Sq Epi: 0 /hpf / Bacteria: Negative        CXR:    NAD      Carotid Duplex:    no significant stenosis      Echocardiogram:The left ventricle is normal in size, wall thickness, wall motion and   contractility. The apex appears mildly hypokinetic. Estimated left   ventricular ejection fraction is 55%.   Mild mitral regurgitation.   Mild aortic regurgitation.   Mild tricuspid regurgitation.   Borderline pulmonary hypertension.    Cardiac catheterization: Severe multivessel disease of mRCA with severe calcification, ISR of Cx   and LAD, in location with multiple prior stents.    Gen: WN/WD NAD  Neuro: AAOx3, nonfocal  Pulm: CTA B/L  CV: RRR, S1S2  Abd: Soft, NT, ND +BS  Ext: No edema, + peripheral pulses      Pt has AICD/PPM  No          Pre-op Beta Blocker ordered within 24 hrs of surgery (CABG ONLY)?  [ ] Yes  Type & Cross  [ ] Yes   NPO after Midnight Yes   Pre-op ABX ordered, to be taped on chart:  [ ] Yes     Hibiclens/Peridex ordered [ ] Yes    Intraop on Hold: PRBCs, CXR, FLORA   Consent obtained  [ ] Yes  [ ] No

## 2021-03-23 NOTE — PROGRESS NOTE ADULT - ASSESSMENT
61M hx gout, obesity, retinal tear, inguinal hernia, a.flutter s/p ablation, MI, CAD s/p PCI x 4 stents, HTN, HLD, who presented to ED due to chest pain, admitted for c/f NSTEMI. Underwent cath on 3/22/21, showing 3 vessel disease, transferred to [facility] for surgical eval.  3/23

## 2021-03-23 NOTE — PROGRESS NOTE ADULT - SUBJECTIVE AND OBJECTIVE BOX
VITAL SIGNS    Telemetry:      sr  60-80    Vital Signs Last 24 Hrs  T(C): 36.4 (03-23-21 @ 05:19), Max: 37.2 (03-22-21 @ 21:04)  T(F): 97.6 (03-23-21 @ 05:19), Max: 98.9 (03-22-21 @ 21:04)  HR: 60 (03-23-21 @ 05:19) (60 - 80)  BP: 129/83 (03-23-21 @ 05:19) (125/81 - 169/97)  RR: 18 (03-23-21 @ 05:19) (17 - 19)  SpO2: 95% (03-23-21 @ 05:19) (95% - 100%)                   Daily Height in cm: 187.96 (22 Mar 2021 22:25)    Daily       Bilirubin Total, Serum: 0.3 mg/dL (03-23 @ 00:47)    CAPILLARY BLOOD GLUCOSE                    PHYSICAL EXAM  s"  Neurology: alert and oriented x 3, moves all extremities with no defecits  CV :  RRR  Lungs:   CTA B/L  Abdomen: soft, nontender, nondistended, positive bowel sounds, last bowel movement   Extremities:

## 2021-03-24 ENCOUNTER — APPOINTMENT (OUTPATIENT)
Dept: CARDIOTHORACIC SURGERY | Facility: HOSPITAL | Age: 61
End: 2021-03-24

## 2021-03-24 LAB
ALBUMIN SERPL ELPH-MCNC: 3.7 G/DL — SIGNIFICANT CHANGE UP (ref 3.3–5)
ALP SERPL-CCNC: 44 U/L — SIGNIFICANT CHANGE UP (ref 40–120)
ALT FLD-CCNC: 51 U/L — HIGH (ref 10–45)
ANION GAP SERPL CALC-SCNC: 13 MMOL/L — SIGNIFICANT CHANGE UP (ref 5–17)
ANION GAP SERPL CALC-SCNC: 15 MMOL/L — SIGNIFICANT CHANGE UP (ref 5–17)
APTT BLD: 28.6 SEC — SIGNIFICANT CHANGE UP (ref 27.5–35.5)
APTT BLD: 48.4 SEC — HIGH (ref 27.5–35.5)
AST SERPL-CCNC: 67 U/L — HIGH (ref 10–40)
BASE EXCESS BLDV CALC-SCNC: -0.1 MMOL/L — SIGNIFICANT CHANGE UP (ref -2–2)
BASE EXCESS BLDV CALC-SCNC: -0.4 MMOL/L — SIGNIFICANT CHANGE UP (ref -2–2)
BASE EXCESS BLDV CALC-SCNC: 0.5 MMOL/L — SIGNIFICANT CHANGE UP (ref -2–2)
BASOPHILS # BLD AUTO: 0.04 K/UL — SIGNIFICANT CHANGE UP (ref 0–0.2)
BASOPHILS NFR BLD AUTO: 0.2 % — SIGNIFICANT CHANGE UP (ref 0–2)
BILIRUB SERPL-MCNC: 0.8 MG/DL — SIGNIFICANT CHANGE UP (ref 0.2–1.2)
BLD GP AB SCN SERPL QL: NEGATIVE — SIGNIFICANT CHANGE UP
BLOOD GAS VENOUS - CREATININE: SIGNIFICANT CHANGE UP MG/DL (ref 0.5–1.3)
BUN SERPL-MCNC: 16 MG/DL — SIGNIFICANT CHANGE UP (ref 7–23)
BUN SERPL-MCNC: 18 MG/DL — SIGNIFICANT CHANGE UP (ref 7–23)
CA-I SERPL-SCNC: 1.01 MMOL/L — LOW (ref 1.12–1.3)
CA-I SERPL-SCNC: 1.05 MMOL/L — LOW (ref 1.12–1.3)
CA-I SERPL-SCNC: 1.08 MMOL/L — LOW (ref 1.12–1.3)
CA-I SERPL-SCNC: 1.14 MMOL/L — SIGNIFICANT CHANGE UP (ref 1.12–1.3)
CALCIUM SERPL-MCNC: 8.8 MG/DL — SIGNIFICANT CHANGE UP (ref 8.4–10.5)
CALCIUM SERPL-MCNC: 9.2 MG/DL — SIGNIFICANT CHANGE UP (ref 8.4–10.5)
CHLORIDE BLDV-SCNC: SIGNIFICANT CHANGE UP MMOL/L (ref 96–108)
CHLORIDE SERPL-SCNC: 98 MMOL/L — SIGNIFICANT CHANGE UP (ref 96–108)
CHLORIDE SERPL-SCNC: 99 MMOL/L — SIGNIFICANT CHANGE UP (ref 96–108)
CK MB BLD-MCNC: 7.9 % — HIGH (ref 0–3.5)
CK MB CFR SERPL CALC: 27.2 NG/ML — HIGH (ref 0–6.7)
CK SERPL-CCNC: 346 U/L — HIGH (ref 30–200)
CO2 SERPL-SCNC: 23 MMOL/L — SIGNIFICANT CHANGE UP (ref 22–31)
CO2 SERPL-SCNC: 24 MMOL/L — SIGNIFICANT CHANGE UP (ref 22–31)
COVID-19 SPIKE DOMAIN AB INTERP: NEGATIVE — SIGNIFICANT CHANGE UP
COVID-19 SPIKE DOMAIN ANTIBODY RESULT: 0.4 U/ML — SIGNIFICANT CHANGE UP
CREAT SERPL-MCNC: 0.89 MG/DL — SIGNIFICANT CHANGE UP (ref 0.5–1.3)
CREAT SERPL-MCNC: 0.94 MG/DL — SIGNIFICANT CHANGE UP (ref 0.5–1.3)
EOSINOPHIL # BLD AUTO: 0.06 K/UL — SIGNIFICANT CHANGE UP (ref 0–0.5)
EOSINOPHIL NFR BLD AUTO: 0.4 % — SIGNIFICANT CHANGE UP (ref 0–6)
FIBRINOGEN PPP-MCNC: 369 MG/DL — SIGNIFICANT CHANGE UP (ref 290–520)
GAS PNL BLDA: SIGNIFICANT CHANGE UP
GAS PNL BLDV: 135 MMOL/L — SIGNIFICANT CHANGE UP (ref 135–145)
GAS PNL BLDV: 136 MMOL/L — SIGNIFICANT CHANGE UP (ref 135–145)
GAS PNL BLDV: SIGNIFICANT CHANGE UP
GLUCOSE BLDC GLUCOMTR-MCNC: 122 MG/DL — HIGH (ref 70–99)
GLUCOSE BLDC GLUCOMTR-MCNC: 135 MG/DL — HIGH (ref 70–99)
GLUCOSE BLDC GLUCOMTR-MCNC: 136 MG/DL — HIGH (ref 70–99)
GLUCOSE BLDC GLUCOMTR-MCNC: 149 MG/DL — HIGH (ref 70–99)
GLUCOSE BLDC GLUCOMTR-MCNC: 152 MG/DL — HIGH (ref 70–99)
GLUCOSE BLDV-MCNC: 111 MG/DL — HIGH (ref 70–99)
GLUCOSE BLDV-MCNC: 124 MG/DL — HIGH (ref 70–99)
GLUCOSE BLDV-MCNC: 130 MG/DL — HIGH (ref 70–99)
GLUCOSE BLDV-MCNC: 137 MG/DL — HIGH (ref 70–99)
GLUCOSE SERPL-MCNC: 101 MG/DL — HIGH (ref 70–99)
GLUCOSE SERPL-MCNC: 150 MG/DL — HIGH (ref 70–99)
HCO3 BLDV-SCNC: 25 MMOL/L — SIGNIFICANT CHANGE UP (ref 21–29)
HCO3 BLDV-SCNC: 26 MMOL/L — SIGNIFICANT CHANGE UP (ref 21–29)
HCO3 BLDV-SCNC: 26 MMOL/L — SIGNIFICANT CHANGE UP (ref 21–29)
HCO3 BLDV-SCNC: 27 MMOL/L — SIGNIFICANT CHANGE UP (ref 21–29)
HCT VFR BLD CALC: 47.7 % — SIGNIFICANT CHANGE UP (ref 39–50)
HCT VFR BLD CALC: 49.8 % — SIGNIFICANT CHANGE UP (ref 39–50)
HCT VFR BLDA CALC: 41 % — SIGNIFICANT CHANGE UP (ref 39–50)
HCT VFR BLDA CALC: 42 % — SIGNIFICANT CHANGE UP (ref 39–50)
HCT VFR BLDA CALC: 42 % — SIGNIFICANT CHANGE UP (ref 39–50)
HCT VFR BLDA CALC: 43 % — SIGNIFICANT CHANGE UP (ref 39–50)
HGB BLD CALC-MCNC: 13.3 G/DL — SIGNIFICANT CHANGE UP (ref 13–17)
HGB BLD CALC-MCNC: 13.7 G/DL — SIGNIFICANT CHANGE UP (ref 13–17)
HGB BLD CALC-MCNC: 13.8 G/DL — SIGNIFICANT CHANGE UP (ref 13–17)
HGB BLD CALC-MCNC: 14 G/DL — SIGNIFICANT CHANGE UP (ref 13–17)
HGB BLD-MCNC: 16.1 G/DL — SIGNIFICANT CHANGE UP (ref 13–17)
HGB BLD-MCNC: 16.7 G/DL — SIGNIFICANT CHANGE UP (ref 13–17)
HOROWITZ INDEX BLDV+IHG-RTO: 0 — SIGNIFICANT CHANGE UP
IMM GRANULOCYTES NFR BLD AUTO: 0.9 % — SIGNIFICANT CHANGE UP (ref 0–1.5)
INR BLD: 1.05 RATIO — SIGNIFICANT CHANGE UP (ref 0.88–1.16)
INR BLD: 1.17 RATIO — HIGH (ref 0.88–1.16)
LACTATE BLDV-MCNC: 1.7 MMOL/L — SIGNIFICANT CHANGE UP (ref 0.7–2)
LACTATE BLDV-MCNC: 1.9 MMOL/L — SIGNIFICANT CHANGE UP (ref 0.7–2)
LACTATE BLDV-MCNC: 2.1 MMOL/L — HIGH (ref 0.7–2)
LACTATE BLDV-MCNC: 2.2 MMOL/L — HIGH (ref 0.7–2)
LYMPHOCYTES # BLD AUTO: 1.36 K/UL — SIGNIFICANT CHANGE UP (ref 1–3.3)
LYMPHOCYTES # BLD AUTO: 8 % — LOW (ref 13–44)
MAGNESIUM SERPL-MCNC: 2.2 MG/DL — SIGNIFICANT CHANGE UP (ref 1.6–2.6)
MCHC RBC-ENTMCNC: 29.9 PG — SIGNIFICANT CHANGE UP (ref 27–34)
MCHC RBC-ENTMCNC: 30 PG — SIGNIFICANT CHANGE UP (ref 27–34)
MCHC RBC-ENTMCNC: 33.5 GM/DL — SIGNIFICANT CHANGE UP (ref 32–36)
MCHC RBC-ENTMCNC: 33.8 GM/DL — SIGNIFICANT CHANGE UP (ref 32–36)
MCV RBC AUTO: 88.7 FL — SIGNIFICANT CHANGE UP (ref 80–100)
MCV RBC AUTO: 89.6 FL — SIGNIFICANT CHANGE UP (ref 80–100)
MONOCYTES # BLD AUTO: 0.8 K/UL — SIGNIFICANT CHANGE UP (ref 0–0.9)
MONOCYTES NFR BLD AUTO: 4.7 % — SIGNIFICANT CHANGE UP (ref 2–14)
NEUTROPHILS # BLD AUTO: 14.68 K/UL — HIGH (ref 1.8–7.4)
NEUTROPHILS NFR BLD AUTO: 85.8 % — HIGH (ref 43–77)
NRBC # BLD: 0 /100 WBCS — SIGNIFICANT CHANGE UP (ref 0–0)
NRBC # BLD: 0 /100 WBCS — SIGNIFICANT CHANGE UP (ref 0–0)
PCO2 BLDV: 48 MMHG — SIGNIFICANT CHANGE UP (ref 35–50)
PCO2 BLDV: 49 MMHG — SIGNIFICANT CHANGE UP (ref 35–50)
PCO2 BLDV: 50 MMHG — SIGNIFICANT CHANGE UP (ref 35–50)
PCO2 BLDV: 55 MMHG — HIGH (ref 35–50)
PH BLDV: 7.31 — LOW (ref 7.35–7.45)
PH BLDV: 7.34 — LOW (ref 7.35–7.45)
PHOSPHATE SERPL-MCNC: 3.9 MG/DL — SIGNIFICANT CHANGE UP (ref 2.5–4.5)
PLATELET # BLD AUTO: 158 K/UL — SIGNIFICANT CHANGE UP (ref 150–400)
PLATELET # BLD AUTO: 194 K/UL — SIGNIFICANT CHANGE UP (ref 150–400)
PO2 BLDV: 54 MMHG — HIGH (ref 25–45)
PO2 BLDV: 54 MMHG — HIGH (ref 25–45)
PO2 BLDV: 58 MMHG — HIGH (ref 25–45)
PO2 BLDV: 70 MMHG — HIGH (ref 25–45)
POTASSIUM BLDV-SCNC: 4.9 MMOL/L — SIGNIFICANT CHANGE UP (ref 3.5–5)
POTASSIUM BLDV-SCNC: 5.5 MMOL/L — HIGH (ref 3.5–5)
POTASSIUM BLDV-SCNC: 5.7 MMOL/L — HIGH (ref 3.5–5)
POTASSIUM BLDV-SCNC: 6.1 MMOL/L — HIGH (ref 3.5–5)
POTASSIUM SERPL-MCNC: 4.4 MMOL/L — SIGNIFICANT CHANGE UP (ref 3.5–5.3)
POTASSIUM SERPL-MCNC: 4.8 MMOL/L — SIGNIFICANT CHANGE UP (ref 3.5–5.3)
POTASSIUM SERPL-SCNC: 4.4 MMOL/L — SIGNIFICANT CHANGE UP (ref 3.5–5.3)
POTASSIUM SERPL-SCNC: 4.8 MMOL/L — SIGNIFICANT CHANGE UP (ref 3.5–5.3)
PROT SERPL-MCNC: 6 G/DL — SIGNIFICANT CHANGE UP (ref 6–8.3)
PROTHROM AB SERPL-ACNC: 12.6 SEC — SIGNIFICANT CHANGE UP (ref 10.6–13.6)
PROTHROM AB SERPL-ACNC: 13.9 SEC — HIGH (ref 10.6–13.6)
RBC # BLD: 5.38 M/UL — SIGNIFICANT CHANGE UP (ref 4.2–5.8)
RBC # BLD: 5.56 M/UL — SIGNIFICANT CHANGE UP (ref 4.2–5.8)
RBC # FLD: 13.4 % — SIGNIFICANT CHANGE UP (ref 10.3–14.5)
RBC # FLD: 13.4 % — SIGNIFICANT CHANGE UP (ref 10.3–14.5)
RH IG SCN BLD-IMP: POSITIVE — SIGNIFICANT CHANGE UP
SAO2 % BLDV: 85 % — SIGNIFICANT CHANGE UP (ref 67–88)
SAO2 % BLDV: 85 % — SIGNIFICANT CHANGE UP (ref 67–88)
SAO2 % BLDV: 86 % — SIGNIFICANT CHANGE UP (ref 67–88)
SAO2 % BLDV: 93 % — HIGH (ref 67–88)
SARS-COV-2 IGG+IGM SERPL QL IA: 0.4 U/ML — SIGNIFICANT CHANGE UP
SARS-COV-2 IGG+IGM SERPL QL IA: NEGATIVE — SIGNIFICANT CHANGE UP
SODIUM SERPL-SCNC: 135 MMOL/L — SIGNIFICANT CHANGE UP (ref 135–145)
SODIUM SERPL-SCNC: 137 MMOL/L — SIGNIFICANT CHANGE UP (ref 135–145)
TROPONIN T, HIGH SENSITIVITY RESULT: 327 NG/L — HIGH (ref 0–51)
WBC # BLD: 17.1 K/UL — HIGH (ref 3.8–10.5)
WBC # BLD: 7.87 K/UL — SIGNIFICANT CHANGE UP (ref 3.8–10.5)
WBC # FLD AUTO: 17.1 K/UL — HIGH (ref 3.8–10.5)
WBC # FLD AUTO: 7.87 K/UL — SIGNIFICANT CHANGE UP (ref 3.8–10.5)

## 2021-03-24 PROCEDURE — 33519 CABG ARTERY-VEIN THREE: CPT

## 2021-03-24 PROCEDURE — 93010 ELECTROCARDIOGRAM REPORT: CPT

## 2021-03-24 PROCEDURE — 99292 CRITICAL CARE ADDL 30 MIN: CPT

## 2021-03-24 PROCEDURE — 71045 X-RAY EXAM CHEST 1 VIEW: CPT | Mod: 26

## 2021-03-24 PROCEDURE — 33533 CABG ARTERIAL SINGLE: CPT

## 2021-03-24 PROCEDURE — 71045 X-RAY EXAM CHEST 1 VIEW: CPT | Mod: 26,77

## 2021-03-24 PROCEDURE — 33508 ENDOSCOPIC VEIN HARVEST: CPT | Mod: 59

## 2021-03-24 PROCEDURE — 99291 CRITICAL CARE FIRST HOUR: CPT

## 2021-03-24 RX ORDER — DEXTROSE 50 % IN WATER 50 %
50 SYRINGE (ML) INTRAVENOUS
Refills: 0 | Status: DISCONTINUED | OUTPATIENT
Start: 2021-03-24 | End: 2021-03-25

## 2021-03-24 RX ORDER — CHLORHEXIDINE GLUCONATE 213 G/1000ML
5 SOLUTION TOPICAL
Refills: 0 | Status: DISCONTINUED | OUTPATIENT
Start: 2021-03-24 | End: 2021-03-25

## 2021-03-24 RX ORDER — ASPIRIN/CALCIUM CARB/MAGNESIUM 324 MG
300 TABLET ORAL ONCE
Refills: 0 | Status: COMPLETED | OUTPATIENT
Start: 2021-03-24 | End: 2021-03-24

## 2021-03-24 RX ORDER — CHLORHEXIDINE GLUCONATE 213 G/1000ML
30 SOLUTION TOPICAL ONCE
Refills: 0 | Status: COMPLETED | OUTPATIENT
Start: 2021-03-24 | End: 2021-03-24

## 2021-03-24 RX ORDER — ONDANSETRON 8 MG/1
4 TABLET, FILM COATED ORAL ONCE
Refills: 0 | Status: DISCONTINUED | OUTPATIENT
Start: 2021-03-24 | End: 2021-03-24

## 2021-03-24 RX ORDER — HYDROMORPHONE HYDROCHLORIDE 2 MG/ML
0.5 INJECTION INTRAMUSCULAR; INTRAVENOUS; SUBCUTANEOUS ONCE
Refills: 0 | Status: DISCONTINUED | OUTPATIENT
Start: 2021-03-24 | End: 2021-03-24

## 2021-03-24 RX ORDER — POTASSIUM CHLORIDE 20 MEQ
10 PACKET (EA) ORAL
Refills: 0 | Status: DISCONTINUED | OUTPATIENT
Start: 2021-03-24 | End: 2021-03-25

## 2021-03-24 RX ORDER — SODIUM CHLORIDE 9 MG/ML
1000 INJECTION INTRAMUSCULAR; INTRAVENOUS; SUBCUTANEOUS
Refills: 0 | Status: DISCONTINUED | OUTPATIENT
Start: 2021-03-24 | End: 2021-03-28

## 2021-03-24 RX ORDER — OXYCODONE HYDROCHLORIDE 5 MG/1
5 TABLET ORAL EVERY 6 HOURS
Refills: 0 | Status: DISCONTINUED | OUTPATIENT
Start: 2021-03-24 | End: 2021-03-26

## 2021-03-24 RX ORDER — POLYETHYLENE GLYCOL 3350 17 G/17G
17 POWDER, FOR SOLUTION ORAL DAILY
Refills: 0 | Status: DISCONTINUED | OUTPATIENT
Start: 2021-03-24 | End: 2021-03-29

## 2021-03-24 RX ORDER — MAGNESIUM SULFATE 500 MG/ML
2 VIAL (ML) INJECTION ONCE
Refills: 0 | Status: COMPLETED | OUTPATIENT
Start: 2021-03-24 | End: 2021-03-24

## 2021-03-24 RX ORDER — SODIUM CHLORIDE 9 MG/ML
1000 INJECTION, SOLUTION INTRAVENOUS ONCE
Refills: 0 | Status: COMPLETED | OUTPATIENT
Start: 2021-03-24 | End: 2021-03-24

## 2021-03-24 RX ORDER — ALBUMIN HUMAN 25 %
250 VIAL (ML) INTRAVENOUS ONCE
Refills: 0 | Status: COMPLETED | OUTPATIENT
Start: 2021-03-24 | End: 2021-03-24

## 2021-03-24 RX ORDER — ASPIRIN/CALCIUM CARB/MAGNESIUM 324 MG
300 TABLET ORAL ONCE
Refills: 0 | Status: COMPLETED | OUTPATIENT
Start: 2021-03-24

## 2021-03-24 RX ORDER — CHLORHEXIDINE GLUCONATE 213 G/1000ML
15 SOLUTION TOPICAL EVERY 12 HOURS
Refills: 0 | Status: DISCONTINUED | OUTPATIENT
Start: 2021-03-24 | End: 2021-03-25

## 2021-03-24 RX ORDER — OXYCODONE HYDROCHLORIDE 5 MG/1
10 TABLET ORAL EVERY 6 HOURS
Refills: 0 | Status: DISCONTINUED | OUTPATIENT
Start: 2021-03-24 | End: 2021-03-26

## 2021-03-24 RX ORDER — SODIUM CHLORIDE 9 MG/ML
1000 INJECTION INTRAMUSCULAR; INTRAVENOUS; SUBCUTANEOUS ONCE
Refills: 0 | Status: DISCONTINUED | OUTPATIENT
Start: 2021-03-24 | End: 2021-03-24

## 2021-03-24 RX ORDER — MEPERIDINE HYDROCHLORIDE 50 MG/ML
25 INJECTION INTRAMUSCULAR; INTRAVENOUS; SUBCUTANEOUS ONCE
Refills: 0 | Status: DISCONTINUED | OUTPATIENT
Start: 2021-03-24 | End: 2021-03-25

## 2021-03-24 RX ORDER — CEFUROXIME AXETIL 250 MG
1500 TABLET ORAL EVERY 8 HOURS
Refills: 0 | Status: COMPLETED | OUTPATIENT
Start: 2021-03-24 | End: 2021-03-26

## 2021-03-24 RX ORDER — DEXTROSE 50 % IN WATER 50 %
25 SYRINGE (ML) INTRAVENOUS
Refills: 0 | Status: DISCONTINUED | OUTPATIENT
Start: 2021-03-24 | End: 2021-03-25

## 2021-03-24 RX ORDER — PANTOPRAZOLE SODIUM 20 MG/1
40 TABLET, DELAYED RELEASE ORAL DAILY
Refills: 0 | Status: DISCONTINUED | OUTPATIENT
Start: 2021-03-24 | End: 2021-03-25

## 2021-03-24 RX ORDER — INSULIN HUMAN 100 [IU]/ML
3 INJECTION, SOLUTION SUBCUTANEOUS
Qty: 100 | Refills: 0 | Status: DISCONTINUED | OUTPATIENT
Start: 2021-03-24 | End: 2021-03-25

## 2021-03-24 RX ORDER — ASPIRIN/CALCIUM CARB/MAGNESIUM 324 MG
81 TABLET ORAL DAILY
Refills: 0 | Status: DISCONTINUED | OUTPATIENT
Start: 2021-03-24 | End: 2021-03-29

## 2021-03-24 RX ORDER — DEXMEDETOMIDINE HYDROCHLORIDE IN 0.9% SODIUM CHLORIDE 4 UG/ML
0.3 INJECTION INTRAVENOUS
Qty: 200 | Refills: 0 | Status: DISCONTINUED | OUTPATIENT
Start: 2021-03-24 | End: 2021-03-25

## 2021-03-24 RX ORDER — METOCLOPRAMIDE HCL 10 MG
10 TABLET ORAL EVERY 8 HOURS
Refills: 0 | Status: COMPLETED | OUTPATIENT
Start: 2021-03-24 | End: 2021-03-26

## 2021-03-24 RX ADMIN — HYDROMORPHONE HYDROCHLORIDE 0.5 MILLIGRAM(S): 2 INJECTION INTRAMUSCULAR; INTRAVENOUS; SUBCUTANEOUS at 23:10

## 2021-03-24 RX ADMIN — INSULIN HUMAN 3 UNIT(S)/HR: 100 INJECTION, SOLUTION SUBCUTANEOUS at 20:04

## 2021-03-24 RX ADMIN — HYDROMORPHONE HYDROCHLORIDE 0.5 MILLIGRAM(S): 2 INJECTION INTRAMUSCULAR; INTRAVENOUS; SUBCUTANEOUS at 23:25

## 2021-03-24 RX ADMIN — PANTOPRAZOLE SODIUM 40 MILLIGRAM(S): 20 TABLET, DELAYED RELEASE ORAL at 16:27

## 2021-03-24 RX ADMIN — HEPARIN SODIUM 16.5 UNIT(S)/HR: 5000 INJECTION INTRAVENOUS; SUBCUTANEOUS at 03:03

## 2021-03-24 RX ADMIN — DEXMEDETOMIDINE HYDROCHLORIDE IN 0.9% SODIUM CHLORIDE 9.6 MICROGRAM(S)/KG/HR: 4 INJECTION INTRAVENOUS at 20:04

## 2021-03-24 RX ADMIN — HYDROMORPHONE HYDROCHLORIDE 0.5 MILLIGRAM(S): 2 INJECTION INTRAMUSCULAR; INTRAVENOUS; SUBCUTANEOUS at 20:15

## 2021-03-24 RX ADMIN — Medication 12.5 MILLIGRAM(S): at 05:25

## 2021-03-24 RX ADMIN — Medication 125 MILLILITER(S): at 17:45

## 2021-03-24 RX ADMIN — Medication 125 MILLILITER(S): at 18:16

## 2021-03-24 RX ADMIN — Medication 125 MILLILITER(S): at 15:15

## 2021-03-24 RX ADMIN — Medication 125 MILLILITER(S): at 20:05

## 2021-03-24 RX ADMIN — DEXMEDETOMIDINE HYDROCHLORIDE IN 0.9% SODIUM CHLORIDE 9.6 MICROGRAM(S)/KG/HR: 4 INJECTION INTRAVENOUS at 13:40

## 2021-03-24 RX ADMIN — CHLORHEXIDINE GLUCONATE 30 MILLILITER(S): 213 SOLUTION TOPICAL at 05:25

## 2021-03-24 RX ADMIN — Medication 100 MILLIGRAM(S): at 15:18

## 2021-03-24 RX ADMIN — Medication 10 MILLIGRAM(S): at 21:12

## 2021-03-24 RX ADMIN — Medication 500 MILLIGRAM(S): at 05:25

## 2021-03-24 RX ADMIN — Medication 1 SPRAY(S): at 05:27

## 2021-03-24 RX ADMIN — CHLORHEXIDINE GLUCONATE 15 MILLILITER(S): 213 SOLUTION TOPICAL at 17:02

## 2021-03-24 RX ADMIN — Medication 10 MILLIGRAM(S): at 15:18

## 2021-03-24 RX ADMIN — Medication 300 MILLIGRAM(S): at 19:35

## 2021-03-24 RX ADMIN — SODIUM CHLORIDE 3 MILLILITER(S): 9 INJECTION INTRAMUSCULAR; INTRAVENOUS; SUBCUTANEOUS at 05:27

## 2021-03-24 RX ADMIN — Medication 125 MILLILITER(S): at 15:45

## 2021-03-24 RX ADMIN — Medication 50 GRAM(S): at 18:16

## 2021-03-24 RX ADMIN — INSULIN HUMAN 3 UNIT(S)/HR: 100 INJECTION, SOLUTION SUBCUTANEOUS at 16:03

## 2021-03-24 RX ADMIN — SODIUM CHLORIDE 1000 MILLILITER(S): 9 INJECTION, SOLUTION INTRAVENOUS at 13:43

## 2021-03-24 RX ADMIN — HYDROMORPHONE HYDROCHLORIDE 0.5 MILLIGRAM(S): 2 INJECTION INTRAMUSCULAR; INTRAVENOUS; SUBCUTANEOUS at 20:00

## 2021-03-24 NOTE — PHYSICAL THERAPY INITIAL EVALUATION ADULT - PERTINENT HX OF CURRENT PROBLEM, REHAB EVAL
60y/o M with a pmh/o gout, obesity, retinal tear, inguinal hernia, a.flutter s/p ablation, MI, CAD s/p PCI x 4 stents, HTN, HLD, who presented to ED due to chest pain which has now been present for several hours which is centralized, pressure like in nature, non radiating, not quantifiable on scale, no a/a factors and hypertensive to 160/100. 61y M with a pmh/o gout, retinal tear, inguinal hernia, a.flutter s/p ablation, MI, CAD s/p PCI x 4 stents. Presented to ED w/ CP. Admitted for NSTEMI. Transferred to [facility] for CTS eval. Pt now s/p CABGx4 3/24/21.

## 2021-03-24 NOTE — PHYSICAL THERAPY INITIAL EVALUATION ADULT - PRECAUTIONS/LIMITATIONS, REHAB EVAL
Admitted for c/f NSTEMI. Managed with heparin drip, ASA, statin, BB. Trops downtrended, underwent cath on 3/22/21, showing 3 vessel disease. During post procedure recovery, Pt reported Lt vision changes and B/L LE tightness. Pt was assessed with Dr. Deleon, full strength on B/L LE and B/L UE with normal motor and sensory function on quick exam, no focal neuro deficit noted,  Eye vision changes improved (almost back to normal) after IV hydration.  He was transferred to [facility] for surgical eval with Dr. Thomason. Currently denies any active chest pain or pressure. Denies diaphoresis, cough, fever, chills, n/v/d, constipation, abdominal pain, HA, paresthesias, leg swelling, vision changes.VADuplexB/Lcarotid: (-) 3/22:CXR: (-) cardiac precautions/fall precautions/sternal precautions/surgical precautions

## 2021-03-24 NOTE — PHYSICAL THERAPY INITIAL EVALUATION ADULT - ADDITIONAL COMMENTS
Pt reports lives alone in private house with 3 steps to enter and 1 flight to bedroom. Pt reports girlfriend and daughter (26 y) can assist if needed upon D/C. Pt was independent with mobility and ADLs prior to admit. Pt reports working for himself.

## 2021-03-24 NOTE — PHYSICAL THERAPY INITIAL EVALUATION ADULT - GAIT DEVIATIONS NOTED, PT EVAL
decreased steffen/increased time in double stance/decreased step length/decreased stride length/decreased weight-shifting ability

## 2021-03-24 NOTE — PROGRESS NOTE ADULT - SUBJECTIVE AND OBJECTIVE BOX
STANISLAV GARAY  MRN-[id number]  Patient is a 61y old  Male who presents with a chief complaint of cardiac surgery (24 Mar 2021 14:35)    HPI:  62 yo male with a pmh/o gout, obesity, retinal tear, inguinal hernia, a.flutter s/p ablation, MI, CAD s/p PCI x 4 stents, HTN, HLD, who presented to ED due to chest pain which has now been present for several hours which is centralized, pressure like in nature, non radiating, not quantifiable on scale, no a/a factors and hypertensive to 160/100. Admitted for c/f NSTEMI. Managed with heparin drip, ASA, statin, BB. Trops downtrended, underwent cath on 3/22/21, showing 3 vessel disease. During post procedure recovery, Pt reported Lt vision changes and B/L LE tightness. Pt was assessed with Dr. Deleon, full strength on B/L LE and B/L UE with normal motor and sensory function on quick exam, no focal neuro deficit noted,  Eye vision changes improved (almost back to normal) after IV hydration.  He was transferred to [facility] for surgical eval with Dr. Thomason. Currently denies any active chest pain or pressure. Denies diaphoresis, cough, fever, chills, n/v/d, constipation, abdominal pain, HA, paresthesias, leg swelling, vision changes.      Surgery/Hospital course:  Dental implant 3/16  Received Pfizer COVID vaccine #1  Post PTCA x 4 stents in place- last   H/O eye surgery right eye retinal tear, 2017  CABG x4    Today/Overnight:  -Low grade fever   -Hypoxia is resolving.    Vital Signs Last 24 Hrs  T(C): 37.3 (24 Mar 2021 20:00), Max: 37.3 (24 Mar 2021 20:00)  T(F): 99.1 (24 Mar 2021 20:00), Max: 99.1 (24 Mar 2021 20:00)  HR: 64 (24 Mar 2021 20:00) (59 - 78)  BP: 141/94 (24 Mar 2021 07:28) (141/94 - 144/94)  BP(mean): --  RR: 16 (24 Mar 2021 19:00) (12 - 21)  SpO2: 94% (24 Mar 2021 20:00) (94% - 99%)  ============================I/O===========================  I&O's Summary    23 Mar 2021 07:01  -  24 Mar 2021 07:00  --------------------------------------------------------  IN: 1193.5 mL / OUT: 3100 mL / NET: -1906.5 mL    24 Mar 2021 07:01  -  24 Mar 2021 20:30  --------------------------------------------------------  IN: 2548.1 mL / OUT: 1460 mL / NET: 1088.1 mL      ============================ LABS =========================                        16.1   17.10 )-----------( 158      ( 24 Mar 2021 14:15 )             47.7     -24    137  |  99  |  16  ----------------------------<  150<H>  4.8   |  23  |  0.89    Ca    8.8      24 Mar 2021 14:15  Phos  3.9     03-24  Mg     2.2     -24    TPro  6.0  /  Alb  3.7  /  TBili  0.8  /  DBili  x   /  AST  67<H>  /  ALT  51<H>  /  AlkPhos  44  03-24    LIVER FUNCTIONS - ( 24 Mar 2021 14:15 )  Alb: 3.7 g/dL / Pro: 6.0 g/dL / ALK PHOS: 44 U/L / ALT: 51 U/L / AST: 67 U/L / GGT: x           PT/INR - ( 24 Mar 2021 14:15 )   PT: 13.9 sec;   INR: 1.17 ratio         PTT - ( 24 Mar 2021 14:15 )  PTT:28.6 sec  ABG - ( 24 Mar 2021 20:16 )  pH, Arterial: 7.45  pH, Blood: x     /  pCO2: 35    /  pO2: 71    / HCO3: 24    / Base Excess: .9    /  SaO2: 95                Urinalysis Basic - ( 23 Mar 2021 05:46 )    Color: Yellow / Appearance: Clear / S.035 / pH: x  Gluc: x / Ketone: Negative  / Bili: Negative / Urobili: Negative   Blood: x / Protein: 30 mg/dL / Nitrite: Negative   Leuk Esterase: Negative / RBC: 5 /hpf / WBC 1 /HPF   Sq Epi: x / Non Sq Epi: 0 /hpf / Bacteria: Negative      ======================Micro/Rad/Cardio=================  Culture: Reviewed   CXR: Reviewed  Echo: Reviewed  ======================================================  PAST MEDICAL & SURGICAL HISTORY:  Diverticulitis    Hernia  inguinal hernia    Sleep apnea, unspecified type    Atrial fibrillation    Vitamin D deficiency    Osteoarthritis    Class 2 obesity with body mass index (BMI) of 38.0 to 38.9 in adult    Hyperlipidemia    HTN (hypertension)    Atrial flutter by electrocardiogram    At risk for sleep apnea    Stented coronary artery  X4- last     Left knee pain    Gout    Dyslipidemia    CAD (coronary artery disease)    Myocardial infarction  ,     S/P meniscectomy    H/O cardiac radiofrequency ablation    History of appendectomy      H/O eye surgery  right eye retinal tear, 2017    H/O right inguinal hernia repair  age 13    Post PTCA  x 4 stents in place- last 2010      ========================ASSESSMENT ================  CABGx4  Acute Blood Loss Anemia   Fever  Thrombocytopenia   Leukocytosis   Stress Hyperglycemia    Plan:  ====================== NEUROLOGY=====================  Continue analgesic regimen with Oxycodone and Meperdine to optimize function and sedated for ventilatory synchrony with IV Precedex.    dexMEDEtomidine Infusion 0.3 MICROgram(s)/kG/Hr (9.6 mL/Hr) IV Continuous <Continuous>  meperidine     Injectable 25 milliGRAM(s) IV Push once  oxyCODONE    IR 10 milliGRAM(s) Oral every 6 hours PRN Severe Pain (7 - 10)  oxyCODONE    IR 5 milliGRAM(s) Oral every 6 hours PRN Moderate Pain (4 - 6)    ==================== RESPIRATORY======================  Hypoxia is resolving.   Respiratory status required full ventilatory support, close monitoring of respiratory rate and breathing pattern, the following of ABG’s with A-line monitoring, continuous pulse oximetry monitoring    Mechanical Ventilation:  Mode: AC/ CMV (Assist Control/ Continuous Mandatory Ventilation)  RR (machine): 16  TV (machine): 700  FiO2: 50  PEEP: 10  ITime: 1  MAP: 10  PIP: 23      ====================CARDIOVASCULAR==================  Patient with history of coronary artery disease, subsequently underwent coronary artery bypass grafting x4 procedure.   Invasive hemodynamic monitoring with a central line & an A-line were required for the continuous central venous and MAP/BP monitoring to ensure adequate cardiovascular support  ASA continued for graft occlusion-thromboembolism prophylaxis     aspirin enteric coated 81 milliGRAM(s) Oral daily  ===================HEMATOLOGIC/ONC ===================  Acute blood Loss Anemia, monitor hemoglobin and hematocrit levels, 14.8/45%  Thrombocytopenia, continue to monitor platelet levels.     ===================== RENAL =========================  Optimize renal perfusion with adequate volume resuscitation and continued monitoring of urine output, fluid balance, BUN/Creatinine.     ==================== GASTROINTESTINAL===================  Protonix for stress ulcer prophylaxis.   Continue bowel regimen with Miralax.    Reglan for gut motility.    metoclopramide Injectable 10 milliGRAM(s) IV Push every 8 hours  pantoprazole  Injectable 40 milliGRAM(s) IV Push daily  polyethylene glycol 3350 17 Gram(s) Oral daily  potassium chloride  10 mEq/50 mL IVPB 10 milliEquivalent(s) IV Intermittent every 1 hour  potassium chloride  10 mEq/50 mL IVPB 10 milliEquivalent(s) IV Intermittent every 1 hour  potassium chloride  10 mEq/50 mL IVPB 10 milliEquivalent(s) IV Intermittent every 1 hour  sodium chloride 0.9%. 1000 milliLiter(s) (10 mL/Hr) IV Continuous <Continuous>    =======================    ENDOCRINE  =====================  Metabolic stability, stress hyperglycemia required an IV regular Insulin drip while following serial glucose levels to help achieve and maintain euglycemia.       dextrose 50% Injectable 50 milliLiter(s) IV Push every 15 minutes  dextrose 50% Injectable 25 milliLiter(s) IV Push every 15 minutes  insulin regular Infusion 3 Unit(s)/Hr (3 mL/Hr) IV Continuous <Continuous>    ========================INFECTIOUS DISEASE================  Low grade fever of 99.1.  Leukocytosis white blood cell count 17.10. Continue to monitor levels.  Continue Cefuroxime for perioperative antibiotic coverage.    cefuroxime  IVPB 1500 milliGRAM(s) IV Intermittent every 8 hours      Patient requires continuous monitoring with bedside rhythm monitoring, pulse oximetry monitoring, and continuous central venous and arterial pressure monitoring; and intermittent blood gas analysis.  Care plan discussed with ICU care team.    Patient remained critical, at risk for life threatening decompensation.   I have spent Karyn Zhu MD minutes providing acute care with multiple re-evaluations throughout the evening.     By signing my name below, I, Brittany Perdue, attest that this documentation has been prepared under the direction and in the presence of Brittany Perdue MD   Electronically signed: Regla Roche, 21 @ 20:30    I, Karyn Zhu, personally performed the services described in this documentation. All medical record entries made by the scribe were at my direction and in my presence. I have reviewed the chart and agree that the record reflects my personal performance and is accurate and complete  Electronically signed: Karyn Zhu MD 21 @ 20:30       STANISLAV GARAY  MRN-[id number]  Patient is a 61y old  Male who presents with a chief complaint of cardiac surgery (24 Mar 2021 14:35)    HPI:  62 yo male with a pmh/o gout, obesity, retinal tear, inguinal hernia, a.flutter s/p ablation, MI, CAD s/p PCI x 4 stents, HTN, HLD, who presented to ED due to chest pain which has now been present for several hours which is centralized, pressure like in nature, non radiating, not quantifiable on scale, no a/a factors and hypertensive to 160/100. Admitted for NSTEMI. Managed with heparin drip, ASA, statin, BB. Trops downtrended, underwent cath on 3/22/21, showing 3 vessel disease. During post procedure recovery, Pt reported Lt vision changes and B/L LE tightness. Pt was assessed with Dr. Deleon, full strength on B/L LE and B/L UE with normal motor and sensory function on quick exam, no focal neuro deficit noted,  Eye vision changes improved (almost back to normal) after IV hydration.  He was transferred to [facility] for surgical eval with Dr. Thomason. Currently denies any active chest pain or pressure. Denies diaphoresis, cough, fever, chills, n/v/d, constipation, abdominal pain, HA, paresthesias, leg swelling, vision changes.      Surgery/Hospital course:  Dental implant 3/16  Received Pfizer COVID vaccine #1  Post PTCA x 4 stents in place- last   H/O eye surgery right eye retinal tear, 2017  CABG x4    Today/Overnight:  -Low grade fever   -Hypoxia is resolving.    Vital Signs Last 24 Hrs  T(C): 37.3 (24 Mar 2021 20:00), Max: 37.3 (24 Mar 2021 20:00)  T(F): 99.1 (24 Mar 2021 20:00), Max: 99.1 (24 Mar 2021 20:00)  HR: 64 (24 Mar 2021 20:00) (59 - 78)  BP: 141/94 (24 Mar 2021 07:28) (141/94 - 144/94)  BP(mean): --  RR: 16 (24 Mar 2021 19:00) (12 - 21)  SpO2: 94% (24 Mar 2021 20:00) (94% - 99%)  ============================I/O===========================  I&O's Summary    23 Mar 2021 07:  -  24 Mar 2021 07:00  --------------------------------------------------------  IN: 1193.5 mL / OUT: 3100 mL / NET: -1906.5 mL    24 Mar 2021 07:  -  24 Mar 2021 20:30  --------------------------------------------------------  IN: 2548.1 mL / OUT: 1460 mL / NET: 1088.1 mL    ============================ LABS =========================                        16.1   17.10 )-----------( 158      ( 24 Mar 2021 14:15 )             47.7     -    137  |  99  |  16  ----------------------------<  150<H>  4.8   |  23  |  0.89    Ca    8.8      24 Mar 2021 14:15  Phos  3.9     03-24  Mg     2.2     -24    TPro  6.0  /  Alb  3.7  /  TBili  0.8  /  DBili  x   /  AST  67<H>  /  ALT  51<H>  /  AlkPhos  44  03-24    LIVER FUNCTIONS - ( 24 Mar 2021 14:15 )  Alb: 3.7 g/dL / Pro: 6.0 g/dL / ALK PHOS: 44 U/L / ALT: 51 U/L / AST: 67 U/L / GGT: x           PT/INR - ( 24 Mar 2021 14:15 )   PT: 13.9 sec;   INR: 1.17 ratio    PTT - ( 24 Mar 2021 14:15 )  PTT:28.6 sec    ABG - ( 24 Mar 2021 20:16 )  pH, Arterial: 7.45  pH, Blood: x     /  pCO2: 35    /  pO2: 71    / HCO3: 24    / Base Excess: .9    /  SaO2: 95        Urinalysis Basic - ( 23 Mar 2021 05:46 )    Color: Yellow / Appearance: Clear / S.035 / pH: x  Gluc: x / Ketone: Negative  / Bili: Negative / Urobili: Negative   Blood: x / Protein: 30 mg/dL / Nitrite: Negative   Leuk Esterase: Negative / RBC: 5 /hpf / WBC 1 /HPF   Sq Epi: x / Non Sq Epi: 0 /hpf / Bacteria: Negative      ======================Micro/Rad/Cardio=================  Culture: Reviewed   CXR: Reviewed  Echo: Reviewed  ======================================================  PAST MEDICAL & SURGICAL HISTORY:  Diverticulitis    Hernia  inguinal hernia    Sleep apnea, unspecified type    Atrial fibrillation    Vitamin D deficiency    Osteoarthritis    Class 2 obesity with body mass index (BMI) of 38.0 to 38.9 in adult    Hyperlipidemia    HTN (hypertension)    Atrial flutter by electrocardiogram    At risk for sleep apnea    Stented coronary artery  X4- last     Left knee pain    Gout    Dyslipidemia    CAD (coronary artery disease)    Myocardial infarction  ,     S/P meniscectomy    H/O cardiac radiofrequency ablation    History of appendectomy      H/O eye surgery  right eye retinal tear, 2017    H/O right inguinal hernia repair  age 13    Post PTCA  x 4 stents in place- last     ========================ASSESSMENT ================  CABGx4  Acute Blood Loss Anemia   Fever  Thrombocytopenia   Leukocytosis   Stress Hyperglycemia    Plan:  ====================== NEUROLOGY=====================  Continue analgesic regimen with intermittent IV Dilaudid to optimize function and sedated for ventilatory synchrony with IV Precedex.    ==================== RESPIRATORY======================    Respiratory status required full ventilatory support, close monitoring of respiratory rate and breathing pattern, the following of ABG’s with A-line monitoring, and continuous pulse oximetry monitoring. Hypoxia required increased PEEP and Fi02, now being weaned with plans for CPAP trials once PEEP lowered to 5. Patient has a history of HOPE with obesity related restrictive lung disease and will likely extubate to bipap due to high risk extubation.    Mechanical Ventilation:  Mode: AC/ CMV (Assist Control/ Continuous Mandatory Ventilation)  RR (machine): 16  TV (machine): 700  FiO2: 50  PEEP: 10  ITime: 1  MAP: 10  PIP: 23      ====================CARDIOVASCULAR==================  Patient with history of coronary artery disease, subsequently underwent coronary artery bypass grafting x4 procedure. Volume resuscitation ordered for hypovolemic shock with lactic acidosis.  Invasive hemodynamic monitoring with a central line & an A-line were required for the continuous central venous and MAP/BP monitoring to ensure adequate cardiovascular support  ASA continued for graft occlusion-thromboembolism prophylaxis     aspirin enteric coated 81 milliGRAM(s) Oral daily  ===================HEMATOLOGIC/ONC ===================  Monitor hemoglobin and hematocrit levels, 14.8/45%    ===================== RENAL =========================  Optimize renal perfusion with adequate volume resuscitation and continued monitoring of urine output, fluid balance, BUN/Creatinine.     ==================== GASTROINTESTINAL===================  Protonix for stress ulcer prophylaxis.   Continue bowel regimen with Miralax.    Reglan for intestinal motility.    metoclopramide Injectable 10 milliGRAM(s) IV Push every 8 hours  pantoprazole  Injectable 40 milliGRAM(s) IV Push daily  polyethylene glycol 3350 17 Gram(s) Oral daily    =======================    ENDOCRINE  =====================  Metabolic stability, stress hyperglycemia required an IV regular Insulin drip while following serial glucose levels to help achieve and maintain euglycemia.       ========================INFECTIOUS DISEASE================  Low grade fever of 99.1.  Leukocytosis white blood cell count 17.10. Continue to monitor levels.  Continue Cefuroxime for perioperative antibiotic coverage.    cefuroxime  IVPB 1500 milliGRAM(s) IV Intermittent every 8 hours      Patient requires continuous monitoring with bedside rhythm monitoring, pulse oximetry monitoring, and continuous central venous and arterial pressure monitoring; and intermittent blood gas analysis.  Care plan discussed with ICU care team.    Patient remained critical, at risk for life threatening decompensation.   I have spent 45 minutes providing acute care with multiple re-evaluations throughout the evening.     By signing my name below, I, Karyn Zhu MD  attest that this documentation has been prepared under the direction and in the presence of Brittany Perdue MD   Electronically signed: Mason Roche, 21 @ 20:30    I, Karyn Zhu, personally performed the services described in this documentation. All medical record entries made by the mason were at my direction and in my presence. I have reviewed the chart and agree that the record reflects my personal performance and is accurate and complete  Electronically signed: Karyn Zhu MD 21 @ 20:30

## 2021-03-24 NOTE — PHYSICAL THERAPY INITIAL EVALUATION ADULT - GENERAL OBSERVATIONS, REHAB EVAL
Pt tolerated 45min PT initial evaluation well s/p CABGx4 3/24. Rec'd in chair in NAD, +CT, ICU monitoring, price, ext dfib, a-line.

## 2021-03-24 NOTE — PROGRESS NOTE ADULT - SUBJECTIVE AND OBJECTIVE BOX
CRITICAL CARE ATTENDING - CTICU    MEDICATIONS  (STANDING):  aspirin enteric coated 81 milliGRAM(s) Oral daily  aspirin Suppository 300 milliGRAM(s) Rectal once  cefuroxime  IVPB 1500 milliGRAM(s) IV Intermittent every 8 hours  chlorhexidine 0.12% Liquid 15 milliLiter(s) Oral Mucosa every 12 hours  chlorhexidine 0.12% Liquid 5 milliLiter(s) Oral Mucosa two times a day  dexMEDEtomidine Infusion 0.3 MICROgram(s)/kG/Hr (9.6 mL/Hr) IV Continuous <Continuous>  dextrose 50% Injectable 50 milliLiter(s) IV Push every 15 minutes  dextrose 50% Injectable 25 milliLiter(s) IV Push every 15 minutes  insulin regular Infusion 3 Unit(s)/Hr (3 mL/Hr) IV Continuous <Continuous>  lactated ringers Bolus 1000 milliLiter(s) IV Bolus once  meperidine     Injectable 25 milliGRAM(s) IV Push once  metoclopramide Injectable 10 milliGRAM(s) IV Push every 8 hours  pantoprazole  Injectable 40 milliGRAM(s) IV Push daily  polyethylene glycol 3350 17 Gram(s) Oral daily  potassium chloride  10 mEq/50 mL IVPB 10 milliEquivalent(s) IV Intermittent every 1 hour  potassium chloride  10 mEq/50 mL IVPB 10 milliEquivalent(s) IV Intermittent every 1 hour  potassium chloride  10 mEq/50 mL IVPB 10 milliEquivalent(s) IV Intermittent every 1 hour  sodium chloride 0.9%. 1000 milliLiter(s) (10 mL/Hr) IV Continuous <Continuous>                                    16.7   7.87  )-----------( 194      ( 24 Mar 2021 02:26 )             49.8       03-24    135  |  98  |  18  ----------------------------<  101<H>  4.4   |  24  |  0.94    Ca    9.2      24 Mar 2021 02:26    TPro  7.3  /  Alb  4.0  /  TBili  0.3  /  DBili  x   /  AST  38  /  ALT  40  /  AlkPhos  54  03-23      PT/INR - ( 24 Mar 2021 02:26 )   PT: 12.6 sec;   INR: 1.05 ratio         PTT - ( 24 Mar 2021 02:26 )  PTT:48.4 sec    Mode: AC/ CMV (Assist Control/ Continuous Mandatory Ventilation)  RR (machine): 12  TV (machine): 700  FiO2: 100  PEEP: 5  ITime: 1  MAP: 11  PIP: 21      Daily Height in cm: 187.96 (24 Mar 2021 07:28)    Daily       03-23 @ 07:01  -  03-24 @ 07:00  --------------------------------------------------------  IN: 1193.5 mL / OUT: 3100 mL / NET: -1906.5 mL    03-24 @ 07:01 - 03-24 @ 14:35  --------------------------------------------------------  IN: 39.2 mL / OUT: 375 mL / NET: -335.8 mL        Critically Ill patient  : [ ] preoperative ,   [ x] post operative    Requires :  [ x] Arterial Line   [x ] Central Line  [ ] PA catheter  [ ] IABP  [ ] ECMO  [ ] LVAD  [ x] Ventilator  [x ] pacemaker [ ] Impella.                      [ x] ABG's     [ x] Pulse Oxymetry Monitoring  Bedside evaluation , monitoring , treatment of hemodynamics , fluids , IVP/ IVCD meds.        Diagnosis:     Op Day - CABG X 4 L     Hypotension    Hypovolemia     Hemodynamic lability,  instability. Requires IVCD [ ] vasopressors [ ] inotropes  [ ] vasodilator  [x ]IVSS fluid  to maintain MAP, perfusion, C.I.     Ventilator Management:  [x ]AC-rest    [ x]CPAP-PS Wean this PM     [ ]Trach Collar     [ ]Extubate    [ ] T-Piece  [ ]peep>5     Requires chest PT, pulmonary toilet, ambu bagging, suctioning to maintain SaO2,  patent airway and treat atelectasis.     Early Extubation candidate    Requires IVCD  Sedation / Analgesia to maintain ventilator synchrony and weaning process    Temporary pacemaker (TPM) interrogation and setting.     Chest Tube Drainage     IVCD Insulin    ECG                        Discussed with CT surgeon, Physician's Assistant - Nurse Practitioner- Critical care medicine team.   Discussed at  AM / PM rounds.   Chart, labs , films reviewed.    Cumulative Critical Care Time Given Today : 30 min

## 2021-03-25 DIAGNOSIS — I48.92 UNSPECIFIED ATRIAL FLUTTER: ICD-10-CM

## 2021-03-25 DIAGNOSIS — Z95.1 PRESENCE OF AORTOCORONARY BYPASS GRAFT: ICD-10-CM

## 2021-03-25 LAB
ALBUMIN SERPL ELPH-MCNC: 4.2 G/DL — SIGNIFICANT CHANGE UP (ref 3.3–5)
ALP SERPL-CCNC: 35 U/L — LOW (ref 40–120)
ALT FLD-CCNC: 43 U/L — SIGNIFICANT CHANGE UP (ref 10–45)
ANION GAP SERPL CALC-SCNC: 13 MMOL/L — SIGNIFICANT CHANGE UP (ref 5–17)
APTT BLD: 27.4 SEC — LOW (ref 27.5–35.5)
AST SERPL-CCNC: 55 U/L — HIGH (ref 10–40)
BILIRUB SERPL-MCNC: 0.8 MG/DL — SIGNIFICANT CHANGE UP (ref 0.2–1.2)
BUN SERPL-MCNC: 18 MG/DL — SIGNIFICANT CHANGE UP (ref 7–23)
CALCIUM SERPL-MCNC: 8.8 MG/DL — SIGNIFICANT CHANGE UP (ref 8.4–10.5)
CHLORIDE SERPL-SCNC: 102 MMOL/L — SIGNIFICANT CHANGE UP (ref 96–108)
CO2 SERPL-SCNC: 22 MMOL/L — SIGNIFICANT CHANGE UP (ref 22–31)
CREAT SERPL-MCNC: 0.77 MG/DL — SIGNIFICANT CHANGE UP (ref 0.5–1.3)
GAS PNL BLDA: SIGNIFICANT CHANGE UP
GAS PNL BLDA: SIGNIFICANT CHANGE UP
GLUCOSE BLDC GLUCOMTR-MCNC: 107 MG/DL — HIGH (ref 70–99)
GLUCOSE BLDC GLUCOMTR-MCNC: 113 MG/DL — HIGH (ref 70–99)
GLUCOSE BLDC GLUCOMTR-MCNC: 114 MG/DL — HIGH (ref 70–99)
GLUCOSE BLDC GLUCOMTR-MCNC: 119 MG/DL — HIGH (ref 70–99)
GLUCOSE BLDC GLUCOMTR-MCNC: 119 MG/DL — HIGH (ref 70–99)
GLUCOSE BLDC GLUCOMTR-MCNC: 120 MG/DL — HIGH (ref 70–99)
GLUCOSE BLDC GLUCOMTR-MCNC: 120 MG/DL — HIGH (ref 70–99)
GLUCOSE BLDC GLUCOMTR-MCNC: 125 MG/DL — HIGH (ref 70–99)
GLUCOSE BLDC GLUCOMTR-MCNC: 127 MG/DL — HIGH (ref 70–99)
GLUCOSE BLDC GLUCOMTR-MCNC: 128 MG/DL — HIGH (ref 70–99)
GLUCOSE BLDC GLUCOMTR-MCNC: 136 MG/DL — HIGH (ref 70–99)
GLUCOSE BLDC GLUCOMTR-MCNC: 137 MG/DL — HIGH (ref 70–99)
GLUCOSE BLDC GLUCOMTR-MCNC: 139 MG/DL — HIGH (ref 70–99)
GLUCOSE BLDC GLUCOMTR-MCNC: 145 MG/DL — HIGH (ref 70–99)
GLUCOSE SERPL-MCNC: 122 MG/DL — HIGH (ref 70–99)
HCT VFR BLD CALC: 42 % — SIGNIFICANT CHANGE UP (ref 39–50)
HGB BLD-MCNC: 14.1 G/DL — SIGNIFICANT CHANGE UP (ref 13–17)
INR BLD: 1.18 RATIO — HIGH (ref 0.88–1.16)
MAGNESIUM SERPL-MCNC: 2.1 MG/DL — SIGNIFICANT CHANGE UP (ref 1.6–2.6)
MCHC RBC-ENTMCNC: 29.8 PG — SIGNIFICANT CHANGE UP (ref 27–34)
MCHC RBC-ENTMCNC: 33.6 GM/DL — SIGNIFICANT CHANGE UP (ref 32–36)
MCV RBC AUTO: 88.8 FL — SIGNIFICANT CHANGE UP (ref 80–100)
NRBC # BLD: 0 /100 WBCS — SIGNIFICANT CHANGE UP (ref 0–0)
PHOSPHATE SERPL-MCNC: 3.3 MG/DL — SIGNIFICANT CHANGE UP (ref 2.5–4.5)
PLATELET # BLD AUTO: 158 K/UL — SIGNIFICANT CHANGE UP (ref 150–400)
POTASSIUM SERPL-MCNC: 4.6 MMOL/L — SIGNIFICANT CHANGE UP (ref 3.5–5.3)
POTASSIUM SERPL-SCNC: 4.6 MMOL/L — SIGNIFICANT CHANGE UP (ref 3.5–5.3)
PREALB SERPL-MCNC: 32 MG/DL — SIGNIFICANT CHANGE UP (ref 20–40)
PROT SERPL-MCNC: 6.5 G/DL — SIGNIFICANT CHANGE UP (ref 6–8.3)
PROTHROM AB SERPL-ACNC: 14.1 SEC — HIGH (ref 10.6–13.6)
RBC # BLD: 4.73 M/UL — SIGNIFICANT CHANGE UP (ref 4.2–5.8)
RBC # FLD: 13.3 % — SIGNIFICANT CHANGE UP (ref 10.3–14.5)
SARS-COV-2 RNA SPEC QL NAA+PROBE: SIGNIFICANT CHANGE UP
SODIUM SERPL-SCNC: 137 MMOL/L — SIGNIFICANT CHANGE UP (ref 135–145)
WBC # BLD: 15.82 K/UL — HIGH (ref 3.8–10.5)
WBC # FLD AUTO: 15.82 K/UL — HIGH (ref 3.8–10.5)

## 2021-03-25 PROCEDURE — 93010 ELECTROCARDIOGRAM REPORT: CPT

## 2021-03-25 PROCEDURE — 99291 CRITICAL CARE FIRST HOUR: CPT | Mod: 24

## 2021-03-25 PROCEDURE — 71045 X-RAY EXAM CHEST 1 VIEW: CPT | Mod: 26

## 2021-03-25 RX ORDER — PANTOPRAZOLE SODIUM 20 MG/1
40 TABLET, DELAYED RELEASE ORAL
Refills: 0 | Status: DISCONTINUED | OUTPATIENT
Start: 2021-03-25 | End: 2021-03-29

## 2021-03-25 RX ORDER — ATORVASTATIN CALCIUM 80 MG/1
40 TABLET, FILM COATED ORAL AT BEDTIME
Refills: 0 | Status: DISCONTINUED | OUTPATIENT
Start: 2021-03-25 | End: 2021-03-29

## 2021-03-25 RX ORDER — HYDROMORPHONE HYDROCHLORIDE 2 MG/ML
0.5 INJECTION INTRAMUSCULAR; INTRAVENOUS; SUBCUTANEOUS ONCE
Refills: 0 | Status: DISCONTINUED | OUTPATIENT
Start: 2021-03-25 | End: 2021-03-25

## 2021-03-25 RX ORDER — CHLORHEXIDINE GLUCONATE 213 G/1000ML
1 SOLUTION TOPICAL
Refills: 0 | Status: DISCONTINUED | OUTPATIENT
Start: 2021-03-25 | End: 2021-03-28

## 2021-03-25 RX ORDER — SENNA PLUS 8.6 MG/1
2 TABLET ORAL AT BEDTIME
Refills: 0 | Status: DISCONTINUED | OUTPATIENT
Start: 2021-03-25 | End: 2021-03-29

## 2021-03-25 RX ORDER — ACETAMINOPHEN 500 MG
1000 TABLET ORAL ONCE
Refills: 0 | Status: COMPLETED | OUTPATIENT
Start: 2021-03-25 | End: 2021-03-25

## 2021-03-25 RX ORDER — ENOXAPARIN SODIUM 100 MG/ML
40 INJECTION SUBCUTANEOUS DAILY
Refills: 0 | Status: DISCONTINUED | OUTPATIENT
Start: 2021-03-25 | End: 2021-03-28

## 2021-03-25 RX ORDER — METOPROLOL TARTRATE 50 MG
5 TABLET ORAL ONCE
Refills: 0 | Status: COMPLETED | OUTPATIENT
Start: 2021-03-25 | End: 2021-03-25

## 2021-03-25 RX ORDER — METOPROLOL TARTRATE 50 MG
25 TABLET ORAL
Refills: 0 | Status: DISCONTINUED | OUTPATIENT
Start: 2021-03-25 | End: 2021-03-25

## 2021-03-25 RX ORDER — METOPROLOL TARTRATE 50 MG
50 TABLET ORAL EVERY 8 HOURS
Refills: 0 | Status: DISCONTINUED | OUTPATIENT
Start: 2021-03-25 | End: 2021-03-29

## 2021-03-25 RX ORDER — ALLOPURINOL 300 MG
100 TABLET ORAL AT BEDTIME
Refills: 0 | Status: DISCONTINUED | OUTPATIENT
Start: 2021-03-25 | End: 2021-03-29

## 2021-03-25 RX ORDER — HYDROMORPHONE HYDROCHLORIDE 2 MG/ML
1 INJECTION INTRAMUSCULAR; INTRAVENOUS; SUBCUTANEOUS ONCE
Refills: 0 | Status: DISCONTINUED | OUTPATIENT
Start: 2021-03-25 | End: 2021-03-25

## 2021-03-25 RX ORDER — INSULIN LISPRO 100/ML
VIAL (ML) SUBCUTANEOUS
Refills: 0 | Status: DISCONTINUED | OUTPATIENT
Start: 2021-03-25 | End: 2021-03-28

## 2021-03-25 RX ADMIN — HYDROMORPHONE HYDROCHLORIDE 1 MILLIGRAM(S): 2 INJECTION INTRAMUSCULAR; INTRAVENOUS; SUBCUTANEOUS at 00:00

## 2021-03-25 RX ADMIN — Medication 5 MILLIGRAM(S): at 10:54

## 2021-03-25 RX ADMIN — Medication 100 MILLIGRAM(S): at 17:29

## 2021-03-25 RX ADMIN — HYDROMORPHONE HYDROCHLORIDE 0.5 MILLIGRAM(S): 2 INJECTION INTRAMUSCULAR; INTRAVENOUS; SUBCUTANEOUS at 08:45

## 2021-03-25 RX ADMIN — Medication 10 MILLIGRAM(S): at 21:15

## 2021-03-25 RX ADMIN — Medication 25 MILLIGRAM(S): at 05:01

## 2021-03-25 RX ADMIN — HYDROMORPHONE HYDROCHLORIDE 0.5 MILLIGRAM(S): 2 INJECTION INTRAMUSCULAR; INTRAVENOUS; SUBCUTANEOUS at 08:27

## 2021-03-25 RX ADMIN — Medication 100 MILLIEQUIVALENT(S): at 00:44

## 2021-03-25 RX ADMIN — Medication 100 MILLIGRAM(S): at 00:37

## 2021-03-25 RX ADMIN — Medication 50 MILLIGRAM(S): at 21:15

## 2021-03-25 RX ADMIN — OXYCODONE HYDROCHLORIDE 10 MILLIGRAM(S): 5 TABLET ORAL at 21:23

## 2021-03-25 RX ADMIN — HYDROMORPHONE HYDROCHLORIDE 0.5 MILLIGRAM(S): 2 INJECTION INTRAMUSCULAR; INTRAVENOUS; SUBCUTANEOUS at 10:54

## 2021-03-25 RX ADMIN — OXYCODONE HYDROCHLORIDE 10 MILLIGRAM(S): 5 TABLET ORAL at 22:00

## 2021-03-25 RX ADMIN — Medication 1000 MILLIGRAM(S): at 03:15

## 2021-03-25 RX ADMIN — Medication 50 MILLIGRAM(S): at 13:09

## 2021-03-25 RX ADMIN — ATORVASTATIN CALCIUM 40 MILLIGRAM(S): 80 TABLET, FILM COATED ORAL at 21:15

## 2021-03-25 RX ADMIN — Medication 100 MILLIEQUIVALENT(S): at 00:41

## 2021-03-25 RX ADMIN — OXYCODONE HYDROCHLORIDE 10 MILLIGRAM(S): 5 TABLET ORAL at 14:57

## 2021-03-25 RX ADMIN — HYDROMORPHONE HYDROCHLORIDE 1 MILLIGRAM(S): 2 INJECTION INTRAMUSCULAR; INTRAVENOUS; SUBCUTANEOUS at 03:00

## 2021-03-25 RX ADMIN — HYDROMORPHONE HYDROCHLORIDE 0.5 MILLIGRAM(S): 2 INJECTION INTRAMUSCULAR; INTRAVENOUS; SUBCUTANEOUS at 00:15

## 2021-03-25 RX ADMIN — Medication 10 MILLIGRAM(S): at 05:01

## 2021-03-25 RX ADMIN — Medication 100 MILLIGRAM(S): at 08:14

## 2021-03-25 RX ADMIN — ENOXAPARIN SODIUM 40 MILLIGRAM(S): 100 INJECTION SUBCUTANEOUS at 11:47

## 2021-03-25 RX ADMIN — OXYCODONE HYDROCHLORIDE 10 MILLIGRAM(S): 5 TABLET ORAL at 15:30

## 2021-03-25 RX ADMIN — HYDROMORPHONE HYDROCHLORIDE 0.5 MILLIGRAM(S): 2 INJECTION INTRAMUSCULAR; INTRAVENOUS; SUBCUTANEOUS at 11:08

## 2021-03-25 RX ADMIN — Medication 100 MILLIGRAM(S): at 21:15

## 2021-03-25 RX ADMIN — Medication 400 MILLIGRAM(S): at 03:00

## 2021-03-25 RX ADMIN — Medication 81 MILLIGRAM(S): at 11:47

## 2021-03-25 RX ADMIN — SENNA PLUS 2 TABLET(S): 8.6 TABLET ORAL at 21:19

## 2021-03-25 RX ADMIN — Medication 10 MILLIGRAM(S): at 13:09

## 2021-03-25 RX ADMIN — HYDROMORPHONE HYDROCHLORIDE 0.5 MILLIGRAM(S): 2 INJECTION INTRAMUSCULAR; INTRAVENOUS; SUBCUTANEOUS at 00:00

## 2021-03-25 RX ADMIN — INSULIN HUMAN 3 UNIT(S)/HR: 100 INJECTION, SOLUTION SUBCUTANEOUS at 08:14

## 2021-03-25 RX ADMIN — POLYETHYLENE GLYCOL 3350 17 GRAM(S): 17 POWDER, FOR SOLUTION ORAL at 11:47

## 2021-03-25 NOTE — AIRWAY REMOVAL NOTE  ADULT & PEDS - ARTIFICAL AIRWAY REMOVAL COMMENTS
Written order for extubation verified. The patient was identified by full name and birth date compared to the identification band. Present during the procedure was Niharika Barnes RN.

## 2021-03-25 NOTE — DIETITIAN INITIAL EVALUATION ADULT. - PHYSCIAL ASSESSMENT
Skin: midsternal incision no known pressure injuries per RN documentation  148% IBW well nourished/obese

## 2021-03-25 NOTE — DIETITIAN INITIAL EVALUATION ADULT. - OTHER INFO
Patient reports good appetite and 100% PO intake of breakfast tray. Patient amenable to Mighty Shake to supplement intake for wound healing. Reports no nausea/vomiting; however, endorses some intestinal "gurgling." States last BM the 3/23, noted patient on bowel regimen. Denies difficulty chewing/swallowing.      Patient endorses weight gain x1 year 2/2 decreased physical activity. States weight of 235lbs 1 year ago, admitting weight of 281.6lbs indicates 45lbs weight gain.

## 2021-03-25 NOTE — PROGRESS NOTE ADULT - SUBJECTIVE AND OBJECTIVE BOX
Subjective: "Hello." Denies chest pain, SOB, palpitations, headache, dizziness, fever, chills and n/v/d.     Telemetry: SR 90s w/ frequent PVCs    Vital Signs Last 24 Hrs  T(C): 37.1 (21 @ 18:22), Max: 37.3 (21 @ 20:00)  T(F): 98.7 (21 @ 18:22), Max: 99.1 (21 @ 20:00)  HR: 90 (21 @ 18:22) (63 - 99)  BP: 140/99 (21 @ 18:22) (140/67 - 168/75)  RR: 18 (21 @ 18:22) (12 - 18)  SpO2: 95% (21 @ 18:22) (92% - 99%)              @ 07:01  -   @ 07:00  --------------------------------------------------------  IN: 2934.1 mL / OUT: 2515 mL / NET: 419.1 mL     @ 07:01  -   @ 18:49  --------------------------------------------------------  IN: 1370 mL / OUT: 1095 mL / NET: 275 mL      Daily Weight in k.4 (25 Mar 2021 00:00)      POCT Blood Glucose.: 139 mg/dL (25 Mar 2021 16:52)  POCT Blood Glucose.: 137 mg/dL (25 Mar 2021 14:07)  POCT Blood Glucose.: 120 mg/dL (25 Mar 2021 13:16)  POCT Blood Glucose.: 125 mg/dL (25 Mar 2021 11:45)  POCT Blood Glucose.: 136 mg/dL (25 Mar 2021 10:16)  POCT Blood Glucose.: 145 mg/dL (25 Mar 2021 09:13)  POCT Blood Glucose.: 113 mg/dL (25 Mar 2021 07:54)  POCT Blood Glucose.: 119 mg/dL (25 Mar 2021 06:45)  POCT Blood Glucose.: 128 mg/dL (25 Mar 2021 05:57)  POCT Blood Glucose.: 120 mg/dL (25 Mar 2021 04:46)  POCT Blood Glucose.: 119 mg/dL (25 Mar 2021 02:45)  POCT Blood Glucose.: 114 mg/dL (25 Mar 2021 02:05)  POCT Blood Glucose.: 107 mg/dL (25 Mar 2021 00:48)  POCT Blood Glucose.: 122 mg/dL (24 Mar 2021 21:00)  POCT Blood Glucose.: 136 mg/dL (24 Mar 2021 18:50)          Drains:     MS  [ x ] Drainage: 140cc overnight, 270cc in 24 hrs, 170cc today's shift         L Pleural  [ x ] Drainage: 5cc overnight, 20cc in 24 hrs, 5cc today's shift                      Pacing Wires     [ x ] Settings: connected, box off                                                             14.1   15.82 )-----------( 158      ( 25 Mar 2021 00:47 )             42.0     03-25    137  |  102  |  18  ----------------------------<  122<H>  4.6   |  22  |  0.77    Ca    8.8      25 Mar 2021 00:47  Phos  3.3     03-25  Mg     2.1     03-25    TPro  6.5  /  Alb  4.2  /  TBili  0.8  /  DBili  x   /  AST  55<H>  /  ALT  43  /  AlkPhos  35<L>  03-25    PT/INR - ( 25 Mar 2021 00:47 )   PT: 14.1 sec;   INR: 1.18 ratio       PTT - ( 25 Mar 2021 00:47 )  PTT:27.4 sec      PHYSICAL EXAM:    Neurology: alert and oriented x 3, nonfocal, no gross deficits    CV: (+) PW - connected, box off, RRR, (+)S1/S2, no murmur appreciated    Sternal Wound: MSI w/ opsite in place - CDI, Stable    Lungs: diminished at b/l bases, otherwise CTA b/l     Abdomen: soft, nontender, nondistended, (+) bowel sounds, (-) BM, (+) flatus     : voiding freely               Extremities: L SVG site w/ dressing in place - CDI, LLE ACE wrapped, trace edema b/l LE          allopurinol 100 milliGRAM(s) Oral at bedtime  aspirin enteric coated 81 milliGRAM(s) Oral daily  atorvastatin 40 milliGRAM(s) Oral at bedtime  cefuroxime IVPB 1500 milliGRAM(s) IV Intermittent every 8 hours  chlorhexidine 2% Cloths 1 Application(s) Topical <User Schedule>  enoxaparin Injectable 40 milliGRAM(s) SubCutaneous daily  insulin lispro (ADMELOG) corrective regimen sliding scale SubCutaneous Before meals and at bedtime  insulin regular Infusion 3 Unit(s)/Hr IV Continuous <Continuous>  metoclopramide Injectable 10 milliGRAM(s) IV Push every 8 hours  metoprolol tartrate 50 milliGRAM(s) Oral every 8 hours  oxyCODONE IR 5 milliGRAM(s) Oral every 6 hours PRN  oxyCODONE IR 10 milliGRAM(s) Oral every 6 hours PRN  pantoprazole Tablet 40 milliGRAM(s) Oral before breakfast  polyethylene glycol 3350 17 Gram(s) Oral daily  sodium chloride 0.9%. 1000 milliLiter(s) IV Continuous <Continuous>       Physical Therapy Rec:   Home  [ x ]   Home w/ PT  [  ]  Rehab  [  ]    Discussed with Cardiothoracic Team at AM rounds. Subjective: "Hello." Denies chest pain, SOB, palpitations, headache, dizziness, fever, chills and n/v/d.     Telemetry: SR 90s w/ frequent PVCs    Vital Signs Last 24 Hrs  T(C): 37.1 (21 @ 18:22), Max: 37.3 (21 @ 20:00)  T(F): 98.7 (21 @ 18:22), Max: 99.1 (21 @ 20:00)  HR: 90 (21 @ 18:22) (63 - 99)  BP: 140/99 (21 @ 18:22) (140/67 - 168/75)  RR: 18 (21 @ 18:22) (12 - 18)  SpO2: 95% (21 @ 18:22) (92% - 99%)              @ 07:01  -   @ 07:00  --------------------------------------------------------  IN: 2934.1 mL / OUT: 2515 mL / NET: 419.1 mL     @ 07:01  -   @ 18:49  --------------------------------------------------------  IN: 1370 mL / OUT: 1095 mL / NET: 275 mL      Daily Weight in k.4 (25 Mar 2021 00:00)      POCT Blood Glucose.: 139 mg/dL (25 Mar 2021 16:52)  POCT Blood Glucose.: 137 mg/dL (25 Mar 2021 14:07)  POCT Blood Glucose.: 120 mg/dL (25 Mar 2021 13:16)  POCT Blood Glucose.: 125 mg/dL (25 Mar 2021 11:45)  POCT Blood Glucose.: 136 mg/dL (25 Mar 2021 10:16)  POCT Blood Glucose.: 145 mg/dL (25 Mar 2021 09:13)  POCT Blood Glucose.: 113 mg/dL (25 Mar 2021 07:54)  POCT Blood Glucose.: 119 mg/dL (25 Mar 2021 06:45)  POCT Blood Glucose.: 128 mg/dL (25 Mar 2021 05:57)  POCT Blood Glucose.: 120 mg/dL (25 Mar 2021 04:46)  POCT Blood Glucose.: 119 mg/dL (25 Mar 2021 02:45)  POCT Blood Glucose.: 114 mg/dL (25 Mar 2021 02:05)  POCT Blood Glucose.: 107 mg/dL (25 Mar 2021 00:48)  POCT Blood Glucose.: 122 mg/dL (24 Mar 2021 21:00)  POCT Blood Glucose.: 136 mg/dL (24 Mar 2021 18:50)          Drains:     MS  [ x ] Drainage: 140cc overnight, 270cc in 24 hrs, 170cc today's shift         L Pleural  [ x ] Drainage: 5cc overnight, 20cc in 24 hrs, 5cc today's shift                      Pacing Wires     [ x ] Settings: connected, box off                                                             14.1   15.82 )-----------( 158      ( 25 Mar 2021 00:47 )             42.0     03-25    137  |  102  |  18  ----------------------------<  122<H>  4.6   |  22  |  0.77    Ca    8.8      25 Mar 2021 00:47  Phos  3.3     03-25  Mg     2.1     03-25    TPro  6.5  /  Alb  4.2  /  TBili  0.8  /  DBili  x   /  AST  55<H>  /  ALT  43  /  AlkPhos  35<L>  03-25    PT/INR - ( 25 Mar 2021 00:47 )   PT: 14.1 sec;   INR: 1.18 ratio       PTT - ( 25 Mar 2021 00:47 )  PTT:27.4 sec      PHYSICAL EXAM:    Neurology: alert and oriented x 3, nonfocal, no gross deficits    CV: (+) PW - connected, box off, RRR, (+)S1/S2, no murmur appreciated    Sternal Wound: MSI w/ opsite in place - CDI, Stable    Lungs: (+) med and L pleural CT in place - no AL noted, diminished at b/l bases, otherwise CTA b/l     Abdomen: soft, nontender, nondistended, (+) bowel sounds, (-) BM, (+) flatus     : voiding freely               Extremities: L SVG site w/ dressing in place - CDI, LLE ACE wrapped, trace edema b/l LE          allopurinol 100 milliGRAM(s) Oral at bedtime  aspirin enteric coated 81 milliGRAM(s) Oral daily  atorvastatin 40 milliGRAM(s) Oral at bedtime  cefuroxime IVPB 1500 milliGRAM(s) IV Intermittent every 8 hours  chlorhexidine 2% Cloths 1 Application(s) Topical <User Schedule>  enoxaparin Injectable 40 milliGRAM(s) SubCutaneous daily  insulin lispro (ADMELOG) corrective regimen sliding scale SubCutaneous Before meals and at bedtime  insulin regular Infusion 3 Unit(s)/Hr IV Continuous <Continuous>  metoclopramide Injectable 10 milliGRAM(s) IV Push every 8 hours  metoprolol tartrate 50 milliGRAM(s) Oral every 8 hours  oxyCODONE IR 5 milliGRAM(s) Oral every 6 hours PRN  oxyCODONE IR 10 milliGRAM(s) Oral every 6 hours PRN  pantoprazole Tablet 40 milliGRAM(s) Oral before breakfast  polyethylene glycol 3350 17 Gram(s) Oral daily  sodium chloride 0.9%. 1000 milliLiter(s) IV Continuous <Continuous>       Physical Therapy Rec:   Home  [ x ]   Home w/ PT  [  ]  Rehab  [  ]    Discussed with Cardiothoracic Team at AM rounds.

## 2021-03-25 NOTE — PROGRESS NOTE ADULT - SUBJECTIVE AND OBJECTIVE BOX
STANISLAV GARAY  MRN-[id number]  Patient is a 61y old  Male who presents with a chief complaint of cardiac surgery (24 Mar 2021 20:29)    HPI:  60 yo male with a pmh/o gout, obesity, retinal tear, inguinal hernia, a.flutter s/p ablation, MI, CAD s/p PCI x 4 stents, HTN, HLD, who presented to ED due to chest pain which has now been present for several hours which is centralized, pressure like in nature, non radiating, not quantifiable on scale, no a/a factors and hypertensive to 160/100. Admitted for c/f NSTEMI. Managed with heparin drip, ASA, statin, BB. Trops downtrended, underwent cath on 3/22/21, showing 3 vessel disease. During post procedure recovery, Pt reported Lt vision changes and B/L LE tightness. Pt was assessed with Dr. Deleon, full strength on B/L LE and B/L UE with normal motor and sensory function on quick exam, no focal neuro deficit noted,  Eye vision changes improved (almost back to normal) after IV hydration.  He was transferred to [facility] for surgical eval with Dr. Thomason. Currently denies any active chest pain or pressure. Denies diaphoresis, cough, fever, chills, n/v/d, constipation, abdominal pain, HA, paresthesias, leg swelling, vision changes.    Dental implant 3/16  Received Pfizer COVID vaccine #1 (22 Mar 2021 22:25)      Surgery/Hospital Course:  3/16 Dental implant, Pfizer vaccine  3/24 CABG x4    Today:  Patient was extubated overnight, receiving supplemental O2 via nasal cannula, will continue to monitor SpO2, RR, and ABGs.     REVIEW OF SYSTEMS:  Gen: No fever  EYES/ENT: No visual changes;  No vertigo or throat pain   NECK: No pain   RES:  No shortness of breath or Cough  CARD: No chest pain   GI: No abdominal pain  : No dysuria  NEURO: No weakness  SKIN: No itching, rashes     ICU Vital Signs Last 24 Hrs  T(C): 36.7 (25 Mar 2021 04:00), Max: 37.3 (24 Mar 2021 20:00)  T(F): 98.1 (25 Mar 2021 04:00), Max: 99.1 (24 Mar 2021 20:00)  HR: 78 (25 Mar 2021 07:00) (59 - 90)  BP: --  BP(mean): --  ABP: 135/66 (25 Mar 2021 07:00) (89/55 - 143/74)  ABP(mean): 86 (25 Mar 2021 07:00) (66 - 98)  RR: 16 (25 Mar 2021 06:00) (12 - 21)  SpO2: 97% (25 Mar 2021 07:00) (93% - 99%)      Physical Exam:  Gen:  Awake, alert   CNS: non focal 	  Neck: no JVD  RES : clear , no wheezing              CVS: Regular  rhythm. Normal S1/S2  Chest: + chest tubes  Abd: Soft, non-distended. Bowel sounds present.  Skin: No rash.  Ext:  no edema    ============================I/O===========================   I&O's Detail    24 Mar 2021 07:01  -  25 Mar 2021 07:00  --------------------------------------------------------  IN:    Albumin 5%  - 250 mL: 1250 mL    Dexmedetomidine: 140.6 mL    Insulin: 53.5 mL    IV PiggyBack: 300 mL    Lactated Ringers Bolus: 1000 mL    sodium chloride 0.9%: 190 mL  Total IN: 2934.1 mL    OUT:    Chest Tube (mL): 270 mL    Chest Tube (mL): 20 mL    Indwelling Catheter - Urethral (mL): 2225 mL  Total OUT: 2515 mL    Total NET: 419.1 mL      ============================ LABS =========================                        14.1   15.82 )-----------( 158      ( 25 Mar 2021 00:47 )             42.0     03-25    137  |  102  |  18  ----------------------------<  122<H>  4.6   |  22  |  0.77    Ca    8.8      25 Mar 2021 00:47  Phos  3.3     03-25  Mg     2.1     03-25    TPro  6.5  /  Alb  4.2  /  TBili  0.8  /  DBili  x   /  AST  55<H>  /  ALT  43  /  AlkPhos  35<L>  03-25    LIVER FUNCTIONS - ( 25 Mar 2021 00:47 )  Alb: 4.2 g/dL / Pro: 6.5 g/dL / ALK PHOS: 35 U/L / ALT: 43 U/L / AST: 55 U/L / GGT: x           PT/INR - ( 25 Mar 2021 00:47 )   PT: 14.1 sec;   INR: 1.18 ratio         PTT - ( 25 Mar 2021 00:47 )  PTT:27.4 sec  ABG - ( 25 Mar 2021 04:13 )  pH, Arterial: 7.39  pH, Blood: x     /  pCO2: 43    /  pO2: 75    / HCO3: 26    / Base Excess: 1.1   /  SaO2: 97        ======================Micro/Rad/Cardio=================  Telemetry: Reviewed   CXR: Reviewed  Echo:Reviewed  ======================================================  PAST MEDICAL & SURGICAL HISTORY:  Diverticulitis    Hernia  inguinal hernia    Sleep apnea, unspecified type    Atrial fibrillation    Vitamin D deficiency    Osteoarthritis    Class 2 obesity with body mass index (BMI) of 38.0 to 38.9 in adult    Hyperlipidemia    HTN (hypertension)    Atrial flutter by electrocardiogram    At risk for sleep apnea    Stented coronary artery  X4- last 2010    Left knee pain    Gout    Dyslipidemia    CAD (coronary artery disease)    Myocardial infarction  2005, 2008    S/P meniscectomy    H/O cardiac radiofrequency ablation    History of appendectomy  1991    H/O eye surgery  right eye retinal tear, 2017    H/O right inguinal hernia repair  age 13    Post PTCA  x 4 stents in place- last 2010      ====================ASSESSMENT ==============  S/p CABG x4 on 3/24  Fever  Leukocytosis   Stress Hyperglycemia  Hypovolemia  Lactic acidosis    Plan:  ====================== NEUROLOGY=====================  Off sedation, continue to assess and monitor neurological status as per protocol  Oxycodone as needed for pain management    dexMEDEtomidine Infusion 0.3 MICROgram(s)/kG/Hr (9.6 mL/Hr) IV Continuous <Continuous>  oxyCODONE    IR 5 milliGRAM(s) Oral every 6 hours PRN Moderate Pain (4 - 6)  oxyCODONE    IR 10 milliGRAM(s) Oral every 6 hours PRN Severe Pain (7 - 10)    ==================== RESPIRATORY======================  Extubated overnight, receiving supplemental O2 via 36%4L/min nasal canula  Encourage incentive spirometry, continue pulse ox monitoring, follow ABGs    ====================CARDIOVASCULAR==================  S/p CABG x4 on 3/24  Blood pressure support with Lopressor  ASA for graft patency  Temporary pacing wires in place  Continue hemodynamics monitoring, trend perfusion indices    aspirin enteric coated 81 milliGRAM(s) Oral daily  metoprolol tartrate 25 milliGRAM(s) Oral two times a day    ===================HEMATOLOGIC/ONC ===================  Continue closely monitoring levels of hemoglobin, hematocrit and platelets    ===================== RENAL =========================  Continue to monitor I/Os, BUN/Cr, and urine output.  Replenish electrolytes as needed, keep K > 4 and Mg > 2    ==================== GASTROINTESTINAL===================  Tolerating regular PO diet  Protonix for stress ulcer prophylaxis  Bowel regimen with Miralax  Reglan for gastric motility    metoclopramide Injectable 10 milliGRAM(s) IV Push every 8 hours  pantoprazole  Injectable 40 milliGRAM(s) IV Push daily  polyethylene glycol 3350 17 Gram(s) Oral daily  sodium chloride 0.9%. 1000 milliLiter(s) (10 mL/Hr) IV Continuous <Continuous>    =======================    ENDOCRINE  =====================  Hyperglycemia, requiring IV Insulin gtt for glycemic control, continue titrating as per protocol along with hourly glucose checks    insulin regular Infusion 3 Unit(s)/Hr (3 mL/Hr) IV Continuous <Continuous>    ========================INFECTIOUS DISEASE================  Continue Cefuroxime for perioperative antibiotic coverage  Temp 98.1F, WBC elevated and downtrending 17.10 -> 15.82  Monitor for fevers and for leukocytosis    cefuroxime  IVPB 1500 milliGRAM(s) IV Intermittent every 8 hours      Patient requires continuous monitoring with bedside rhythm monitoring, pulse ox monitoring, and intermittent blood gas analysis. Care plan discussed with ICU care team. Patient remained critical and at risk for life threatening decompensation.     By signing my name below, I, Jeremiah Elam, attest that this documentation has been prepared under the direction and in the presence of Mary Ellen Sandoval NP  Electronically signed: Mason Zaldivar, 03-25-21 @ 07:31    I, Mary Ellen Sandoval NP, personally performed the services described in this documentation. All medical record entries made by the mason were at my direction and in my presence. I have reviewed the chart and agree that the record reflects my personal performance and is accurate and complete  Electronically signed: Mary Ellen Sandoval NP       STANISLAV GARAY  MRN-[id number]  Patient is a 61y old  Male who presents with a chief complaint of cardiac surgery (24 Mar 2021 20:29)    HPI:  60 yo male with a pmh/o gout, obesity, retinal tear, inguinal hernia, a.flutter s/p ablation, MI, CAD s/p PCI x 4 stents, HTN, HLD, who presented to ED due to chest pain which has now been present for several hours which is centralized, pressure like in nature, non radiating, not quantifiable on scale, no a/a factors and hypertensive to 160/100. Admitted for c/f NSTEMI. Managed with heparin drip, ASA, statin, BB. Trops downtrended, underwent cath on 3/22/21, showing 3 vessel disease. During post procedure recovery, Pt reported Lt vision changes and B/L LE tightness. Pt was assessed with Dr. Deleon, full strength on B/L LE and B/L UE with normal motor and sensory function on quick exam, no focal neuro deficit noted,  Eye vision changes improved (almost back to normal) after IV hydration.  He was transferred to [facility] for surgical eval with Dr. Thomason. Currently denies any active chest pain or pressure. Denies diaphoresis, cough, fever, chills, n/v/d, constipation, abdominal pain, HA, paresthesias, leg swelling, vision changes.    Dental implant 3/16  Received Pfizer COVID vaccine #1 (22 Mar 2021 22:25)      Surgery/Hospital Course:  3/24 CABG x4, extubated    24 hour events:   ·	Volume resusciated for lactic acidosis, peaked at 3.7, cleared to 1.3  ·	Patient was extubated overnight, receiving supplemental O2 via nasal cannula, will continue to monitor SpO2, RR, and ABGs.   ·	Started on BB  ·	Plan to ambulate and transfer to SDU today    REVIEW OF SYSTEMS:  Gen: No fever  EYES/ENT: No visual changes;  No vertigo or throat pain   NECK: No pain   RES:  No shortness of breath or Cough  CARD: No chest pain   GI: No abdominal pain  : No dysuria  NEURO: No weakness  SKIN: No itching, rashes     ICU Vital Signs Last 24 Hrs  T(C): 36.7 (25 Mar 2021 04:00), Max: 37.3 (24 Mar 2021 20:00)  T(F): 98.1 (25 Mar 2021 04:00), Max: 99.1 (24 Mar 2021 20:00)  HR: 78 (25 Mar 2021 07:00) (59 - 90)  BP: --  BP(mean): --  ABP: 135/66 (25 Mar 2021 07:00) (89/55 - 143/74)  ABP(mean): 86 (25 Mar 2021 07:00) (66 - 98)  RR: 16 (25 Mar 2021 06:00) (12 - 21)  SpO2: 97% (25 Mar 2021 07:00) (93% - 99%)      Physical Exam:  Gen:  Awake, alert   CNS: non focal 	  Neck: no JVD  RES : clear , no wheezing              CVS: Regular  rhythm. Normal S1/S2. Midsternotomy with OPSITE, c/d/i. V wires to EPM   Chest: + chest tubes: MS and left pleural with serosang drainage.   Abd: Soft, non-distended. Bowel sounds present.  Skin: No rash.  Ext:  no edema    ============================I/O===========================   I&O's Detail    24 Mar 2021 07:01  -  25 Mar 2021 07:00  --------------------------------------------------------  IN:    Albumin 5%  - 250 mL: 1250 mL    Dexmedetomidine: 140.6 mL    Insulin: 53.5 mL    IV PiggyBack: 300 mL    Lactated Ringers Bolus: 1000 mL    sodium chloride 0.9%: 190 mL  Total IN: 2934.1 mL    OUT:    Chest Tube (mL): 270 mL    Chest Tube (mL): 20 mL    Indwelling Catheter - Urethral (mL): 2225 mL  Total OUT: 2515 mL    Total NET: 419.1 mL      ============================ LABS =========================                        14.1   15.82 )-----------( 158      ( 25 Mar 2021 00:47 )             42.0     03-25    137  |  102  |  18  ----------------------------<  122<H>  4.6   |  22  |  0.77    Ca    8.8      25 Mar 2021 00:47  Phos  3.3     03-25  Mg     2.1     03-25    TPro  6.5  /  Alb  4.2  /  TBili  0.8  /  DBili  x   /  AST  55<H>  /  ALT  43  /  AlkPhos  35<L>  03-25    LIVER FUNCTIONS - ( 25 Mar 2021 00:47 )  Alb: 4.2 g/dL / Pro: 6.5 g/dL / ALK PHOS: 35 U/L / ALT: 43 U/L / AST: 55 U/L / GGT: x           PT/INR - ( 25 Mar 2021 00:47 )   PT: 14.1 sec;   INR: 1.18 ratio         PTT - ( 25 Mar 2021 00:47 )  PTT:27.4 sec  ABG - ( 25 Mar 2021 04:13 )  pH, Arterial: 7.39  pH, Blood: x     /  pCO2: 43    /  pO2: 75    / HCO3: 26    / Base Excess: 1.1   /  SaO2: 97        ======================Micro/Rad/Cardio=================  Telemetry: Reviewed   CXR: Reviewed  Echo:Reviewed  ======================================================  PAST MEDICAL & SURGICAL HISTORY:  Diverticulitis    Hernia  inguinal hernia    Sleep apnea, unspecified type    Atrial fibrillation    Vitamin D deficiency    Osteoarthritis    Class 2 obesity with body mass index (BMI) of 38.0 to 38.9 in adult    Hyperlipidemia    HTN (hypertension)    Atrial flutter by electrocardiogram    At risk for sleep apnea    Stented coronary artery  X4- last 2010    Left knee pain    Gout    Dyslipidemia    CAD (coronary artery disease)    Myocardial infarction  2005, 2008    S/P meniscectomy    H/O cardiac radiofrequency ablation    History of appendectomy  1991    H/O eye surgery  right eye retinal tear, 2017    H/O right inguinal hernia repair  age 13    Post PTCA  x 4 stents in place- last 2010      ====================ASSESSMENT ==============  S/p CABG x4 on 3/24  Fever  Leukocytosis   Stress Hyperglycemia  Hypovolemia  Lactic acidosis    Plan:  ====================== NEUROLOGY=====================  Off sedation, continue to assess and monitor neurological status as per protocol  Oxycodone as needed for pain management    oxyCODONE    IR 5 milliGRAM(s) Oral every 6 hours PRN Moderate Pain (4 - 6)  oxyCODONE    IR 10 milliGRAM(s) Oral every 6 hours PRN Severe Pain (7 - 10)    ==================== RESPIRATORY======================  Extubated overnight, receiving supplemental O2 via 36%4L/min nasal canula  Encourage incentive spirometry, continue pulse ox monitoring, follow ABGs  Will DC Fitz    ====================CARDIOVASCULAR==================  S/p CABG x4 on 3/24, ASA and lipitor for graft patency  Afib prophylaxis and blood pressure support with Lopressor, increase as toelrated  Temporary pacing wires in place  Continue hemodynamics monitoring, trend perfusion indices    aspirin enteric coated 81 milliGRAM(s) Oral daily  metoprolol tartrate 25 milliGRAM(s) Oral two times a day    ===================HEMATOLOGIC/ONC ===================  Continue closely monitoring levels of hemoglobin, hematocrit and platelets    ===================== RENAL =========================  Continue to monitor I/Os, BUN/Cr, and urine output.  Replenish electrolytes as needed, keep K > 4 and Mg > 2  Will DC price for trial void    ==================== GASTROINTESTINAL===================  Tolerating regular PO diet  Protonix for stress ulcer prophylaxis  Bowel regimen with Miralax  Reglan for gastric motility    metoclopramide Injectable 10 milliGRAM(s) IV Push every 8 hours  pantoprazole  Injectable 40 milliGRAM(s) IV Push daily  polyethylene glycol 3350 17 Gram(s) Oral daily  sodium chloride 0.9%. 1000 milliLiter(s) (10 mL/Hr) IV Continuous <Continuous>    =======================    ENDOCRINE  =====================  Hyperglycemia, requiring IV Insulin gtt for glycemic control, continue titrating as per protocol along with hourly glucose checks  Once patient tolerating diet will transition to ACHS sliding scale    insulin regular Infusion 3 Unit(s)/Hr (3 mL/Hr) IV Continuous <Continuous>    ========================INFECTIOUS DISEASE================  Continue Cefuroxime for perioperative antibiotic coverage  Temp 98.1F, WBC elevated and downtrending 17.10 -> 15.82  Monitor for fevers and for leukocytosis    cefuroxime  IVPB 1500 milliGRAM(s) IV Intermittent every 8 hours      Patient requires continuous monitoring with bedside rhythm monitoring, pulse ox monitoring, and intermittent blood gas analysis. Care plan discussed with ICU care team. Patient remained critical and at risk for life threatening decompensation.     By signing my name below, Jeremiah CHAVEZ, attest that this documentation has been prepared under the direction and in the presence of Mary Ellen Sandoval NP  Electronically signed: Regla Zaldivar, 03-25-21 @ 07:31    SCOTT, Mary Ellen Sandoval NP, personally performed the services described in this documentation. All medical record entries made by the scribe were at my direction and in my presence. I have reviewed the chart and agree that the record reflects my personal performance and is accurate and complete  Electronically signed: Mary Ellen Sandoval NP

## 2021-03-25 NOTE — DIETITIAN INITIAL EVALUATION ADULT. - ORAL INTAKE PTA/DIET HISTORY
Patient reports good PO intake PTA and no therapeutic restrictions. Per chart, supplements with fish oil, zinc, centrum multivitamin, CoQ-10, vitamin D3. Confirms NKFA.

## 2021-03-25 NOTE — DIETITIAN INITIAL EVALUATION ADULT. - ENTER FROM (G/KG)
Cimzia Pregnancy And Lactation Text: This medication crosses the placenta but can be considered safe in certain situations. Cimzia may be excreted in breast milk. 1.2

## 2021-03-25 NOTE — DIETITIAN INITIAL EVALUATION ADULT. - CHIEF COMPLAINT
60 yo male with a pmh/o gout, obesity, retinal tear, inguinal hernia, a.flutter s/p ablation, MI, CAD s/p PCI x 4 stents, HTN, HLD. Admitted to OSH for c/f NSTEMI. Underwent cath on 3/22/21, showing 3 vessel disease. Transfer to [facility] for surgical eval. S/p CABG 3/24, extubated overnight.

## 2021-03-25 NOTE — DIETITIAN INITIAL EVALUATION ADULT. - PERTINENT LABORATORY DATA
Blood gas arterial lactate 2.6, serum glucose 122, Alk Phos 35, ALT 55  POCT Blood Glucose.: 119 mg/dL (25 Mar 2021 06:45)  POCT Blood Glucose.: 128 mg/dL (25 Mar 2021 05:57)  POCT Blood Glucose.: 120 mg/dL (25 Mar 2021 04:46)  POCT Blood Glucose.: 119 mg/dL (25 Mar 2021 02:45)  POCT Bloo Glucose.: 114 mg/dL (25 Mar 2021 02:05)  POCT Blood Glucose.: 107 mg/dL (25 Mar 2021 00:48)  POCT Blood Glucose.: 122 mg/dL (24 Mar 2021 21:00)  POCT Blood Glucose.: 136 mg/dL (24 Mar 2021 18:50)  POCT Blood Glucose.: 135 mg/dL (24 Mar 2021 18:06)  POCT Blood Glucose.: 152 mg/dL (24 Mar 2021 17:06)  POCT Blood Glucose.: 149 mg/dL (24 Mar 2021 16:04)

## 2021-03-25 NOTE — DIETITIAN INITIAL EVALUATION ADULT. - ADD RECOMMEND
1) Add DASH restriction to current diet 2) Monitor PO intake, weights, BM, skin integrity, labs 3) Follow up with heart healthy diet education when appropriate

## 2021-03-25 NOTE — ED ADULT NURSE NOTE - NSHOSCREENINGQ1_ED_ALL_ED
No Myalgia Monitoring: I explained this is common when taking isotretinoin. If this worsens they will contact us.

## 2021-03-25 NOTE — PROGRESS NOTE ADULT - PROBLEM SELECTOR PLAN 1
Continue post-op care   Continue ASA 81 mg QD, Lopressor 50 mg q8h & Atorvastatin 40 mg qhs   Maintain med and L pleural CTs for now   Maintain RIJ and PW for now  Increase activity as tolerated, ambulate 4x daily and w/ PT   Chest PT, encourage incentive spirometry   Wound care, pain control

## 2021-03-25 NOTE — PROGRESS NOTE ADULT - ASSESSMENT
62 y/o M w/ PMHx of gout, obesity, retinal tear, inguinal hernia, aflutter s/p ablation, MI, CAD s/p PCI x 4 stents, HTN, HLD and recent dental implant 3/16/21 who presented to the ED c/o central, non-radiating, pressure-like chest pain x several hours and HTN to 160/100. Admitted for c/f NSTEMI. Managed with heparin drip, ASA, statin, BB. Trops downtrended, underwent cath on 3/22/21, showing 3VD. During post procedure recovery, pt reported Lt vision changes and B/L LE tightness. Pt was assessed with Dr. Deleon, full strength on B/L LE and B/L UE with normal motor and sensory function on quick exam, no focal neuro deficit noted,  Eye vision changes improved (almost back to normal) after IV hydration.  He was transferred to [facility] for surgical eval with Dr. Thomason. Of note, pt has already received 1 dose of Pfizer COVID vaccine.     3/23 ENT consulted for R ear pain - recommend Flonase and outpt f/u for eustachian tube dysfunction.  3/24 s/p C4L.  3/25 Extubated at 0004   62 y/o M w/ PMHx of gout, obesity, retinal tear, inguinal hernia, aflutter s/p ablation, MI, CAD s/p PCI x 4 stents, HTN, HLD and recent dental implant 3/16/21 who presented to the ED c/o central, non-radiating, pressure-like chest pain x several hours and HTN to 160/100. Admitted for c/f NSTEMI. Managed with heparin drip, ASA, statin, BB. Trops downtrended, underwent cath on 3/22/21, showing 3VD. During post procedure recovery, pt reported Lt vision changes and B/L LE tightness. Pt was assessed with Dr. Deleon, full strength on B/L LE and B/L UE with normal motor and sensory function on quick exam, no focal neuro deficit noted,  Eye vision changes improved (almost back to normal) after IV hydration.  He was transferred to [facility] for surgical eval with Dr. Thomason. Of note, pt has already received 1 dose of Pfizer COVID vaccine.     3/23 ENT consulted for R ear pain - recommend Flonase and outpt f/u for eustachian tube dysfunction.  3/24 s/p C4L. No products intra-op, no pressor requirements.   3/25 Extubated at 0004. Insulin gtt weaned to off this afternoon. Lopressor 50 mg q8h initiated, will uptitrate as tolerated. Maintain med and L pleural chest tubes for now as per Dr. Thomason. Received pt to SDU - VSS, NAD. Continue current medication regimen for now.

## 2021-03-26 LAB
ALBUMIN SERPL ELPH-MCNC: 4.3 G/DL — SIGNIFICANT CHANGE UP (ref 3.3–5)
ALP SERPL-CCNC: 41 U/L — SIGNIFICANT CHANGE UP (ref 40–120)
ALT FLD-CCNC: 38 U/L — SIGNIFICANT CHANGE UP (ref 10–45)
ANION GAP SERPL CALC-SCNC: 13 MMOL/L — SIGNIFICANT CHANGE UP (ref 5–17)
AST SERPL-CCNC: 48 U/L — HIGH (ref 10–40)
BILIRUB SERPL-MCNC: 0.6 MG/DL — SIGNIFICANT CHANGE UP (ref 0.2–1.2)
BUN SERPL-MCNC: 20 MG/DL — SIGNIFICANT CHANGE UP (ref 7–23)
CALCIUM SERPL-MCNC: 9.2 MG/DL — SIGNIFICANT CHANGE UP (ref 8.4–10.5)
CHLORIDE SERPL-SCNC: 98 MMOL/L — SIGNIFICANT CHANGE UP (ref 96–108)
CO2 SERPL-SCNC: 24 MMOL/L — SIGNIFICANT CHANGE UP (ref 22–31)
CREAT SERPL-MCNC: 0.88 MG/DL — SIGNIFICANT CHANGE UP (ref 0.5–1.3)
GLUCOSE BLDC GLUCOMTR-MCNC: 113 MG/DL — HIGH (ref 70–99)
GLUCOSE BLDC GLUCOMTR-MCNC: 114 MG/DL — HIGH (ref 70–99)
GLUCOSE BLDC GLUCOMTR-MCNC: 114 MG/DL — HIGH (ref 70–99)
GLUCOSE BLDC GLUCOMTR-MCNC: 125 MG/DL — HIGH (ref 70–99)
GLUCOSE SERPL-MCNC: 126 MG/DL — HIGH (ref 70–99)
MAGNESIUM SERPL-MCNC: 1.8 MG/DL — SIGNIFICANT CHANGE UP (ref 1.6–2.6)
POTASSIUM SERPL-MCNC: 4.4 MMOL/L — SIGNIFICANT CHANGE UP (ref 3.5–5.3)
POTASSIUM SERPL-SCNC: 4.4 MMOL/L — SIGNIFICANT CHANGE UP (ref 3.5–5.3)
PROT SERPL-MCNC: 7 G/DL — SIGNIFICANT CHANGE UP (ref 6–8.3)
SODIUM SERPL-SCNC: 135 MMOL/L — SIGNIFICANT CHANGE UP (ref 135–145)

## 2021-03-26 PROCEDURE — 71045 X-RAY EXAM CHEST 1 VIEW: CPT | Mod: 26,77

## 2021-03-26 PROCEDURE — 93010 ELECTROCARDIOGRAM REPORT: CPT

## 2021-03-26 PROCEDURE — 71045 X-RAY EXAM CHEST 1 VIEW: CPT | Mod: 26

## 2021-03-26 RX ORDER — HYDROMORPHONE HYDROCHLORIDE 2 MG/ML
4 INJECTION INTRAMUSCULAR; INTRAVENOUS; SUBCUTANEOUS EVERY 4 HOURS
Refills: 0 | Status: DISCONTINUED | OUTPATIENT
Start: 2021-03-26 | End: 2021-03-29

## 2021-03-26 RX ORDER — AMIODARONE HYDROCHLORIDE 400 MG/1
200 TABLET ORAL DAILY
Refills: 0 | Status: CANCELLED | OUTPATIENT
Start: 2021-03-30 | End: 2021-03-29

## 2021-03-26 RX ORDER — HYDROMORPHONE HYDROCHLORIDE 2 MG/ML
2 INJECTION INTRAMUSCULAR; INTRAVENOUS; SUBCUTANEOUS
Refills: 0 | Status: DISCONTINUED | OUTPATIENT
Start: 2021-03-26 | End: 2021-03-29

## 2021-03-26 RX ORDER — METOPROLOL TARTRATE 50 MG
2.5 TABLET ORAL ONCE
Refills: 0 | Status: COMPLETED | OUTPATIENT
Start: 2021-03-26 | End: 2021-03-26

## 2021-03-26 RX ORDER — AMIODARONE HYDROCHLORIDE 400 MG/1
400 TABLET ORAL EVERY 8 HOURS
Refills: 0 | Status: DISCONTINUED | OUTPATIENT
Start: 2021-03-26 | End: 2021-03-29

## 2021-03-26 RX ORDER — ACETAMINOPHEN 500 MG
1000 TABLET ORAL ONCE
Refills: 0 | Status: COMPLETED | OUTPATIENT
Start: 2021-03-26 | End: 2021-03-26

## 2021-03-26 RX ORDER — MAGNESIUM SULFATE 500 MG/ML
2 VIAL (ML) INJECTION ONCE
Refills: 0 | Status: COMPLETED | OUTPATIENT
Start: 2021-03-26 | End: 2021-03-26

## 2021-03-26 RX ADMIN — ATORVASTATIN CALCIUM 40 MILLIGRAM(S): 80 TABLET, FILM COATED ORAL at 22:53

## 2021-03-26 RX ADMIN — Medication 50 MILLIGRAM(S): at 05:27

## 2021-03-26 RX ADMIN — Medication 1 TABLET(S): at 14:14

## 2021-03-26 RX ADMIN — ENOXAPARIN SODIUM 40 MILLIGRAM(S): 100 INJECTION SUBCUTANEOUS at 13:05

## 2021-03-26 RX ADMIN — Medication 100 MILLIGRAM(S): at 01:19

## 2021-03-26 RX ADMIN — HYDROMORPHONE HYDROCHLORIDE 4 MILLIGRAM(S): 2 INJECTION INTRAMUSCULAR; INTRAVENOUS; SUBCUTANEOUS at 22:59

## 2021-03-26 RX ADMIN — AMIODARONE HYDROCHLORIDE 400 MILLIGRAM(S): 400 TABLET ORAL at 22:53

## 2021-03-26 RX ADMIN — Medication 50 MILLIGRAM(S): at 14:13

## 2021-03-26 RX ADMIN — Medication 2.5 MILLIGRAM(S): at 17:28

## 2021-03-26 RX ADMIN — Medication 1 TABLET(S): at 22:52

## 2021-03-26 RX ADMIN — HYDROMORPHONE HYDROCHLORIDE 2 MILLIGRAM(S): 2 INJECTION INTRAMUSCULAR; INTRAVENOUS; SUBCUTANEOUS at 18:30

## 2021-03-26 RX ADMIN — Medication 100 MILLIGRAM(S): at 22:53

## 2021-03-26 RX ADMIN — HYDROMORPHONE HYDROCHLORIDE 2 MILLIGRAM(S): 2 INJECTION INTRAMUSCULAR; INTRAVENOUS; SUBCUTANEOUS at 18:09

## 2021-03-26 RX ADMIN — CHLORHEXIDINE GLUCONATE 1 APPLICATION(S): 213 SOLUTION TOPICAL at 05:24

## 2021-03-26 RX ADMIN — HYDROMORPHONE HYDROCHLORIDE 4 MILLIGRAM(S): 2 INJECTION INTRAMUSCULAR; INTRAVENOUS; SUBCUTANEOUS at 05:28

## 2021-03-26 RX ADMIN — PANTOPRAZOLE SODIUM 40 MILLIGRAM(S): 20 TABLET, DELAYED RELEASE ORAL at 05:29

## 2021-03-26 RX ADMIN — POLYETHYLENE GLYCOL 3350 17 GRAM(S): 17 POWDER, FOR SOLUTION ORAL at 14:02

## 2021-03-26 RX ADMIN — Medication 50 GRAM(S): at 17:28

## 2021-03-26 RX ADMIN — AMIODARONE HYDROCHLORIDE 400 MILLIGRAM(S): 400 TABLET ORAL at 18:01

## 2021-03-26 RX ADMIN — Medication 50 MILLIGRAM(S): at 22:53

## 2021-03-26 RX ADMIN — HYDROMORPHONE HYDROCHLORIDE 4 MILLIGRAM(S): 2 INJECTION INTRAMUSCULAR; INTRAVENOUS; SUBCUTANEOUS at 06:00

## 2021-03-26 RX ADMIN — Medication 400 MILLIGRAM(S): at 01:49

## 2021-03-26 RX ADMIN — Medication 10 MILLIGRAM(S): at 05:27

## 2021-03-26 RX ADMIN — Medication 1000 MILLIGRAM(S): at 02:05

## 2021-03-26 RX ADMIN — HYDROMORPHONE HYDROCHLORIDE 4 MILLIGRAM(S): 2 INJECTION INTRAMUSCULAR; INTRAVENOUS; SUBCUTANEOUS at 23:44

## 2021-03-26 RX ADMIN — Medication 81 MILLIGRAM(S): at 13:06

## 2021-03-26 RX ADMIN — SENNA PLUS 2 TABLET(S): 8.6 TABLET ORAL at 22:52

## 2021-03-26 NOTE — PROGRESS NOTE ADULT - SUBJECTIVE AND OBJECTIVE BOX
im ok    VITAL SIGNS    Telemetry:  nsr 96    Vital Signs Last 24 Hrs  T(C): 36.7 (03-26-21 @ 07:08), Max: 37.1 (03-25-21 @ 18:22)  T(F): 98.1 (03-26-21 @ 07:08), Max: 98.7 (03-25-21 @ 18:22)  HR: 80 (03-26-21 @ 07:08) (80 - 90)  BP: 146/81 (03-26-21 @ 07:08) (140/67 - 168/75)  RR: 18 (03-26-21 @ 07:08) (17 - 18)  SpO2: 92% (03-26-21 @ 07:08) (92% - 99%)                   03-25 @ 07:01  -  03-26 @ 07:00  --------------------------------------------------------  IN: 1570 mL / OUT: 2459 mL / NET: -889 mL          Daily     Daily             CAPILLARY BLOOD GLUCOSE      POCT Blood Glucose.: 114 mg/dL (26 Mar 2021 07:53)  POCT Blood Glucose.: 127 mg/dL (25 Mar 2021 21:28)  POCT Blood Glucose.: 139 mg/dL (25 Mar 2021 16:52)  POCT Blood Glucose.: 137 mg/dL (25 Mar 2021 14:07)  POCT Blood Glucose.: 120 mg/dL (25 Mar 2021 13:16)  POCT Blood Glucose.: 125 mg/dL (25 Mar 2021 11:45)  POCT Blood Glucose.: 136 mg/dL (25 Mar 2021 10:16)            Drains:     MS         [  x] Drainage:                 L Pleural  [ x ]  Drainage:                R Pleural  [  ]  Drainage:    Pacing Wires        [x  ]   Settings:    vvi                            Isolated  [  ]    Coumadin    [ ] YES          [  x]      NO                                   PHYSICAL EXAM        Neurology: alert and oriented x 3, nonfocal, no gross deficits  CV : s1 s2 RRR  Sternal Wound :  CDI , Stable  Lungs: cta  Abdomen: soft, nontender, nondistended, positive bowel sounds, last bowel movement +                      chest tubes x 2  :    voiding      Extremities:     - edema   /  -   calve tenderness ,    L leg   incisions cdi          allopurinol 100 milliGRAM(s) Oral at bedtime  aspirin enteric coated 81 milliGRAM(s) Oral daily  atorvastatin 40 milliGRAM(s) Oral at bedtime  chlorhexidine 2% Cloths 1 Application(s) Topical <User Schedule>  enoxaparin Injectable 40 milliGRAM(s) SubCutaneous daily  HYDROmorphone   Tablet 2 milliGRAM(s) Oral every 3 hours PRN  HYDROmorphone   Tablet 4 milliGRAM(s) Oral every 4 hours PRN  insulin lispro (ADMELOG) corrective regimen sliding scale   SubCutaneous Before meals and at bedtime  metoprolol tartrate 50 milliGRAM(s) Oral every 8 hours  pantoprazole    Tablet 40 milliGRAM(s) Oral before breakfast  polyethylene glycol 3350 17 Gram(s) Oral daily  senna 2 Tablet(s) Oral at bedtime  sodium chloride 0.9%. 1000 milliLiter(s) IV Continuous <Continuous>                    Physical Therapy Rec:   Home  [  ]   Home w/ PT  [  ]  Rehab  [  ]  Discussed with Cardiothoracic Team at AM rounds.

## 2021-03-26 NOTE — PROGRESS NOTE ADULT - ASSESSMENT
60 y/o M w/ PMHx of gout, obesity, retinal tear, inguinal hernia, aflutter s/p ablation, MI, CAD s/p PCI x 4 stents, HTN, HLD and recent dental implant 3/16/21 who presented to the ED c/o central, non-radiating, pressure-like chest pain x several hours and HTN to 160/100. Admitted for c/f NSTEMI. Managed with heparin drip, ASA, statin, BB. Trops downtrended, underwent cath on 3/22/21, showing 3VD. During post procedure recovery, pt reported Lt vision changes and B/L LE tightness. Pt was assessed with Dr. Deleon, full strength on B/L LE and B/L UE with normal motor and sensory function on quick exam, no focal neuro deficit noted,  Eye vision changes improved (almost back to normal) after IV hydration.  He was transferred to [facility] for surgical eval with Dr. Thomason. Of note, pt has already received 1 dose of Pfizer COVID vaccine.     3/23 ENT consulted for R ear pain - recommend Flonase and outpt f/u for eustachian tube dysfunction.  3/24 s/p C4L. No products intra-op, no pressor requirements.   3/25 Extubated at 0004. Insulin gtt weaned to off this afternoon. Lopressor 50 mg q8h initiated, will uptitrate as tolerated. Maintain med and L pleural chest tubes for now as per Dr. Thomason. Received pt to SDU - VSS, NAD. Continue current medication regimen for now.   3/26  d/c Ct's,intro pw.  Lopressor as tolerated  Preop dental implant was on amox, will resume for 24hrs  Insulin infusion remains off-glucose 114 to 139  May transfer [facility]

## 2021-03-26 NOTE — PROVIDER CONTACT NOTE (OTHER) - ACTION/TREATMENT ORDERED:
Stat EKG done, IVP lopressor given, Magnesium, and Patient started on Amio load, will continue to monitor

## 2021-03-27 LAB
ALBUMIN SERPL ELPH-MCNC: 2.8 G/DL — LOW (ref 3.3–5)
ALP SERPL-CCNC: 69 U/L — SIGNIFICANT CHANGE UP (ref 40–120)
ALT FLD-CCNC: 33 U/L — SIGNIFICANT CHANGE UP (ref 10–45)
ANION GAP SERPL CALC-SCNC: 10 MMOL/L — SIGNIFICANT CHANGE UP (ref 5–17)
ANION GAP SERPL CALC-SCNC: 12 MMOL/L — SIGNIFICANT CHANGE UP (ref 5–17)
AST SERPL-CCNC: 55 U/L — HIGH (ref 10–40)
BILIRUB SERPL-MCNC: 0.7 MG/DL — SIGNIFICANT CHANGE UP (ref 0.2–1.2)
BUN SERPL-MCNC: 17 MG/DL — SIGNIFICANT CHANGE UP (ref 7–23)
BUN SERPL-MCNC: 18 MG/DL — SIGNIFICANT CHANGE UP (ref 7–23)
CALCIUM SERPL-MCNC: 9 MG/DL — SIGNIFICANT CHANGE UP (ref 8.4–10.5)
CALCIUM SERPL-MCNC: 9.2 MG/DL — SIGNIFICANT CHANGE UP (ref 8.4–10.5)
CHLORIDE SERPL-SCNC: 97 MMOL/L — SIGNIFICANT CHANGE UP (ref 96–108)
CHLORIDE SERPL-SCNC: 99 MMOL/L — SIGNIFICANT CHANGE UP (ref 96–108)
CO2 SERPL-SCNC: 17 MMOL/L — LOW (ref 22–31)
CO2 SERPL-SCNC: 25 MMOL/L — SIGNIFICANT CHANGE UP (ref 22–31)
CREAT SERPL-MCNC: 0.66 MG/DL — SIGNIFICANT CHANGE UP (ref 0.5–1.3)
CREAT SERPL-MCNC: 0.9 MG/DL — SIGNIFICANT CHANGE UP (ref 0.5–1.3)
GLUCOSE BLDC GLUCOMTR-MCNC: 116 MG/DL — HIGH (ref 70–99)
GLUCOSE BLDC GLUCOMTR-MCNC: 118 MG/DL — HIGH (ref 70–99)
GLUCOSE BLDC GLUCOMTR-MCNC: 120 MG/DL — HIGH (ref 70–99)
GLUCOSE BLDC GLUCOMTR-MCNC: 130 MG/DL — HIGH (ref 70–99)
GLUCOSE SERPL-MCNC: 100 MG/DL — HIGH (ref 70–99)
GLUCOSE SERPL-MCNC: 111 MG/DL — HIGH (ref 70–99)
HCT VFR BLD CALC: 42.2 % — SIGNIFICANT CHANGE UP (ref 39–50)
HGB BLD-MCNC: 13.9 G/DL — SIGNIFICANT CHANGE UP (ref 13–17)
MCHC RBC-ENTMCNC: 30 PG — SIGNIFICANT CHANGE UP (ref 27–34)
MCHC RBC-ENTMCNC: 32.9 GM/DL — SIGNIFICANT CHANGE UP (ref 32–36)
MCV RBC AUTO: 90.9 FL — SIGNIFICANT CHANGE UP (ref 80–100)
NRBC # BLD: 0 /100 WBCS — SIGNIFICANT CHANGE UP (ref 0–0)
PLATELET # BLD AUTO: 186 K/UL — SIGNIFICANT CHANGE UP (ref 150–400)
POTASSIUM SERPL-MCNC: 4.6 MMOL/L — SIGNIFICANT CHANGE UP (ref 3.5–5.3)
POTASSIUM SERPL-MCNC: 6.5 MMOL/L — CRITICAL HIGH (ref 3.5–5.3)
POTASSIUM SERPL-SCNC: 4.6 MMOL/L — SIGNIFICANT CHANGE UP (ref 3.5–5.3)
POTASSIUM SERPL-SCNC: 6.5 MMOL/L — CRITICAL HIGH (ref 3.5–5.3)
PROT SERPL-MCNC: 6.7 G/DL — SIGNIFICANT CHANGE UP (ref 6–8.3)
RBC # BLD: 4.64 M/UL — SIGNIFICANT CHANGE UP (ref 4.2–5.8)
RBC # FLD: 14 % — SIGNIFICANT CHANGE UP (ref 10.3–14.5)
SODIUM SERPL-SCNC: 126 MMOL/L — LOW (ref 135–145)
SODIUM SERPL-SCNC: 134 MMOL/L — LOW (ref 135–145)
WBC # BLD: 16.54 K/UL — HIGH (ref 3.8–10.5)
WBC # FLD AUTO: 16.54 K/UL — HIGH (ref 3.8–10.5)

## 2021-03-27 PROCEDURE — 71045 X-RAY EXAM CHEST 1 VIEW: CPT | Mod: 26

## 2021-03-27 RX ADMIN — Medication 81 MILLIGRAM(S): at 11:19

## 2021-03-27 RX ADMIN — AMIODARONE HYDROCHLORIDE 400 MILLIGRAM(S): 400 TABLET ORAL at 21:41

## 2021-03-27 RX ADMIN — SENNA PLUS 2 TABLET(S): 8.6 TABLET ORAL at 21:42

## 2021-03-27 RX ADMIN — Medication 50 MILLIGRAM(S): at 21:41

## 2021-03-27 RX ADMIN — AMIODARONE HYDROCHLORIDE 400 MILLIGRAM(S): 400 TABLET ORAL at 13:29

## 2021-03-27 RX ADMIN — ENOXAPARIN SODIUM 40 MILLIGRAM(S): 100 INJECTION SUBCUTANEOUS at 11:20

## 2021-03-27 RX ADMIN — Medication 1 TABLET(S): at 21:41

## 2021-03-27 RX ADMIN — Medication 1 TABLET(S): at 05:35

## 2021-03-27 RX ADMIN — Medication 50 MILLIGRAM(S): at 13:29

## 2021-03-27 RX ADMIN — CHLORHEXIDINE GLUCONATE 1 APPLICATION(S): 213 SOLUTION TOPICAL at 11:25

## 2021-03-27 RX ADMIN — ATORVASTATIN CALCIUM 40 MILLIGRAM(S): 80 TABLET, FILM COATED ORAL at 21:41

## 2021-03-27 RX ADMIN — Medication 1 TABLET(S): at 13:29

## 2021-03-27 RX ADMIN — PANTOPRAZOLE SODIUM 40 MILLIGRAM(S): 20 TABLET, DELAYED RELEASE ORAL at 05:35

## 2021-03-27 RX ADMIN — Medication 50 MILLIGRAM(S): at 05:35

## 2021-03-27 RX ADMIN — AMIODARONE HYDROCHLORIDE 400 MILLIGRAM(S): 400 TABLET ORAL at 05:35

## 2021-03-27 RX ADMIN — Medication 100 MILLIGRAM(S): at 21:41

## 2021-03-27 NOTE — PROGRESS NOTE ADULT - PROBLEM SELECTOR PLAN 1
Continue post-op care   Continue ASA 81 mg QD, Lopressor 50 mg q8h & Atorvastatin 40 mg qhs   Maintain med and L pleural CTs for now -d/c  d/c  RIJ and PW for now  Increase activity as tolerated, ambulate 4x daily and w/ PT   Chest PT, encourage incentive spirometry   Wound care, pain control

## 2021-03-27 NOTE — PROGRESS NOTE ADULT - SUBJECTIVE AND OBJECTIVE BOX
VITAL SIGNS    Telemetry:    Vital Signs Last 24 Hrs  T(C): 36.6 (21 @ 12:54), Max: 37.2 (21 @ 15:23)  T(F): 97.9 (21 @ 12:54), Max: 98.9 (21 @ 15:23)  HR: 83 (21 @ 12:54) (77 - 93)  BP: 128/87 (21 @ 12:54) (128/87 - 150/96)  RR: 18 (21 @ 12:54) (18 - 18)  SpO2: 96% (21 @ 12:54) (93% - 96%)             @ 07:01  -   @ 07:00  --------------------------------------------------------  IN: 720 mL / OUT: 1560 mL / NET: -840 mL     @ 07:01  -   @ 15:20  --------------------------------------------------------  IN: 360 mL / OUT: 0 mL / NET: 360 mL       Daily     Daily Weight in k.5 (27 Mar 2021 08:30)  Admit Wt: Drug Dosing Weight  Height (cm): 188 (24 Mar 2021 07:28)  Weight (kg): 128 (24 Mar 2021 07:28)  BMI (kg/m2): 36.2 (24 Mar 2021 07:28)  BSA (m2): 2.52 (24 Mar 2021 07:28)    Bilirubin Total, Serum: 0.7 mg/dL ( @ 05:20)    CAPILLARY BLOOD GLUCOSE      POCT Blood Glucose.: 130 mg/dL (27 Mar 2021 11:19)  POCT Blood Glucose.: 120 mg/dL (27 Mar 2021 07:28)  POCT Blood Glucose.: 125 mg/dL (26 Mar 2021 21:35)  POCT Blood Glucose.: 114 mg/dL (26 Mar 2021 16:37)          MEDICATIONS  allopurinol 100 milliGRAM(s) Oral at bedtime  aMIOdarone    Tablet 400 milliGRAM(s) Oral every 8 hours  amoxicillin  500 milliGRAM(s)/clavulanate 1 Tablet(s) Oral every 8 hours  aspirin enteric coated 81 milliGRAM(s) Oral daily  atorvastatin 40 milliGRAM(s) Oral at bedtime  chlorhexidine 2% Cloths 1 Application(s) Topical <User Schedule>  enoxaparin Injectable 40 milliGRAM(s) SubCutaneous daily  HYDROmorphone   Tablet 2 milliGRAM(s) Oral every 3 hours PRN  HYDROmorphone   Tablet 4 milliGRAM(s) Oral every 4 hours PRN  insulin lispro (ADMELOG) corrective regimen sliding scale   SubCutaneous Before meals and at bedtime  metoprolol tartrate 50 milliGRAM(s) Oral every 8 hours  pantoprazole    Tablet 40 milliGRAM(s) Oral before breakfast  polyethylene glycol 3350 17 Gram(s) Oral daily  senna 2 Tablet(s) Oral at bedtime  sodium chloride 0.9%. 1000 milliLiter(s) IV Continuous <Continuous>      >>> <<<  PHYSICAL EXAM  Subjective:  Neurology: alert and oriented x 3, nonfocal, no gross deficits  CV :  Sternal Wound :  CDI , Stable  Lungs:  Abdomen: soft, NT,ND, ( )BM  :  voiding  Extremities:       LABS      134<L>  |  97  |  17  ----------------------------<  111<H>  4.6   |  25  |  0.90    Ca    9.2      27 Mar 2021 06:31  Mg     1.8         TPro  6.7  /  Alb  2.8<L>  /  TBili  0.7  /  DBili  x   /  AST  55<H>  /  ALT  33  /  AlkPhos  69                                   13.9   16.54 )-----------( 186      ( 27 Mar 2021 06:31 )             42.2                 PAST MEDICAL & SURGICAL HISTORY:  Diverticulitis    Hernia  inguinal hernia    Sleep apnea, unspecified type    Atrial fibrillation    Vitamin D deficiency    Osteoarthritis    Class 2 obesity with body mass index (BMI) of 38.0 to 38.9 in adult    Hyperlipidemia    HTN (hypertension)    Atrial flutter by electrocardiogram    At risk for sleep apnea    Stented coronary artery  X4- last     Left knee pain    Gout    Dyslipidemia    CAD (coronary artery disease)    Myocardial infarction  ,     S/P meniscectomy    H/O cardiac radiofrequency ablation    History of appendectomy      H/O eye surgery  right eye retinal tear, 2017    H/O right inguinal hernia repair  age 13    Post PTCA  x 4 stents in place- last 2010

## 2021-03-27 NOTE — PROGRESS NOTE ADULT - ASSESSMENT
62 y/o M w/ PMHx of gout, obesity, retinal tear, inguinal hernia, aflutter s/p ablation, MI, CAD s/p PCI x 4 stents, HTN, HLD and recent dental implant 3/16/21 who presented to the ED c/o central, non-radiating, pressure-like chest pain x several hours and HTN to 160/100. Admitted for c/f NSTEMI. Managed with heparin drip, ASA, statin, BB. Trops downtrended, underwent cath on 3/22/21, showing 3VD. During post procedure recovery, pt reported Lt vision changes and B/L LE tightness. Pt was assessed with Dr. Deleon, full strength on B/L LE and B/L UE with normal motor and sensory function on quick exam, no focal neuro deficit noted,  Eye vision changes improved (almost back to normal) after IV hydration.  He was transferred to [facility] for surgical eval with Dr. Thomason. Of note, pt has already received 1 dose of Pfizer COVID vaccine.     3/23 ENT consulted for R ear pain - recommend Flonase and outpt f/u for eustachian tube dysfunction.  3/24 s/p C4L. No products intra-op, no pressor requirements.   3/25 Extubated at 0004. Insulin gtt weaned to off this afternoon. Lopressor 50 mg q8h initiated, will uptitrate as tolerated. Maintain med and L pleural chest tubes for now as per Dr. Thomason. Received pt to SDU - VSS, NAD. Continue current medication regimen for now.   3/26  d/c Ct's,intro pw.  Lopressor as tolerated  Preop dental implant was on amox, will resume for 24hrs Insulin infusion remains off-glucose 114 to 139  May transfer to floor  3/27 Pt remains in  rate controlled afib . Will consider initiating anticoagulation.
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
## 2021-03-28 DIAGNOSIS — R35.0 FREQUENCY OF MICTURITION: ICD-10-CM

## 2021-03-28 DIAGNOSIS — E66.9 OBESITY, UNSPECIFIED: ICD-10-CM

## 2021-03-28 DIAGNOSIS — R73.9 HYPERGLYCEMIA, UNSPECIFIED: ICD-10-CM

## 2021-03-28 DIAGNOSIS — I10 ESSENTIAL (PRIMARY) HYPERTENSION: ICD-10-CM

## 2021-03-28 DIAGNOSIS — I21.4 NON-ST ELEVATION (NSTEMI) MYOCARDIAL INFARCTION: ICD-10-CM

## 2021-03-28 DIAGNOSIS — I16.0 HYPERTENSIVE URGENCY: ICD-10-CM

## 2021-03-28 DIAGNOSIS — Z95.5 PRESENCE OF CORONARY ANGIOPLASTY IMPLANT AND GRAFT: ICD-10-CM

## 2021-03-28 DIAGNOSIS — I25.110 ATHEROSCLEROTIC HEART DISEASE OF NATIVE CORONARY ARTERY WITH UNSTABLE ANGINA PECTORIS: ICD-10-CM

## 2021-03-28 DIAGNOSIS — T82.855A STENOSIS OF CORONARY ARTERY STENT, INITIAL ENCOUNTER: ICD-10-CM
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
## 2021-03-28 DIAGNOSIS — M10.9 GOUT, UNSPECIFIED: ICD-10-CM

## 2021-03-28 DIAGNOSIS — I25.2 OLD MYOCARDIAL INFARCTION: ICD-10-CM

## 2021-03-28 DIAGNOSIS — E55.9 VITAMIN D DEFICIENCY, UNSPECIFIED: ICD-10-CM

## 2021-03-28 DIAGNOSIS — E78.5 HYPERLIPIDEMIA, UNSPECIFIED: ICD-10-CM

## 2021-03-28 DIAGNOSIS — I25.84 CORONARY ATHEROSCLEROSIS DUE TO CALCIFIED CORONARY LESION: ICD-10-CM

## 2021-03-28 LAB
ALBUMIN SERPL ELPH-MCNC: 3.8 G/DL — SIGNIFICANT CHANGE UP (ref 3.3–5)
ALP SERPL-CCNC: 46 U/L — SIGNIFICANT CHANGE UP (ref 40–120)
ALT FLD-CCNC: 38 U/L — SIGNIFICANT CHANGE UP (ref 10–45)
ANION GAP SERPL CALC-SCNC: 12 MMOL/L — SIGNIFICANT CHANGE UP (ref 5–17)
AST SERPL-CCNC: 30 U/L — SIGNIFICANT CHANGE UP (ref 10–40)
BASOPHILS # BLD AUTO: 0.06 K/UL — SIGNIFICANT CHANGE UP (ref 0–0.2)
BASOPHILS NFR BLD AUTO: 0.4 % — SIGNIFICANT CHANGE UP (ref 0–2)
BILIRUB SERPL-MCNC: 0.9 MG/DL — SIGNIFICANT CHANGE UP (ref 0.2–1.2)
BUN SERPL-MCNC: 18 MG/DL — SIGNIFICANT CHANGE UP (ref 7–23)
CALCIUM SERPL-MCNC: 9 MG/DL — SIGNIFICANT CHANGE UP (ref 8.4–10.5)
CHLORIDE SERPL-SCNC: 99 MMOL/L — SIGNIFICANT CHANGE UP (ref 96–108)
CO2 SERPL-SCNC: 23 MMOL/L — SIGNIFICANT CHANGE UP (ref 22–31)
CREAT SERPL-MCNC: 0.78 MG/DL — SIGNIFICANT CHANGE UP (ref 0.5–1.3)
EOSINOPHIL # BLD AUTO: 0.21 K/UL — SIGNIFICANT CHANGE UP (ref 0–0.5)
EOSINOPHIL NFR BLD AUTO: 1.4 % — SIGNIFICANT CHANGE UP (ref 0–6)
GLUCOSE BLDC GLUCOMTR-MCNC: 107 MG/DL — HIGH (ref 70–99)
GLUCOSE BLDC GLUCOMTR-MCNC: 107 MG/DL — HIGH (ref 70–99)
GLUCOSE BLDC GLUCOMTR-MCNC: 116 MG/DL — HIGH (ref 70–99)
GLUCOSE SERPL-MCNC: 118 MG/DL — HIGH (ref 70–99)
HCT VFR BLD CALC: 42.9 % — SIGNIFICANT CHANGE UP (ref 39–50)
HGB BLD-MCNC: 14.3 G/DL — SIGNIFICANT CHANGE UP (ref 13–17)
IMM GRANULOCYTES NFR BLD AUTO: 0.6 % — SIGNIFICANT CHANGE UP (ref 0–1.5)
LYMPHOCYTES # BLD AUTO: 19 % — SIGNIFICANT CHANGE UP (ref 13–44)
LYMPHOCYTES # BLD AUTO: 2.83 K/UL — SIGNIFICANT CHANGE UP (ref 1–3.3)
MCHC RBC-ENTMCNC: 29.9 PG — SIGNIFICANT CHANGE UP (ref 27–34)
MCHC RBC-ENTMCNC: 33.3 GM/DL — SIGNIFICANT CHANGE UP (ref 32–36)
MCV RBC AUTO: 89.7 FL — SIGNIFICANT CHANGE UP (ref 80–100)
MONOCYTES # BLD AUTO: 1.71 K/UL — HIGH (ref 0–0.9)
MONOCYTES NFR BLD AUTO: 11.5 % — SIGNIFICANT CHANGE UP (ref 2–14)
NEUTROPHILS # BLD AUTO: 10.01 K/UL — HIGH (ref 1.8–7.4)
NEUTROPHILS NFR BLD AUTO: 67.1 % — SIGNIFICANT CHANGE UP (ref 43–77)
NRBC # BLD: 0 /100 WBCS — SIGNIFICANT CHANGE UP (ref 0–0)
PLATELET # BLD AUTO: 208 K/UL — SIGNIFICANT CHANGE UP (ref 150–400)
POTASSIUM SERPL-MCNC: 4.1 MMOL/L — SIGNIFICANT CHANGE UP (ref 3.5–5.3)
POTASSIUM SERPL-SCNC: 4.1 MMOL/L — SIGNIFICANT CHANGE UP (ref 3.5–5.3)
PROT SERPL-MCNC: 6.9 G/DL — SIGNIFICANT CHANGE UP (ref 6–8.3)
RBC # BLD: 4.78 M/UL — SIGNIFICANT CHANGE UP (ref 4.2–5.8)
RBC # FLD: 13.8 % — SIGNIFICANT CHANGE UP (ref 10.3–14.5)
SODIUM SERPL-SCNC: 134 MMOL/L — LOW (ref 135–145)
WBC # BLD: 14.91 K/UL — HIGH (ref 3.8–10.5)
WBC # FLD AUTO: 14.91 K/UL — HIGH (ref 3.8–10.5)

## 2021-03-28 PROCEDURE — 71045 X-RAY EXAM CHEST 1 VIEW: CPT | Mod: 26

## 2021-03-28 RX ORDER — ALPRAZOLAM 0.25 MG
0.5 TABLET ORAL AT BEDTIME
Refills: 0 | Status: DISCONTINUED | OUTPATIENT
Start: 2021-03-29 | End: 2021-03-29

## 2021-03-28 RX ORDER — ALPRAZOLAM 0.25 MG
0.25 TABLET ORAL AT BEDTIME
Refills: 0 | Status: DISCONTINUED | OUTPATIENT
Start: 2021-03-28 | End: 2021-03-28

## 2021-03-28 RX ORDER — APIXABAN 2.5 MG/1
5 TABLET, FILM COATED ORAL EVERY 12 HOURS
Refills: 0 | Status: DISCONTINUED | OUTPATIENT
Start: 2021-03-28 | End: 2021-03-29

## 2021-03-28 RX ORDER — ALPRAZOLAM 0.25 MG
0.25 TABLET ORAL ONCE
Refills: 0 | Status: DISCONTINUED | OUTPATIENT
Start: 2021-03-28 | End: 2021-03-28

## 2021-03-28 RX ADMIN — Medication 0.25 MILLIGRAM(S): at 23:48

## 2021-03-28 RX ADMIN — AMIODARONE HYDROCHLORIDE 400 MILLIGRAM(S): 400 TABLET ORAL at 21:46

## 2021-03-28 RX ADMIN — Medication 81 MILLIGRAM(S): at 08:00

## 2021-03-28 RX ADMIN — Medication 100 MILLIGRAM(S): at 21:45

## 2021-03-28 RX ADMIN — Medication 50 MILLIGRAM(S): at 05:53

## 2021-03-28 RX ADMIN — Medication 50 MILLIGRAM(S): at 13:40

## 2021-03-28 RX ADMIN — Medication 50 MILLIGRAM(S): at 21:46

## 2021-03-28 RX ADMIN — APIXABAN 5 MILLIGRAM(S): 2.5 TABLET, FILM COATED ORAL at 09:08

## 2021-03-28 RX ADMIN — AMIODARONE HYDROCHLORIDE 400 MILLIGRAM(S): 400 TABLET ORAL at 13:41

## 2021-03-28 RX ADMIN — CHLORHEXIDINE GLUCONATE 1 APPLICATION(S): 213 SOLUTION TOPICAL at 09:46

## 2021-03-28 RX ADMIN — Medication 1 TABLET(S): at 05:53

## 2021-03-28 RX ADMIN — Medication 0.25 MILLIGRAM(S): at 21:47

## 2021-03-28 RX ADMIN — ATORVASTATIN CALCIUM 40 MILLIGRAM(S): 80 TABLET, FILM COATED ORAL at 21:46

## 2021-03-28 RX ADMIN — APIXABAN 5 MILLIGRAM(S): 2.5 TABLET, FILM COATED ORAL at 21:45

## 2021-03-28 RX ADMIN — AMIODARONE HYDROCHLORIDE 400 MILLIGRAM(S): 400 TABLET ORAL at 05:53

## 2021-03-28 RX ADMIN — PANTOPRAZOLE SODIUM 40 MILLIGRAM(S): 20 TABLET, DELAYED RELEASE ORAL at 05:53

## 2021-03-28 NOTE — PROGRESS NOTE ADULT - PROBLEM SELECTOR PLAN 4
Continue Allopurinol 100 mg QD
does not use cpap
Continue Allopurinol 100 mg QD

## 2021-03-28 NOTE — PROGRESS NOTE ADULT - ASSESSMENT
62 y/o M w/ PMHx of gout, obesity, retinal tear, inguinal hernia, aflutter s/p ablation, MI, CAD s/p PCI x 4 stents, HTN, HLD and recent dental implant 3/16/21 who presented to the ED c/o central, non-radiating, pressure-like chest pain x several hours and HTN to 160/100. Admitted for c/f NSTEMI. Managed with heparin drip, ASA, statin, BB. Trops downtrended, underwent cath on 3/22/21, showing 3VD. During post procedure recovery, pt reported Lt vision changes and B/L LE tightness. Pt was assessed with Dr. Deleon, full strength on B/L LE and B/L UE with normal motor and sensory function on quick exam, no focal neuro deficit noted,  Eye vision changes improved (almost back to normal) after IV hydration.  He was transferred to [facility] for surgical eval with Dr. Thomason. Of note, pt has already received 1 dose of Pfizer COVID vaccine.     3/23 ENT consulted for R ear pain - recommend Flonase and outpt f/u for eustachian tube dysfunction.  3/24 s/p C4L. No products intra-op, no pressor requirements.   3/25 Extubated at 0004. Insulin gtt weaned to off this afternoon. Lopressor 50 mg q8h initiated, will uptitrate as tolerated. Maintain med and L pleural chest tubes for now as per Dr. Thomason. Received pt to SDU - VSS, NAD. Continue current medication regimen for now.   3/26  d/c Ct's,intro pw.  Lopressor as tolerated  Preop dental implant was on amox, will resume for 24hrs Insulin infusion remains off-glucose 114 to 139  May transfer to floor  3/27 Pt remains in rate controlled afib . Will consider initiating anticoagulation.  3/28 Persistent afib. Eliquis initiated. Check chest xray in am. Anticipate discharge tomorrow. Continue with amiodarone, aspirin statin and bblocker

## 2021-03-28 NOTE — PROGRESS NOTE ADULT - PROBLEM SELECTOR PROBLEM 2
Ear pain, right
Dental disease or condition
Anticoagulated on heparin
Dental disease or condition

## 2021-03-28 NOTE — PROGRESS NOTE ADULT - REASON FOR ADMISSION
cardiac surgery
cardiac surgery
cardiac surgery   preop
cardiac surgery
cardiac surgery
cardiac surgery  preop
cardiac surgery

## 2021-03-28 NOTE — PROGRESS NOTE ADULT - PROBLEM SELECTOR PLAN 2
ENT cslt  today
Recent dental implant on 3/16/21  Pt was on course of Amoxicillin pre-operatively - continue
Recent dental implant on 3/16/21  Pt was on course of Amoxicillin pre-operatively - continue
Patient specific PTT goal 60-80
Recent dental implant on 3/16/21  Pt was on course of Amoxicillin pre-operatively - continue
Recent dental implant on 3/16/21  Pt was on course of Amoxicillin pre-operatively - will d/w Dr. Thomason if abx need to be continued

## 2021-03-28 NOTE — PROGRESS NOTE ADULT - PROVIDER SPECIALTY LIST ADULT
Critical Care
CT Surgery
Critical Care
Critical Care
CT Surgery

## 2021-03-28 NOTE — PROGRESS NOTE ADULT - PROBLEM SELECTOR PLAN 3
H/o aflutter s/p ablation   Currently NSR 90s   Continue Lopressor 50 mg q8h  Check lytes daily and replete prn
H/o aflutter s/p ablation   Currently NSR 90s   Continue Lopressor 50 mg q8h  Check lytes daily and replete prn
recent dental implant  RX for 10 days amoxicillin: completed 5 days
H/o aflutter s/p ablation   Currently NSR 90s   Continue Lopressor 50 mg q8h  Check lytes daily and replete prn
H/o aflutter s/p ablation   Currently NSR 90s   Continue Lopressor 50 mg q8h  Check lytes daily and replete prn

## 2021-03-28 NOTE — PROGRESS NOTE ADULT - PROBLEM SELECTOR PLAN 5
DVT ppx w/ Lovenox   GI ppx w/ Protonix
DVT ppx w/ Lovenox   GI ppx w/ Protonix
allopurinol
DVT ppx w/ Lovenox   GI ppx w/ Protonix
DVT ppx w/ Lovenox   GI ppx w/ Protonix

## 2021-03-28 NOTE — PROGRESS NOTE ADULT - SUBJECTIVE AND OBJECTIVE BOX
VITAL SIGNS    Telemetry:  afib 70-90  Vital Signs Last 24 Hrs  T(C): 36.8 (21 @ 11:45), Max: 37 (21 @ 20:45)  T(F): 98.3 (21 @ 11:45), Max: 98.6 (21 @ 20:45)  HR: 89 (21 @ 11:45) (82 - 89)  BP: 131/84 (21 @ 11:45) (127/89 - 133/84)  RR: 18 (21 @ 11:45) (16 - 18)  SpO2: 95% (21 @ 11:45) (95% - 95%)             @ 07:01  -   @ 07:00  --------------------------------------------------------  IN: 920 mL / OUT: 1951 mL / NET: -1031 mL     @ 07:01  -   @ 13:53  --------------------------------------------------------  IN: 240 mL / OUT: 301 mL / NET: -61 mL       Daily     Daily Weight in k (28 Mar 2021 09:00)  Admit Wt: Drug Dosing Weight  Height (cm): 188 (24 Mar 2021 07:28)  Weight (kg): 128 (24 Mar 2021 07:28)  BMI (kg/m2): 36.2 (24 Mar 2021 07:28)  BSA (m2): 2.52 (24 Mar 2021 07:28)    Bilirubin Total, Serum: 0.9 mg/dL ( @ 06:24)    CAPILLARY BLOOD GLUCOSE      POCT Blood Glucose.: 116 mg/dL (28 Mar 2021 11:42)  POCT Blood Glucose.: 107 mg/dL (28 Mar 2021 07:44)  POCT Blood Glucose.: 118 mg/dL (27 Mar 2021 21:35)  POCT Blood Glucose.: 116 mg/dL (27 Mar 2021 16:55)          MEDICATIONS  allopurinol 100 milliGRAM(s) Oral at bedtime  aMIOdarone    Tablet 400 milliGRAM(s) Oral every 8 hours  apixaban 5 milliGRAM(s) Oral every 12 hours  aspirin enteric coated 81 milliGRAM(s) Oral daily  atorvastatin 40 milliGRAM(s) Oral at bedtime  chlorhexidine 2% Cloths 1 Application(s) Topical <User Schedule>  HYDROmorphone   Tablet 2 milliGRAM(s) Oral every 3 hours PRN  HYDROmorphone   Tablet 4 milliGRAM(s) Oral every 4 hours PRN  insulin lispro (ADMELOG) corrective regimen sliding scale   SubCutaneous Before meals and at bedtime  metoprolol tartrate 50 milliGRAM(s) Oral every 8 hours  pantoprazole    Tablet 40 milliGRAM(s) Oral before breakfast  polyethylene glycol 3350 17 Gram(s) Oral daily  senna 2 Tablet(s) Oral at bedtime      >>> <<<  PHYSICAL EXAM  Subjective: NAD  Neurology: alert and oriented x 3, nonfocal, no gross deficits  CV : s1s2  Sternal Wound :  CDI , Stable  Lungs: CTA b/l  Abdomen: soft, NT,ND, (+ )BM  :  voiding  Extremities:  -c/c/e    LABS      134<L>  |  99  |  18  ----------------------------<  118<H>  4.1   |  23  |  0.78    Ca    9.0      28 Mar 2021 06:24    TPro  6.9  /  Alb  3.8  /  TBili  0.9  /  DBili  x   /  AST  30  /  ALT  38  /  AlkPhos  46                                   14.3   14.91 )-----------( 208      ( 28 Mar 2021 06:24 )             42.9                 PAST MEDICAL & SURGICAL HISTORY:  Diverticulitis    Hernia  inguinal hernia    Sleep apnea, unspecified type    Atrial fibrillation    Vitamin D deficiency    Osteoarthritis    Class 2 obesity with body mass index (BMI) of 38.0 to 38.9 in adult    Hyperlipidemia    HTN (hypertension)    Atrial flutter by electrocardiogram    At risk for sleep apnea    Stented coronary artery  X4- last     Left knee pain    Gout    Dyslipidemia    CAD (coronary artery disease)    Myocardial infarction  ,     S/P meniscectomy    H/O cardiac radiofrequency ablation    History of appendectomy      H/O eye surgery  right eye retinal tear, 2017    H/O right inguinal hernia repair  age 13    Post PTCA  x 4 stents in place- last

## 2021-03-29 ENCOUNTER — TRANSCRIPTION ENCOUNTER (OUTPATIENT)
Age: 61
End: 2021-03-29

## 2021-03-29 VITALS — WEIGHT: 276.68 LBS

## 2021-03-29 LAB
ALBUMIN SERPL ELPH-MCNC: 3.5 G/DL — SIGNIFICANT CHANGE UP (ref 3.3–5)
ALP SERPL-CCNC: 47 U/L — SIGNIFICANT CHANGE UP (ref 40–120)
ALT FLD-CCNC: 35 U/L — SIGNIFICANT CHANGE UP (ref 10–45)
ANION GAP SERPL CALC-SCNC: 10 MMOL/L — SIGNIFICANT CHANGE UP (ref 5–17)
AST SERPL-CCNC: 25 U/L — SIGNIFICANT CHANGE UP (ref 10–40)
BASOPHILS # BLD AUTO: 0.05 K/UL — SIGNIFICANT CHANGE UP (ref 0–0.2)
BASOPHILS NFR BLD AUTO: 0.3 % — SIGNIFICANT CHANGE UP (ref 0–2)
BILIRUB SERPL-MCNC: 0.8 MG/DL — SIGNIFICANT CHANGE UP (ref 0.2–1.2)
BUN SERPL-MCNC: 17 MG/DL — SIGNIFICANT CHANGE UP (ref 7–23)
CALCIUM SERPL-MCNC: 8.9 MG/DL — SIGNIFICANT CHANGE UP (ref 8.4–10.5)
CHLORIDE SERPL-SCNC: 98 MMOL/L — SIGNIFICANT CHANGE UP (ref 96–108)
CO2 SERPL-SCNC: 25 MMOL/L — SIGNIFICANT CHANGE UP (ref 22–31)
CREAT SERPL-MCNC: 0.92 MG/DL — SIGNIFICANT CHANGE UP (ref 0.5–1.3)
EOSINOPHIL # BLD AUTO: 0.39 K/UL — SIGNIFICANT CHANGE UP (ref 0–0.5)
EOSINOPHIL NFR BLD AUTO: 2.5 % — SIGNIFICANT CHANGE UP (ref 0–6)
GLUCOSE SERPL-MCNC: 121 MG/DL — HIGH (ref 70–99)
HCT VFR BLD CALC: 41.4 % — SIGNIFICANT CHANGE UP (ref 39–50)
HGB BLD-MCNC: 13.7 G/DL — SIGNIFICANT CHANGE UP (ref 13–17)
IMM GRANULOCYTES NFR BLD AUTO: 0.6 % — SIGNIFICANT CHANGE UP (ref 0–1.5)
LYMPHOCYTES # BLD AUTO: 15.6 % — SIGNIFICANT CHANGE UP (ref 13–44)
LYMPHOCYTES # BLD AUTO: 2.43 K/UL — SIGNIFICANT CHANGE UP (ref 1–3.3)
MCHC RBC-ENTMCNC: 29.9 PG — SIGNIFICANT CHANGE UP (ref 27–34)
MCHC RBC-ENTMCNC: 33.1 GM/DL — SIGNIFICANT CHANGE UP (ref 32–36)
MCV RBC AUTO: 90.4 FL — SIGNIFICANT CHANGE UP (ref 80–100)
MONOCYTES # BLD AUTO: 1.67 K/UL — HIGH (ref 0–0.9)
MONOCYTES NFR BLD AUTO: 10.7 % — SIGNIFICANT CHANGE UP (ref 2–14)
NEUTROPHILS # BLD AUTO: 10.98 K/UL — HIGH (ref 1.8–7.4)
NEUTROPHILS NFR BLD AUTO: 70.3 % — SIGNIFICANT CHANGE UP (ref 43–77)
NRBC # BLD: 0 /100 WBCS — SIGNIFICANT CHANGE UP (ref 0–0)
PLATELET # BLD AUTO: 245 K/UL — SIGNIFICANT CHANGE UP (ref 150–400)
POTASSIUM SERPL-MCNC: 4 MMOL/L — SIGNIFICANT CHANGE UP (ref 3.5–5.3)
POTASSIUM SERPL-SCNC: 4 MMOL/L — SIGNIFICANT CHANGE UP (ref 3.5–5.3)
PROT SERPL-MCNC: 6.7 G/DL — SIGNIFICANT CHANGE UP (ref 6–8.3)
RBC # BLD: 4.58 M/UL — SIGNIFICANT CHANGE UP (ref 4.2–5.8)
RBC # FLD: 13.8 % — SIGNIFICANT CHANGE UP (ref 10.3–14.5)
SODIUM SERPL-SCNC: 133 MMOL/L — LOW (ref 135–145)
WBC # BLD: 15.61 K/UL — HIGH (ref 3.8–10.5)
WBC # FLD AUTO: 15.61 K/UL — HIGH (ref 3.8–10.5)

## 2021-03-29 PROCEDURE — 82553 CREATINE MB FRACTION: CPT

## 2021-03-29 PROCEDURE — 86769 SARS-COV-2 COVID-19 ANTIBODY: CPT

## 2021-03-29 PROCEDURE — 87640 STAPH A DNA AMP PROBE: CPT

## 2021-03-29 PROCEDURE — 85730 THROMBOPLASTIN TIME PARTIAL: CPT

## 2021-03-29 PROCEDURE — 86901 BLOOD TYPING SEROLOGIC RH(D): CPT

## 2021-03-29 PROCEDURE — 85576 BLOOD PLATELET AGGREGATION: CPT

## 2021-03-29 PROCEDURE — 82330 ASSAY OF CALCIUM: CPT

## 2021-03-29 PROCEDURE — 82803 BLOOD GASES ANY COMBINATION: CPT

## 2021-03-29 PROCEDURE — 94010 BREATHING CAPACITY TEST: CPT

## 2021-03-29 PROCEDURE — 84295 ASSAY OF SERUM SODIUM: CPT

## 2021-03-29 PROCEDURE — U0003: CPT

## 2021-03-29 PROCEDURE — C1769: CPT

## 2021-03-29 PROCEDURE — 93880 EXTRACRANIAL BILAT STUDY: CPT

## 2021-03-29 PROCEDURE — 97116 GAIT TRAINING THERAPY: CPT

## 2021-03-29 PROCEDURE — 86891 AUTOLOGOUS BLOOD OP SALVAGE: CPT

## 2021-03-29 PROCEDURE — 83880 ASSAY OF NATRIURETIC PEPTIDE: CPT

## 2021-03-29 PROCEDURE — 94640 AIRWAY INHALATION TREATMENT: CPT

## 2021-03-29 PROCEDURE — 84134 ASSAY OF PREALBUMIN: CPT

## 2021-03-29 PROCEDURE — 83036 HEMOGLOBIN GLYCOSYLATED A1C: CPT

## 2021-03-29 PROCEDURE — P9047: CPT

## 2021-03-29 PROCEDURE — 81001 URINALYSIS AUTO W/SCOPE: CPT

## 2021-03-29 PROCEDURE — 84484 ASSAY OF TROPONIN QUANT: CPT

## 2021-03-29 PROCEDURE — 84100 ASSAY OF PHOSPHORUS: CPT

## 2021-03-29 PROCEDURE — 82947 ASSAY GLUCOSE BLOOD QUANT: CPT

## 2021-03-29 PROCEDURE — 93010 ELECTROCARDIOGRAM REPORT: CPT

## 2021-03-29 PROCEDURE — 85610 PROTHROMBIN TIME: CPT

## 2021-03-29 PROCEDURE — C1729: CPT

## 2021-03-29 PROCEDURE — 85384 FIBRINOGEN ACTIVITY: CPT

## 2021-03-29 PROCEDURE — C1751: CPT

## 2021-03-29 PROCEDURE — 82565 ASSAY OF CREATININE: CPT

## 2021-03-29 PROCEDURE — 82550 ASSAY OF CK (CPK): CPT

## 2021-03-29 PROCEDURE — 86850 RBC ANTIBODY SCREEN: CPT

## 2021-03-29 PROCEDURE — 84439 ASSAY OF FREE THYROXINE: CPT

## 2021-03-29 PROCEDURE — 82962 GLUCOSE BLOOD TEST: CPT

## 2021-03-29 PROCEDURE — 85027 COMPLETE CBC AUTOMATED: CPT

## 2021-03-29 PROCEDURE — 97162 PT EVAL MOD COMPLEX 30 MIN: CPT

## 2021-03-29 PROCEDURE — 87641 MR-STAPH DNA AMP PROBE: CPT

## 2021-03-29 PROCEDURE — C1889: CPT

## 2021-03-29 PROCEDURE — P9045: CPT

## 2021-03-29 PROCEDURE — 80053 COMPREHEN METABOLIC PANEL: CPT

## 2021-03-29 PROCEDURE — 94002 VENT MGMT INPAT INIT DAY: CPT

## 2021-03-29 PROCEDURE — 83605 ASSAY OF LACTIC ACID: CPT

## 2021-03-29 PROCEDURE — 97530 THERAPEUTIC ACTIVITIES: CPT

## 2021-03-29 PROCEDURE — 71045 X-RAY EXAM CHEST 1 VIEW: CPT

## 2021-03-29 PROCEDURE — 80048 BASIC METABOLIC PNL TOTAL CA: CPT

## 2021-03-29 PROCEDURE — 85018 HEMOGLOBIN: CPT

## 2021-03-29 PROCEDURE — 84443 ASSAY THYROID STIM HORMONE: CPT

## 2021-03-29 PROCEDURE — 83735 ASSAY OF MAGNESIUM: CPT

## 2021-03-29 PROCEDURE — 86923 COMPATIBILITY TEST ELECTRIC: CPT

## 2021-03-29 PROCEDURE — 86900 BLOOD TYPING SEROLOGIC ABO: CPT

## 2021-03-29 PROCEDURE — 84132 ASSAY OF SERUM POTASSIUM: CPT

## 2021-03-29 PROCEDURE — U0005: CPT

## 2021-03-29 PROCEDURE — 93005 ELECTROCARDIOGRAM TRACING: CPT

## 2021-03-29 PROCEDURE — 82435 ASSAY OF BLOOD CHLORIDE: CPT

## 2021-03-29 PROCEDURE — 85014 HEMATOCRIT: CPT

## 2021-03-29 PROCEDURE — 85025 COMPLETE CBC W/AUTO DIFF WBC: CPT

## 2021-03-29 RX ORDER — ALLOPURINOL 300 MG
1 TABLET ORAL
Qty: 0 | Refills: 0 | DISCHARGE
Start: 2021-03-29

## 2021-03-29 RX ORDER — SENNA PLUS 8.6 MG/1
2 TABLET ORAL
Qty: 0 | Refills: 0 | DISCHARGE
Start: 2021-03-29

## 2021-03-29 RX ORDER — METOPROLOL TARTRATE 50 MG
1 TABLET ORAL
Qty: 90 | Refills: 0
Start: 2021-03-29 | End: 2021-04-27

## 2021-03-29 RX ORDER — ATORVASTATIN CALCIUM 80 MG/1
1 TABLET, FILM COATED ORAL
Qty: 30 | Refills: 0
Start: 2021-03-29 | End: 2021-04-27

## 2021-03-29 RX ORDER — OMEGA-3 ACID ETHYL ESTERS 1 G
1 CAPSULE ORAL
Qty: 0 | Refills: 0 | DISCHARGE

## 2021-03-29 RX ORDER — HYDROMORPHONE HYDROCHLORIDE 2 MG/ML
1 INJECTION INTRAMUSCULAR; INTRAVENOUS; SUBCUTANEOUS
Qty: 28 | Refills: 0
Start: 2021-03-29 | End: 2021-04-04

## 2021-03-29 RX ORDER — APIXABAN 2.5 MG/1
1 TABLET, FILM COATED ORAL
Qty: 60 | Refills: 0
Start: 2021-03-29 | End: 2021-04-27

## 2021-03-29 RX ORDER — ASPIRIN/CALCIUM CARB/MAGNESIUM 324 MG
1 TABLET ORAL
Qty: 30 | Refills: 0
Start: 2021-03-29 | End: 2021-04-27

## 2021-03-29 RX ORDER — AMIODARONE HYDROCHLORIDE 400 MG/1
1 TABLET ORAL
Qty: 30 | Refills: 0
Start: 2021-03-29 | End: 2021-04-27

## 2021-03-29 RX ORDER — POLYETHYLENE GLYCOL 3350 17 G/17G
17 POWDER, FOR SOLUTION ORAL
Qty: 0 | Refills: 0 | DISCHARGE
Start: 2021-03-29

## 2021-03-29 RX ADMIN — AMIODARONE HYDROCHLORIDE 400 MILLIGRAM(S): 400 TABLET ORAL at 05:57

## 2021-03-29 RX ADMIN — APIXABAN 5 MILLIGRAM(S): 2.5 TABLET, FILM COATED ORAL at 05:58

## 2021-03-29 RX ADMIN — Medication 50 MILLIGRAM(S): at 05:57

## 2021-03-29 RX ADMIN — PANTOPRAZOLE SODIUM 40 MILLIGRAM(S): 20 TABLET, DELAYED RELEASE ORAL at 06:03

## 2021-03-29 RX ADMIN — Medication 81 MILLIGRAM(S): at 11:36

## 2021-03-29 NOTE — DISCHARGE NOTE PROVIDER - HOSPITAL COURSE
62 y/o M w/ PMHx of gout, obesity, retinal tear, inguinal hernia, aflutter s/p ablation, MI, CAD s/p PCI x 4 stents, HTN, HLD and recent dental implant 3/16/21 who presented to the ED c/o central, non-radiating, pressure-like chest pain x several hours and HTN to 160/100. Admitted for c/f NSTEMI. Managed with heparin drip, ASA, statin, BB. Trops downtrended, underwent cath on 3/22/21, showing 3VD. During post procedure recovery, pt reported Lt vision changes and B/L LE tightness. Pt was assessed with Dr. Deleon, full strength on B/L LE and B/L UE with normal motor and sensory function on quick exam, no focal neuro deficit noted,  Eye vision changes improved (almost back to normal) after IV hydration.  He was transferred to [facility] for surgical eval with Dr. Thomason. Of note, pt has already received 1 dose of Pfizer COVID vaccine.     3/23 ENT consulted for R ear pain - recommend Flonase and outpt f/u for eustachian tube dysfunction.  3/24 s/p C4L. No products intra-op, no pressor requirements.   3/25 Extubated at 0004. Insulin gtt weaned to off this afternoon. Lopressor 50 mg q8h initiated, will uptitrate as tolerated. Maintain med and L pleural chest tubes for now as per Dr. Thomason. Received pt to SDU - VSS, NAD. Continue current medication regimen for now.   3/26  d/c Ct's,intro pw.  Lopressor as tolerated  Preop dental implant was on amox, will resume for 24hrs Insulin infusion remains off-glucose 114 to 139  May transfer to floor  3/27 Pt remains in rate controlled afib . Will consider initiating anticoagulation.  3/28 Persistent afib. Eliquis initiated. Check chest xray in am. Anticipate discharge tomorrow. Continue with amiodarone, aspirin statin and bblocker  3/29 VSS: pt converted from afib - rsr ; ekg done qtc 474; discharge pt home today as per DR. Thomason

## 2021-03-29 NOTE — DISCHARGE NOTE PROVIDER - NSDCFUADDINST_GEN_ALL_CORE_FT
no driving until cleared by DR Dang  refer to cardiac surgery do and don't checklist  call DR. Dang for fever > 101

## 2021-03-29 NOTE — DISCHARGE NOTE PROVIDER - CARE PROVIDERS DIRECT ADDRESSES
[email or web address]

## 2021-03-29 NOTE — DISCHARGE NOTE PROVIDER - NSDCPNSUBOBJ_GEN_ALL_CORE
VSS  tele: afib- rsr   midsternal incision cdi dariusz- sternum stable  lungs clear, RR easy unlabored  + bs nt nd + bm; neg n/v/d  LE: neg calf tenderness, trace LE edema, ppp bilaterally, left SVG site cdi dariusz.     Discharge pt home today 3/29 as per Dr. Thomason

## 2021-03-29 NOTE — DISCHARGE NOTE PROVIDER - NSDCCPCAREPLAN_GEN_ALL_CORE_FT
PRINCIPAL DISCHARGE DIAGNOSIS  Diagnosis: S/P CABG x 4  Assessment and Plan of Treatment: S/P CABG x 4  shower daily  weigh yourself daily  continue current prescriptions as ordered  increase activity as tolerated   no added salt; low fat; low cholesterol, low salt diet.   follow up with Cardiologist in 1-2 weeks. Call to schedule appointment.  follow up with cardiac surgeon, Dr. Thomason on April 5th at 1:45 pm. Call to confirm appointment. [phone]

## 2021-03-29 NOTE — DISCHARGE NOTE PROVIDER - CARE PROVIDER_API CALL
Keron Thomason)  Surgery; Thoracic and Cardiac Surgery  [address] [place] [address]  Phone: [phone]  Fax: [phone]  Follow Up Time:

## 2021-03-29 NOTE — DISCHARGE NOTE NURSING/CASE MANAGEMENT/SOCIAL WORK - PATIENT PORTAL LINK FT
You can access the FollowMyHealth Patient Portal offered by [facility] by registering at the following website: [email or web address]. By joining [facility]’s FollowMyHealth portal, you will also be able to view your health information using other applications (apps) compatible with our system.

## 2021-03-30 ENCOUNTER — NON-APPOINTMENT (OUTPATIENT)
Age: 61
End: 2021-03-30

## 2021-03-30 RX ORDER — METOPROLOL SUCCINATE 25 MG/1
25 TABLET, EXTENDED RELEASE ORAL DAILY
Qty: 45 | Refills: 3 | Status: DISCONTINUED | COMMUNITY
Start: 2019-02-22 | End: 2021-03-30

## 2021-03-30 RX ORDER — PAPAVERINE HYDROCHLORIDE 30 MG/ML
30 INJECTION, SOLUTION INTRAVENOUS
Qty: 30 | Refills: 0 | Status: DISCONTINUED | COMMUNITY
Start: 2019-12-11 | End: 2021-03-30

## 2021-03-30 RX ORDER — TESTOSTERONE CYPIONATE 200 MG/ML
200 INJECTION, SOLUTION INTRAMUSCULAR
Qty: 2 | Refills: 0 | Status: DISCONTINUED | COMMUNITY
Start: 2020-11-10 | End: 2021-03-30

## 2021-04-01 ENCOUNTER — APPOINTMENT (OUTPATIENT)
Dept: CARE COORDINATION | Facility: HOME HEALTH | Age: 61
End: 2021-04-01
Payer: COMMERCIAL

## 2021-04-01 VITALS
RESPIRATION RATE: 20 BRPM | TEMPERATURE: 98 F | DIASTOLIC BLOOD PRESSURE: 80 MMHG | HEART RATE: 110 BPM | SYSTOLIC BLOOD PRESSURE: 140 MMHG | BODY MASS INDEX: 34.28 KG/M2 | OXYGEN SATURATION: 95 % | WEIGHT: 267 LBS

## 2021-04-01 PROCEDURE — 99024 POSTOP FOLLOW-UP VISIT: CPT

## 2021-04-01 NOTE — PHYSICAL EXAM
[Sclera] : the sclera and conjunctiva were normal [PERRL With Normal Accommodation] : pupils were equal in size, round, and reactive to light [Extraocular Movements] : extraocular movements were intact [Neck Appearance] : the appearance of the neck was normal [Jugular Venous Distention Increased] : there was no jugular-venous distention [Exaggerated Use Of Accessory Muscles For Inspiration] : no accessory muscle use [Respiration, Rhythm And Depth] : normal respiratory rhythm and effort [Auscultation Breath Sounds / Voice Sounds] : lungs were clear to auscultation bilaterally [Apical Impulse] : the apical impulse was normal [Heart Sounds] : normal S1 and S2 [Murmurs] : no murmurs [Heart Sounds Pericardial Friction Rub] : no pericardial rub [Tachycardic ___] : the heart rate was tachycardic at [unfilled] bpm [Irregularly Irregular] : the rhythm was irregularly irregular [Examination Of The Chest] : the chest was normal in appearance [Chest Visual Inspection Thoracic Asymmetry] : no chest asymmetry [Diminished Respiratory Excursion] : normal chest expansion [2+] : left 2+ [Fingers] :  capillary refill of the fingers was normal [Bowel Sounds] : normal bowel sounds [Abdomen Soft] : soft [Abdomen Tenderness] : non-tender [Cervical Lymph Nodes Enlarged Posterior Bilaterally] : posterior cervical [Cervical Lymph Nodes Enlarged Anterior Bilaterally] : anterior cervical [Nail Clubbing] : no clubbing  or cyanosis of the fingernails [Abnormal Walk] : normal gait [Motor Tone] : muscle strength and tone were normal [Skin Color & Pigmentation] : normal skin color and pigmentation [Skin Turgor] : normal skin turgor [] : no rash [FreeTextEntry1] : L SVG sites healing well. Resolving ecchymosis to left thigh. No tenderness or hematoma. [No Focal Deficits] : no focal deficits [Oriented To Time, Place, And Person] : oriented to person, place, and time [Impaired Insight] : insight and judgment were intact [Affect] : the affect was normal [Mood] : the mood was normal

## 2021-04-01 NOTE — HISTORY OF PRESENT ILLNESS
[FreeTextEntry1] : As per EMR:\par \par Hospital Course: 60 y/o M w/ PMHx of gout, obesity, retinal tear, inguinal hernia, aflutter s/p ablation, MI, CAD s/p PCI x 4 stents, HTN, HLD and recent dental implant 3/16/21 who presented to the ED c/o central, non-radiating, pressure-like chest pain x several hours and HTN to 160/100. Admitted for c/f NSTEMI. Managed with heparin drip, ASA, statin, BB. Trops downtrended, underwent cath on 3/22/21, showing 3VD. During post procedure recovery, pt reported Lt vision changes and B/L LE tightness. Pt was assessed with Dr. Deleon, full strength on B/L LE and B/L UE with normal motor and sensory function on quick exam, no focal neuro deficit noted, Eye vision changes improved (almost back to normal) after IV hydration. He was transferred to [facility] for surgical eval with Dr. Thomason. Of note, pt has already received 1 dose of Pfizer COVID vaccine. \par 3/23 ENT consulted for R ear pain - recommend Flonase and outpt f/u for eustachian tube dysfunction.\par 3/24 s/p C4L. No products intra-op, no pressor requirements. \par 3/25 Extubated at 0004. Insulin gtt weaned to off this afternoon. Lopressor 50 mg q8h initiated, will uptitrate as tolerated. Maintain med and L pleural chest tubes for now as per Dr. Thomason. Received pt to SDU - VSS, NAD. Continue current medication regimen for now. \par 3/26 d/c CT's,intro pw. Lopressor as tolerated\par Preop dental implant was on amox, will resume for 24 hrs Insulin infusion remains off-glucose 114 to 139\par May transfer to floor\par 3/27 Pt remains in rate controlled afib . Will consider initiating anticoagulation.\par 3/28 Persistent afib. Eliquis initiated. Check chest xray in am. Anticipate discharge tomorrow. Continue with Amiodarone, Aspirin, statin and bblocker\par 3/29 VSS: pt converted from afib - RSR ; EKG done ; discharge pt home today as per DR. Thomason.\par \par Pt. seen & examined at home in routine f/u. Labs & studies reviewed. Upon obtaining VS pt, noted to be in a-fib '.s /80. Medications reviewed & pt. realized he had forgotten to take am & pm dose of Metoprolol Tartrate 50 mg & had been only taking it once a day in the middle of the day. He corrected his pill dispenser at this time.  Pt. reports taking Dilaudid at hs & Tylenol during the day for pain.

## 2021-04-01 NOTE — ASSESSMENT
[FreeTextEntry1] : Overweight, middle-aged male doing well s/p CABG x 4. \par \par Problems:\par 1. HTN/HLD/Obesity/a-fib/CAD; s/p CABG x 4\par c/w daily weights, temps and showers.\par Continue with current meds: Amiodarone, Metoprolol, Eliquis, Atorvastatin & ASA.\par HR not controlled due to incorrect dose of Metoprolol. Pt. educated on correct dosing. Pt. verbalized understanding & able to fill pill dispenser correctly.\par c/w Tylenol & Dilaudid PRN pain.\par c/w Pepcid for GI ppx.\par Keep legs elevated.\par Incentive spirometry.\par Homecare- [facility].\par Spoke with primary RN on case & requested f/u visit 4/2 or 4/2 to f/u on HR & BP.\par Diet- low salt, low fat.\par f/u appts - CTS, Cardiology & PCP.\par [facility] team will continue to f/u with pt status. \par Pt agrees to call with any questions, issues or concerns.\par \par 2. Allergic rhinitis/pharyngitis\par c/w Flonase, Claritin & Chloraseptic spray.\par \par 3. Gout\par c/w Allopurinol.\par f/u with PCP.

## 2021-04-01 NOTE — REVIEW OF SYSTEMS
[Nasal Discharge] : nasal discharge [Sore Throat] : sore throat [Cough] : cough [Easy Bleeding] : no tendency for easy bleeding [Easy Bruising] : no tendency for easy bruising [Swollen Glands] : no swollen glands [Swollen Glands In The Neck] : no swollen glands in the neck [Negative] : Psychiatric [de-identified] : Surgical incisions.

## 2021-04-05 ENCOUNTER — APPOINTMENT (OUTPATIENT)
Dept: CARDIOTHORACIC SURGERY | Facility: CLINIC | Age: 61
End: 2021-04-05
Payer: COMMERCIAL

## 2021-04-05 VITALS
TEMPERATURE: 98.8 F | WEIGHT: 267 LBS | SYSTOLIC BLOOD PRESSURE: 137 MMHG | DIASTOLIC BLOOD PRESSURE: 86 MMHG | BODY MASS INDEX: 34.27 KG/M2 | RESPIRATION RATE: 16 BRPM | HEART RATE: 62 BPM | HEIGHT: 74 IN

## 2021-04-05 PROCEDURE — 99024 POSTOP FOLLOW-UP VISIT: CPT

## 2021-04-05 RX ORDER — SENNOSIDES 8.6 MG TABLETS 8.6 MG/1
8.6 TABLET ORAL AT BEDTIME
Refills: 0 | Status: COMPLETED | COMMUNITY
Start: 2021-03-30 | End: 2021-04-05

## 2021-04-05 RX ORDER — POLYETHYLENE GLYCOL 3350 17 G/17G
17 POWDER, FOR SOLUTION ORAL
Qty: 1 | Refills: 0 | Status: COMPLETED | COMMUNITY
Start: 2021-03-30 | End: 2021-04-05

## 2021-04-06 ENCOUNTER — TRANSCRIPTION ENCOUNTER (OUTPATIENT)
Age: 61
End: 2021-04-06

## 2021-04-23 ENCOUNTER — APPOINTMENT (OUTPATIENT)
Dept: CARDIOLOGY | Facility: CLINIC | Age: 61
End: 2021-04-23
Payer: COMMERCIAL

## 2021-04-23 ENCOUNTER — NON-APPOINTMENT (OUTPATIENT)
Age: 61
End: 2021-04-23

## 2021-04-23 VITALS
SYSTOLIC BLOOD PRESSURE: 128 MMHG | WEIGHT: 273 LBS | TEMPERATURE: 98.4 F | HEIGHT: 74 IN | DIASTOLIC BLOOD PRESSURE: 80 MMHG | HEART RATE: 63 BPM | OXYGEN SATURATION: 97 % | BODY MASS INDEX: 35.04 KG/M2

## 2021-04-23 PROCEDURE — 99072 ADDL SUPL MATRL&STAF TM PHE: CPT

## 2021-04-23 PROCEDURE — 93000 ELECTROCARDIOGRAM COMPLETE: CPT

## 2021-04-23 PROCEDURE — 99214 OFFICE O/P EST MOD 30 MIN: CPT

## 2021-04-23 RX ORDER — PHENOL 1.4 %
1.4 AEROSOL, SPRAY (ML) MUCOUS MEMBRANE
Refills: 0 | Status: DISCONTINUED | COMMUNITY
Start: 2021-04-01 | End: 2021-04-23

## 2021-04-25 NOTE — DISCUSSION/SUMMARY
[FreeTextEntry1] : Pt will continue current medications. Pt will initiate cardiac rehab. He was counseled in stress reduction was encouraged.

## 2021-04-25 NOTE — REASON FOR VISIT
[Hyperlipidemia] : hyperlipidemia [Hypertension] : hypertension [Coronary Artery Disease] : coronary artery disease

## 2021-04-25 NOTE — HISTORY OF PRESENT ILLNESS
[FreeTextEntry1] : 60 yo male presents for follow up after recent CABG. Pt visit with CTSX was reviewed. Pt feels improved and denies chest pain. He is ambulating well at this point and is interested in cardiac rehab. Meds were reviewed. Previous notes were reviewed.

## 2021-04-25 NOTE — PHYSICAL EXAM

## 2021-04-28 ENCOUNTER — TRANSCRIPTION ENCOUNTER (OUTPATIENT)
Age: 61
End: 2021-04-28

## 2021-05-01 ENCOUNTER — NON-APPOINTMENT (OUTPATIENT)
Age: 61
End: 2021-05-01

## 2021-05-01 RX ORDER — MULTIVIT-MIN/FOLIC/VIT K/LYCOP 400-300MCG
25 MCG TABLET ORAL DAILY
Qty: 31 | Refills: 0 | Status: ACTIVE | COMMUNITY
Start: 2021-03-30 | End: 1900-01-01

## 2021-05-05 ENCOUNTER — APPOINTMENT (OUTPATIENT)
Dept: CARDIOTHORACIC SURGERY | Facility: CLINIC | Age: 61
End: 2021-05-05
Payer: COMMERCIAL

## 2021-05-06 ENCOUNTER — APPOINTMENT (OUTPATIENT)
Dept: CARDIOTHORACIC SURGERY | Facility: CLINIC | Age: 61
End: 2021-05-06
Payer: COMMERCIAL

## 2021-05-06 ENCOUNTER — RX RENEWAL (OUTPATIENT)
Age: 61
End: 2021-05-06

## 2021-05-06 VITALS
OXYGEN SATURATION: 97 % | RESPIRATION RATE: 16 BRPM | HEIGHT: 74 IN | SYSTOLIC BLOOD PRESSURE: 115 MMHG | TEMPERATURE: 98.4 F | DIASTOLIC BLOOD PRESSURE: 78 MMHG | HEART RATE: 60 BPM

## 2021-05-06 PROCEDURE — 93000 ELECTROCARDIOGRAM COMPLETE: CPT

## 2021-05-06 PROCEDURE — 99024 POSTOP FOLLOW-UP VISIT: CPT

## 2021-05-06 RX ORDER — HYDROMORPHONE HYDROCHLORIDE 2 MG/1
2 TABLET ORAL
Qty: 60 | Refills: 0 | Status: COMPLETED | COMMUNITY
Start: 2021-03-30 | End: 2021-05-06

## 2021-05-12 ENCOUNTER — RX CHANGE (OUTPATIENT)
Age: 61
End: 2021-05-12

## 2021-05-13 ENCOUNTER — APPOINTMENT (OUTPATIENT)
Dept: CARDIOLOGY | Facility: CLINIC | Age: 61
End: 2021-05-13

## 2021-05-26 ENCOUNTER — RX CHANGE (OUTPATIENT)
Age: 61
End: 2021-05-26

## 2021-05-28 ENCOUNTER — APPOINTMENT (OUTPATIENT)
Dept: CARDIOLOGY | Facility: CLINIC | Age: 61
End: 2021-05-28
Payer: COMMERCIAL

## 2021-05-28 ENCOUNTER — APPOINTMENT (OUTPATIENT)
Dept: CARDIOLOGY | Facility: CLINIC | Age: 61
End: 2021-05-28

## 2021-05-28 VITALS
HEIGHT: 74 IN | BODY MASS INDEX: 35.04 KG/M2 | SYSTOLIC BLOOD PRESSURE: 110 MMHG | WEIGHT: 273 LBS | OXYGEN SATURATION: 95 % | TEMPERATURE: 98.6 F | HEART RATE: 74 BPM | DIASTOLIC BLOOD PRESSURE: 80 MMHG

## 2021-05-28 PROCEDURE — 99072 ADDL SUPL MATRL&STAF TM PHE: CPT

## 2021-05-28 PROCEDURE — 99214 OFFICE O/P EST MOD 30 MIN: CPT

## 2021-05-28 PROCEDURE — 93000 ELECTROCARDIOGRAM COMPLETE: CPT

## 2021-05-28 RX ORDER — ZOLPIDEM TARTRATE 10 MG/1
10 TABLET ORAL
Qty: 30 | Refills: 3 | Status: DISCONTINUED | COMMUNITY
Start: 2019-10-03 | End: 2021-05-28

## 2021-05-28 NOTE — PHYSICAL EXAM

## 2021-05-28 NOTE — DISCUSSION/SUMMARY
[FreeTextEntry1] : Pt will continue current medications. Pt will initiate cardiac rehab. Pt will continue current therapy with BB

## 2021-05-28 NOTE — HISTORY OF PRESENT ILLNESS
[FreeTextEntry1] : 62 yo male presents for follow up after recent CABG. Pt visit with CTSX was reviewed. Pt feels improved and denies chest pain. He is ambulating well at this point and is interested in cardiac rehab.\par Pt presents for result of Zio. 7 beats of NSVT was noted. Pt was asymptomatic. Meds were reviewed.

## 2021-07-23 ENCOUNTER — APPOINTMENT (OUTPATIENT)
Dept: CARDIOLOGY | Facility: CLINIC | Age: 61
End: 2021-07-23

## 2021-07-27 NOTE — PROGRESS NOTE ADULT - PROBLEM SELECTOR PLAN 1
Video Progress Note    Chief complaint: Leticia  is a 49 year old year old choose not to disclose requesting to be evaluated for cough.      HPI:    Cough    Going on for 5 days   Rattling a lot when she is lying down  Short of breath    No fevers  No body aches  No sore throat  No body aches  No nausea, vomiting, diarrhea  No headache    Had negative covid test 5 days ago    Drank a whole bottle of delsym     Had biopsy on Friday  Was on vacation last week beforehand  Had flown on a plane    Daughter is starting to get sick now    Past Medical History:   Diagnosis Date   • Arthritis    • Chronic pain     neck pain   • CPAP (continuous positive airway pressure) dependence    • Gout    • High cholesterol    • Hypertension    • Neck pain     From spinal stenosis, chronic -- sees naturopath and chiropractor   • Sleep apnea     Using CPAP   • Type 2 diabetes mellitus (CMS/[facility])        Current Outpatient Medications   Medication Sig Dispense Refill   • naproxen (NAPROSYN) 500 MG tablet TAKE 1 TABLET BY MOUTH TWICE DAILY 180 tablet 0   • telmisartan (MICARDIS) 80 MG tablet Take 1 tablet by mouth daily. 30 tablet 3   • hydrALAZINE (APRESOLINE) 10 MG tablet Take 1 tablet by mouth 2 times daily. 180 tablet 1   • Semaglutide, 1 MG/DOSE, (1.34 mg/ml) 1 MG/DOSE Solution Pen-injector Inject 1 mg into the skin every 7 days. (Patient taking differently: Inject 1 mg into the skin every 7 days. Indications: Type 2 Diabetes ) 4.5 mL 3   • hydrochlorothiazide (HYDRODIURIL) 12.5 MG tablet Take 1 tablet by mouth 2 times daily. 180 tablet 3   • atorvastatin (LIPITOR) 40 MG tablet Take 1 tablet by mouth daily. 90 tablet 3   • amLODIPine (NORVASC) 10 MG tablet Take 1 tablet by mouth daily. (Patient taking differently: Take 10 mg by mouth daily. Indications: take in AM DOS ) 90 tablet 3   • allopurinol (ZYLOPRIM) 300 MG tablet Take 1.5 tablets by mouth daily. 135 tablet 3   • cyclobenzaprine (FLEXERIL) 5 MG tablet Take 1 tablet by mouth at  Continue post-op care   Continue ASA 81 mg QD, Lopressor 50 mg q8h & Atorvastatin 40 mg qhs   Maintain med and L pleural CTs for now -d/c  d/c  RIJ and PW for now  Increase activity as tolerated, ambulate 4x daily and w/ PT   Chest PT, encourage incentive spirometry   Wound care, pain control bedtime as needed for Muscle spasms. 42 tablet 0   • Multiple Vitamins-Minerals (MULTIVITAMIN ADULT PO) Take 1 tablet by mouth daily.     • metFORMIN (GLUCOPHAGE-XR) 500 MG 24 hr tablet TAKE 1 TABLET BY MOUTH TWICE DAILY (Patient taking differently: Take 500 mg by mouth daily. ) 180 tablet 0     No current facility-administered medications for this visit.     ALLERGIES:   Allergen Reactions   • Colchicine Other (See Comments)     Adverse reaction, not true allergy -- nausea, vomiting, diarrhea, fevers       Social History     Tobacco Use   • Smoking status: Never Smoker   • Smokeless tobacco: Never Used   Substance Use Topics   • Alcohol use: No   • Drug use: Never       Review of Systems   Constitutional: Positive for fatigue. Negative for chills, diaphoresis and fever.   HENT: Positive for rhinorrhea. Negative for congestion, ear discharge, ear pain, postnasal drip, sinus pressure, sinus pain and sore throat.    Respiratory: Positive for cough and shortness of breath.    Gastrointestinal: Negative for abdominal pain, diarrhea, nausea and vomiting.   Musculoskeletal: Negative for arthralgias and myalgias.   Neurological: Negative for headaches.        Physical exam:  No acute distress  No respiratory distress  +frequent coughing      Assessment/Plan:     1. Cough  Acute issue. Most consistent with viral syndrome. Should have covid-19 testing done since testing on day 1 may have been too early to be reliable. No respiratory distress. Tolerating oral intake well. Comfortable with home treatment for now. She understands isolation protocol.  - albuterol 108 (90 Base) MCG/ACT inhaler; Inhale 2 puffs into the lungs every 4 hours as needed for Shortness of Breath.  Dispense: 1 each; Refill: 2  - POST VIRTUAL VISIT TESTING; Future  -isolation  -maintain hydration        This visit was performed via live interactive two-way video.    Clinician Location: [facility]   Patient Location:  Home    she is an established patient of mine.  she is in [place] and her identity has been established.   Leticia  understands that we are limiting office visits due to the coronavirus pandemic and she consents to a virtual visit with charges submitted to her insurance.   Without the patient being seen and evaluated in person, there is a risk that the information and/or assessment may be incomplete or inaccurate.

## 2021-08-06 ENCOUNTER — APPOINTMENT (OUTPATIENT)
Dept: CARDIOLOGY | Facility: CLINIC | Age: 61
End: 2021-08-06
Payer: COMMERCIAL

## 2021-08-06 ENCOUNTER — NON-APPOINTMENT (OUTPATIENT)
Age: 61
End: 2021-08-06

## 2021-08-06 VITALS
OXYGEN SATURATION: 96 % | WEIGHT: 290 LBS | DIASTOLIC BLOOD PRESSURE: 80 MMHG | BODY MASS INDEX: 37.22 KG/M2 | HEART RATE: 75 BPM | SYSTOLIC BLOOD PRESSURE: 124 MMHG | HEIGHT: 74 IN

## 2021-08-06 PROCEDURE — 99214 OFFICE O/P EST MOD 30 MIN: CPT | Mod: 25

## 2021-08-06 PROCEDURE — 93000 ELECTROCARDIOGRAM COMPLETE: CPT

## 2021-08-06 NOTE — PHYSICAL EXAM

## 2021-08-06 NOTE — DISCUSSION/SUMMARY
[FreeTextEntry1] : Pt will continue current medications except he will replace crestor with lipitor. He will reduce metoprolol to 25 mg bid. He will use succinate. He will go to cardiac rehab. Labs will be obtained.

## 2021-08-06 NOTE — HISTORY OF PRESENT ILLNESS
[FreeTextEntry1] : 60 yo male presents for follow up after recent CABG. Pt visit with CTSX was reviewed. Pt feels improved and denies chest pain.Pt is under stress from work. Pt notices difficulty breathing easily at start of exercise. Pt did not start cardiac rehab. Pt reports feeling "numb" in am. He feels improved by the afternoon. Medications were reviewed.

## 2021-08-09 RX ORDER — METOPROLOL TARTRATE 50 MG/1
50 TABLET, FILM COATED ORAL
Qty: 180 | Refills: 1 | Status: DISCONTINUED | COMMUNITY
End: 2021-08-09

## 2021-09-08 ENCOUNTER — NON-APPOINTMENT (OUTPATIENT)
Age: 61
End: 2021-09-08

## 2021-09-08 ENCOUNTER — APPOINTMENT (OUTPATIENT)
Dept: CARDIOLOGY | Facility: CLINIC | Age: 61
End: 2021-09-08
Payer: COMMERCIAL

## 2021-09-08 VITALS
OXYGEN SATURATION: 96 % | HEART RATE: 64 BPM | SYSTOLIC BLOOD PRESSURE: 120 MMHG | WEIGHT: 297 LBS | DIASTOLIC BLOOD PRESSURE: 82 MMHG | HEIGHT: 74 IN | BODY MASS INDEX: 38.12 KG/M2

## 2021-09-08 PROCEDURE — 93000 ELECTROCARDIOGRAM COMPLETE: CPT

## 2021-09-08 PROCEDURE — 99214 OFFICE O/P EST MOD 30 MIN: CPT

## 2021-09-08 NOTE — HISTORY OF PRESENT ILLNESS
[FreeTextEntry1] : 62 Y/O gentleman PMH: HTN, HLD, gout, AF S/P ablation followed with EP, LVH, HOPE, Dilated AO, CAD/MI/Stent,  S/P NSTEMI Post CABG X 4 ( 3/24/21) LIMA to LAD, SVG to OM-1,OM-2,RPDA here today in routine cardiac follow up states he lowered his Metoprolol dose 2/2 memory issues which have completely resolved with the lowering of metoprolol.  Was prescribed Cialis by Urologist and accidentally took an extra dose he contacted poison control and he is feeling well \par

## 2021-09-08 NOTE — DISCUSSION/SUMMARY
[FreeTextEntry1] : HTN: Controlled\par \par HLD: Continue statin maintain LDL of 70\par \par AF:  S/P ablation maintain EP management \par \par HOPE: Currently has not been treating for 3 months had just received new equipment and will resume treatment \par \par Dilated AO: Monitor Echo and obtain US Aorta \par \par CAD/S/P CABG: no active cardiac complaints continue current medications \par \par Complete labs ordered\par \par OV 3 months

## 2021-09-08 NOTE — PHYSICAL EXAM
[Normal S1, S2] : normal S1, S2 [Soft] : abdomen soft [Non Tender] : non-tender [Normal Gait] : normal gait [No Edema] : no edema [Normal] : moves all extremities, no focal deficits, normal speech [Alert and Oriented] : alert and oriented [de-identified] : Overweight

## 2021-09-14 ENCOUNTER — RX RENEWAL (OUTPATIENT)
Age: 61
End: 2021-09-14

## 2021-09-20 LAB
25(OH)D3 SERPL-MCNC: 41.2 NG/ML
ALBUMIN SERPL ELPH-MCNC: 4.5 G/DL
ALP BLD-CCNC: 51 U/L
ALT SERPL-CCNC: 38 U/L
ANION GAP SERPL CALC-SCNC: 16 MMOL/L
AST SERPL-CCNC: 39 U/L
BASOPHILS # BLD AUTO: 0.06 K/UL
BASOPHILS NFR BLD AUTO: 0.8 %
BILIRUB SERPL-MCNC: 0.6 MG/DL
BUN SERPL-MCNC: 14 MG/DL
CALCIUM SERPL-MCNC: 9.4 MG/DL
CHLORIDE SERPL-SCNC: 101 MMOL/L
CHOLEST SERPL-MCNC: 169 MG/DL
CO2 SERPL-SCNC: 24 MMOL/L
CREAT SERPL-MCNC: 0.99 MG/DL
EOSINOPHIL # BLD AUTO: 0.28 K/UL
EOSINOPHIL NFR BLD AUTO: 3.5 %
ESTIMATED AVERAGE GLUCOSE: 131 MG/DL
GLUCOSE SERPL-MCNC: 92 MG/DL
HBA1C MFR BLD HPLC: 6.2 %
HCT VFR BLD CALC: 48.1 %
HDLC SERPL-MCNC: 57 MG/DL
HGB BLD-MCNC: 16.4 G/DL
IMM GRANULOCYTES NFR BLD AUTO: 0.1 %
LDLC SERPL CALC-MCNC: 76 MG/DL
LYMPHOCYTES # BLD AUTO: 2.32 K/UL
LYMPHOCYTES NFR BLD AUTO: 29.1 %
MAN DIFF?: NORMAL
MCHC RBC-ENTMCNC: 30.3 PG
MCHC RBC-ENTMCNC: 34.1 GM/DL
MCV RBC AUTO: 88.7 FL
MONOCYTES # BLD AUTO: 0.74 K/UL
MONOCYTES NFR BLD AUTO: 9.3 %
NEUTROPHILS # BLD AUTO: 4.56 K/UL
NEUTROPHILS NFR BLD AUTO: 57.2 %
NONHDLC SERPL-MCNC: 111 MG/DL
PLATELET # BLD AUTO: 250 K/UL
POTASSIUM SERPL-SCNC: 4.9 MMOL/L
PROT SERPL-MCNC: 6.9 G/DL
PSA SERPL-MCNC: 0.34 NG/ML
RBC # BLD: 5.42 M/UL
RBC # FLD: 13.9 %
SODIUM SERPL-SCNC: 141 MMOL/L
T3 SERPL-MCNC: 121 NG/DL
T4 SERPL-MCNC: 6.7 UG/DL
TRIGL SERPL-MCNC: 176 MG/DL
TSH SERPL-ACNC: 1.48 UIU/ML
URATE SERPL-MCNC: 7.2 MG/DL
WBC # FLD AUTO: 7.97 K/UL

## 2021-09-22 ENCOUNTER — NON-APPOINTMENT (OUTPATIENT)
Age: 61
End: 2021-09-22

## 2021-09-22 ENCOUNTER — APPOINTMENT (OUTPATIENT)
Dept: INTERNAL MEDICINE | Facility: CLINIC | Age: 61
End: 2021-09-22
Payer: COMMERCIAL

## 2021-09-22 ENCOUNTER — INPATIENT (INPATIENT)
Facility: HOSPITAL | Age: 61
LOS: 0 days | Discharge: HOME | DRG: 292 | End: 2021-09-23
Attending: HOSPITALIST | Admitting: INTERNAL MEDICINE
Payer: COMMERCIAL

## 2021-09-22 VITALS
WEIGHT: 294 LBS | TEMPERATURE: 99 F | HEART RATE: 68 BPM | OXYGEN SATURATION: 97 % | HEIGHT: 74 IN | DIASTOLIC BLOOD PRESSURE: 76 MMHG | SYSTOLIC BLOOD PRESSURE: 138 MMHG | RESPIRATION RATE: 16 BRPM | BODY MASS INDEX: 37.73 KG/M2

## 2021-09-22 VITALS — WEIGHT: 296.08 LBS | HEIGHT: 74 IN

## 2021-09-22 DIAGNOSIS — Z98.890 OTHER SPECIFIED POSTPROCEDURAL STATES: Chronic | ICD-10-CM

## 2021-09-22 DIAGNOSIS — M23.92 UNSPECIFIED INTERNAL DERANGEMENT OF LEFT KNEE: ICD-10-CM

## 2021-09-22 DIAGNOSIS — S80.01XA CONTUSION OF RIGHT KNEE, INITIAL ENCOUNTER: ICD-10-CM

## 2021-09-22 DIAGNOSIS — Z87.898 PERSONAL HISTORY OF OTHER SPECIFIED CONDITIONS: ICD-10-CM

## 2021-09-22 DIAGNOSIS — Z01.818 ENCOUNTER FOR OTHER PREPROCEDURAL EXAMINATION: ICD-10-CM

## 2021-09-22 DIAGNOSIS — Z90.49 ACQUIRED ABSENCE OF OTHER SPECIFIED PARTS OF DIGESTIVE TRACT: Chronic | ICD-10-CM

## 2021-09-22 DIAGNOSIS — K46.9 UNSPECIFIED ABDOMINAL HERNIA W/OUT OBSTRUCTION OR GANGRENE: ICD-10-CM

## 2021-09-22 DIAGNOSIS — Z98.890 OTHER SPECIFIED POSTPROCEDURAL STATES: ICD-10-CM

## 2021-09-22 DIAGNOSIS — Z09 ENCOUNTER FOR FOLLOW-UP EXAMINATION AFTER COMPLETED TREATMENT FOR CONDITIONS OTHER THAN MALIGNANT NEOPLASM: ICD-10-CM

## 2021-09-22 DIAGNOSIS — M77.02 MEDIAL EPICONDYLITIS, LEFT ELBOW: ICD-10-CM

## 2021-09-22 DIAGNOSIS — R07.9 CHEST PAIN, UNSPECIFIED: ICD-10-CM

## 2021-09-22 DIAGNOSIS — Z98.61 CORONARY ANGIOPLASTY STATUS: Chronic | ICD-10-CM

## 2021-09-22 DIAGNOSIS — S83.249A OTHER TEAR OF MEDIAL MENISCUS, CURRENT INJURY, UNSPECIFIED KNEE, INITIAL ENCOUNTER: ICD-10-CM

## 2021-09-22 DIAGNOSIS — S80.02XA CONTUSION OF LEFT KNEE, INITIAL ENCOUNTER: ICD-10-CM

## 2021-09-22 DIAGNOSIS — I25.2 OLD MYOCARDIAL INFARCTION: ICD-10-CM

## 2021-09-22 LAB
ALBUMIN SERPL ELPH-MCNC: 3.7 G/DL — SIGNIFICANT CHANGE UP (ref 3.3–5)
ALP SERPL-CCNC: 48 U/L — SIGNIFICANT CHANGE UP (ref 40–120)
ALT FLD-CCNC: 57 U/L — SIGNIFICANT CHANGE UP (ref 12–78)
ANION GAP SERPL CALC-SCNC: 8 MMOL/L — SIGNIFICANT CHANGE UP (ref 5–17)
APTT BLD: 35.1 SEC — SIGNIFICANT CHANGE UP (ref 27.5–35.5)
AST SERPL-CCNC: 44 U/L — HIGH (ref 15–37)
BASOPHILS # BLD AUTO: 0.05 K/UL — SIGNIFICANT CHANGE UP (ref 0–0.2)
BASOPHILS NFR BLD AUTO: 0.7 % — SIGNIFICANT CHANGE UP (ref 0–2)
BILIRUB SERPL-MCNC: 0.6 MG/DL — SIGNIFICANT CHANGE UP (ref 0.2–1.2)
BUN SERPL-MCNC: 13 MG/DL — SIGNIFICANT CHANGE UP (ref 7–23)
CALCIUM SERPL-MCNC: 9 MG/DL — SIGNIFICANT CHANGE UP (ref 8.5–10.1)
CHLORIDE SERPL-SCNC: 102 MMOL/L — SIGNIFICANT CHANGE UP (ref 96–108)
CO2 SERPL-SCNC: 27 MMOL/L — SIGNIFICANT CHANGE UP (ref 22–31)
CREAT SERPL-MCNC: 1.03 MG/DL — SIGNIFICANT CHANGE UP (ref 0.5–1.3)
EOSINOPHIL # BLD AUTO: 0.21 K/UL — SIGNIFICANT CHANGE UP (ref 0–0.5)
EOSINOPHIL NFR BLD AUTO: 2.9 % — SIGNIFICANT CHANGE UP (ref 0–6)
GLUCOSE SERPL-MCNC: 107 MG/DL — HIGH (ref 70–99)
HCT VFR BLD CALC: 45.8 % — SIGNIFICANT CHANGE UP (ref 39–50)
HGB BLD-MCNC: 15.7 G/DL — SIGNIFICANT CHANGE UP (ref 13–17)
IMM GRANULOCYTES NFR BLD AUTO: 0.3 % — SIGNIFICANT CHANGE UP (ref 0–1.5)
INR BLD: 1.17 RATIO — HIGH (ref 0.88–1.16)
LYMPHOCYTES # BLD AUTO: 2.22 K/UL — SIGNIFICANT CHANGE UP (ref 1–3.3)
LYMPHOCYTES # BLD AUTO: 30.6 % — SIGNIFICANT CHANGE UP (ref 13–44)
MCHC RBC-ENTMCNC: 30.3 PG — SIGNIFICANT CHANGE UP (ref 27–34)
MCHC RBC-ENTMCNC: 34.3 GM/DL — SIGNIFICANT CHANGE UP (ref 32–36)
MCV RBC AUTO: 88.4 FL — SIGNIFICANT CHANGE UP (ref 80–100)
MONOCYTES # BLD AUTO: 0.6 K/UL — SIGNIFICANT CHANGE UP (ref 0–0.9)
MONOCYTES NFR BLD AUTO: 8.3 % — SIGNIFICANT CHANGE UP (ref 2–14)
NEUTROPHILS # BLD AUTO: 4.16 K/UL — SIGNIFICANT CHANGE UP (ref 1.8–7.4)
NEUTROPHILS NFR BLD AUTO: 57.2 % — SIGNIFICANT CHANGE UP (ref 43–77)
NT-PROBNP SERPL-SCNC: 299 PG/ML — HIGH (ref 0–125)
PLATELET # BLD AUTO: 241 K/UL — SIGNIFICANT CHANGE UP (ref 150–400)
POTASSIUM SERPL-MCNC: 3.9 MMOL/L — SIGNIFICANT CHANGE UP (ref 3.5–5.3)
POTASSIUM SERPL-SCNC: 3.9 MMOL/L — SIGNIFICANT CHANGE UP (ref 3.5–5.3)
PROT SERPL-MCNC: 7.4 GM/DL — SIGNIFICANT CHANGE UP (ref 6–8.3)
PROTHROM AB SERPL-ACNC: 13.6 SEC — SIGNIFICANT CHANGE UP (ref 10.6–13.6)
RBC # BLD: 5.18 M/UL — SIGNIFICANT CHANGE UP (ref 4.2–5.8)
RBC # FLD: 13.7 % — SIGNIFICANT CHANGE UP (ref 10.3–14.5)
SARS-COV-2 RNA SPEC QL NAA+PROBE: SIGNIFICANT CHANGE UP
SODIUM SERPL-SCNC: 137 MMOL/L — SIGNIFICANT CHANGE UP (ref 135–145)
TROPONIN I SERPL-MCNC: 0.02 NG/ML — SIGNIFICANT CHANGE UP (ref 0.01–0.04)
TROPONIN I SERPL-MCNC: 0.04 NG/ML — SIGNIFICANT CHANGE UP (ref 0.01–0.04)
WBC # BLD: 7.26 K/UL — SIGNIFICANT CHANGE UP (ref 3.8–10.5)
WBC # FLD AUTO: 7.26 K/UL — SIGNIFICANT CHANGE UP (ref 3.8–10.5)

## 2021-09-22 PROCEDURE — 93010 ELECTROCARDIOGRAM REPORT: CPT

## 2021-09-22 PROCEDURE — 84443 ASSAY THYROID STIM HORMONE: CPT

## 2021-09-22 PROCEDURE — 71045 X-RAY EXAM CHEST 1 VIEW: CPT | Mod: 26

## 2021-09-22 PROCEDURE — 86900 BLOOD TYPING SEROLOGIC ABO: CPT

## 2021-09-22 PROCEDURE — 36415 COLL VENOUS BLD VENIPUNCTURE: CPT

## 2021-09-22 PROCEDURE — 80061 LIPID PANEL: CPT

## 2021-09-22 PROCEDURE — 86901 BLOOD TYPING SEROLOGIC RH(D): CPT

## 2021-09-22 PROCEDURE — 99285 EMERGENCY DEPT VISIT HI MDM: CPT

## 2021-09-22 PROCEDURE — 90686 IIV4 VACC NO PRSV 0.5 ML IM: CPT

## 2021-09-22 PROCEDURE — 84484 ASSAY OF TROPONIN QUANT: CPT

## 2021-09-22 PROCEDURE — 86850 RBC ANTIBODY SCREEN: CPT

## 2021-09-22 PROCEDURE — G0008: CPT

## 2021-09-22 PROCEDURE — 83036 HEMOGLOBIN GLYCOSYLATED A1C: CPT

## 2021-09-22 PROCEDURE — 94640 AIRWAY INHALATION TREATMENT: CPT

## 2021-09-22 PROCEDURE — 99214 OFFICE O/P EST MOD 30 MIN: CPT

## 2021-09-22 RX ORDER — UBIDECARENONE 100 MG
3 CAPSULE ORAL
Qty: 0 | Refills: 0 | DISCHARGE

## 2021-09-22 RX ORDER — ZINC SULFATE TAB 220 MG (50 MG ZINC EQUIVALENT) 220 (50 ZN) MG
1 TAB ORAL
Qty: 0 | Refills: 0 | DISCHARGE

## 2021-09-22 RX ORDER — MULTIVIT-MIN/FERROUS GLUCONATE 9 MG/15 ML
1 LIQUID (ML) ORAL
Qty: 0 | Refills: 0 | DISCHARGE

## 2021-09-22 RX ORDER — FAMOTIDINE 20 MG/1
20 TABLET, FILM COATED ORAL
Qty: 90 | Refills: 0 | Status: DISCONTINUED | COMMUNITY
Start: 2021-03-30 | End: 2021-09-22

## 2021-09-22 NOTE — ED ADULT TRIAGE NOTE - CHIEF COMPLAINT QUOTE
patient c/o SOB on exertion x 1 week.  patient sent in by Dr. Kathleen.  patient with cardiac hx- CABG in april.  denies chest pain, N/V, dizziness.  EKG in progress.

## 2021-09-22 NOTE — ED PROVIDER NOTE - OBJECTIVE STATEMENT
62 y/o M w/ PMHx of gout, obesity, retinal tear, inguinal hernia, aflutter s/p ablation, MI, CAD s/p PCI x 4 stents, HTN, HLD and recent dental implant 3/16/21 presents to the ED c/o SOB, worsening. Pt reports that SOB worsens upon ambulatory exertion; pt has been experiencing SOB for a while, but symptoms peaked yesterday. Pt notes that symptoms subside during no exertion. Pt is currently receiving Eliquis and Aspirin. Pulmonologist: Dr. Person, Cardiologist: Dr. Kathleen

## 2021-09-22 NOTE — ED ADULT NURSE REASSESSMENT NOTE - NS ED NURSE REASSESS COMMENT FT1
Pt does not want to be discharged since "my blood pressure is high". Dr. Thomas informed. Pt does not want to be discharged since "my blood pressure is high". Dr. Thomas informed.  Pt's girlfriend, Karla, phone number [phone]

## 2021-09-22 NOTE — ED PROVIDER NOTE - CLINICAL SUMMARY MEDICAL DECISION MAKING FREE TEXT BOX
EKG WNL.  CXR WNL.  Troponin x 2 negative. Discussed with Dr. Kathleen.  Recommends admission.  Pt declines.  Shared decision making made with patient.  States he understands risks and benefits, and instead will call Dr. Kathleen in the AM to scheduled outpatient appointment. EKG WNL.  CXR WNL.  Troponin x 2 negative. Discussed with Dr. Kathleen.  Recommends admission, possible cath.  Pt initially declined, now wishing to stay.  Will keep NPO after midnight. Osmani Thomas D.O.

## 2021-09-22 NOTE — ED ADULT NURSE REASSESSMENT NOTE - NS ED NURSE REASSESS COMMENT FT1
Received pt from Emy WRAY RN. Pt A+Ox4, denies chest pain or shortness of breath at this time. No distress noted. waiting for results.

## 2021-09-22 NOTE — HISTORY OF PRESENT ILLNESS
[FreeTextEntry1] : followup evaluation [de-identified] : Mr. Jose presents for followup evaluation. He was last seen in this office on 8/9/19. He has a history of mild obstructive sleep apnea and is currently on auto Pap 5-15 cm H2O. Mr. Jose underwent CABG x4 on 3/24/21 by Dr. Keron Thomason. Currently, he is complaining of increased daytime fatigue. He had stopped wearing his oral Pap machine up until approximately 10 days ago. The patient states that he is also having shortness of breath with exertion. Activities such as walking up stairs, walking up an incline causes shortness of breath. Patient states that the symptoms do resolve with rest, however, he finds it difficult to take a deep breath in. He has no nocturnal symptoms of cough or dyspnea.

## 2021-09-22 NOTE — PLAN
[FreeTextEntry1] : Mr. Jose presents for a followup evaluation.\par \par #1. Patient will continue on her medication regimen which has been reviewed and revised.\par \par #2. Comprehensive blood profile was reviewed with the patient.\par \par #3. Patient has a history of obstructive sleep apnea. He is having symptoms of daytime tiredness despite using auto Pap therapy. He will be scheduled for an attended CPAP titration study to determine the most appropriate pressure setting for his CPAP.\par \par #4. Patient states he will start cardiac rehabilitation within the next week to 2 weeks.\par \par #5. Followup in this office in 6 months with complete pulmonary function tests.

## 2021-09-22 NOTE — PHYSICAL EXAM
[No Acute Distress] : no acute distress [Well Nourished] : well nourished [Well Developed] : well developed [Well-Appearing] : well-appearing [Normal Sclera/Conjunctiva] : normal sclera/conjunctiva [PERRL] : pupils equal round and reactive to light [EOMI] : extraocular movements intact [Normal Outer Ear/Nose] : the outer ears and nose were normal in appearance [Normal Oropharynx] : the oropharynx was normal [No JVD] : no jugular venous distention [No Lymphadenopathy] : no lymphadenopathy [Supple] : supple [Thyroid Normal, No Nodules] : the thyroid was normal and there were no nodules present [No Respiratory Distress] : no respiratory distress  [No Accessory Muscle Use] : no accessory muscle use [Clear to Auscultation] : lungs were clear to auscultation bilaterally [Normal Rate] : normal rate  [Regular Rhythm] : with a regular rhythm [Normal S1, S2] : normal S1 and S2 [No Murmur] : no murmur heard [No Carotid Bruits] : no carotid bruits [No Abdominal Bruit] : a ~M bruit was not heard ~T in the abdomen [No Varicosities] : no varicosities [Pedal Pulses Present] : the pedal pulses are present [No Edema] : there was no peripheral edema [No Palpable Aorta] : no palpable aorta [No Extremity Clubbing/Cyanosis] : no extremity clubbing/cyanosis [Soft] : abdomen soft [Non Tender] : non-tender [Non-distended] : non-distended [No Masses] : no abdominal mass palpated [No HSM] : no HSM [Normal Bowel Sounds] : normal bowel sounds [Normal Posterior Cervical Nodes] : no posterior cervical lymphadenopathy [Normal Anterior Cervical Nodes] : no anterior cervical lymphadenopathy [No CVA Tenderness] : no CVA  tenderness [No Spinal Tenderness] : no spinal tenderness [No Joint Swelling] : no joint swelling [Grossly Normal Strength/Tone] : grossly normal strength/tone [No Rash] : no rash [Coordination Grossly Intact] : coordination grossly intact [No Focal Deficits] : no focal deficits [Normal Gait] : normal gait [Deep Tendon Reflexes (DTR)] : deep tendon reflexes were 2+ and symmetric [Normal Affect] : the affect was normal [Normal Insight/Judgement] : insight and judgment were intact [de-identified] : Healed sternotomy scar

## 2021-09-22 NOTE — ED STATDOCS - PROGRESS NOTE DETAILS
LYNDSAY Trevizo, Attending: sent to [facility] for eval given dyspnea, cardiac hx/CABG, and abnormal EKG. NO BRITTON.

## 2021-09-22 NOTE — ED PROVIDER NOTE - PATIENT PORTAL LINK FT
You can access the FollowMyHealth Patient Portal offered by [facility] by registering at the following website: [email or web address]. By joining [employer]’s FollowMyHealth portal, you will also be able to view your health information using other applications (apps) compatible with our system.

## 2021-09-23 ENCOUNTER — TRANSCRIPTION ENCOUNTER (OUTPATIENT)
Age: 61
End: 2021-09-23

## 2021-09-23 VITALS
DIASTOLIC BLOOD PRESSURE: 81 MMHG | OXYGEN SATURATION: 99 % | TEMPERATURE: 98 F | RESPIRATION RATE: 17 BRPM | SYSTOLIC BLOOD PRESSURE: 132 MMHG | HEART RATE: 78 BPM

## 2021-09-23 DIAGNOSIS — R06.00 DYSPNEA, UNSPECIFIED: ICD-10-CM

## 2021-09-23 LAB
A1C WITH ESTIMATED AVERAGE GLUCOSE RESULT: 6.1 % — HIGH (ref 4–5.6)
BLD GP AB SCN SERPL QL: SIGNIFICANT CHANGE UP
CHOLEST SERPL-MCNC: 179 MG/DL — SIGNIFICANT CHANGE UP
COVID-19 SPIKE DOMAIN AB INTERP: POSITIVE
COVID-19 SPIKE DOMAIN ANTIBODY RESULT: >250 U/ML — HIGH
ESTIMATED AVERAGE GLUCOSE: 128 MG/DL — HIGH (ref 68–114)
HDLC SERPL-MCNC: 55 MG/DL — SIGNIFICANT CHANGE UP
LIPID PNL WITH DIRECT LDL SERPL: 95 MG/DL — SIGNIFICANT CHANGE UP
NON HDL CHOLESTEROL: 124 MG/DL — SIGNIFICANT CHANGE UP
SARS-COV-2 IGG+IGM SERPL QL IA: >250 U/ML — HIGH
SARS-COV-2 IGG+IGM SERPL QL IA: POSITIVE
TRIGL SERPL-MCNC: 147 MG/DL — SIGNIFICANT CHANGE UP
TROPONIN I SERPL-MCNC: 0.04 NG/ML — SIGNIFICANT CHANGE UP (ref 0.01–0.04)

## 2021-09-23 PROCEDURE — 99235 HOSP IP/OBS SAME DATE MOD 70: CPT

## 2021-09-23 PROCEDURE — 99223 1ST HOSP IP/OBS HIGH 75: CPT

## 2021-09-23 RX ORDER — ASPIRIN/CALCIUM CARB/MAGNESIUM 324 MG
81 TABLET ORAL DAILY
Refills: 0 | Status: DISCONTINUED | OUTPATIENT
Start: 2021-09-23 | End: 2021-09-23

## 2021-09-23 RX ORDER — METOPROLOL TARTRATE 50 MG
25 TABLET ORAL
Refills: 0 | Status: DISCONTINUED | OUTPATIENT
Start: 2021-09-23 | End: 2021-09-23

## 2021-09-23 RX ORDER — HEPARIN SODIUM 5000 [USP'U]/ML
5000 INJECTION INTRAVENOUS; SUBCUTANEOUS EVERY 8 HOURS
Refills: 0 | Status: DISCONTINUED | OUTPATIENT
Start: 2021-09-23 | End: 2021-09-23

## 2021-09-23 RX ORDER — ONDANSETRON 8 MG/1
4 TABLET, FILM COATED ORAL EVERY 8 HOURS
Refills: 0 | Status: DISCONTINUED | OUTPATIENT
Start: 2021-09-23 | End: 2021-09-23

## 2021-09-23 RX ORDER — ATORVASTATIN CALCIUM 80 MG/1
20 TABLET, FILM COATED ORAL AT BEDTIME
Refills: 0 | Status: DISCONTINUED | OUTPATIENT
Start: 2021-09-23 | End: 2021-09-23

## 2021-09-23 RX ORDER — CHOLECALCIFEROL (VITAMIN D3) 125 MCG
1000 CAPSULE ORAL DAILY
Refills: 0 | Status: DISCONTINUED | OUTPATIENT
Start: 2021-09-23 | End: 2021-09-23

## 2021-09-23 RX ORDER — LANOLIN ALCOHOL/MO/W.PET/CERES
3 CREAM (GRAM) TOPICAL AT BEDTIME
Refills: 0 | Status: DISCONTINUED | OUTPATIENT
Start: 2021-09-23 | End: 2021-09-23

## 2021-09-23 RX ORDER — ACETAMINOPHEN 500 MG
650 TABLET ORAL EVERY 6 HOURS
Refills: 0 | Status: DISCONTINUED | OUTPATIENT
Start: 2021-09-23 | End: 2021-09-23

## 2021-09-23 RX ORDER — FUROSEMIDE 40 MG
1 TABLET ORAL
Qty: 15 | Refills: 0
Start: 2021-09-23 | End: 2021-10-22

## 2021-09-23 RX ORDER — FLUTICASONE PROPIONATE 50 MCG
1 SPRAY, SUSPENSION NASAL
Refills: 0 | Status: DISCONTINUED | OUTPATIENT
Start: 2021-09-23 | End: 2021-09-23

## 2021-09-23 RX ORDER — ALLOPURINOL 300 MG
100 TABLET ORAL DAILY
Refills: 0 | Status: DISCONTINUED | OUTPATIENT
Start: 2021-09-23 | End: 2021-09-23

## 2021-09-23 RX ORDER — INFLUENZA VIRUS VACCINE 15; 15; 15; 15 UG/.5ML; UG/.5ML; UG/.5ML; UG/.5ML
0.5 SUSPENSION INTRAMUSCULAR ONCE
Refills: 0 | Status: COMPLETED | OUTPATIENT
Start: 2021-09-23 | End: 2021-09-23

## 2021-09-23 RX ORDER — MAGNESIUM OXIDE 400 MG ORAL TABLET 241.3 MG
400 TABLET ORAL DAILY
Refills: 0 | Status: DISCONTINUED | OUTPATIENT
Start: 2021-09-23 | End: 2021-09-23

## 2021-09-23 RX ADMIN — Medication 100 MILLIGRAM(S): at 11:00

## 2021-09-23 RX ADMIN — INFLUENZA VIRUS VACCINE 0.5 MILLILITER(S): 15; 15; 15; 15 SUSPENSION INTRAMUSCULAR at 13:38

## 2021-09-23 RX ADMIN — Medication 81 MILLIGRAM(S): at 11:00

## 2021-09-23 RX ADMIN — Medication 1 SPRAY(S): at 11:00

## 2021-09-23 RX ADMIN — Medication 1000 UNIT(S): at 11:00

## 2021-09-23 RX ADMIN — Medication 25 MILLIGRAM(S): at 11:00

## 2021-09-23 RX ADMIN — MAGNESIUM OXIDE 400 MG ORAL TABLET 400 MILLIGRAM(S): 241.3 TABLET ORAL at 11:00

## 2021-09-23 NOTE — CONSULT NOTE ADULT - SUBJECTIVE AND OBJECTIVE BOX
HPI:    62 Y/O w/    CAD s/p CABG X 4 ( 3/24/21) LIMA to LAD, SVG to OM-1,OM-2,RPDA   AF S/P ablation   HOPE  Dilated AO,    HTN  HLP     came to ED for symptoms of dyspnea.  Reports symptoms for 1 week.  Some difficulty walking across parking lot  or walking dog.  Difficulty taking deep breaths.  No chest discomfort similar to what he felt prior to workup leading to CABG.  Slow weight gain  s/p CABG related to diet no recent abrupt increase.  No LE edema, brief episode of PND & orthopnea 2 days ago.  No syncope, lightheadness, palpitations or other cardiac symptoms.      PAST MEDICAL AND SURGICAL HISTORY:  Myocardial infarction  2005, 2008  CAD (coronary artery disease)  Dyslipidemia  Gout  Left knee pain  Stented coronary artery  X4- last 2010  At risk for sleep apnea  Atrial flutter by electrocardiogram  HTN (hypertension)  Hyperlipidemia  Class 2 obesity with body mass index (BMI) of 38.0 to 38.9 in adult  Osteoarthritis  Vitamin D deficiency  Atrial fibrillation  Sleep apnea, unspecified type    Hernia  inguinal hernia    Diverticulitis    Post PTCA  x 4 stents in place- last 2010    H/O right inguinal hernia repair  age 13    H/O eye surgery  right eye retinal tear, 2017    History of appendectomy  1991    H/O cardiac radiofrequency ablation    S/P meniscectomy      ALLERGIES:  Allergies  No Known Allergies  Intolerances        SOCIAL HISTORY:        FAMILY  HISTORY:  FAMILY HISTORY:  Family history of heart failure (Father)    Family history of liver disease (Father)    Maternal family history of respiratory disease (Mother)        MEDICATIONS:  OUTPATIENT:  Home Medications:  allopurinol 100 mg oral tablet: 1 tab(s) orally once a day (at bedtime) (22 Sep 2021 22:45)  Co Q-10 100 mg oral capsule: 1 cap(s) orally once a day (22 Sep 2021 22:45)  Fish Oil oral capsule: 1 cap(s) orally once a day (22 Sep 2021 22:45)  fluticasone 50 mcg/inh nasal spray: 1 spray(s) in each nostril once a day, As Needed (22 Sep 2021 22:45)  Mag-Ox 400 oral tablet: 1 tab(s) orally once a day (22 Sep 2021 22:45)  metoprolol succinate 25 mg oral tablet, extended release: 1 tab(s) orally 2 times a day (22 Sep 2021 22:45)  Pfizer-BioNTech COVID-19 Vaccine PF 30 mcg/0.3 mL intramuscular suspension: 0.3 milliliter(s) intramuscular once  ***2nd dose 4/5*** (22 Sep 2021 22:45)  rosuvastatin 5 mg oral tablet: 1 tab(s) orally once a day (at bedtime) (22 Sep 2021 22:45)  Vitamin D3 1000 intl units oral capsule: 1 cap(s) orally once a day (22 Sep 2021 22:45)        INPATIENT:  MEDICATIONS  (STANDING):  allopurinol 100 milliGRAM(s) Oral daily  aspirin enteric coated 81 milliGRAM(s) Oral daily  atorvastatin 20 milliGRAM(s) Oral at bedtime  cholecalciferol 1000 Unit(s) Oral daily  fluticasone propionate 50 MICROgram(s)/spray Nasal Spray 1 Spray(s) Both Nostrils <User Schedule>  heparin   Injectable 5000 Unit(s) SubCutaneous every 8 hours  magnesium oxide 400 milliGRAM(s) Oral daily  metoprolol succinate ER 25 milliGRAM(s) Oral two times a day    MEDICATIONS  (PRN):  acetaminophen   Tablet .. 650 milliGRAM(s) Oral every 6 hours PRN Temp greater or equal to 38.5C (101.3F), Mild Pain (1 - 3)  melatonin 3 milliGRAM(s) Oral at bedtime PRN Insomnia  ondansetron Injectable 4 milliGRAM(s) IV Push every 8 hours PRN Nausea and/or Vomiting    MEDICATIONS  (PRN):  acetaminophen   Tablet .. 650 milliGRAM(s) Oral every 6 hours PRN Temp greater or equal to 38.5C (101.3F), Mild Pain (1 - 3)  melatonin 3 milliGRAM(s) Oral at bedtime PRN Insomnia  ondansetron Injectable 4 milliGRAM(s) IV Push every 8 hours PRN Nausea and/or Vomiting    REVIEW OF SYSTEMS:    CONSTITUTIONAL: No weakness, fevers or chills  EYES/ENT: No visual changes;  No vertigo or throat pain   NECK: No pain or stiffness  RESPIRATORY: No cough, wheezing, hemoptysis; No shortness of breath  CARDIOVASCULAR: No chest pain or palpitations  GASTROINTESTINAL: No abdominal or epigastric pain. No nausea, vomiting, or hematemesis; No diarrhea or constipation. No melena or hematochezia.  GENITOURINARY: No dysuria, frequency or hematuria  NEUROLOGICAL: No numbness or weakness  SKIN: No itching, burning, rashes, or lesions   All other review of systems is negative unless indicated above    Vital Signs Last 24 Hrs  T(C): 36.7 (23 Sep 2021 07:07), Max: 36.7 (23 Sep 2021 02:28)  T(F): 98 (23 Sep 2021 07:07), Max: 98 (23 Sep 2021 02:28)  HR: 69 (23 Sep 2021 07:07) (66 - 80)  BP: 155/91 (23 Sep 2021 07:07) (133/87 - 155/91)  BP(mean): 104 (23 Sep 2021 07:07) (70 - 104)  RR: 18 (23 Sep 2021 07:07) (16 - 18)  SpO2: 98% (23 Sep 2021 07:07) (98% - 100%)      PHYSICAL EXAM:    Constitutional: NAD, awake and alert,   HEENT: PERR, EOMI,  No oral cyananosis.  Neck:  supple,  No JVD  Respiratory: Breath sounds are clear bilaterally, No wheezing, rales or rhonchi  Cardiovascular: S1 and S2, regular rate and rhythm, no Murmurs, gallops or rubs  Gastrointestinal: Bowel Sounds present, soft, nontender.   Extremities: No peripheral edema. No clubbing or cyanosis.  Vascular: 2+ peripheral pulses  Neurological: A/O x 3, no focal deficits        LABS: All Labs Reviewed:                        15.7   7.26  )-----------( 241      ( 22 Sep 2021 15:47 )             45.8     22 Sep 2021 15:47    137    |  102    |  13     ----------------------------<  107    3.9     |  27     |  1.03     Ca    9.0        22 Sep 2021 15:47  Mg     2.0       22 Sep 2021 15:47    TPro  7.4    /  Alb  3.7    /  TBili  0.6    /  DBili  x      /  AST  44     /  ALT  57     /  AlkPhos  48     22 Sep 2021 15:47    PT/INR - ( 22 Sep 2021 15:47 )   PT: 13.6 sec;   INR: 1.17 ratio         PTT - ( 22 Sep 2021 15:47 )  PTT:35.1 sec  CARDIAC MARKERS ( 22 Sep 2021 19:43 )  0.039 ng/mL / x     / x     / x     / x      CARDIAC MARKERS ( 22 Sep 2021 15:47 )  0.024 ng/mL / x     / x     / x     / x          Blood Culture:   09-22 @ 15:47  Pro Bnp 299      ===============================  EKG: NSR, nonspecific changes, PVC    ===============================  RADIOLOGY:    INTERPRETATION:    AP chest on September 22, 2021 at 3:53 PM.   Patient has chest pain and is short of breath.  Heart magnified by technique. Sternotomy again noted.  On March 20 of this year there was some lingular atelectasis not presently seen. Presently lungs are clear.  IMPRESSION: Clear lungs at this time.  ===============================    Eitan Avilez M.D.  Cardiology, [facility]  Cell:   Office:   [phone] ([facility] Office)  [phone] ([facility] Office)

## 2021-09-23 NOTE — H&P ADULT - ASSESSMENT
61M hx gout, obesity, aflutter s/p ablation on Eliquis, HOPE, HTN, HLD, MI, CAD s/p PCI x 4 stents and CABG in 3/21 who p/w PRATT.     #PRATT- unlikely to be anginal equivalent  -not hypoxic, trops neg, EKG neg  -BNP low (of note, pt is obese) and CXR clear  -eval'd by cardiology who rec outpt stress and outpt echo  -to start on lasix 20mg every other day for possible HFpEF  -f/u w cards in office in 1wk    #CAD s/p CABG, prior MI, HTN, HLD, Aflutter  -continue home ASA, statin  -continue home BB  -continue home Eliquis    # will plan for d/c today with close outpt f/u

## 2021-09-23 NOTE — DISCHARGE NOTE NURSING/CASE MANAGEMENT/SOCIAL WORK - NSDCVIVACCINE_GEN_ALL_CORE_FT
influenza, injectable, quadrivalent, preservative free; 23-Sep-2021 13:38; Emy Bruno (RN); Rebyoo; [id number] (Exp. Date: 30-Jun-2022); IntraMuscular; Deltoid Right.; 0.5 milliLiter(s); VIS (VIS Published: 15-Aug-2019, VIS Presented: 23-Sep-2021);

## 2021-09-23 NOTE — DISCHARGE NOTE NURSING/CASE MANAGEMENT/SOCIAL WORK - PATIENT PORTAL LINK FT
You can access the FollowMyHealth Patient Portal offered by [facility] by registering at the following website: [email or web address]. By joining [employer]’s FollowMyHealth portal, you will also be able to view your health information using other applications (apps) compatible with our system.

## 2021-09-23 NOTE — DISCHARGE NOTE PROVIDER - NSDCMRMEDTOKEN_GEN_ALL_CORE_FT
allopurinol 100 mg oral tablet: 1 tab(s) orally once a day (at bedtime)  apixaban 5 mg oral tablet: 1 tab(s) orally every 12 hours  aspirin 81 mg oral delayed release tablet: 1 tab(s) orally once a day  Co Q-10 100 mg oral capsule: 1 cap(s) orally once a day  Fish Oil oral capsule: 1 cap(s) orally once a day  fluticasone 50 mcg/inh nasal spray: 1 spray(s) in each nostril once a day, As Needed  Lasix 20 mg oral tablet: 1 tab(s) orally every other day   Mag-Ox 400 oral tablet: 1 tab(s) orally once a day  metoprolol succinate 25 mg oral tablet, extended release: 1 tab(s) orally 2 times a day  Pfizer-BioNTech COVID-19 Vaccine PF 30 mcg/0.3 mL intramuscular suspension: 0.3 milliliter(s) intramuscular once  ***2nd dose 4/5***  rosuvastatin 5 mg oral tablet: 1 tab(s) orally once a day (at bedtime)  Vitamin D3 1000 intl units oral capsule: 1 cap(s) orally once a day

## 2021-09-23 NOTE — ED ADULT NURSE REASSESSMENT NOTE - NS ED NURSE REASSESS COMMENT FT1
Patient D/C to home. VSS. Patient verbalized understanding to follow up with outside providers. Patient ambulated out of ER with steady gait.

## 2021-09-23 NOTE — ED ADULT NURSE REASSESSMENT NOTE - NS ED NURSE REASSESS COMMENT FT1
Received report from RA GIRALDO. pt  resting in stretcher, a/ox3 awaiting admission bed to tele. pt denies complaints/needs at this time. pt on cardiac monitor, NSR 74. pt safety maintained.

## 2021-09-23 NOTE — DISCHARGE NOTE PROVIDER - CARE PROVIDER_API CALL
Phi Kathleen)  Cardiovascular Disease  [address], [address]  [place] [address]  Phone: [phone]  Fax: [phone]  Follow Up Time: 1 week    Mj Person)  Internal Medicine; Pulmonary Disease  [address], [address]  [place] [address]  Phone: [phone]  Fax: [phone]  Follow Up Time: 2 weeks

## 2021-09-23 NOTE — DISCHARGE NOTE PROVIDER - NSDCCPCAREPLAN_GEN_ALL_CORE_FT
PRINCIPAL DISCHARGE DIAGNOSIS  Diagnosis: Dyspnea  Assessment and Plan of Treatment: Follow up with cardiology in the office in one week. You will need an outpatient stress test and outpatient echo. Start taking lasix 20mg every other day. The cardiologist will assess if you should continue to take this when you are seen in the office.

## 2021-09-23 NOTE — H&P ADULT - HISTORY OF PRESENT ILLNESS
Patient is a 61y old  Male who presents with a chief complaint of PRATT.  HPI: 61M hx gout, obesity, aflutter s/p ablation on Eliquis, HOPE, HTN, HLD, MI, CAD s/p PCI x 4 stents and CABG in 3/21 who p/w PRATT. Has had sx for one week, difficult to take deep breaths. No CP/chest discomfort. No SIDDHARTH, no orthopnea. No LH, syncope, f/c.    In ED, trops and EKG neg, BNP low and CXR neg for congestion. Seen by cardiology and deemed appropriate for d/c with close outpt f/u for echo and stress, starting on lasix 20mg every other day for possible HFpEF.    PAST MEDICAL & SURGICAL HISTORY:  Myocardial infarction  2005, 2008    CAD (coronary artery disease)    Dyslipidemia    Gout    Left knee pain    Stented coronary artery  X4- last 2010    At risk for sleep apnea    Atrial flutter by electrocardiogram    HTN (hypertension)    Hyperlipidemia    Class 2 obesity with body mass index (BMI) of 38.0 to 38.9 in adult    Osteoarthritis    Vitamin D deficiency    Atrial fibrillation    Sleep apnea, unspecified type    Hernia  inguinal hernia    Diverticulitis    Post PTCA  x 4 stents in place- last 2010    H/O right inguinal hernia repair  age 13    H/O eye surgery  right eye retinal tear, 2017    History of appendectomy  1991    H/O cardiac radiofrequency ablation    S/P meniscectomy      FAMILY HISTORY:  Family history of heart failure (Father)    Family history of liver disease (Father)    Maternal family history of respiratory disease (Mother)      Social History:  no smoking, EtOH, drugs    Allergies    No Known Allergies    Intolerances        MEDICATIONS  (STANDING):  allopurinol 100 milliGRAM(s) Oral daily  aspirin enteric coated 81 milliGRAM(s) Oral daily  atorvastatin 20 milliGRAM(s) Oral at bedtime  cholecalciferol 1000 Unit(s) Oral daily  fluticasone propionate 50 MICROgram(s)/spray Nasal Spray 1 Spray(s) Both Nostrils <User Schedule>  heparin   Injectable 5000 Unit(s) SubCutaneous every 8 hours  influenza   Vaccine 0.5 milliLiter(s) IntraMuscular once  magnesium oxide 400 milliGRAM(s) Oral daily  metoprolol succinate ER 25 milliGRAM(s) Oral two times a day    MEDICATIONS  (PRN):  acetaminophen   Tablet .. 650 milliGRAM(s) Oral every 6 hours PRN Temp greater or equal to 38.5C (101.3F), Mild Pain (1 - 3)  melatonin 3 milliGRAM(s) Oral at bedtime PRN Insomnia  ondansetron Injectable 4 milliGRAM(s) IV Push every 8 hours PRN Nausea and/or Vomiting      ROS:  General:  No fevers, chills, or unexplained weight loss  Skin: No rash or bothersome skin lesions  Musculoskeletal: No arthalgias, myalgias or joint swelling  Eyes: No visual changes or eye pain  Ears: No hearing loss , otorrhea or ear pain  Nose, Mouth, Throat: No nasal congestion, rhinorrhea, oral lesions, postnasal drip or sore throat  Cardio: No chest pain or palpitations. no lower extremity edema. no syncope. no claudication.   Respiratory: No cough, shortness of breath or wheezing   GI: No diarrhea, constipation, blood in stools, abdominal pain, vomiting or heartburn  : No urinary frequency, hematuria, incontinence, or dysuria  Neurologic: No headaches, parasthesias, confusion, dysarthria or gait instability  Psychiatric:  No anxiety or depression  Lymphatic:  No easy bruising, easy bleeding or swollen glands  Allergic: No itching, sneezing , watery eyes, clear rhinorrhea or recurrent infections    PEx  T(C): 36.7 (09-23-21 @ 07:07), Max: 36.7 (09-23-21 @ 02:28)  HR: 69 (09-23-21 @ 07:07) (66 - 80)  BP: 155/91 (09-23-21 @ 07:07) (133/87 - 155/91)  RR: 18 (09-23-21 @ 07:07) (16 - 18)  SpO2: 98% (09-23-21 @ 07:07) (98% - 100%)  Wt(kg): --  General:     Well appearing, well nourished in no distress, no identifying marks , scars, or tattoos.  Skin: no rash or prominent lesions  Head: normocephalic, atraumatic     Sinuses: non-tender  Nose: no external lesions, mucosa non-inflamed, septum and turbinates normal  Throat: no erythema, exudates or lesions.  Neck: Supple without lymphadenopathy. Thyroid no thyromegaly, no palpable thyroid nodules, no palpable nodules or masses, carotid arteries no bruits.   Breasts: No palpable masses or lesions.  Heart: RRR, no murmur or gallop.  Normal S1, S2.  No S3, S4.   Lungs: CTA bilaterally, no wheezes, rhonchi, rales.  Breathing unlabored.   Chest wall: Normal insp   Abdomen:  Soft, NT/ND, normal bowel sounds, no HSM, no masses.  No peritoneal signs.   Back: spine normal without deformity or tenderness.  Normal ROM   : Exam normal.  no inguinal hernias.  Extremities: No deformities, clubbing, cyanosis, or edema.  Musculoskeletal: Normal gait and station. No decreased range of motion, instability, atrophy or abnormal strength or tone in the head, neck, spine, ribs, pelvis or extremities.   Neurologic: CN 2-12 normal. Sensation to pain, touch and proprioception normal. DTRs normal in upper and lower extremities. No pathologic reflexes.  Motor normal.  Psychiatric: Oriented X3, intact recent and remote memory, judgement and insight, normal mood and affect.                          15.7   7.26  )-----------( 241      ( 22 Sep 2021 15:47 )             45.8     09-22    137  |  102  |  13  ----------------------------<  107<H>  3.9   |  27  |  1.03    Ca    9.0      22 Sep 2021 15:47  Mg     2.0     09-22    TPro  7.4  /  Alb  3.7  /  TBili  0.6  /  DBili  x   /  AST  44<H>  /  ALT  57  /  AlkPhos  48  09-22    CAPILLARY BLOOD GLUCOSE        PT/INR - ( 22 Sep 2021 15:47 )   PT: 13.6 sec;   INR: 1.17 ratio         PTT - ( 22 Sep 2021 15:47 )  PTT:35.1 sec        Radiology/Imaging, I have personally reviewed:  CXR 9/22/21: clear lungs

## 2021-09-23 NOTE — DISCHARGE NOTE PROVIDER - CARE PROVIDERS DIRECT ADDRESSES
[email or web address]

## 2021-09-23 NOTE — DISCHARGE NOTE PROVIDER - PROVIDER TOKENS
PROVIDER:[TOKEN:[[id number]],FOLLOWUP:[1 week]],PROVIDER:[TOKEN:[[id number]],FOLLOWUP:[2 weeks]]

## 2021-09-23 NOTE — DISCHARGE NOTE NURSING/CASE MANAGEMENT/SOCIAL WORK - NSDCPETBCESMAN_GEN_ALL_CORE
Problem: METABOLIC, FLUID AND ELECTROLYTES - ADULT  Goal: Electrolytes maintained within normal limits  Description  INTERVENTIONS:  - Monitor labs and assess patient for signs and symptoms of electrolyte imbalances  - Administer electrolyte replacement as ordered  - Monitor response to electrolyte replacements, including repeat lab results as appropriate  - Instruct patient on fluid and nutrition as appropriate  Outcome: Progressing  Goal: Fluid balance maintained  Description  INTERVENTIONS:  - Monitor labs   - Monitor I/O and WT  - Instruct patient on fluid and nutrition as appropriate  - Assess for signs & symptoms of volume excess or deficit  Outcome: Progressing     Problem: CARDIOVASCULAR - ADULT  Goal: Maintains optimal cardiac output and hemodynamic stability  Description  INTERVENTIONS:  - Monitor I/O, vital signs and rhythm  - Monitor for S/S and trends of decreased cardiac output  - Administer and titrate ordered vasoactive medications to optimize hemodynamic stability  - Assess quality of pulses, skin color and temperature  - Assess for signs of decreased coronary artery perfusion  - Instruct patient to report change in severity of symptoms  Outcome: Progressing  Goal: Absence of cardiac dysrhythmias or at baseline rhythm  Description  INTERVENTIONS:  - Continuous cardiac monitoring, vital signs, obtain 12 lead EKG if ordered  - Administer antiarrhythmic and heart rate control medications as ordered  - Monitor electrolytes and administer replacement therapy as ordered  Outcome: Progressing     Problem: DISCHARGE PLANNING  Goal: Discharge to home or other facility with appropriate resources  Description  INTERVENTIONS:  - Identify barriers to discharge w/patient and caregiver  - Arrange for needed discharge resources and transportation as appropriate  - Identify discharge learning needs (meds, wound care, etc )  - Arrange for interpretive services to assist at discharge as needed  - Refer to Case Management Department for coordinating discharge planning if the patient needs post-hospital services based on physician/advanced practitioner order or complex needs related to functional status, cognitive ability, or social support system  Outcome: Progressing If you are a smoker, it is important for your health to stop smoking. Please be aware that second hand smoke is also harmful.

## 2021-09-23 NOTE — DISCHARGE NOTE PROVIDER - NSDCFUSCHEDAPPT_GEN_ALL_CORE_FT
GRAMMERSTORF, STANISLAV MAX ; 09/30/2021 ; [facility] [address]  GRAMMERSTORF, STANISLAV MAX ; 10/19/2021 ; [facility] [address]  GRAMMERSTORF, STANISLAV MAX ; 12/09/2021 ; [facility] [address]

## 2021-09-23 NOTE — CONSULT NOTE ADULT - PROBLEM SELECTOR RECOMMENDATION 9
Unlikely to be anginal equivalent 2/2 worsening CAD.  Trops neg, ECG unremarkable.  CXR unremarkable.  May have mild diastolic dysfunction (BNP neg but obese)  D/w Hospitalist - will not plan on cath.  Echo & nuclear scan ordered in OP EMR  pt will call  & schedule testing in next week.  start lasix 20 mg qOD for possible HFpEF.  Followup in cardiology clinic in 1 week to reassess symptoms & get basic chem on   diuretic therapy.

## 2021-09-23 NOTE — DISCHARGE NOTE PROVIDER - HOSPITAL COURSE
CC: PRATT  HPI and Hospital Course: 61M hx gout, obesity, aflutter s/p ablation on Eliquis, HOPE, HTN, HLD, MI, CAD s/p PCI x 4 stents and CABG in 3/21 who p/w PRATT. Has had sx for one week, difficult to take deep breaths. No CP/chest discomfort. No SIDDHARTH, no orthopnea. No LH, syncope, f/c. In ED, trops and EKG neg, BNP low and CXR neg for congestion. Seen by cardiology and deemed appropriate for d/c with close outpt f/u for echo and stress, starting on lasix 20mg every other day for possible HFpEF.      VITALS:  T(F): 98 (09-23-21 @ 07:07), Max: 98 (09-23-21 @ 02:28)  HR: 69 (09-23-21 @ 07:07) (66 - 80)  BP: 155/91 (09-23-21 @ 07:07) (133/87 - 155/91)  RR: 18 (09-23-21 @ 07:07) (16 - 18)  SpO2: 98% (09-23-21 @ 07:07) (98% - 100%)    PHYSICAL EXAM:  General: NAD, lying in bed  HEENT:  pupils equal and reactive, EOMI, no oropharyngeal lesions, erythema, exudates, oral thrush  NECK:   supple, no carotid bruits, no palpable lymph nodes, no thyromegaly  CV:  +S1, +S2, regular, no murmurs or rubs  RESP:   lungs clear to auscultation bilaterally, no wheezing, rales, rhonchi, good air entry bilaterally  BREAST:  not examined  GI:  abdomen soft, non-tender, non-distended, normal BS, no bruits, no abdominal masses, no palpable masses  RECTAL:  not examined  :  not examined  MSK:   normal muscle tone, no atrophy, no rigidity, no contractions  EXT:  no clubbing, no cyanosis, no edema, no calf pain, swelling or erythema  VASCULAR:  pulses equal and symmetric in the upper and lower extremities  NEURO:  AAOX3, no focal neurological deficits, follows all commands, able to move extremities spontaneously  SKIN:  no ulcers, lesions or rashes    CXR 9/22/21: clear lungs    #PRATT- unlikely to be anginal equivalent  -not hypoxic, trops neg, EKG neg  -BNP low (of note, pt is obese) and CXR clear  -eval'd by cardiology who rec outpt stress and outpt echo  -to start on lasix 20mg every other day for possible HFpEF  -f/u w cards in office in 1wk    #CAD s/p CABG, prior MI, HTN, HLD, Aflutter  -continue home ASA, statin  -continue home BB  -continue home Eliquis

## 2021-09-23 NOTE — ED ADULT NURSE REASSESSMENT NOTE - NS ED NURSE REASSESS COMMENT FT1
Flu shot administered as ordered by Dr. Cook. Education provided. Patient discharge to home. IV removed. patient ambulated with steady gait. Patient denies any pain. Alert and oriented. Verbalized understanding to follow up with outside providers

## 2021-09-23 NOTE — ED ADULT NURSE REASSESSMENT NOTE - NS ED NURSE REASSESS COMMENT FT1
As per patient Dr. Erwin team to D/C patient. No plan for procedure today . Patient resting comfortably. Meds given as ordered. Will continue to monitor. Discharge pending.

## 2021-09-27 ENCOUNTER — NON-APPOINTMENT (OUTPATIENT)
Age: 61
End: 2021-09-27

## 2021-09-28 DIAGNOSIS — E55.9 VITAMIN D DEFICIENCY, UNSPECIFIED: ICD-10-CM

## 2021-09-28 DIAGNOSIS — G47.33 OBSTRUCTIVE SLEEP APNEA (ADULT) (PEDIATRIC): ICD-10-CM

## 2021-09-28 DIAGNOSIS — I11.0 HYPERTENSIVE HEART DISEASE WITH HEART FAILURE: ICD-10-CM

## 2021-09-28 DIAGNOSIS — Z95.5 PRESENCE OF CORONARY ANGIOPLASTY IMPLANT AND GRAFT: ICD-10-CM

## 2021-09-28 DIAGNOSIS — R06.00 DYSPNEA, UNSPECIFIED: ICD-10-CM

## 2021-09-28 DIAGNOSIS — I50.30 UNSPECIFIED DIASTOLIC (CONGESTIVE) HEART FAILURE: ICD-10-CM

## 2021-09-28 DIAGNOSIS — I25.10 ATHEROSCLEROTIC HEART DISEASE OF NATIVE CORONARY ARTERY WITHOUT ANGINA PECTORIS: ICD-10-CM

## 2021-09-28 DIAGNOSIS — R07.9 CHEST PAIN, UNSPECIFIED: ICD-10-CM

## 2021-09-28 DIAGNOSIS — E66.9 OBESITY, UNSPECIFIED: ICD-10-CM

## 2021-09-28 DIAGNOSIS — E78.5 HYPERLIPIDEMIA, UNSPECIFIED: ICD-10-CM

## 2021-09-28 DIAGNOSIS — I48.92 UNSPECIFIED ATRIAL FLUTTER: ICD-10-CM

## 2021-09-28 DIAGNOSIS — Z95.1 PRESENCE OF AORTOCORONARY BYPASS GRAFT: ICD-10-CM

## 2021-09-28 DIAGNOSIS — M10.9 GOUT, UNSPECIFIED: ICD-10-CM

## 2021-09-28 DIAGNOSIS — M19.90 UNSPECIFIED OSTEOARTHRITIS, UNSPECIFIED SITE: ICD-10-CM

## 2021-09-28 DIAGNOSIS — I25.2 OLD MYOCARDIAL INFARCTION: ICD-10-CM

## 2021-09-28 DIAGNOSIS — Z79.82 LONG TERM (CURRENT) USE OF ASPIRIN: ICD-10-CM

## 2021-09-30 ENCOUNTER — APPOINTMENT (OUTPATIENT)
Dept: CARDIOLOGY | Facility: CLINIC | Age: 61
End: 2021-09-30

## 2021-10-06 ENCOUNTER — APPOINTMENT (OUTPATIENT)
Age: 61
End: 2021-10-06

## 2021-10-13 ENCOUNTER — NON-APPOINTMENT (OUTPATIENT)
Age: 61
End: 2021-10-13

## 2021-10-14 ENCOUNTER — APPOINTMENT (OUTPATIENT)
Dept: CARDIOLOGY | Facility: CLINIC | Age: 61
End: 2021-10-14
Payer: COMMERCIAL

## 2021-10-14 PROCEDURE — A9500: CPT

## 2021-10-14 PROCEDURE — 78452 HT MUSCLE IMAGE SPECT MULT: CPT

## 2021-10-14 PROCEDURE — 93015 CV STRESS TEST SUPVJ I&R: CPT

## 2021-10-19 ENCOUNTER — APPOINTMENT (OUTPATIENT)
Dept: CARDIOLOGY | Facility: CLINIC | Age: 61
End: 2021-10-19
Payer: COMMERCIAL

## 2021-10-19 PROCEDURE — 93978 VASCULAR STUDY: CPT

## 2021-11-12 ENCOUNTER — RX CHANGE (OUTPATIENT)
Age: 61
End: 2021-11-12

## 2021-12-09 ENCOUNTER — NON-APPOINTMENT (OUTPATIENT)
Age: 61
End: 2021-12-09

## 2021-12-09 ENCOUNTER — APPOINTMENT (OUTPATIENT)
Dept: CARDIOLOGY | Facility: CLINIC | Age: 61
End: 2021-12-09
Payer: COMMERCIAL

## 2021-12-09 VITALS
HEIGHT: 74 IN | OXYGEN SATURATION: 97 % | WEIGHT: 295.41 LBS | SYSTOLIC BLOOD PRESSURE: 138 MMHG | BODY MASS INDEX: 37.91 KG/M2 | HEART RATE: 62 BPM | DIASTOLIC BLOOD PRESSURE: 84 MMHG

## 2021-12-09 PROCEDURE — 93000 ELECTROCARDIOGRAM COMPLETE: CPT

## 2021-12-09 PROCEDURE — 99214 OFFICE O/P EST MOD 30 MIN: CPT

## 2021-12-09 RX ORDER — AMIODARONE HYDROCHLORIDE 200 MG/1
200 TABLET ORAL
Qty: 30 | Refills: 0 | Status: DISCONTINUED | COMMUNITY
Start: 2021-03-29 | End: 2021-12-09

## 2021-12-09 NOTE — DISCUSSION/SUMMARY
[FreeTextEntry1] : HTN: Controlled\par \par HLD: Continue statin maintain LDL of 70\par \par AF:  S/P ablation maintain EP management \par \par HOPE: Currently has not been treating for 3 months had just received new equipment and will resume treatment \par \par Dilated AO: U/S reviewed\par \par CAD/S/P CABG: no active cardiac complaints continue current medications \par \par Complete labs ordered\par \par OV 3 months

## 2021-12-09 NOTE — PHYSICAL EXAM
[Normal S1, S2] : normal S1, S2 [Soft] : abdomen soft [Non Tender] : non-tender [Normal Gait] : normal gait [No Edema] : no edema [Normal] : moves all extremities, no focal deficits, normal speech [Alert and Oriented] : alert and oriented [de-identified] : Overweight

## 2021-12-09 NOTE — HISTORY OF PRESENT ILLNESS
[FreeTextEntry1] : 60 Y/O gentleman PMH: HTN, HLD, gout, AF S/P ablation followed with EP, LVH, HOPE, Dilated AO, CAD/MI/Stent,  S/P NSTEMI Post CABG X 4 ( 3/24/21) LIMA to LAD, SVG to OM-1,OM-2,RPDA here today in routine cardiac follow up. Pt denies chest pain. He admits to not losing weight.

## 2022-01-07 ENCOUNTER — RX RENEWAL (OUTPATIENT)
Age: 62
End: 2022-01-07

## 2022-01-13 ENCOUNTER — APPOINTMENT (OUTPATIENT)
Dept: UROLOGY | Facility: CLINIC | Age: 62
End: 2022-01-13
Payer: COMMERCIAL

## 2022-01-13 VITALS
SYSTOLIC BLOOD PRESSURE: 141 MMHG | HEIGHT: 74 IN | BODY MASS INDEX: 36.57 KG/M2 | DIASTOLIC BLOOD PRESSURE: 88 MMHG | OXYGEN SATURATION: 97 % | WEIGHT: 285 LBS | HEART RATE: 72 BPM

## 2022-01-13 PROCEDURE — 99213 OFFICE O/P EST LOW 20 MIN: CPT

## 2022-01-13 NOTE — PHYSICAL EXAM
[General Appearance - Well Developed] : well developed [General Appearance - Well Nourished] : well nourished [Normal Appearance] : normal appearance [Well Groomed] : well groomed [General Appearance - In No Acute Distress] : no acute distress [Abdomen Soft] : soft [Abdomen Tenderness] : non-tender [Costovertebral Angle Tenderness] : no ~M costovertebral angle tenderness [Urethral Meatus] : meatus normal [Urinary Bladder Findings] : the bladder was normal on palpation [Scrotum] : the scrotum was normal [Testes Tenderness] : no tenderness of the testes [Testes Mass (___cm)] : there were no testicular masses [FreeTextEntry1] : Left inguinal incision scar healing well.  [Edema] : no peripheral edema [] : no respiratory distress [Respiration, Rhythm And Depth] : normal respiratory rhythm and effort [Exaggerated Use Of Accessory Muscles For Inspiration] : no accessory muscle use [Oriented To Time, Place, And Person] : oriented to person, place, and time [Affect] : the affect was normal [Mood] : the mood was normal [Not Anxious] : not anxious [Normal Station and Gait] : the gait and station were normal for the patient's age [No Focal Deficits] : no focal deficits [No Palpable Adenopathy] : no palpable adenopathy

## 2022-01-13 NOTE — HISTORY OF PRESENT ILLNESS
[FreeTextEntry1] : This patient is here for a checkup and needs labs done and refills on his medications.

## 2022-01-13 NOTE — END OF VISIT
[FreeTextEntry3] : Labs were ordered and medications prescribed  .  He will follow-up in 1 year or as needed

## 2022-01-14 LAB
PSA SERPL-MCNC: 0.31 NG/ML
URATE SERPL-MCNC: 6.9 MG/DL

## 2022-01-18 LAB — URINE CYTOLOGY: NORMAL

## 2022-01-25 ENCOUNTER — RX CHANGE (OUTPATIENT)
Age: 62
End: 2022-01-25

## 2022-02-01 ENCOUNTER — NON-APPOINTMENT (OUTPATIENT)
Age: 62
End: 2022-02-01

## 2022-02-16 ENCOUNTER — NON-APPOINTMENT (OUTPATIENT)
Age: 62
End: 2022-02-16

## 2022-02-16 ENCOUNTER — APPOINTMENT (OUTPATIENT)
Dept: CARDIOLOGY | Facility: CLINIC | Age: 62
End: 2022-02-16
Payer: COMMERCIAL

## 2022-02-16 VITALS — BODY MASS INDEX: 38.26 KG/M2 | WEIGHT: 298 LBS

## 2022-02-16 VITALS
HEART RATE: 84 BPM | SYSTOLIC BLOOD PRESSURE: 150 MMHG | WEIGHT: 285 LBS | BODY MASS INDEX: 36.57 KG/M2 | HEIGHT: 74 IN | DIASTOLIC BLOOD PRESSURE: 94 MMHG | OXYGEN SATURATION: 99 %

## 2022-02-16 VITALS — SYSTOLIC BLOOD PRESSURE: 130 MMHG | DIASTOLIC BLOOD PRESSURE: 84 MMHG

## 2022-02-16 PROCEDURE — 93000 ELECTROCARDIOGRAM COMPLETE: CPT

## 2022-02-16 PROCEDURE — 99214 OFFICE O/P EST MOD 30 MIN: CPT | Mod: 25

## 2022-02-16 NOTE — HISTORY OF PRESENT ILLNESS
[FreeTextEntry1] : 61 Y/O gentleman PMH: HTN, HLD, gout, AF S/P ablation followed with EP, LVH, HOPE, Dilated AO, CAD/MI/Stent,  S/P NSTEMI Post CABG X 4 ( 3/24/21) LIMA to LAD, SVG to OM-1,OM-2,RPDA here today for evaluation of shortness of breath.  Pt notes an episode of shortness of breath 2 days ago while at cardiac rehab.  States he was walking on the treadmill for a minute and a half when he suddenly lost his breath. This was associated with chest heaviness.  He had another episode of shortness of breath last week while walking his dog.  States he tired out easily which he does not usually do.  Symptoms are similar to what he had prior to his CABG.  States he never felt 100% improved after his surgery.  Symptoms are intermittent but he is sick of having them and wanted to be evaluated.  Denies chest pain, palpitations or syncope.  Goes to cardiac rehab 3 times a week.  Normally walks 7 minutes on the treadmill x 2. If symptoms occur, he is able to push through them and finish his workout.  He has unfortunately gained weight.  He has not gotten new equipment for his sleep apnea to date.  Cardiac testing reviewed.\par \par EKG: Sinus rhythm, first degree AVB, old AWMI. When compared to prior EKG, there are new Q waves in V4.

## 2022-02-16 NOTE — PHYSICAL EXAM
[Normal S1, S2] : normal S1, S2 [Soft] : abdomen soft [Non Tender] : non-tender [Normal Gait] : normal gait [No Edema] : no edema [Normal] : moves all extremities, no focal deficits, normal speech [Alert and Oriented] : alert and oriented [Normal Conjunctiva] : normal conjunctiva [Normal Venous Pressure] : normal venous pressure [No Carotid Bruit] : no carotid bruit [No Murmur] : no murmur [No Rub] : no rub [Clear Lung Fields] : clear lung fields [No Rash] : no rash [de-identified] : Overweight

## 2022-02-16 NOTE — DISCUSSION/SUMMARY
[FreeTextEntry1] : Exertional dyspnea: Intermittent, present before CABG, unchanged since surgery; unchanged in frequency or intensity.  Recommend echocardiogram. If symptoms worsen, I would have low threshold to repeating cath but at this time I do not think it is necessary\par \par HTN: Controlled. Pt unable to tolerate metoprolol succinate 25mg BID.  Has been taking 1/2 tablet TID on his own doing.  Recommended he take 1.5tablets once daily (total 37.5mg daily)\par \par HLD: Continue statin maintain LDL of 70. Labs ordered\par \par AF:  S/P ablation maintain EP management \par \par HOPE: Currently not being treated.  Needs new equipment.  Strongly recommend he follows up with pulmonary. \par \par Dilated AO: U/S reviewed\par \par CAD/S/P CABG: No chest pain but intermittent andrade.  See above.  Continue BB, ASA, statin\par \par OV 1 month

## 2022-02-16 NOTE — REVIEW OF SYSTEMS
[Negative] : Heme/Lymph [Weight Gain (___ Lbs)] : [unfilled] ~Ulb weight gain [Dyspnea on exertion] : dyspnea during exertion [Chest Discomfort] : no chest discomfort [Lower Ext Edema] : no extremity edema [Leg Claudication] : no intermittent leg claudication [Palpitations] : no palpitations [Orthopnea] : no orthopnea [PND] : no PND [Syncope] : no syncope [Cough] : no cough [Wheezing] : no wheezing [Coughing Up Blood] : no hemoptysis [Snoring] : snoring [Rash] : no rash

## 2022-02-17 LAB
BASOPHILS # BLD AUTO: 0.05 K/UL
BASOPHILS NFR BLD AUTO: 0.6 %
EOSINOPHIL # BLD AUTO: 0.27 K/UL
EOSINOPHIL NFR BLD AUTO: 3.3 %
HCT VFR BLD CALC: 52.3 %
HGB BLD-MCNC: 17.2 G/DL
IMM GRANULOCYTES NFR BLD AUTO: 0.4 %
LYMPHOCYTES # BLD AUTO: 2.66 K/UL
LYMPHOCYTES NFR BLD AUTO: 32.3 %
MAN DIFF?: NORMAL
MCHC RBC-ENTMCNC: 30.5 PG
MCHC RBC-ENTMCNC: 32.9 GM/DL
MCV RBC AUTO: 92.7 FL
MONOCYTES # BLD AUTO: 0.73 K/UL
MONOCYTES NFR BLD AUTO: 8.9 %
NEUTROPHILS # BLD AUTO: 4.5 K/UL
NEUTROPHILS NFR BLD AUTO: 54.5 %
PLATELET # BLD AUTO: 248 K/UL
RBC # BLD: 5.64 M/UL
RBC # FLD: 13.9 %
WBC # FLD AUTO: 8.24 K/UL

## 2022-02-18 ENCOUNTER — NON-APPOINTMENT (OUTPATIENT)
Age: 62
End: 2022-02-18

## 2022-02-18 LAB
25(OH)D3 SERPL-MCNC: 38.9 NG/ML
ALBUMIN SERPL ELPH-MCNC: 4.7 G/DL
ALP BLD-CCNC: 53 U/L
ALT SERPL-CCNC: 39 U/L
ANION GAP SERPL CALC-SCNC: 14 MMOL/L
AST SERPL-CCNC: 39 U/L
BILIRUB SERPL-MCNC: 0.6 MG/DL
BUN SERPL-MCNC: 13 MG/DL
CALCIUM SERPL-MCNC: 9.9 MG/DL
CHLORIDE SERPL-SCNC: 98 MMOL/L
CHOLEST SERPL-MCNC: 180 MG/DL
CK SERPL-CCNC: 456 U/L
CO2 SERPL-SCNC: 28 MMOL/L
CREAT SERPL-MCNC: 0.97 MG/DL
GLUCOSE SERPL-MCNC: 105 MG/DL
HDLC SERPL-MCNC: 60 MG/DL
LDLC SERPL CALC-MCNC: 79 MG/DL
NONHDLC SERPL-MCNC: 120 MG/DL
POTASSIUM SERPL-SCNC: 4.7 MMOL/L
PROT SERPL-MCNC: 7.4 G/DL
SODIUM SERPL-SCNC: 139 MMOL/L
T4 FREE SERPL-MCNC: 1.3 NG/DL
TRIGL SERPL-MCNC: 206 MG/DL
TSH SERPL-ACNC: 1.62 UIU/ML

## 2022-02-19 ENCOUNTER — EMERGENCY (EMERGENCY)
Facility: HOSPITAL | Age: 62
LOS: 0 days | Discharge: HOME | End: 2022-02-19
Attending: EMERGENCY MEDICINE
Payer: COMMERCIAL

## 2022-02-19 ENCOUNTER — NON-APPOINTMENT (OUTPATIENT)
Age: 62
End: 2022-02-19

## 2022-02-19 VITALS
SYSTOLIC BLOOD PRESSURE: 132 MMHG | OXYGEN SATURATION: 98 % | HEART RATE: 65 BPM | DIASTOLIC BLOOD PRESSURE: 78 MMHG | TEMPERATURE: 98 F | RESPIRATION RATE: 16 BRPM

## 2022-02-19 VITALS
DIASTOLIC BLOOD PRESSURE: 94 MMHG | HEART RATE: 81 BPM | SYSTOLIC BLOOD PRESSURE: 184 MMHG | TEMPERATURE: 98 F | RESPIRATION RATE: 20 BRPM | OXYGEN SATURATION: 98 %

## 2022-02-19 DIAGNOSIS — Z98.890 OTHER SPECIFIED POSTPROCEDURAL STATES: Chronic | ICD-10-CM

## 2022-02-19 DIAGNOSIS — R06.02 SHORTNESS OF BREATH: ICD-10-CM

## 2022-02-19 DIAGNOSIS — G47.30 SLEEP APNEA, UNSPECIFIED: ICD-10-CM

## 2022-02-19 DIAGNOSIS — E66.9 OBESITY, UNSPECIFIED: ICD-10-CM

## 2022-02-19 DIAGNOSIS — Z98.61 CORONARY ANGIOPLASTY STATUS: Chronic | ICD-10-CM

## 2022-02-19 DIAGNOSIS — R06.09 OTHER FORMS OF DYSPNEA: ICD-10-CM

## 2022-02-19 DIAGNOSIS — I48.92 UNSPECIFIED ATRIAL FLUTTER: ICD-10-CM

## 2022-02-19 DIAGNOSIS — I48.91 UNSPECIFIED ATRIAL FIBRILLATION: ICD-10-CM

## 2022-02-19 DIAGNOSIS — I10 ESSENTIAL (PRIMARY) HYPERTENSION: ICD-10-CM

## 2022-02-19 DIAGNOSIS — I25.2 OLD MYOCARDIAL INFARCTION: ICD-10-CM

## 2022-02-19 DIAGNOSIS — Z79.82 LONG TERM (CURRENT) USE OF ASPIRIN: ICD-10-CM

## 2022-02-19 DIAGNOSIS — I25.10 ATHEROSCLEROTIC HEART DISEASE OF NATIVE CORONARY ARTERY WITHOUT ANGINA PECTORIS: ICD-10-CM

## 2022-02-19 DIAGNOSIS — E78.5 HYPERLIPIDEMIA, UNSPECIFIED: ICD-10-CM

## 2022-02-19 DIAGNOSIS — Z20.822 CONTACT WITH AND (SUSPECTED) EXPOSURE TO COVID-19: ICD-10-CM

## 2022-02-19 DIAGNOSIS — Z90.49 ACQUIRED ABSENCE OF OTHER SPECIFIED PARTS OF DIGESTIVE TRACT: Chronic | ICD-10-CM

## 2022-02-19 LAB
ALBUMIN SERPL ELPH-MCNC: 3.5 G/DL — SIGNIFICANT CHANGE UP (ref 3.3–5)
ALP SERPL-CCNC: 52 U/L — SIGNIFICANT CHANGE UP (ref 40–120)
ALT FLD-CCNC: 51 U/L — SIGNIFICANT CHANGE UP (ref 12–78)
ANION GAP SERPL CALC-SCNC: 7 MMOL/L — SIGNIFICANT CHANGE UP (ref 5–17)
APTT BLD: 37.4 SEC — HIGH (ref 27.5–35.5)
AST SERPL-CCNC: 43 U/L — HIGH (ref 15–37)
BASOPHILS # BLD AUTO: 0.04 K/UL — SIGNIFICANT CHANGE UP (ref 0–0.2)
BASOPHILS NFR BLD AUTO: 0.6 % — SIGNIFICANT CHANGE UP (ref 0–2)
BILIRUB SERPL-MCNC: 0.5 MG/DL — SIGNIFICANT CHANGE UP (ref 0.2–1.2)
BUN SERPL-MCNC: 14 MG/DL — SIGNIFICANT CHANGE UP (ref 7–23)
CALCIUM SERPL-MCNC: 9.3 MG/DL — SIGNIFICANT CHANGE UP (ref 8.5–10.1)
CHLORIDE SERPL-SCNC: 103 MMOL/L — SIGNIFICANT CHANGE UP (ref 96–108)
CK SERPL-CCNC: 471 U/L — HIGH (ref 26–308)
CO2 SERPL-SCNC: 27 MMOL/L — SIGNIFICANT CHANGE UP (ref 22–31)
CREAT SERPL-MCNC: 1.12 MG/DL — SIGNIFICANT CHANGE UP (ref 0.5–1.3)
EOSINOPHIL # BLD AUTO: 0.16 K/UL — SIGNIFICANT CHANGE UP (ref 0–0.5)
EOSINOPHIL NFR BLD AUTO: 2.3 % — SIGNIFICANT CHANGE UP (ref 0–6)
GLUCOSE SERPL-MCNC: 138 MG/DL — HIGH (ref 70–99)
HCT VFR BLD CALC: 49.2 % — SIGNIFICANT CHANGE UP (ref 39–50)
HGB BLD-MCNC: 16.3 G/DL — SIGNIFICANT CHANGE UP (ref 13–17)
IMM GRANULOCYTES NFR BLD AUTO: 0.3 % — SIGNIFICANT CHANGE UP (ref 0–1.5)
INR BLD: 1.13 RATIO — SIGNIFICANT CHANGE UP (ref 0.88–1.16)
LIDOCAIN IGE QN: 161 U/L — SIGNIFICANT CHANGE UP (ref 73–393)
LYMPHOCYTES # BLD AUTO: 2.07 K/UL — SIGNIFICANT CHANGE UP (ref 1–3.3)
LYMPHOCYTES # BLD AUTO: 30.1 % — SIGNIFICANT CHANGE UP (ref 13–44)
MAGNESIUM SERPL-MCNC: 1.9 MG/DL — SIGNIFICANT CHANGE UP (ref 1.6–2.6)
MCHC RBC-ENTMCNC: 30 PG — SIGNIFICANT CHANGE UP (ref 27–34)
MCHC RBC-ENTMCNC: 33.1 GM/DL — SIGNIFICANT CHANGE UP (ref 32–36)
MCV RBC AUTO: 90.4 FL — SIGNIFICANT CHANGE UP (ref 80–100)
MONOCYTES # BLD AUTO: 0.42 K/UL — SIGNIFICANT CHANGE UP (ref 0–0.9)
MONOCYTES NFR BLD AUTO: 6.1 % — SIGNIFICANT CHANGE UP (ref 2–14)
NEUTROPHILS # BLD AUTO: 4.16 K/UL — SIGNIFICANT CHANGE UP (ref 1.8–7.4)
NEUTROPHILS NFR BLD AUTO: 60.6 % — SIGNIFICANT CHANGE UP (ref 43–77)
NT-PROBNP SERPL-SCNC: 194 PG/ML — HIGH (ref 0–125)
PLATELET # BLD AUTO: 226 K/UL — SIGNIFICANT CHANGE UP (ref 150–400)
POTASSIUM SERPL-MCNC: 4.2 MMOL/L — SIGNIFICANT CHANGE UP (ref 3.5–5.3)
POTASSIUM SERPL-SCNC: 4.2 MMOL/L — SIGNIFICANT CHANGE UP (ref 3.5–5.3)
PROT SERPL-MCNC: 7.6 GM/DL — SIGNIFICANT CHANGE UP (ref 6–8.3)
PROTHROM AB SERPL-ACNC: 13 SEC — SIGNIFICANT CHANGE UP (ref 10.6–13.6)
RAPID RVP RESULT: SIGNIFICANT CHANGE UP
RBC # BLD: 5.44 M/UL — SIGNIFICANT CHANGE UP (ref 4.2–5.8)
RBC # FLD: 13.4 % — SIGNIFICANT CHANGE UP (ref 10.3–14.5)
SARS-COV-2 RNA SPEC QL NAA+PROBE: SIGNIFICANT CHANGE UP
SARS-COV-2 RNA SPEC QL NAA+PROBE: SIGNIFICANT CHANGE UP
SODIUM SERPL-SCNC: 137 MMOL/L — SIGNIFICANT CHANGE UP (ref 135–145)
TROPONIN I, HIGH SENSITIVITY RESULT: 52.85 NG/L — SIGNIFICANT CHANGE UP
TROPONIN I, HIGH SENSITIVITY RESULT: 53.84 NG/L — SIGNIFICANT CHANGE UP
TROPONIN I, HIGH SENSITIVITY RESULT: 63.03 NG/L — SIGNIFICANT CHANGE UP
WBC # BLD: 6.87 K/UL — SIGNIFICANT CHANGE UP (ref 3.8–10.5)
WBC # FLD AUTO: 6.87 K/UL — SIGNIFICANT CHANGE UP (ref 3.8–10.5)

## 2022-02-19 PROCEDURE — 85610 PROTHROMBIN TIME: CPT

## 2022-02-19 PROCEDURE — 93005 ELECTROCARDIOGRAM TRACING: CPT

## 2022-02-19 PROCEDURE — 0225U NFCT DS DNA&RNA 21 SARSCOV2: CPT

## 2022-02-19 PROCEDURE — 71045 X-RAY EXAM CHEST 1 VIEW: CPT

## 2022-02-19 PROCEDURE — 84484 ASSAY OF TROPONIN QUANT: CPT

## 2022-02-19 PROCEDURE — 93010 ELECTROCARDIOGRAM REPORT: CPT

## 2022-02-19 PROCEDURE — U0003: CPT

## 2022-02-19 PROCEDURE — 80053 COMPREHEN METABOLIC PANEL: CPT

## 2022-02-19 PROCEDURE — 83735 ASSAY OF MAGNESIUM: CPT

## 2022-02-19 PROCEDURE — 82550 ASSAY OF CK (CPK): CPT

## 2022-02-19 PROCEDURE — 71045 X-RAY EXAM CHEST 1 VIEW: CPT | Mod: 26

## 2022-02-19 PROCEDURE — 99285 EMERGENCY DEPT VISIT HI MDM: CPT

## 2022-02-19 PROCEDURE — 85730 THROMBOPLASTIN TIME PARTIAL: CPT

## 2022-02-19 PROCEDURE — 83880 ASSAY OF NATRIURETIC PEPTIDE: CPT

## 2022-02-19 PROCEDURE — 85025 COMPLETE CBC W/AUTO DIFF WBC: CPT

## 2022-02-19 PROCEDURE — U0005: CPT

## 2022-02-19 PROCEDURE — 83690 ASSAY OF LIPASE: CPT

## 2022-02-19 PROCEDURE — 36415 COLL VENOUS BLD VENIPUNCTURE: CPT

## 2022-02-19 PROCEDURE — 99285 EMERGENCY DEPT VISIT HI MDM: CPT | Mod: 25

## 2022-02-19 RX ORDER — ASPIRIN/CALCIUM CARB/MAGNESIUM 324 MG
162 TABLET ORAL ONCE
Refills: 0 | Status: COMPLETED | OUTPATIENT
Start: 2022-02-19 | End: 2022-02-19

## 2022-02-19 RX ADMIN — Medication 162 MILLIGRAM(S): at 10:42

## 2022-02-19 NOTE — ED STATDOCS - PROGRESS NOTE DETAILS
Regla De for Dr. Greenberg : 62 M hx CHF, quadruple by pass, stents, diverticulitis, hernia, sleep apnea, Afib, vitamin D deficiency, OA, obesity, HLD, gout, CAD, MI, dyslipidemia here sent in by cardiologist for elevated troponin. pt states that when at cardiac therapy on monday 5 days ago reports he was having worsening SOB while on the treadmill. pt notes that he recently gained more weight. no associated chest pain or pressure. does endorse muscular mid back pain while at cardiac therapy. at rest, pt states that he feels fine. denies feeling lightheaded. also pt states that he has been congestion recently, reports he went snorkeling recently; currently still has nasal pressure and congestion. no ASA ingestion today. pt on Eliquis Will send pt to main ED for further evaluation. Regla De for Dr. Greenberg : 62 M hx CHF, quadruple by pass, stents, diverticulitis, hernia, sleep apnea, Afib, vitamin D deficiency, OA, obesity, HLD, gout, CAD, MI, dyslipidemia here sent in by cardiologist for elevated troponin. pt states that when at cardiac therapy on monday 5 days ago reports he was having worsening SOB while on the treadmill. pt notes that he recently gained more weight. no associated chest pain or pressure. does endorse muscular mid back pain while at cardiac therapy. at rest, pt states that he feels fine. denies feeling lightheaded. also pt states that he has been congestion recently, reports he went snorkeling recently; currently still has nasal pressure and congestion. no ASA ingestion today. pt on Eliquis. patient with positive troponin on outpatient labs. Will send pt to main ED for further evaluation.

## 2022-02-19 NOTE — ED PROVIDER NOTE - CLINICAL SUMMARY MEDICAL DECISION MAKING FREE TEXT BOX
Pt sent in by cardiology because of elevated out-patient troponin. Will check labs, X-ray, EKG and consult Dr. Palla.

## 2022-02-19 NOTE — ED PROVIDER NOTE - CARE PROVIDER_API CALL
Palla, Venugopal R (MD)  Cardiovascular Disease; Internal Medicine  [address], [place] [id number]  Phone: [phone]  Fax: [phone]  Follow Up Time:

## 2022-02-19 NOTE — ED ADULT TRIAGE NOTE - CHIEF COMPLAINT QUOTE
Pt. to the ED sent by Cardiologist Office for evaluation of Elevated Troponin- + SOB- Denies Chest Pain- Hx of Cardiac ( Stents, Quadruple By Pass, CHF) on Eliquis -- EKG STAT

## 2022-02-19 NOTE — ED PROVIDER NOTE - MDM ORDERS SUBMITTED SELECTION
Subjective:      Patient ID: Mary Johansen is a 60 y.o. male Established patient, here for evaluation of the following chief complaint(s):  No chief complaint on file. HPI  71-year-old male previously diagnosed with Covid presents with a complaint of ageusia. Ageusia: The patient states that as result of not being able to taste he has not been able to eat food. He is interested in receiving pharmacologic therapy to bring resolution to his anosmia. MRN      COVID test negative 1 month ago. At present he denies polyuria,  Polydipsia, constitutional, sinus, visual, cardiopulmonary, urologic, gastrointestinal, immunologic/hematologic, musculoskeletal, neurologic,dermatologic, or psychiatric complaints. Review of Systems   Constitutional: Negative for chills, diaphoresis, fatigue and fever. HENT: Negative for congestion, dental problem, drooling, ear discharge, ear pain, facial swelling, hearing loss, mouth sores, nosebleeds, postnasal drip, rhinorrhea, sinus pressure, sinus pain, sneezing, sore throat, tinnitus, trouble swallowing and voice change. Eyes: Negative for photophobia, pain, discharge, redness, itching and visual disturbance. Respiratory: Negative for apnea, cough, chest tightness, shortness of breath and wheezing. Cardiovascular: Negative for chest pain, palpitations and leg swelling. Gastrointestinal: Negative for abdominal distention, abdominal pain, blood in stool, constipation, diarrhea, nausea, rectal pain and vomiting. Endocrine: Negative for cold intolerance, heat intolerance, polydipsia, polyphagia and polyuria. Genitourinary: Negative for decreased urine volume, difficulty urinating, dysuria, flank pain, frequency, genital sores, hematuria and urgency. Musculoskeletal: Negative for arthralgias, back pain, gait problem, joint swelling, myalgias, neck pain and neck stiffness. Skin: Negative for color change, rash and wound.    Allergic/Immunologic: Negative for environmental allergies and food allergies. Neurological: Negative for dizziness, tremors, seizures, syncope, facial asymmetry, speech difficulty, weakness, light-headedness, numbness and headaches. Hematological: Negative for adenopathy. Does not bruise/bleed easily. Psychiatric/Behavioral: Negative for agitation, confusion, decreased concentration, hallucinations, self-injury, sleep disturbance and suicidal ideas. The patient is not nervous/anxious. Objective: There were no vitals taken for this visit. Assessment:       Diagnosis Orders   1. Ageusia           Plan:      Diagnoses and all orders for this visit:    Vance Gutierrez have explained to the patient that his ageusia will resolve in time. The patient reports that his daughter will provide him with an over-the-counter medication that will return his ability to taste. No follow-ups on file. On this date 11/04/21 I have spent 30 minutes reviewing previous notes, test results and face to face with the patient discussing the diagnosis and importance of compliance with the treatment plan. Jeremy Ledesma MD      TELEHEALTH EVALUATION -- Audio/Visual (During [facility]- public health emergency)    -   Jennifer Vyas is a 60 y.o. male being evaluated by a Virtual Visit (video visit) encounter to address concerns as mentioned above. A caregiver was present when appropriate. Due to this being a TeleHealth encounter (During [facility] public health emergency), evaluation of the following organ systems was limited: Vitals/Constitutional/EENT/Resp/CV/GI//MS/Neuro/Skin/Heme-Lymph-Imm.   Pursuant to the emergency declaration under the [address] [place] [address], [address] [place] [address] authority and the [employer] and [employer] Act, this Virtual Visit was conducted with patient's (and/or legal guardian's) consent, to reduce the patient's risk of exposure to COVID-19 and provide necessary medical care. The patient (and/or legal guardian) has also been advised to contact this office for worsening conditions or problems, and seek emergency medical treatment and/or call 911 if deemed necessary. Services were provided through a video synchronous discussion virtually to substitute for in-person clinic visit. Type of encounter was x__telephone encounter__ MyChart ___Facetime    Patient was located at their home. Provider was located at their ___ home or        __x__ office. --Dex Peacock MD on 11/4/2021 at 10:38 AM    An electronic signature was used to authenticate this note. Alexandria Chu is a 60 y.o. male evaluated via telephone on 11/4/2021. Consent:  He and/or health care decision maker is aware that that he may receive a bill for this telephone service, depending on his insurance coverage, and has provided verbal consent to proceed: Yes      Documentation:  I communicated with the patient and/or health care decision maker about ageusia. Details of this discussion including any medical advice provided: Please refer to note above      I affirm this is a Patient Initiated Episode with a Patient who has not had a related appointment within my department in the past 7 days or scheduled within the next 24 hours. Patient identification was verified at the start of the visit: Yes    Total Time: minutes: 11-20 minutes    The visit was conducted pursuant to the emergency declaration under the [address] [facility], [address] [place] [address] waiver authority and the [employer] and [employer] Act. Patient identification was verified, and a caregiver was present when appropriate. The patient was located in a state where the provider was credentialed to provide care.     Note: not billable if this call serves to triage the patient into an appointment for the relevant concern      Dex Peacock MD       Please note, this report has been partially produced using speech recognition software  and may cause  and /or contain errors related to that system including grammar, punctuation and spelling as well as words and phrases that may seem inappropriate. If there are questions or concerns please feel free to contact me to clarify. Labs/EKG/Imaging Studies/Medications

## 2022-02-19 NOTE — ED ADULT NURSE NOTE - OBJECTIVE STATEMENT
patient sent by cardiologist for + troponin.  reports worsening SOB while at cardiac therapy on monday.  denies chest pain, SOB, N/V.

## 2022-02-19 NOTE — ED STATDOCS - CCCP TRG CHIEF CMPLNT
History and physical and required testing for medical clearance has been faxed to Aide Dean with Dr. Andrews at [phone] as requested.   Then placed H&P in folder at RN desk to hold until surgery.   Lisa Sanz CMA on 07/14/20 at 3:09 PM   shortness of breath

## 2022-02-21 ENCOUNTER — APPOINTMENT (OUTPATIENT)
Dept: CARDIOLOGY | Facility: CLINIC | Age: 62
End: 2022-02-21
Payer: COMMERCIAL

## 2022-02-21 ENCOUNTER — APPOINTMENT (OUTPATIENT)
Dept: INTERNAL MEDICINE | Facility: CLINIC | Age: 62
End: 2022-02-21
Payer: COMMERCIAL

## 2022-02-21 ENCOUNTER — NON-APPOINTMENT (OUTPATIENT)
Age: 62
End: 2022-02-21

## 2022-02-21 VITALS
BODY MASS INDEX: 38.24 KG/M2 | HEIGHT: 74 IN | SYSTOLIC BLOOD PRESSURE: 160 MMHG | DIASTOLIC BLOOD PRESSURE: 90 MMHG | OXYGEN SATURATION: 97 % | HEART RATE: 66 BPM | WEIGHT: 298 LBS

## 2022-02-21 VITALS
SYSTOLIC BLOOD PRESSURE: 138 MMHG | DIASTOLIC BLOOD PRESSURE: 86 MMHG | WEIGHT: 293 LBS | HEART RATE: 86 BPM | BODY MASS INDEX: 37.6 KG/M2 | OXYGEN SATURATION: 97 % | TEMPERATURE: 97.9 F | RESPIRATION RATE: 16 BRPM | HEIGHT: 74 IN

## 2022-02-21 DIAGNOSIS — K21.9 GASTRO-ESOPHAGEAL REFLUX DISEASE W/OUT ESOPHAGITIS: ICD-10-CM

## 2022-02-21 DIAGNOSIS — E66.9 OBESITY, UNSPECIFIED: ICD-10-CM

## 2022-02-21 DIAGNOSIS — M10.9 GOUT, UNSPECIFIED: ICD-10-CM

## 2022-02-21 DIAGNOSIS — R79.89 OTHER SPECIFIED ABNORMAL FINDINGS OF BLOOD CHEMISTRY: ICD-10-CM

## 2022-02-21 LAB — URATE SERPL-MCNC: 6.5 MG/DL

## 2022-02-21 PROCEDURE — 93306 TTE W/DOPPLER COMPLETE: CPT

## 2022-02-21 PROCEDURE — 93000 ELECTROCARDIOGRAM COMPLETE: CPT

## 2022-02-21 PROCEDURE — 99214 OFFICE O/P EST MOD 30 MIN: CPT | Mod: 25

## 2022-02-21 PROCEDURE — 94010 BREATHING CAPACITY TEST: CPT

## 2022-02-22 PROBLEM — R79.89 LOW TESTOSTERONE LEVEL IN MALE: Status: ACTIVE | Noted: 2020-02-11

## 2022-02-22 PROBLEM — K21.9 GERD (GASTROESOPHAGEAL REFLUX DISEASE): Status: ACTIVE | Noted: 2020-02-10

## 2022-02-22 PROBLEM — M10.9 GOUT: Status: ACTIVE | Noted: 2021-09-22

## 2022-02-22 LAB — SARS-COV-2 N GENE NPH QL NAA+PROBE: NOT DETECTED

## 2022-02-22 NOTE — DATA REVIEWED
[FreeTextEntry1] : Spirometry shows mild restrictive lung defect.FEV1 is 3.11 L which is 77% predicted. FVC is 4.03 L which is 75% predicted.FEV1 percent is 77%.

## 2022-02-22 NOTE — PLAN
[FreeTextEntry1] : Patient presents for followup evaluation. Labor was reviewed. He will continue on current medication regimen. Spirometry shows mild restrictive defect. Patient will followup in 3 months.

## 2022-02-23 ENCOUNTER — APPOINTMENT (OUTPATIENT)
Age: 62
End: 2022-02-23
Payer: COMMERCIAL

## 2022-02-23 ENCOUNTER — OUTPATIENT (OUTPATIENT)
Dept: OUTPATIENT SERVICES | Facility: HOSPITAL | Age: 62
LOS: 1 days | End: 2022-02-23
Payer: COMMERCIAL

## 2022-02-23 DIAGNOSIS — Z98.890 OTHER SPECIFIED POSTPROCEDURAL STATES: Chronic | ICD-10-CM

## 2022-02-23 DIAGNOSIS — G47.33 OBSTRUCTIVE SLEEP APNEA (ADULT) (PEDIATRIC): ICD-10-CM

## 2022-02-23 DIAGNOSIS — Z90.49 ACQUIRED ABSENCE OF OTHER SPECIFIED PARTS OF DIGESTIVE TRACT: Chronic | ICD-10-CM

## 2022-02-23 DIAGNOSIS — Z98.61 CORONARY ANGIOPLASTY STATUS: Chronic | ICD-10-CM

## 2022-02-23 PROCEDURE — 95811 POLYSOM 6/>YRS CPAP 4/> PARM: CPT

## 2022-02-23 PROCEDURE — 95811 POLYSOM 6/>YRS CPAP 4/> PARM: CPT | Mod: 26

## 2022-02-24 ENCOUNTER — APPOINTMENT (OUTPATIENT)
Dept: INTERNAL MEDICINE | Facility: CLINIC | Age: 62
End: 2022-02-24

## 2022-02-24 ENCOUNTER — APPOINTMENT (OUTPATIENT)
Dept: CARDIOLOGY | Facility: CLINIC | Age: 62
End: 2022-02-24

## 2022-02-28 LAB
CK SERPL-CCNC: 493 U/L
DEPRECATED D DIMER PPP IA-ACNC: <150 NG/ML DDU
TROPONIN I SERPL-MCNC: 0.05 NG/ML

## 2022-03-16 NOTE — END OF VISIT
[Time Spent: ___ minutes] : I have spent [unfilled] minutes of time on the encounter. [FreeTextEntry3] : Patient was seen with NP agree with assessment and plan

## 2022-03-16 NOTE — HISTORY OF PRESENT ILLNESS
[FreeTextEntry1] : 61 Y/O gentleman PMH: HTN, HLD, gout, AF/FLTR S/P ablation followed with EP maintained on oral AC, LVH, HTN, HLD, HOPE, Dilated AO, CAD/MI/PCI x4 S/P cardiac cath 3/22/21 Post CABG X 4 ( 3/24/21) LIMA to LAD, SVG to OM-1,OM-2,RPDA here today in follow up of SOB/PRATT  noted to have borderline high troponin level .  Presented to  ED with C/O PRATT noted at cardiac\par rehab while on the treadmill ( Pressure elevated this day ) and while walking his dogs denies associated CP/Palpitations and States he feels well at rest. Outpatient labs reviewed elevated troponin which prompted ER evaluation repeated Troponin I High sensitivity negative (  ) D-Dimer negative, CXR no active chest disease, EKG NSR ( Similiar to prior ) \par Blood pressure elevated in ER and remains elevated today 160/90 \par States SOB still present but improved \par \par Nuclear stress test 10/14/21\par carotid US 3/23/21 No significant stenosis\par Echo 3/20/21 Mild MR/AI NL LV FX EF 55% AO 3.7\par US Aorta 3/20/21 Neg AAA\par \par

## 2022-03-16 NOTE — PHYSICAL EXAM
[Normal S1, S2] : normal S1, S2 [Soft] : abdomen soft [Non Tender] : non-tender [Normal Gait] : normal gait [No Edema] : no edema [Normal] : moves all extremities, no focal deficits, normal speech [Alert and Oriented] : alert and oriented [Normal Venous Pressure] : normal venous pressure [de-identified] : Obese

## 2022-03-16 NOTE — DISCUSSION/SUMMARY
[FreeTextEntry1] : Patient previously evaluated for PRATT; OP labs reviewed elevated troponin level he was followed win  ED for further evaluation repeated Troponin I High sensitivity negative (  ) D-Dimer negative, CXR no active chest disease, EKG NSR ( Similar to prior ) \par After discussing symptoms further with patient I feel his SOB is multifactorial with recent weight gain/high dietary sodium ( Vacation ) HTN with poorly controlled blood pressure/untreated sleep Apnea.  At this time I have prescribed Valsartan/HCTZ increased BB, strict Low sodium diet and weight reduction.  He will have an Echocardiogram today and follow up in one week  if symptoms unresolved with above measures consider cardiac catheterization\par \par EKG was done  for SOB and HTN  borderline troponin level \par \par HLD: Continue statin maintain LDL of 70\par \par AF:  S/P ablation maintain EP management \par \par HOPE: Currently has not been treating is seeing Pulmonary today \par \par Elevated reports he has followed with Rheumatology \par \par Plan DW Patient

## 2022-03-17 ENCOUNTER — APPOINTMENT (OUTPATIENT)
Dept: CARDIOLOGY | Facility: CLINIC | Age: 62
End: 2022-03-17
Payer: COMMERCIAL

## 2022-03-17 VITALS
DIASTOLIC BLOOD PRESSURE: 86 MMHG | HEIGHT: 74 IN | WEIGHT: 288.8 LBS | OXYGEN SATURATION: 99 % | HEART RATE: 63 BPM | SYSTOLIC BLOOD PRESSURE: 132 MMHG | BODY MASS INDEX: 37.06 KG/M2

## 2022-03-17 PROCEDURE — 93000 ELECTROCARDIOGRAM COMPLETE: CPT

## 2022-03-17 PROCEDURE — 99214 OFFICE O/P EST MOD 30 MIN: CPT

## 2022-03-17 RX ORDER — VALSARTAN AND HYDROCHLOROTHIAZIDE 80; 12.5 MG/1; MG/1
80-12.5 TABLET, FILM COATED ORAL DAILY
Qty: 30 | Refills: 1 | Status: DISCONTINUED | COMMUNITY
Start: 2022-02-21 | End: 2022-03-17

## 2022-03-17 NOTE — PHYSICAL EXAM
[Normal Venous Pressure] : normal venous pressure [Normal S1, S2] : normal S1, S2 [Soft] : abdomen soft [Non Tender] : non-tender [No Edema] : no edema [Normal Gait] : normal gait [Normal] : moves all extremities, no focal deficits, normal speech [de-identified] : Obese  [Alert and Oriented] : alert and oriented

## 2022-03-17 NOTE — HISTORY OF PRESENT ILLNESS
[FreeTextEntry1] : 63 Y/O gentleman PMH: HTN, HLD, gout, AF/FLTR S/P ablation followed with EP maintained on oral AC, LVH, HTN, HLD, HOPE, Dilated AO, CAD/MI/PCI x4 S/P cardiac cath 3/22/21 Post CABG X 4 ( 3/24/21) LIMA to LAD, SVG to OM-1,OM-2,RPDA here today in follow up of SOB/PRATT . Pt reports no chest pain, episode of dyspnea improved but not completely well. Medications were reviewed. \par Nuclear stress test 10/14/21\par carotid US 3/23/21 No significant stenosis\par Echo 3/20/21 Mild MR/AI NL LV FX EF 55% AO 3.7\par US Aorta 3/20/21 Neg AAA\par Sleep study was reviewed revealing central apnea\par \par

## 2022-03-17 NOTE — DISCUSSION/SUMMARY
[FreeTextEntry1] : Pt's symptoms were reviewed. Pt has central sleep apnea. Pt will  discontinue valsartan and begin Entresto 24 bid. Start torsemide 10 mg daily. Pt will follow up in 1 month. Pt had ECG to evaluate for ischemia.

## 2022-03-23 NOTE — DISCHARGE NOTE PROVIDER - NSDCMRMEDTOKEN_GEN_ALL_CORE_FT
acetaminophen 325 mg oral tablet: 2 tab(s) orally every 6 hours, As needed, Temp greater or equal to 38C (100.4F), Mild Pain (1 - 3)  allopurinol 100 mg oral tablet: 1 tab(s) orally once a day (at bedtime)  aluminum hydroxide-magnesium hydroxide 200 mg-200 mg/5 mL oral suspension: 30 milliliter(s) orally every 4 hours, As needed, Dyspepsia  amiodarone 200 mg oral tablet: 1 tab(s) orally once a day  apixaban 5 mg oral tablet: 1 tab(s) orally every 12 hours  aspirin 81 mg oral delayed release tablet: 1 tab(s) orally once a day  atorvastatin 40 mg oral tablet: 1 tab(s) orally once a day (at bedtime)  Centrum Adults oral tablet: 1 tab(s) orally once a day  Co Q-10 100 mg oral capsule: 3 cap(s) orally once a day  famotidine 20 mg oral tablet: 1 tab(s) orally once a day  HYDROmorphone 2 mg oral tablet: 1 tab(s) orally every 6 hours, As Needed -Moderate Pain (4 - 6) MDD:4  metoprolol tartrate 50 mg oral tablet: 1 tab(s) orally every 8 hours  polyethylene glycol 3350 oral powder for reconstitution: 17 gram(s) orally once a day  senna oral tablet: 2 tab(s) orally once a day (at bedtime)  Vitamin D3 1000 intl units oral capsule: 1 cap(s) orally once a day  Zinc 140 mg (as elemental zinc 50 mg) oral tablet: 1 tab(s) orally once a day   23-Aug-2022

## 2022-03-29 ENCOUNTER — NON-APPOINTMENT (OUTPATIENT)
Age: 62
End: 2022-03-29

## 2022-04-11 ENCOUNTER — RX RENEWAL (OUTPATIENT)
Age: 62
End: 2022-04-11

## 2022-04-28 ENCOUNTER — APPOINTMENT (OUTPATIENT)
Dept: CARDIOLOGY | Facility: CLINIC | Age: 62
End: 2022-04-28
Payer: COMMERCIAL

## 2022-04-28 VITALS
WEIGHT: 289 LBS | SYSTOLIC BLOOD PRESSURE: 126 MMHG | HEIGHT: 74 IN | DIASTOLIC BLOOD PRESSURE: 80 MMHG | BODY MASS INDEX: 37.09 KG/M2 | HEART RATE: 71 BPM | OXYGEN SATURATION: 97 %

## 2022-04-28 DIAGNOSIS — R77.8 OTHER SPECIFIED ABNORMALITIES OF PLASMA PROTEINS: ICD-10-CM

## 2022-04-28 PROCEDURE — 93000 ELECTROCARDIOGRAM COMPLETE: CPT

## 2022-04-28 PROCEDURE — 99214 OFFICE O/P EST MOD 30 MIN: CPT

## 2022-05-01 NOTE — HISTORY OF PRESENT ILLNESS
[FreeTextEntry1] : 61 Y/O gentleman PMH: HTN, HLD, gout, AF/FLTR S/P ablation followed with EP maintained on oral AC, LVH, HTN, HLD, HOPE, Dilated AO, CAD/MI/PCI x4 S/P cardiac cath 3/22/21 Post CABG X 4 ( 3/24/21) LIMA to LAD, SVG to OM-1,OM-2,RPDA here today in follow up of SOB/PRATT . Pt reports no chest pain, episode of dyspnea improved but not completely well. Medications were reviewed. \par Nuclear stress test 10/14/21\par carotid US 3/23/21 No significant stenosis\par Echo 3/20/21 Mild MR/AI NL LV FX EF 55% AO 3.7\par US Aorta 3/20/21 Neg AAA\par Sleep study was reviewed revealing central apnea. \par Pt returns today for evaluation after initiation of Entresto. Pt feels improved with better breathing. Pt attributes improved respiration to both Entresto and Torsemide. \par \par

## 2022-05-01 NOTE — PHYSICAL EXAM
[Normal Venous Pressure] : normal venous pressure [Normal S1, S2] : normal S1, S2 [Soft] : abdomen soft [Non Tender] : non-tender [Normal Gait] : normal gait [No Edema] : no edema [Normal] : moves all extremities, no focal deficits, normal speech [Alert and Oriented] : alert and oriented [de-identified] : Obese

## 2022-05-01 NOTE — DISCUSSION/SUMMARY
[FreeTextEntry1] : Pt's symptoms were reviewed. Pt has central sleep apnea. Pt will continue torsemide and  Entresto. Pt will follow up with EP and Pulmonary.

## 2022-05-01 NOTE — HISTORY OF PRESENT ILLNESS
[FreeTextEntry1] : 63 Y/O gentleman PMH: HTN, HLD, gout, AF/FLTR S/P ablation followed with EP maintained on oral AC, LVH, HTN, HLD, HOPE, Dilated AO, CAD/MI/PCI x4 S/P cardiac cath 3/22/21 Post CABG X 4 ( 3/24/21) LIMA to LAD, SVG to OM-1,OM-2,RPDA here today in follow up of SOB/PRATT . Pt reports no chest pain, episode of dyspnea improved but not completely well. Medications were reviewed. \par Nuclear stress test 10/14/21\par carotid US 3/23/21 No significant stenosis\par Echo 3/20/21 Mild MR/AI NL LV FX EF 55% AO 3.7\par US Aorta 3/20/21 Neg AAA\par Sleep study was reviewed revealing central apnea. \par Pt returns today for evaluation after initiation of Entresto. Pt feels improved with better breathing. Pt attributes improved respiration to both Entresto and Torsemide. \par \par

## 2022-05-01 NOTE — PHYSICAL EXAM
[Normal Venous Pressure] : normal venous pressure [Normal S1, S2] : normal S1, S2 [Soft] : abdomen soft [Non Tender] : non-tender [Normal Gait] : normal gait [No Edema] : no edema [Normal] : moves all extremities, no focal deficits, normal speech [Alert and Oriented] : alert and oriented [de-identified] : Obese

## 2022-05-16 ENCOUNTER — RX CHANGE (OUTPATIENT)
Age: 62
End: 2022-05-16

## 2022-05-17 ENCOUNTER — APPOINTMENT (OUTPATIENT)
Dept: INTERNAL MEDICINE | Facility: CLINIC | Age: 62
End: 2022-05-17
Payer: COMMERCIAL

## 2022-05-17 VITALS
HEIGHT: 74 IN | RESPIRATION RATE: 16 BRPM | OXYGEN SATURATION: 95 % | DIASTOLIC BLOOD PRESSURE: 79 MMHG | SYSTOLIC BLOOD PRESSURE: 120 MMHG | HEART RATE: 69 BPM | WEIGHT: 290 LBS | TEMPERATURE: 97.6 F | BODY MASS INDEX: 37.22 KG/M2

## 2022-05-17 PROCEDURE — 99214 OFFICE O/P EST MOD 30 MIN: CPT

## 2022-05-17 RX ORDER — ROSUVASTATIN CALCIUM 40 MG/1
40 TABLET, FILM COATED ORAL DAILY
Refills: 0 | Status: DISCONTINUED | COMMUNITY
End: 2022-05-17

## 2022-05-17 NOTE — HISTORY OF PRESENT ILLNESS
[FreeTextEntry1] : Followup [de-identified] : Patient presents for followup evaluation. He did have a CPAP titration study on 2/23/22. He has severe obstructive sleep apnea with an AHI of 43 events per hour. Of significant oxygen desaturation associated with obstructive events. The patient's CPAP titration was suboptimal. There was not enough time for an appropriate titration. No definitive pressure was determined. Patient continues to have some symptoms of daytime tiredness.

## 2022-05-17 NOTE — PLAN
[FreeTextEntry1] : Patient presents for followup evaluation. He has a severe obstructive sleep apnea. Polysomnography examination demonstrated an overall AHI of 43 events per hour. Patient will now go for a CPAP titration. Last CPAP titration could not define an appropriate pressure. Patient will followup in this office in 6 months

## 2022-05-25 ENCOUNTER — RX CHANGE (OUTPATIENT)
Age: 62
End: 2022-05-25

## 2022-06-30 ENCOUNTER — RX CHANGE (OUTPATIENT)
Age: 62
End: 2022-06-30

## 2022-07-18 ENCOUNTER — OFFICE (OUTPATIENT)
Dept: URBAN - METROPOLITAN AREA CLINIC 102 | Facility: CLINIC | Age: 62
Setting detail: OPHTHALMOLOGY
End: 2022-07-18
Payer: COMMERCIAL

## 2022-07-18 DIAGNOSIS — H43.813: ICD-10-CM

## 2022-07-18 DIAGNOSIS — H25.13: ICD-10-CM

## 2022-07-18 DIAGNOSIS — H33.311: ICD-10-CM

## 2022-07-18 PROCEDURE — 92134 CPTRZ OPH DX IMG PST SGM RTA: CPT | Performed by: OPHTHALMOLOGY

## 2022-07-18 PROCEDURE — 99204 OFFICE O/P NEW MOD 45 MIN: CPT | Performed by: OPHTHALMOLOGY

## 2022-07-18 ASSESSMENT — REFRACTION_MANIFEST
OS_CYLINDER: -0.75
OD_VA1: 20/100
OD_CYLINDER: -3.25
OD_SPHERE: -0.75
OS_AXIS: 091
OS_SPHERE: 0.00
OD_AXIS: 095

## 2022-07-18 ASSESSMENT — REFRACTION_AUTOREFRACTION
OD_SPHERE: -0.75
OS_SPHERE: 0.00
OD_AXIS: 094
OS_AXIS: 091
OD_CYLINDER: -3.50
OS_CYLINDER: -0.75

## 2022-07-18 ASSESSMENT — AXIALLENGTH_DERIVED
OS_AL: 24.5564
OD_AL: 25.6492
OS_AL: 24.5564
OD_AL: 25.5918

## 2022-07-18 ASSESSMENT — KERATOMETRY
OD_AXISANGLE_DEGREES: 178
OS_K2POWER_DIOPTERS: 41.75
OD_K1POWER_DIOPTERS: 40.25
OD_K2POWER_DIOPTERS: 41.75
OS_K1POWER_DIOPTERS: 41.00
OS_AXISANGLE_DEGREES: 171

## 2022-07-18 ASSESSMENT — TONOMETRY
OD_IOP_MMHG: 18
OD_IOP_MMHG: 21
OS_IOP_MMHG: 16
OS_IOP_MMHG: 21

## 2022-07-18 ASSESSMENT — SPHEQUIV_DERIVED
OS_SPHEQUIV: -0.375
OD_SPHEQUIV: -2.5
OS_SPHEQUIV: -0.375
OD_SPHEQUIV: -2.375

## 2022-07-18 ASSESSMENT — VISUAL ACUITY
OD_BCVA: 20/25-
OS_BCVA: 20/125

## 2022-07-28 ENCOUNTER — APPOINTMENT (OUTPATIENT)
Dept: CARDIOLOGY | Facility: CLINIC | Age: 62
End: 2022-07-28

## 2022-07-28 ENCOUNTER — NON-APPOINTMENT (OUTPATIENT)
Age: 62
End: 2022-07-28

## 2022-07-28 VITALS
BODY MASS INDEX: 36.96 KG/M2 | HEIGHT: 74 IN | OXYGEN SATURATION: 97 % | SYSTOLIC BLOOD PRESSURE: 106 MMHG | HEART RATE: 59 BPM | DIASTOLIC BLOOD PRESSURE: 82 MMHG | WEIGHT: 288 LBS

## 2022-07-28 PROCEDURE — 99214 OFFICE O/P EST MOD 30 MIN: CPT | Mod: 25

## 2022-07-28 PROCEDURE — 93000 ELECTROCARDIOGRAM COMPLETE: CPT

## 2022-07-28 NOTE — HISTORY OF PRESENT ILLNESS
[FreeTextEntry1] : 61 Y/O gentleman PMH: HTN, HLD, gout, AF/FLTR S/P ablation followed with EP maintained on oral AC, LVH, HTN, HLD, HOPE, Dilated AO, CAD/MI/PCI x4 S/P cardiac cath 3/22/21 Post CABG X 4 ( 3/24/21) LIMA to LAD, SVG to OM-1,OM-2,RPDA here today in follow up of SOB/PRATT . Pt reports no chest pain, episode of dyspnea improved but not completely well. Medications were reviewed. \par Nuclear stress test 10/14/21\par carotid US 3/23/21 No significant stenosis\par Echo 3/20/21 Mild MR/AI NL LV FX EF 55% AO 3.7\par US Aorta 3/20/21 Neg AAA\par Sleep study was reviewed revealing central apnea. \par Pt returns today for evaluation after initiation of Entresto. Pt feels improved with better breathing. Pt attributes improved respiration to both Entresto and Torsemide. Pulmonary notes were reviewed. \par \par

## 2022-07-28 NOTE — DISCUSSION/SUMMARY
[FreeTextEntry1] : Pt's symptoms were reviewed. Pt has central sleep apnea. Pt will continue torsemide and Entresto. Pt will follow up with EP and Pulmonary. Pt will follow up in 4 months. He will attempt to lose weight.  [EKG obtained to assist in diagnosis and management of assessed problem(s)] : EKG obtained to assist in diagnosis and management of assessed problem(s)

## 2022-07-28 NOTE — PHYSICAL EXAM
[Normal Venous Pressure] : normal venous pressure [Normal S1, S2] : normal S1, S2 [Soft] : abdomen soft [Non Tender] : non-tender [Normal Gait] : normal gait [No Edema] : no edema [Normal] : moves all extremities, no focal deficits, normal speech [Alert and Oriented] : alert and oriented [de-identified] : Obese

## 2022-08-03 ENCOUNTER — RX CHANGE (OUTPATIENT)
Age: 62
End: 2022-08-03

## 2022-08-04 ENCOUNTER — OFFICE (OUTPATIENT)
Dept: URBAN - METROPOLITAN AREA CLINIC 102 | Facility: CLINIC | Age: 62
Setting detail: OPHTHALMOLOGY
End: 2022-08-04
Payer: COMMERCIAL

## 2022-08-04 DIAGNOSIS — H25.11: ICD-10-CM

## 2022-08-04 DIAGNOSIS — H25.13: ICD-10-CM

## 2022-08-04 PROCEDURE — 99213 OFFICE O/P EST LOW 20 MIN: CPT | Performed by: OPHTHALMOLOGY

## 2022-08-04 PROCEDURE — 92136 OPHTHALMIC BIOMETRY: CPT | Performed by: OPHTHALMOLOGY

## 2022-08-04 ASSESSMENT — REFRACTION_MANIFEST
OD_CYLINDER: -3.25
OD_AXIS: 095
OD_VA1: 20/100
OD_SPHERE: -0.75
OS_SPHERE: 0.00
OS_AXIS: 091
OS_CYLINDER: -0.75

## 2022-08-04 ASSESSMENT — KERATOMETRY
OD_AXISANGLE_DEGREES: 87
OD_K1POWER_DIOPTERS: 40.25
OS_K2POWER_DIOPTERS: 41.75
OD_CYLAXISANGLE_DEGREES: 177
OS_AXISANGLE_DEGREES: 137
OS_AXISANGLE_DEGREES: 047
OD_K1K2_AVERAGE: 41
OS_AXISANGLE2_DEGREES: 047
OD_K1POWER_DIOPTERS: 40.25
OS_CYLPOWER_DEGREES: 0.75
OS_K1POWER_DIOPTERS: 41.00
OS_K1K2_AVERAGE: 41.375
OD_CYLPOWER_DEGREES: 1.5
OD_K2POWER_DIOPTERS: 41.75
OD_AXISANGLE2_DEGREES: 177
OS_K2POWER_DIOPTERS: 41.75
OS_CYLAXISANGLE_DEGREES: 047
OD_K2POWER_DIOPTERS: 41.75
OS_K1POWER_DIOPTERS: 41.00
OD_AXISANGLE_DEGREES: 177

## 2022-08-04 ASSESSMENT — REFRACTION_AUTOREFRACTION
OS_AXIS: 093
OD_AXIS: 094
OD_CYLINDER: -3.75
OS_CYLINDER: -0.75
OS_SPHERE: 0.00
OD_SPHERE: -1.00

## 2022-08-04 ASSESSMENT — CONFRONTATIONAL VISUAL FIELD TEST (CVF)
OD_FINDINGS: FULL
OS_FINDINGS: FULL

## 2022-08-04 ASSESSMENT — AXIALLENGTH_DERIVED
OD_AL: 25.5918
OD_AL: 25.8231
OS_AL: 24.5564
OS_AL: 24.5564

## 2022-08-04 ASSESSMENT — SPHEQUIV_DERIVED
OD_SPHEQUIV: -2.875
OS_SPHEQUIV: -0.375
OS_SPHEQUIV: -0.375
OD_SPHEQUIV: -2.375

## 2022-08-04 ASSESSMENT — VISUAL ACUITY
OD_BCVA: 20/25-
OS_BCVA: 20/125

## 2022-08-04 ASSESSMENT — TONOMETRY
OD_IOP_MMHG: 14
OS_IOP_MMHG: 16

## 2022-08-09 NOTE — ED PROVIDER NOTE - OBJECTIVE STATEMENT
63 y/o male with PMHx of A-Flutter s/p ablation (on Eliquis), CAD s/p MI, PCI x4 stents and CABG (3/21), Covid-19, gout, HLD, HTN, obesity, HOPE presents to the ED c/o PRATT. Pt states he has been going to cardiac therapy and this past Monday while he was on the treadmill, he noticed he was getting SOB on exertion. States he also noticed he was getting SOB when walking his dogs. States he feels well at rest. States he followed with his cardiologist about this, who had blood work drawn. Was called today saying he had a mildly elevated troponin, so was referred to the ED for further evaluation. Denies fever. Denies chest pain, but does endorse having upper back pain. However, pt states it is a muscular pain and attributes it to being active during his recent trip to [place], mostly resolved now. States he got Covid-19 tested twice since coming back, both resulted negative. Pt also endorses he feels that recently his feet look puffy. Is on 81mg ASA and Eliquis. NKDA. PCP: Dr. Person. Cardiologist: Dr. Kathleen. Humira Counseling:  I discussed with the patient the risks of adalimumab including but not limited to myelosuppression, immunosuppression, autoimmune hepatitis, demyelinating diseases, lymphoma, and serious infections.  The patient understands that monitoring is required including a PPD at baseline and must alert us or the primary physician if symptoms of infection or other concerning signs are noted.

## 2022-08-20 LAB
25(OH)D3 SERPL-MCNC: 52.6 NG/ML
ALBUMIN SERPL ELPH-MCNC: 4.3 G/DL
ALP BLD-CCNC: 54 U/L
ALT SERPL-CCNC: 30 U/L
ANION GAP SERPL CALC-SCNC: 12 MMOL/L
AST SERPL-CCNC: 30 U/L
BASOPHILS # BLD AUTO: 0.04 K/UL
BASOPHILS NFR BLD AUTO: 0.5 %
BILIRUB SERPL-MCNC: 0.4 MG/DL
BUN SERPL-MCNC: 15 MG/DL
CALCIUM SERPL-MCNC: 9.4 MG/DL
CHLORIDE SERPL-SCNC: 101 MMOL/L
CHOLEST SERPL-MCNC: 142 MG/DL
CK SERPL-CCNC: 544 U/L
CO2 SERPL-SCNC: 26 MMOL/L
CREAT SERPL-MCNC: 0.9 MG/DL
EGFR: 97 ML/MIN/1.73M2
EOSINOPHIL # BLD AUTO: 0.26 K/UL
EOSINOPHIL NFR BLD AUTO: 3.4 %
ERYTHROCYTE [SEDIMENTATION RATE] IN BLOOD BY WESTERGREN METHOD: 15 MM/HR
ESTIMATED AVERAGE GLUCOSE: 123 MG/DL
GLUCOSE SERPL-MCNC: 118 MG/DL
HBA1C MFR BLD HPLC: 5.9 %
HCT VFR BLD CALC: 47.3 %
HDLC SERPL-MCNC: 52 MG/DL
HGB BLD-MCNC: 15.9 G/DL
IMM GRANULOCYTES NFR BLD AUTO: 0.3 %
LDLC SERPL CALC-MCNC: 63 MG/DL
LYMPHOCYTES # BLD AUTO: 2.4 K/UL
LYMPHOCYTES NFR BLD AUTO: 31.6 %
MAN DIFF?: NORMAL
MCHC RBC-ENTMCNC: 31.3 PG
MCHC RBC-ENTMCNC: 33.6 GM/DL
MCV RBC AUTO: 93.1 FL
MONOCYTES # BLD AUTO: 0.66 K/UL
MONOCYTES NFR BLD AUTO: 8.7 %
NEUTROPHILS # BLD AUTO: 4.21 K/UL
NEUTROPHILS NFR BLD AUTO: 55.5 %
NONHDLC SERPL-MCNC: 90 MG/DL
PLATELET # BLD AUTO: 246 K/UL
POTASSIUM SERPL-SCNC: 4.4 MMOL/L
PROT SERPL-MCNC: 6.7 G/DL
PSA FREE FLD-MCNC: 51 %
PSA FREE SERPL-MCNC: 0.16 NG/ML
PSA SERPL-MCNC: 0.31 NG/ML
RBC # BLD: 5.08 M/UL
RBC # FLD: 13.2 %
RHEUMATOID FACT SER QL: 25 IU/ML
SODIUM SERPL-SCNC: 139 MMOL/L
T3FREE SERPL-MCNC: 3.14 PG/ML
T4 FREE SERPL-MCNC: 1.3 NG/DL
TRIGL SERPL-MCNC: 138 MG/DL
TSH SERPL-ACNC: 1.56 UIU/ML
URATE SERPL-MCNC: 6.5 MG/DL
WBC # FLD AUTO: 7.59 K/UL

## 2022-08-22 ENCOUNTER — NON-APPOINTMENT (OUTPATIENT)
Age: 62
End: 2022-08-22

## 2022-08-30 ENCOUNTER — RX CHANGE (OUTPATIENT)
Age: 62
End: 2022-08-30

## 2022-09-06 ENCOUNTER — RX RENEWAL (OUTPATIENT)
Age: 62
End: 2022-09-06

## 2022-09-06 ENCOUNTER — APPOINTMENT (OUTPATIENT)
Dept: CARDIOLOGY | Facility: CLINIC | Age: 62
End: 2022-09-06

## 2022-09-06 ENCOUNTER — NON-APPOINTMENT (OUTPATIENT)
Age: 62
End: 2022-09-06

## 2022-09-06 VITALS
BODY MASS INDEX: 36.96 KG/M2 | HEART RATE: 60 BPM | HEIGHT: 74 IN | SYSTOLIC BLOOD PRESSURE: 112 MMHG | DIASTOLIC BLOOD PRESSURE: 62 MMHG | RESPIRATION RATE: 16 BRPM | OXYGEN SATURATION: 95 % | WEIGHT: 288 LBS

## 2022-09-06 PROCEDURE — 93000 ELECTROCARDIOGRAM COMPLETE: CPT

## 2022-09-06 PROCEDURE — 99214 OFFICE O/P EST MOD 30 MIN: CPT | Mod: 25

## 2022-09-06 NOTE — DISCUSSION/SUMMARY
[FreeTextEntry1] : LV dysfunction: EF 45-55% maintain GDMT continue Entresto/Diuretic/Beta Blocker and EP management \par Denies SOB/CP/PND/Orthopnea \par Echo ordered ( Pre OP ) \par \par CAD: No active cardiac complaints continue ASA/Statin/BB \par \par HLD: Continue statin maintain LDL of 70\par \par AF:  S/P ablation maintain EP management \par \par HOPE: Currently untreated reports he is awaiting new machine \par Advised significance of weight loss\par \par Pre op evaluation for Right eye cataract surgery\par Procedure low risk \par Patient intermediate- High risk\par Given patients significant cardiac history and LV dysfunction advised Hospital setting for procedure \par Spoke to Surgical coordinator \par Will confirm clearance when Hospital setting arranged and Echo obtained  \par \par Plan DW Patient

## 2022-09-06 NOTE — PHYSICAL EXAM
[Normal Venous Pressure] : normal venous pressure [Normal S1, S2] : normal S1, S2 [Soft] : abdomen soft [Non Tender] : non-tender [Normal Gait] : normal gait [No Edema] : no edema [Normal] : moves all extremities, no focal deficits, normal speech [Alert and Oriented] : alert and oriented [Normal Conjunctiva] : normal conjunctiva [de-identified] : Obese

## 2022-09-06 NOTE — HISTORY OF PRESENT ILLNESS
[FreeTextEntry1] : 63 Y/O gentleman PMH: HTN, HLD, gout, AF/FLTR S/P ablation followed with EP maintained on oral AC, LVH, HTN, HLD, HOPE, Dilated AO, CAD/MI/PCI x4 S/P cardiac cath 3/22/21 Post CABG X 4 ( 3/24/21) LIMA to LAD, SVG to OM-1,OM-2,RPDA,  sleep apnea currently untreated awaiting new machine here today for pre operative cardiac evaluation for Right eye cataract surgery on 9/7/22 Dr [facility] \par Patient claims to be feeling well no CP/SOB/PND/Orthopnea/Weight stable \par Denies H/O Anesthesia reaction\par No FH Sudden cardiac death \par \par \par Echo 2/21/22 EF 45-55% + Diastolic dysfunction maintained on Entresto/torsemide\par Nuclear stress test 10/14/21\par carotid US 3/23/21 No significant stenosis\par US Aorta 3/20/21 Neg AAA\par \par

## 2022-09-09 ENCOUNTER — RX CHANGE (OUTPATIENT)
Age: 62
End: 2022-09-09

## 2022-09-13 ENCOUNTER — RX CHANGE (OUTPATIENT)
Age: 62
End: 2022-09-13

## 2022-09-14 ENCOUNTER — APPOINTMENT (OUTPATIENT)
Dept: CARDIOLOGY | Facility: CLINIC | Age: 62
End: 2022-09-14

## 2022-09-14 PROCEDURE — 93306 TTE W/DOPPLER COMPLETE: CPT

## 2022-10-03 ENCOUNTER — RX CHANGE (OUTPATIENT)
Age: 62
End: 2022-10-03

## 2022-10-11 NOTE — ED PROVIDER NOTE - CPE EDP SKIN NORM
----- Message from Tracy Alvarez sent at 10/11/2022  9:22 AM CDT -----  Regarding: med  Pt is req Refill alprazolam  [facility]    
normal...

## 2022-10-21 ENCOUNTER — NON-APPOINTMENT (OUTPATIENT)
Age: 62
End: 2022-10-21

## 2022-10-21 ENCOUNTER — APPOINTMENT (OUTPATIENT)
Dept: CARDIOLOGY | Facility: CLINIC | Age: 62
End: 2022-10-21

## 2022-10-21 VITALS
BODY MASS INDEX: 36.96 KG/M2 | HEART RATE: 69 BPM | OXYGEN SATURATION: 94 % | DIASTOLIC BLOOD PRESSURE: 78 MMHG | HEIGHT: 74 IN | WEIGHT: 288 LBS | SYSTOLIC BLOOD PRESSURE: 116 MMHG

## 2022-10-21 PROCEDURE — 93000 ELECTROCARDIOGRAM COMPLETE: CPT

## 2022-10-21 PROCEDURE — 99214 OFFICE O/P EST MOD 30 MIN: CPT | Mod: 25

## 2022-10-21 NOTE — PHYSICAL EXAM
[Normal Conjunctiva] : normal conjunctiva [Normal Venous Pressure] : normal venous pressure [Normal S1, S2] : normal S1, S2 [Soft] : abdomen soft [Non Tender] : non-tender [Normal Gait] : normal gait [No Edema] : no edema [Normal] : moves all extremities, no focal deficits, normal speech [Alert and Oriented] : alert and oriented [de-identified] : Obese

## 2022-10-21 NOTE — DISCUSSION/SUMMARY
[EKG obtained to assist in diagnosis and management of assessed problem(s)] : EKG obtained to assist in diagnosis and management of assessed problem(s) [FreeTextEntry1] : LV dysfunction: EF improved on ECHO maintain GDMT continue Entresto/Diuretic/Beta Blocker and EP management \par Denies SOB/CP/PND/Orthopnea \par Echo ordered ( Pre OP ) \par \par CAD: No active cardiac complaints continue ASA/Statin/BB \par \par HLD: Continue statin maintain LDL of 70\par \par AF:  S/P ablation maintain EP management \par \par HOPE: Currently untreated reports he is awaiting new machine \par Advised significance of weight loss\par \par Pre op evaluation for Right eye cataract surgery\par Procedure low risk \par Patient intermediate- High risk\par Echo reviewed. EF has normalized. No cardiac contraindication for outpt cataract extraction. \par \par Plan DW Patient

## 2022-10-21 NOTE — HISTORY OF PRESENT ILLNESS
[FreeTextEntry1] : 61 Y/O gentleman PMH: HTN, HLD, gout, AF/FLTR S/P ablation followed with EP maintained on oral AC, LVH, HTN, HLD, HOPE, Dilated AO, CAD/MI/PCI x4 S/P cardiac cath 3/22/21 Post CABG X 4 ( 3/24/21) LIMA to LAD, SVG to OM-1,OM-2,RPDA,  sleep apnea \par \par \par Echo 2/21/22 EF 45-55% + Diastolic dysfunction maintained on Entresto/torsemide. New Echo reviewed. \par Nuclear stress test 10/14/21\par carotid US 3/23/21 No significant stenosis\par US Aorta 3/20/21 Neg AAA\par \par

## 2022-11-04 ENCOUNTER — RX RENEWAL (OUTPATIENT)
Age: 62
End: 2022-11-04

## 2022-11-04 NOTE — ED PROVIDER NOTE - CLINICAL SUMMARY MEDICAL DECISION MAKING FREE TEXT BOX
[FreeTextEntry1] : R lateral epicondylitis
head injury, no LOC, no mental status changes, on asa 81mg.  will ct head and dermabond wound

## 2022-11-08 ENCOUNTER — RX CHANGE (OUTPATIENT)
Age: 62
End: 2022-11-08

## 2022-11-10 ENCOUNTER — OFFICE (OUTPATIENT)
Dept: URBAN - METROPOLITAN AREA CLINIC 102 | Facility: CLINIC | Age: 62
Setting detail: OPHTHALMOLOGY
End: 2022-11-10
Payer: COMMERCIAL

## 2022-11-10 DIAGNOSIS — H25.11: ICD-10-CM

## 2022-11-10 DIAGNOSIS — H33.311: ICD-10-CM

## 2022-11-10 DIAGNOSIS — H25.12: ICD-10-CM

## 2022-11-10 DIAGNOSIS — H25.13: ICD-10-CM

## 2022-11-10 DIAGNOSIS — H43.813: ICD-10-CM

## 2022-11-10 PROCEDURE — 92134 CPTRZ OPH DX IMG PST SGM RTA: CPT | Performed by: OPHTHALMOLOGY

## 2022-11-10 PROCEDURE — 99213 OFFICE O/P EST LOW 20 MIN: CPT | Performed by: OPHTHALMOLOGY

## 2022-11-10 ASSESSMENT — REFRACTION_MANIFEST
OS_AXIS: 091
OS_SPHERE: 0.00
OD_CYLINDER: -3.25
OD_VA1: 20/100
OD_SPHERE: -0.75
OS_CYLINDER: -0.75
OD_AXIS: 095

## 2022-11-10 ASSESSMENT — REFRACTION_AUTOREFRACTION
OS_AXIS: 90
OS_SPHERE: -0.25
OD_SPHERE: -1.75
OS_CYLINDER: -0.50
OD_CYLINDER: -3.25
OD_AXIS: 100

## 2022-11-10 ASSESSMENT — KERATOMETRY
OS_AXISANGLE_DEGREES: 82
OD_AXISANGLE_DEGREES: 175
OD_K1POWER_DIOPTERS: 40.75
OD_K2POWER_DIOPTERS: 41.75
OS_K2POWER_DIOPTERS: 41.75
OS_K1POWER_DIOPTERS: 41.00

## 2022-11-10 ASSESSMENT — VISUAL ACUITY
OS_BCVA: 20/150-1
OD_BCVA: 20/25-1

## 2022-11-10 ASSESSMENT — AXIALLENGTH_DERIVED
OS_AL: 24.6093
OD_AL: 25.947
OS_AL: 24.5564
OD_AL: 25.4842

## 2022-11-10 ASSESSMENT — SPHEQUIV_DERIVED
OD_SPHEQUIV: -3.375
OS_SPHEQUIV: -0.375
OS_SPHEQUIV: -0.5
OD_SPHEQUIV: -2.375

## 2022-11-10 ASSESSMENT — TONOMETRY
OD_IOP_MMHG: 15
OS_IOP_MMHG: 15

## 2022-11-10 ASSESSMENT — CONFRONTATIONAL VISUAL FIELD TEST (CVF)
OS_FINDINGS: FULL
OD_FINDINGS: FULL

## 2022-11-15 ENCOUNTER — RX RENEWAL (OUTPATIENT)
Age: 62
End: 2022-11-15

## 2022-11-16 ENCOUNTER — RX RENEWAL (OUTPATIENT)
Age: 62
End: 2022-11-16

## 2022-11-17 ENCOUNTER — APPOINTMENT (OUTPATIENT)
Dept: INTERNAL MEDICINE | Facility: CLINIC | Age: 62
End: 2022-11-17

## 2022-12-02 ENCOUNTER — OFFICE (OUTPATIENT)
Dept: URBAN - METROPOLITAN AREA CLINIC 102 | Facility: CLINIC | Age: 62
Setting detail: OPHTHALMOLOGY
End: 2022-12-02
Payer: COMMERCIAL

## 2022-12-02 DIAGNOSIS — H25.11: ICD-10-CM

## 2022-12-02 PROBLEM — H33.311 RETINAL TEAR; RIGHT EYE: Status: ACTIVE | Noted: 2022-07-18

## 2022-12-02 PROBLEM — H43.813 POSTERIOR VITREOUS DETACHMENT; BOTH EYES: Status: ACTIVE | Noted: 2022-07-18

## 2022-12-02 PROCEDURE — 99211 OFF/OP EST MAY X REQ PHY/QHP: CPT | Performed by: OPHTHALMOLOGY

## 2022-12-02 ASSESSMENT — REFRACTION_MANIFEST
OD_VA1: 20/100
OD_CYLINDER: -3.25
OD_AXIS: 095
OS_CYLINDER: -0.75
OS_AXIS: 091
OS_SPHERE: 0.00
OD_SPHERE: -0.75

## 2022-12-02 ASSESSMENT — SPHEQUIV_DERIVED
OS_SPHEQUIV: -0.5
OD_SPHEQUIV: -2.375
OS_SPHEQUIV: -0.375
OD_SPHEQUIV: -3.375

## 2022-12-02 ASSESSMENT — AXIALLENGTH_DERIVED
OS_AL: 24.6093
OS_AL: 24.5564
OD_AL: 25.4842
OD_AL: 25.947

## 2022-12-02 ASSESSMENT — VISUAL ACUITY
OS_BCVA: 20/150-1
OD_BCVA: 20/25-1

## 2022-12-02 ASSESSMENT — REFRACTION_AUTOREFRACTION
OD_CYLINDER: -3.25
OD_AXIS: 100
OD_SPHERE: -1.75
OS_SPHERE: -0.25
OS_AXIS: 90
OS_CYLINDER: -0.50

## 2022-12-02 ASSESSMENT — KERATOMETRY
OD_AXISANGLE_DEGREES: 175
OD_K2POWER_DIOPTERS: 41.75
OS_K1POWER_DIOPTERS: 41.00
OS_AXISANGLE_DEGREES: 82
OD_K1POWER_DIOPTERS: 40.75
OS_K2POWER_DIOPTERS: 41.75

## 2022-12-06 ENCOUNTER — RX CHANGE (OUTPATIENT)
Age: 62
End: 2022-12-06

## 2022-12-07 ENCOUNTER — ASC (OUTPATIENT)
Dept: URBAN - METROPOLITAN AREA SURGERY 8 | Facility: SURGERY | Age: 62
Setting detail: OPHTHALMOLOGY
End: 2022-12-07

## 2022-12-07 DIAGNOSIS — H52.211: ICD-10-CM

## 2022-12-07 DIAGNOSIS — H25.11: ICD-10-CM

## 2022-12-07 PROCEDURE — V2787 ASTIGMATISM-CORRECT FUNCTION: HCPCS | Performed by: OPHTHALMOLOGY

## 2022-12-07 PROCEDURE — 66984 XCAPSL CTRC RMVL W/O ECP: CPT | Performed by: OPHTHALMOLOGY

## 2022-12-07 PROCEDURE — FEMTO CATARACT LASER: Performed by: OPHTHALMOLOGY

## 2022-12-08 ENCOUNTER — RX ONLY (RX ONLY)
Age: 62
End: 2022-12-08

## 2022-12-08 ENCOUNTER — OFFICE (OUTPATIENT)
Dept: URBAN - METROPOLITAN AREA CLINIC 102 | Facility: CLINIC | Age: 62
Setting detail: OPHTHALMOLOGY
End: 2022-12-08

## 2022-12-08 DIAGNOSIS — Z96.1: ICD-10-CM

## 2022-12-08 PROCEDURE — 99024 POSTOP FOLLOW-UP VISIT: CPT | Performed by: OPHTHALMOLOGY

## 2022-12-08 ASSESSMENT — REFRACTION_AUTOREFRACTION
OS_CYLINDER: -1.00
OD_AXIS: 175
OD_SPHERE: +0.50
OD_CYLINDER: -1.00
OS_SPHERE: +0.25
OS_AXIS: 094

## 2022-12-08 ASSESSMENT — VISUAL ACUITY
OS_BCVA: 20/30
OD_BCVA: 20/25-1

## 2022-12-08 ASSESSMENT — SPHEQUIV_DERIVED
OS_SPHEQUIV: -0.375
OD_SPHEQUIV: 0
OD_SPHEQUIV: -2.375
OS_SPHEQUIV: -0.25

## 2022-12-08 ASSESSMENT — KERATOMETRY
OD_AXISANGLE_DEGREES: 138
OS_K2POWER_DIOPTERS: 41.75
OD_K1POWER_DIOPTERS: 39.50
OD_K2POWER_DIOPTERS: 41.50
OS_AXISANGLE_DEGREES: 21
OS_K1POWER_DIOPTERS: 41.00

## 2022-12-08 ASSESSMENT — CONFRONTATIONAL VISUAL FIELD TEST (CVF)
OD_FINDINGS: FULL
OS_FINDINGS: FULL

## 2022-12-08 ASSESSMENT — AXIALLENGTH_DERIVED
OS_AL: 24.5038
OD_AL: 24.7478
OS_AL: 24.5564
OD_AL: 25.8097

## 2022-12-08 ASSESSMENT — CORNEAL EDEMA CLINICAL DESCRIPTION: OD_CORNEALEDEMA: T

## 2022-12-08 ASSESSMENT — REFRACTION_MANIFEST
OS_SPHERE: 0.00
OD_SPHERE: -0.75
OS_CYLINDER: -0.75
OD_CYLINDER: -3.25
OD_AXIS: 095
OD_VA1: 20/100
OS_AXIS: 091

## 2022-12-08 ASSESSMENT — TONOMETRY
OD_IOP_MMHG: 18
OS_IOP_MMHG: 14

## 2022-12-15 ENCOUNTER — OFFICE (OUTPATIENT)
Dept: URBAN - METROPOLITAN AREA CLINIC 102 | Facility: CLINIC | Age: 62
Setting detail: OPHTHALMOLOGY
End: 2022-12-15

## 2022-12-15 DIAGNOSIS — Z96.1: ICD-10-CM

## 2022-12-15 PROBLEM — H25.12 CATARACT SENILE NUCLEAR SCLEROSIS; LEFT EYE: Status: ACTIVE | Noted: 2022-11-10

## 2022-12-15 PROCEDURE — 99024 POSTOP FOLLOW-UP VISIT: CPT | Performed by: OPHTHALMOLOGY

## 2022-12-15 ASSESSMENT — AXIALLENGTH_DERIVED
OD_AL: 25.5918
OD_AL: 24.4948
OS_AL: 24.5068

## 2022-12-15 ASSESSMENT — REFRACTION_AUTOREFRACTION
OD_SPHERE: +0.25
OD_AXIS: 004
OS_CYLINDER: -0.50
OS_SPHERE: PLANO
OD_CYLINDER: -0.25
OS_AXIS: 098

## 2022-12-15 ASSESSMENT — CONFRONTATIONAL VISUAL FIELD TEST (CVF)
OS_FINDINGS: FULL
OD_FINDINGS: FULL

## 2022-12-15 ASSESSMENT — VISUAL ACUITY
OD_BCVA: 20/30
OS_BCVA: 20/25-

## 2022-12-15 ASSESSMENT — REFRACTION_MANIFEST
OS_SPHERE: 0.00
OS_CYLINDER: -0.75
OD_SPHERE: -0.75
OD_VA1: 20/100
OD_CYLINDER: -3.25
OS_AXIS: 091
OD_AXIS: 095

## 2022-12-15 ASSESSMENT — SPHEQUIV_DERIVED
OD_SPHEQUIV: 0.125
OD_SPHEQUIV: -2.375
OS_SPHEQUIV: -0.375

## 2022-12-15 ASSESSMENT — KERATOMETRY
OS_K1POWER_DIOPTERS: 41.25
OD_AXISANGLE_DEGREES: 176
OS_AXISANGLE_DEGREES: 067
OS_K2POWER_DIOPTERS: 41.75
OD_K2POWER_DIOPTERS: 41.50
OD_K1POWER_DIOPTERS: 40.50

## 2022-12-15 ASSESSMENT — TONOMETRY
OS_IOP_MMHG: 12
OD_IOP_MMHG: 15

## 2023-01-11 ENCOUNTER — OFFICE (OUTPATIENT)
Dept: URBAN - METROPOLITAN AREA CLINIC 102 | Facility: CLINIC | Age: 63
Setting detail: OPHTHALMOLOGY
End: 2023-01-11
Payer: COMMERCIAL

## 2023-01-11 DIAGNOSIS — Z96.1: ICD-10-CM

## 2023-01-11 PROCEDURE — 99024 POSTOP FOLLOW-UP VISIT: CPT | Performed by: OPHTHALMOLOGY

## 2023-01-11 ASSESSMENT — KERATOMETRY
OD_K2POWER_DIOPTERS: 41.75
OS_K2POWER_DIOPTERS: 41.75
OS_AXISANGLE_DEGREES: 012
OD_AXISANGLE_DEGREES: 173
OD_K1POWER_DIOPTERS: 40.75
OS_K1POWER_DIOPTERS: 41.25

## 2023-01-11 ASSESSMENT — TONOMETRY
OD_IOP_MMHG: 15
OS_IOP_MMHG: 15

## 2023-01-11 ASSESSMENT — REFRACTION_MANIFEST
OD_SPHERE: -0.75
OS_SPHERE: 0.00
OS_CYLINDER: -0.75
OD_VA1: 20/100
OD_AXIS: 095
OS_AXIS: 091
OD_CYLINDER: -3.25

## 2023-01-11 ASSESSMENT — AXIALLENGTH_DERIVED
OD_AL: 24.3962
OS_AL: 24.5068
OS_AL: 24.4021
OD_AL: 25.4842

## 2023-01-11 ASSESSMENT — SPHEQUIV_DERIVED
OD_SPHEQUIV: 0.125
OS_SPHEQUIV: -0.125
OS_SPHEQUIV: -0.375
OD_SPHEQUIV: -2.375

## 2023-01-11 ASSESSMENT — CONFRONTATIONAL VISUAL FIELD TEST (CVF)
OS_FINDINGS: FULL
OD_FINDINGS: FULL

## 2023-01-11 ASSESSMENT — VISUAL ACUITY
OD_BCVA: 20/25
OS_BCVA: 20/25-

## 2023-01-11 ASSESSMENT — REFRACTION_AUTOREFRACTION
OS_AXIS: 092
OD_CYLINDER: -0.25
OS_SPHERE: +0.25
OS_CYLINDER: -0.75
OD_AXIS: 001
OD_SPHERE: +0.25

## 2023-01-26 NOTE — ED PROCEDURE NOTE - CPROC ED TOLERANCE1
Patient tolerated procedure well. Cyclosporine Pregnancy And Lactation Text: This medication is Pregnancy Category C and it isn't know if it is safe during pregnancy. This medication is excreted in breast milk.

## 2023-02-02 ENCOUNTER — RX RENEWAL (OUTPATIENT)
Age: 63
End: 2023-02-02

## 2023-02-02 RX ORDER — TADALAFIL 20 MG/1
20 TABLET ORAL
Qty: 10 | Refills: 0 | Status: ACTIVE | COMMUNITY
Start: 2020-11-16 | End: 1900-01-01

## 2023-02-02 RX ORDER — ASPIRIN 81 MG/1
81 TABLET, COATED ORAL DAILY
Qty: 90 | Refills: 3 | Status: ACTIVE | COMMUNITY
Start: 2023-02-02 | End: 1900-01-01

## 2023-02-06 ENCOUNTER — NON-APPOINTMENT (OUTPATIENT)
Age: 63
End: 2023-02-06

## 2023-02-13 ENCOUNTER — RX CHANGE (OUTPATIENT)
Age: 63
End: 2023-02-13

## 2023-02-13 ENCOUNTER — RX RENEWAL (OUTPATIENT)
Age: 63
End: 2023-02-13

## 2023-02-14 ENCOUNTER — RX CHANGE (OUTPATIENT)
Age: 63
End: 2023-02-14

## 2023-02-17 ENCOUNTER — NON-APPOINTMENT (OUTPATIENT)
Age: 63
End: 2023-02-17

## 2023-02-21 ENCOUNTER — NON-APPOINTMENT (OUTPATIENT)
Age: 63
End: 2023-02-21

## 2023-02-28 ENCOUNTER — OFFICE (OUTPATIENT)
Dept: URBAN - METROPOLITAN AREA CLINIC 102 | Facility: CLINIC | Age: 63
Setting detail: OPHTHALMOLOGY
End: 2023-02-28
Payer: COMMERCIAL

## 2023-02-28 DIAGNOSIS — H43.813: ICD-10-CM

## 2023-02-28 DIAGNOSIS — H35.373: ICD-10-CM

## 2023-02-28 PROCEDURE — 99213 OFFICE O/P EST LOW 20 MIN: CPT | Performed by: OPHTHALMOLOGY

## 2023-02-28 PROCEDURE — 92134 CPTRZ OPH DX IMG PST SGM RTA: CPT | Performed by: OPHTHALMOLOGY

## 2023-02-28 ASSESSMENT — REFRACTION_AUTOREFRACTION
OS_AXIS: 095
OS_CYLINDER: -0.75
OS_SPHERE: PL
OD_CYLINDER: -0.25
OD_AXIS: 154
OD_SPHERE: PL

## 2023-02-28 ASSESSMENT — REFRACTION_MANIFEST
OD_SPHERE: -0.75
OS_AXIS: 091
OD_CYLINDER: -3.25
OD_AXIS: 095
OD_VA1: 20/100
OS_SPHERE: 0.00
OS_CYLINDER: -0.75

## 2023-02-28 ASSESSMENT — VISUAL ACUITY
OD_BCVA: 20/30
OS_BCVA: 20/20

## 2023-02-28 ASSESSMENT — SPHEQUIV_DERIVED
OS_SPHEQUIV: -0.375
OD_SPHEQUIV: -2.375

## 2023-02-28 ASSESSMENT — CONFRONTATIONAL VISUAL FIELD TEST (CVF)
OS_FINDINGS: FULL
OD_FINDINGS: FULL

## 2023-03-01 ENCOUNTER — NON-APPOINTMENT (OUTPATIENT)
Age: 63
End: 2023-03-01

## 2023-03-02 ENCOUNTER — APPOINTMENT (OUTPATIENT)
Dept: CARDIOLOGY | Facility: CLINIC | Age: 63
End: 2023-03-02
Payer: COMMERCIAL

## 2023-03-02 ENCOUNTER — NON-APPOINTMENT (OUTPATIENT)
Age: 63
End: 2023-03-02

## 2023-03-02 VITALS — WEIGHT: 291 LBS | HEIGHT: 74 IN | OXYGEN SATURATION: 95 % | HEART RATE: 76 BPM | BODY MASS INDEX: 37.35 KG/M2

## 2023-03-02 VITALS — DIASTOLIC BLOOD PRESSURE: 74 MMHG | SYSTOLIC BLOOD PRESSURE: 122 MMHG

## 2023-03-02 PROCEDURE — 99214 OFFICE O/P EST MOD 30 MIN: CPT | Mod: 25

## 2023-03-02 PROCEDURE — 93000 ELECTROCARDIOGRAM COMPLETE: CPT

## 2023-03-02 NOTE — HISTORY OF PRESENT ILLNESS
[FreeTextEntry1] : 64 y/o gentleman PMH: HTN, HLD, gout, AF/FLTR s/p ablation followed with EP maintained on oral AC, LVH, HTN, HLD, HOPE, Dilated AO, CAD/MI/PCI x4 S/P cardiac cath 3/22/21 Post CABG X 4 ( 3/24/21) LIMA to LAD, SVG to OM-1,OM-2,RPDA,  sleep apnea currently untreated here today in routine cardiac follow \par Patient claims to be feeling well no CP/SOB/PND/Orthopnea/Weight stable \par \par \par Echo reviewed NL LV SYS FX mild DD  maintained on Entresto/torsemide\par Nuclear stress test 10/14/21\par carotid US 3/23/21 No significant stenosis\par US Aorta 3/20/21 Neg AAA\par \par

## 2023-03-02 NOTE — PHYSICAL EXAM
[Normal Conjunctiva] : normal conjunctiva [Normal Venous Pressure] : normal venous pressure [Normal S1, S2] : normal S1, S2 [Soft] : abdomen soft [Non Tender] : non-tender [Normal Gait] : normal gait [No Edema] : no edema [Normal] : moves all extremities, no focal deficits, normal speech [Alert and Oriented] : alert and oriented [de-identified] : Obese

## 2023-03-02 NOTE — DISCUSSION/SUMMARY
[EKG obtained to assist in diagnosis and management of assessed problem(s)] : EKG obtained to assist in diagnosis and management of assessed problem(s) [FreeTextEntry1] : Tremors both hands advised Neurology consult referred to Dr Meraz \par \par LV dysfunction: EF improved on prior Echo CW current medications \par \par CAD: No active cardiac complaints continue medications  \par \par HLD: Continue statin maintain LDL of 70 prior lipids reviewed demonstrate adequate lipid control \par \par AF:  S/P ablation Currently SR rate controlled \par \par HOPE: Currently untreated advised significance of treatment of sleep apnea \par Advised significance of weight loss\par \par Plan DW Patient

## 2023-03-07 ENCOUNTER — NON-APPOINTMENT (OUTPATIENT)
Age: 63
End: 2023-03-07

## 2023-03-07 LAB
APPEARANCE: CLEAR
BACTERIA: NEGATIVE
BILIRUBIN URINE: NEGATIVE
BLOOD URINE: ABNORMAL
COLOR: YELLOW
GLUCOSE QUALITATIVE U: NEGATIVE
HYALINE CASTS: 2 /LPF
KETONES URINE: NEGATIVE
LEUKOCYTE ESTERASE URINE: NEGATIVE
MICROSCOPIC-UA: NORMAL
NITRITE URINE: NEGATIVE
PH URINE: 6.5
PROTEIN URINE: ABNORMAL
RED BLOOD CELLS URINE: 7 /HPF
SPECIFIC GRAVITY URINE: 1.03
SQUAMOUS EPITHELIAL CELLS: 0 /HPF
URINE CYTOLOGY: NORMAL
UROBILINOGEN URINE: NORMAL
WHITE BLOOD CELLS URINE: 1 /HPF

## 2023-03-08 ENCOUNTER — NON-APPOINTMENT (OUTPATIENT)
Age: 63
End: 2023-03-08

## 2023-03-09 ENCOUNTER — OUTPATIENT (OUTPATIENT)
Dept: OUTPATIENT SERVICES | Facility: HOSPITAL | Age: 63
LOS: 1 days | End: 2023-03-09
Payer: COMMERCIAL

## 2023-03-09 ENCOUNTER — APPOINTMENT (OUTPATIENT)
Dept: ULTRASOUND IMAGING | Facility: CLINIC | Age: 63
End: 2023-03-09
Payer: COMMERCIAL

## 2023-03-09 DIAGNOSIS — Z98.890 OTHER SPECIFIED POSTPROCEDURAL STATES: Chronic | ICD-10-CM

## 2023-03-09 DIAGNOSIS — R31.29 OTHER MICROSCOPIC HEMATURIA: ICD-10-CM

## 2023-03-09 DIAGNOSIS — Z90.49 ACQUIRED ABSENCE OF OTHER SPECIFIED PARTS OF DIGESTIVE TRACT: Chronic | ICD-10-CM

## 2023-03-09 DIAGNOSIS — Z98.61 CORONARY ANGIOPLASTY STATUS: Chronic | ICD-10-CM

## 2023-03-09 LAB — PSA SERPL-MCNC: 0.31 NG/ML

## 2023-03-09 PROCEDURE — 76770 US EXAM ABDO BACK WALL COMP: CPT | Mod: 26

## 2023-03-09 PROCEDURE — 76770 US EXAM ABDO BACK WALL COMP: CPT

## 2023-03-10 ENCOUNTER — NON-APPOINTMENT (OUTPATIENT)
Age: 63
End: 2023-03-10

## 2023-03-16 ENCOUNTER — RX RENEWAL (OUTPATIENT)
Age: 63
End: 2023-03-16

## 2023-03-22 ENCOUNTER — APPOINTMENT (OUTPATIENT)
Dept: UROLOGY | Facility: CLINIC | Age: 63
End: 2023-03-22

## 2023-03-29 ENCOUNTER — NON-APPOINTMENT (OUTPATIENT)
Age: 63
End: 2023-03-29

## 2023-03-29 LAB
APPEARANCE: CLEAR
BACTERIA UR CULT: NORMAL
BACTERIA: NEGATIVE
BILIRUBIN URINE: NEGATIVE
BLOOD URINE: ABNORMAL
COLOR: NORMAL
GLUCOSE QUALITATIVE U: NEGATIVE
HYALINE CASTS: 1 /LPF
KETONES URINE: NEGATIVE
LEUKOCYTE ESTERASE URINE: NEGATIVE
MICROSCOPIC-UA: NORMAL
NITRITE URINE: NEGATIVE
PH URINE: 7
PROTEIN URINE: NORMAL
RED BLOOD CELLS URINE: 2 /HPF
SPECIFIC GRAVITY URINE: 1.02
SQUAMOUS EPITHELIAL CELLS: 1 /HPF
UROBILINOGEN URINE: NORMAL
WHITE BLOOD CELLS URINE: 1 /HPF

## 2023-03-31 ENCOUNTER — RX CHANGE (OUTPATIENT)
Age: 63
End: 2023-03-31

## 2023-04-04 ENCOUNTER — RX RENEWAL (OUTPATIENT)
Age: 63
End: 2023-04-04

## 2023-04-05 ENCOUNTER — APPOINTMENT (OUTPATIENT)
Dept: ORTHOPEDIC SURGERY | Facility: CLINIC | Age: 63
End: 2023-04-05
Payer: COMMERCIAL

## 2023-04-05 VITALS
SYSTOLIC BLOOD PRESSURE: 136 MMHG | HEIGHT: 74 IN | WEIGHT: 285 LBS | BODY MASS INDEX: 36.57 KG/M2 | DIASTOLIC BLOOD PRESSURE: 79 MMHG | HEART RATE: 87 BPM

## 2023-04-05 DIAGNOSIS — S46.311A STRAIN OF MUSCLE, FASCIA AND TENDON OF TRICEPS, RIGHT ARM, INITIAL ENCOUNTER: ICD-10-CM

## 2023-04-05 PROCEDURE — 73060 X-RAY EXAM OF HUMERUS: CPT | Mod: RT

## 2023-04-05 PROCEDURE — 99202 OFFICE O/P NEW SF 15 MIN: CPT

## 2023-04-10 PROBLEM — S46.311A STRAIN OF RIGHT TRICEPS, INITIAL ENCOUNTER: Status: ACTIVE | Noted: 2023-04-05

## 2023-04-10 NOTE — CONSULT LETTER
[Dear  ___] : Dear  [unfilled], [FreeTextEntry1] : \par I had the pleasure of evaluating your patient, Seven Jose.\par \par Thank you very much for allowing me to participate in the care of this patient.\par Please see my note below.\par \par If you have any questions, please do not hesitate to contact me.\par \par Sincerely,\par \par \par Curt Sweeney III, MD\par CHING/sg

## 2023-04-10 NOTE — REVIEW OF SYSTEMS
[Joint Stiffness] : joint stiffness [Feeling Tired] : fatigue [Sleep Disturbances] : ~T sleep disturbances [Negative] : Heme/Lymph

## 2023-04-10 NOTE — ADDENDUM
[FreeTextEntry1] : This note was written by Amparo Segura on 04/10/2023 acting as a scribe for ELIZA ELLSWORTH III, MD

## 2023-04-10 NOTE — DISCUSSION/SUMMARY
[de-identified] : At this time, due to triceps/latissimus strain (right), I instructed him on home therapeutic modalities.  He will be reassessed in four to six weeks.

## 2023-04-10 NOTE — HISTORY OF PRESENT ILLNESS
[de-identified] : The patient comes in today with complaints of pain to his right shoulder (in the region of the triceps/latissimus) status post banging his arm down when he slipped out of his truck. The patient states the onset/injury occurred on 4/3/2023.  This injury is not work related or due to an automobile accident.  The patient states the pain is intermittent and radiating.  The patient describes the pain as sharp.  The patient notes rest makes his symptoms better, while use of the arm makes his symptoms worse. The patient indicates a pain level of 1 on a pain scale of 0-10.   [4] : the relief from medicine is 4/10 [7] : the ailment interference is 7/10 [3] : the ailment interference is 3/10 [(Does not interfere) 0] : the ailment interference is 0/10 (does not interfere) [] : No [de-identified] : Tylenol

## 2023-04-10 NOTE — PHYSICAL EXAM
[de-identified] : Right Shoulder: \par Range of Motion in Degrees:   	\par    	                        Claimant:          Normal:  	\par Abduction (Active)  	180  	180 degrees  	\par Abduction (Passive)  	180  	180 degrees  	\par Forward elevation (Active):  	180  	180 degrees  	\par Forward elevation (Passive):  180  	180 degrees  	\par External rotation (Active):  	45  	45 degrees  	\par External rotation (Passive):  	45  	45 degrees  	\par Internal rotation (Active):  	L-1  	L-1  	\par Internal rotation (Passive):  	L-1  	L-1  	\par \par No motor weakness to internal rotation, external rotation or abduction in the scapular plane.  Negative crank test.  Negative O’Mehul’s test.  Negative Speed’s test. Negative Yergason’s test.  Negative cross arm test.  No tenderness to palpation at the AC joint. Negative Hawkin’s sign.  Negative Neer’s sign.  Negative apprehension. Negative sulcus sign.  No gross neurological or vascular deficits distally.  Skin is intact.  No rashes, scars or lesions. 2+ radial and ulnar pulses. No extra-articular swelling or tenderness. \par \par Right Elbow: \par Range of Motion in Degrees\par 	                                          Claimant:	Normal:	\par Flexion (Active)	                          170	170	\par Flexion (Passive)	                          170	170	\par Extension(Active)	                           0	                 0	\par Extension (Passive)	           0	                 0	\par Pronation/Supination (Active)	           0-180	 0-180	\par Pronation/Supination (Passive)          0-180             0-180	\par \par No tenderness to palpation over the medial and lateral epicondyle.  No pain with resisted dorsi or palmar flexion with .  No tenderness over the distal biceps.  No tenderness over the olecranon.  No swelling over the olecranon.  No instability to varus or valgus stress at 0, 30, 60 and 90 degrees.  No instability in the AP plane.  No motor or sensory deficits.  2+ radial and ulnar pulses.  Skin is intact.  No rashes, scars or lesions. \par \par Right Triceps:\par Tenderness in the midaspect of the triceps, although no weakness. Tenderness along the latissimus. \par \par Left Shoulder: \par Range of Motion in Degrees:   	\par    	                        Claimant:  	Normal:  	\par Abduction (Active)  	180  	180 degrees  	\par Abduction (Passive)  	180  	180 degrees  	\par Forward elevation (Active):  	180  	180 degrees  	\par Forward elevation (Passive):  180  	180 degrees  	\par External rotation (Active):  	45  	45 degrees  	\par External rotation (Passive):  	45  	45 degrees  	\par Internal rotation (Active):  	L-1  	L-1  	\par Internal rotation (Passive):  	L-1  	L-1  \par 	\par No motor weakness to internal rotation, external rotation or abduction in the scapular plane.  Negative crank test.  Negative O’Mehul’s test.  Negative Speed’s test. Negative Yergason’s test.  Negative cross arm test.  No tenderness to palpation at the AC joint. Negative Hawkin’s sign.  Negative Neer’s sign.  Negative apprehension. Negative sulcus sign.  No gross neurological or vascular deficits distally.  Skin is intact.  No rashes, scars or lesions. 2+ radial and ulnar pulses. No extra-articular swelling or tenderness.\par   [de-identified] : Radiographs, which were taken in the office today, two views of the right humerus, show no obvious osseous abnormalities.

## 2023-04-11 ENCOUNTER — APPOINTMENT (OUTPATIENT)
Dept: CARDIOLOGY | Facility: CLINIC | Age: 63
End: 2023-04-11
Payer: COMMERCIAL

## 2023-04-11 ENCOUNTER — NON-APPOINTMENT (OUTPATIENT)
Age: 63
End: 2023-04-11

## 2023-04-11 VITALS
BODY MASS INDEX: 37.91 KG/M2 | OXYGEN SATURATION: 98 % | TEMPERATURE: 97.6 F | HEART RATE: 71 BPM | HEIGHT: 74 IN | WEIGHT: 295.41 LBS | DIASTOLIC BLOOD PRESSURE: 60 MMHG | SYSTOLIC BLOOD PRESSURE: 134 MMHG

## 2023-04-11 DIAGNOSIS — R06.02 SHORTNESS OF BREATH: ICD-10-CM

## 2023-04-11 PROCEDURE — 99214 OFFICE O/P EST MOD 30 MIN: CPT | Mod: 25

## 2023-04-11 PROCEDURE — 93000 ELECTROCARDIOGRAM COMPLETE: CPT

## 2023-04-11 RX ORDER — ACETAMINOPHEN 325 MG/1
325 TABLET ORAL EVERY 6 HOURS
Qty: 120 | Refills: 0 | Status: DISCONTINUED | COMMUNITY
Start: 2021-03-30 | End: 2023-04-11

## 2023-04-11 NOTE — HISTORY OF PRESENT ILLNESS
[FreeTextEntry1] : 62 y/o gentleman PMH: HTN, HLD, gout, AF/FLTR s/p ablation followed with EP maintained on oral AC, LVH, HTN, HLD, HOPE, Dilated AO, CAD/MI/PCI x4 S/P cardiac cath 3/22/21 Post CABG X 4 ( 3/24/21) LIMA to LAD, SVG to OM-1,OM-2,RPDA,  sleep apnea currently untreated here today with CC of PRATT while [place] [employer] which he feels is r/t stress from work and has not been sleeping well, denies CP/palpitations/dizziness \par \par \par \par Echo reviewed NL LV SYS FX mild DD  maintained on Entresto/torsemide\par Nuclear stress test 10/14/21\par carotid US 3/23/21 No significant stenosis\par US Aorta 3/20/21 Neg AAA\par \par

## 2023-04-11 NOTE — DISCUSSION/SUMMARY
[EKG obtained to assist in diagnosis and management of assessed problem(s)] : EKG obtained to assist in diagnosis and management of assessed problem(s) [FreeTextEntry1] : Here today with CC of PRATT while working [place] the [employer] which he feels is r/t stress from work and has not been sleeping well, denies CP/palpitations/dizziness.  SOB has since resolved .  At this time I recommend follow up Echo and Nuclear stress test, obtain CXR and labs \par Lungs clear\par Sat RA\par \par LV dysfunction: Echo ordered \par \par CAD: stress test advised \par \par HLD: Continue statin maintain LDL of 70 \par \par AF:  S/P ablation Currently SR rate controlled \par \par HOPE: Currently untreated advised significance of treatment of sleep apnea \par Advised significance of weight loss\par \par OV 2 weeks \par \par Plan DW Patient

## 2023-04-11 NOTE — PHYSICAL EXAM
[Normal Conjunctiva] : normal conjunctiva [Normal Venous Pressure] : normal venous pressure [Normal S1, S2] : normal S1, S2 [Soft] : abdomen soft [Non Tender] : non-tender [Normal Gait] : normal gait [No Edema] : no edema [Normal] : moves all extremities, no focal deficits, normal speech [Alert and Oriented] : alert and oriented [de-identified] : Obese

## 2023-04-13 ENCOUNTER — OUTPATIENT (OUTPATIENT)
Dept: OUTPATIENT SERVICES | Facility: HOSPITAL | Age: 63
LOS: 1 days | End: 2023-04-13
Payer: COMMERCIAL

## 2023-04-13 ENCOUNTER — APPOINTMENT (OUTPATIENT)
Dept: RADIOLOGY | Facility: CLINIC | Age: 63
End: 2023-04-13
Payer: COMMERCIAL

## 2023-04-13 DIAGNOSIS — Z98.890 OTHER SPECIFIED POSTPROCEDURAL STATES: Chronic | ICD-10-CM

## 2023-04-13 DIAGNOSIS — Z90.49 ACQUIRED ABSENCE OF OTHER SPECIFIED PARTS OF DIGESTIVE TRACT: Chronic | ICD-10-CM

## 2023-04-13 DIAGNOSIS — Z98.61 CORONARY ANGIOPLASTY STATUS: Chronic | ICD-10-CM

## 2023-04-13 DIAGNOSIS — Z95.1 PRESENCE OF AORTOCORONARY BYPASS GRAFT: ICD-10-CM

## 2023-04-13 PROCEDURE — 71046 X-RAY EXAM CHEST 2 VIEWS: CPT

## 2023-04-13 PROCEDURE — 71046 X-RAY EXAM CHEST 2 VIEWS: CPT | Mod: 26

## 2023-04-14 LAB
HCT VFR BLD CALC: 47.5 %
HGB BLD-MCNC: 16 G/DL
MCHC RBC-ENTMCNC: 31 PG
MCHC RBC-ENTMCNC: 33.7 GM/DL
MCV RBC AUTO: 92.1 FL
PLATELET # BLD AUTO: 250 K/UL
RBC # BLD: 5.16 M/UL
RBC # FLD: 13.8 %
WBC # FLD AUTO: 7.27 K/UL

## 2023-04-17 LAB
ALBUMIN SERPL ELPH-MCNC: 4.5 G/DL
ALP BLD-CCNC: 53 U/L
ALT SERPL-CCNC: 29 U/L
ANION GAP SERPL CALC-SCNC: 16 MMOL/L
AST SERPL-CCNC: 32 U/L
BILIRUB SERPL-MCNC: 0.5 MG/DL
BUN SERPL-MCNC: 18 MG/DL
CALCIUM SERPL-MCNC: 9.4 MG/DL
CHLORIDE SERPL-SCNC: 99 MMOL/L
CHOLEST SERPL-MCNC: 139 MG/DL
CO2 SERPL-SCNC: 22 MMOL/L
CREAT SERPL-MCNC: 0.89 MG/DL
EGFR: 97 ML/MIN/1.73M2
GLUCOSE SERPL-MCNC: 105 MG/DL
HDLC SERPL-MCNC: 55 MG/DL
LDLC SERPL CALC-MCNC: 58 MG/DL
MAGNESIUM SERPL-MCNC: 1.9 MG/DL
NONHDLC SERPL-MCNC: 84 MG/DL
NT-PROBNP SERPL-MCNC: 195 PG/ML
POTASSIUM SERPL-SCNC: 4.4 MMOL/L
PROT SERPL-MCNC: 6.8 G/DL
SODIUM SERPL-SCNC: 137 MMOL/L
TRIGL SERPL-MCNC: 128 MG/DL

## 2023-04-18 ENCOUNTER — APPOINTMENT (OUTPATIENT)
Dept: UROLOGY | Facility: CLINIC | Age: 63
End: 2023-04-18
Payer: COMMERCIAL

## 2023-04-18 VITALS
DIASTOLIC BLOOD PRESSURE: 73 MMHG | TEMPERATURE: 97.7 F | OXYGEN SATURATION: 95 % | WEIGHT: 288 LBS | SYSTOLIC BLOOD PRESSURE: 138 MMHG | RESPIRATION RATE: 14 BRPM | BODY MASS INDEX: 38.17 KG/M2 | HEART RATE: 95 BPM | HEIGHT: 73 IN

## 2023-04-18 DIAGNOSIS — R31.29 OTHER MICROSCOPIC HEMATURIA: ICD-10-CM

## 2023-04-18 DIAGNOSIS — N52.9 MALE ERECTILE DYSFUNCTION, UNSPECIFIED: ICD-10-CM

## 2023-04-18 DIAGNOSIS — N50.812 RIGHT TESTICULAR PAIN: ICD-10-CM

## 2023-04-18 DIAGNOSIS — N50.811 RIGHT TESTICULAR PAIN: ICD-10-CM

## 2023-04-18 PROCEDURE — 52000 CYSTOURETHROSCOPY: CPT

## 2023-04-18 PROCEDURE — 99213 OFFICE O/P EST LOW 20 MIN: CPT | Mod: 25

## 2023-04-19 ENCOUNTER — NON-APPOINTMENT (OUTPATIENT)
Age: 63
End: 2023-04-19

## 2023-04-19 ENCOUNTER — RX CHANGE (OUTPATIENT)
Age: 63
End: 2023-04-19

## 2023-04-19 NOTE — HISTORY OF PRESENT ILLNESS
[FreeTextEntry1] : 63M presents today with a CC of microhematuria and ED. Pt is here for cystourethroscopy. He notes lessening in the effects of his penile injections. He states that he is at 70-80% of what he would like to see.

## 2023-04-19 NOTE — END OF VISIT
[FreeTextEntry3] : I am placing him on Finasteride to help with the prostate inflammation. He will increase his dose of penile injection formula.

## 2023-04-20 ENCOUNTER — APPOINTMENT (OUTPATIENT)
Dept: CARDIOLOGY | Facility: CLINIC | Age: 63
End: 2023-04-20
Payer: COMMERCIAL

## 2023-04-20 PROCEDURE — 93015 CV STRESS TEST SUPVJ I&R: CPT

## 2023-04-20 PROCEDURE — 78452 HT MUSCLE IMAGE SPECT MULT: CPT

## 2023-04-20 PROCEDURE — A9500: CPT

## 2023-04-24 ENCOUNTER — APPOINTMENT (OUTPATIENT)
Dept: CARDIOLOGY | Facility: CLINIC | Age: 63
End: 2023-04-24
Payer: COMMERCIAL

## 2023-04-24 VITALS
BODY MASS INDEX: 39.1 KG/M2 | WEIGHT: 295 LBS | HEART RATE: 62 BPM | SYSTOLIC BLOOD PRESSURE: 124 MMHG | HEIGHT: 73 IN | OXYGEN SATURATION: 96 % | DIASTOLIC BLOOD PRESSURE: 76 MMHG

## 2023-04-24 DIAGNOSIS — E78.5 HYPERLIPIDEMIA, UNSPECIFIED: ICD-10-CM

## 2023-04-24 PROCEDURE — 99214 OFFICE O/P EST MOD 30 MIN: CPT

## 2023-04-24 NOTE — PHYSICAL EXAM
[Normal Conjunctiva] : normal conjunctiva [Normal Venous Pressure] : normal venous pressure [Normal S1, S2] : normal S1, S2 [Soft] : abdomen soft [Non Tender] : non-tender [Normal Gait] : normal gait [No Edema] : no edema [Normal] : moves all extremities, no focal deficits, normal speech [Alert and Oriented] : alert and oriented [de-identified] : Obese

## 2023-04-24 NOTE — DISCUSSION/SUMMARY
[FreeTextEntry1] : Here today in follow up of SOB which has since fully resolved he is currently w/o cardiovascular complaints.  I have reviewed his stress test with Dr Palla and DW patient.  His Echo is pending and his labs including BNPand CXR were reviewed. \par \par LV dysfunction: Echo pending\par \par CAD: As above\par \par HLD: Continue statin maintain LDL of 70 \par \par HOPE: Currently untreated advised significance of treatment of sleep apnea \par Advised significance of weight loss\par Advised again today to follow with PCP for management of HOPE\par \par Plan DW Patient

## 2023-04-24 NOTE — HISTORY OF PRESENT ILLNESS
[FreeTextEntry1] : 64 y/o male PMH: HTN, HLD, gout, AF/FLTR s/p ablation followed with EP maintained on oral AC, LVH, HTN, HLD, HOPE, Dilated AO, CAD/MI/PCI x4 S/P cardiac cath 3/22/21 Post CABG X 4 ( 3/24/21) LIMA to LAD, SVG to OM-1,OM-2,RPDA,  sleep apnea currently untreated here today in follow up of prior SOB which has since fully resolved\par nuclear stress test reviewed with Dr Palla and similar in comparison\par Echo pending to eval LV FX  \par \par carotid US 3/23/21 No significant stenosis\par US Aorta 3/20/21 Neg AAA\par \par

## 2023-05-04 ENCOUNTER — APPOINTMENT (OUTPATIENT)
Dept: CARDIOLOGY | Facility: CLINIC | Age: 63
End: 2023-05-04
Payer: COMMERCIAL

## 2023-05-04 PROCEDURE — 93306 TTE W/DOPPLER COMPLETE: CPT

## 2023-05-11 ENCOUNTER — RX RENEWAL (OUTPATIENT)
Age: 63
End: 2023-05-11

## 2023-05-15 ENCOUNTER — RX RENEWAL (OUTPATIENT)
Age: 63
End: 2023-05-15

## 2023-05-18 ENCOUNTER — RX RENEWAL (OUTPATIENT)
Age: 63
End: 2023-05-18

## 2023-05-30 ENCOUNTER — RX RENEWAL (OUTPATIENT)
Age: 63
End: 2023-05-30

## 2023-06-19 ENCOUNTER — NON-APPOINTMENT (OUTPATIENT)
Age: 63
End: 2023-06-19

## 2023-06-19 DIAGNOSIS — R39.89 OTHER SYMPTOMS AND SIGNS INVOLVING THE GENITOURINARY SYSTEM: ICD-10-CM

## 2023-06-20 LAB
APPEARANCE: CLEAR
BACTERIA UR CULT: NORMAL
BACTERIA: NEGATIVE /HPF
BILIRUBIN URINE: NEGATIVE
BLOOD URINE: NEGATIVE
CAST: 0 /LPF
COLOR: NORMAL
EPITHELIAL CELLS: 0 /HPF
GLUCOSE QUALITATIVE U: NEGATIVE MG/DL
KETONES URINE: NEGATIVE MG/DL
LEUKOCYTE ESTERASE URINE: NEGATIVE
MICROSCOPIC-UA: NORMAL
MUCUS: PRESENT
NITRITE URINE: NEGATIVE
PH URINE: 6
PROTEIN URINE: NORMAL MG/DL
RED BLOOD CELLS URINE: 20 /HPF
REVIEW: NORMAL
SPECIFIC GRAVITY URINE: 1.03
UROBILINOGEN URINE: 0.2 MG/DL
WHITE BLOOD CELLS URINE: 0 /HPF

## 2023-06-21 LAB — URINE CYTOLOGY: NORMAL

## 2023-06-22 ENCOUNTER — NON-APPOINTMENT (OUTPATIENT)
Age: 63
End: 2023-06-22

## 2023-07-07 ENCOUNTER — APPOINTMENT (OUTPATIENT)
Dept: CARDIOLOGY | Facility: CLINIC | Age: 63
End: 2023-07-07

## 2023-08-23 ENCOUNTER — APPOINTMENT (OUTPATIENT)
Dept: ORTHOPEDIC SURGERY | Facility: CLINIC | Age: 63
End: 2023-08-23
Payer: COMMERCIAL

## 2023-08-23 VITALS
SYSTOLIC BLOOD PRESSURE: 117 MMHG | HEART RATE: 70 BPM | HEIGHT: 73 IN | BODY MASS INDEX: 39.1 KG/M2 | DIASTOLIC BLOOD PRESSURE: 74 MMHG | WEIGHT: 295 LBS

## 2023-08-23 PROCEDURE — 99213 OFFICE O/P EST LOW 20 MIN: CPT | Mod: 25

## 2023-08-23 PROCEDURE — 73560 X-RAY EXAM OF KNEE 1 OR 2: CPT | Mod: RT

## 2023-08-23 PROCEDURE — 20610 DRAIN/INJ JOINT/BURSA W/O US: CPT | Mod: RT

## 2023-08-24 NOTE — PHYSICAL EXAM
[de-identified] : Right Knee: Knee: Range of Motion in Degrees	 	                  Claimant:	Normal:	 Flexion Active	  120 	                135-degrees	 Flexion Passive	  120	                135-degrees	 Extension Active	  0	                0-5-degrees	 Extension Passive	  0	                0-5-degrees	  No weakness to flexion/extension.  No evidence of instability in the AP plane or varus or valgus stress.  Negative  Lachman.  Negative pivot shift.  Negative anterior drawer test.  Negative posterior drawer test.  Negative Violetta.  Negative Apley grind.  No medial or lateral joint line tenderness.  Positive tenderness over the medial and lateral facet of the patella.  Positive patellofemoral crepitations.  No lateral tilting patella.  No patella apprehension.  Positive crepitation in the medial and lateral femoral condyle.  No proximal or distal tenderness.  No gross motor or sensory deficits.  Moderate effusion.  2+ DP and PT pulses.  No varus or valgus malalignment.  Skin is intact.  No rashes, scars or lesions.    Left Knee: Knee: Range of Motion in Degrees	 	                  Claimant:	Normal:	 Flexion Active	  135 	                135-degrees	 Flexion Passive	  135	                135-degrees	 Extension Active	  0-5	                0-5-degrees	 Extension Passive	  0-5	                0-5-degrees	  No weakness to flexion/extension.  No evidence of instability in the AP plane or varus or valgus stress.  Negative  Lachman.  Negative pivot shift.  Negative anterior drawer test.  Negative posterior drawer test.  Negative Violetta.  Negative Apley grind.  No medial or lateral joint line tenderness.  No tenderness over the medial and lateral facet of the patella.  No patellofemoral crepitations.  No lateral tilting patella.  No patellar apprehension.  No crepitation in the medial and lateral femoral condyle.  No proximal or distal swelling, edema or tenderness.  No gross motor or sensory deficits.  No intra-articular swelling.  2+ DP and PT pulses. No varus or valgus malalignment.  Skin is intact.  No rashes, scars or lesions.      [de-identified] : Gait and Station:  Ambulating with a slightly antalgic to antalgic gait.  Station:  Normal.  [de-identified] : Appearance:  Well-developed, well-nourished male in no acute distress.   [de-identified] : Radiographs, one to two views of the right knee taken in the office today, show near bone-on-bone medial compartment arthritis.

## 2023-08-24 NOTE — HISTORY OF PRESENT ILLNESS
[de-identified] : The patient comes in today with complaints referable to his right knee, going on for several months and getting a little worse recently.

## 2023-08-24 NOTE — ADDENDUM
[FreeTextEntry1] : This note was written by Yumi Jalloh on 08/24/2023 acting as scribe for Curt Sweeney III, MD

## 2023-08-24 NOTE — PROCEDURE
[de-identified] : Indication:   Osteoarthritis right knee  Consent:  At this time, I have recommended an injection to the right knee.  The risks and benefits of the procedure were discussed with the patient in detail.  Upon verbal consent of the patient, we proceeded with the injection as noted below.    Description of Procedure:   After a sterile prep, the patient underwent an injection of approximately 9 mL of 1% Lidocaine (10 mg/mL) without epinephrine and 1 mL of Kenalog (40 mg/mL) into the right knee.  The patient tolerated the procedure well.  There were no complications.    :  [employer] Drug Name:  Triamcinolone Acetonide Injectable Suspension USP NCD#:  [id number] Lot#:  [id number] Expiration Date:  01/2024

## 2023-08-24 NOTE — DISCUSSION/SUMMARY
[de-identified] : At this time, due to osteoarthritis of the right knee, the patient was given a cortisone injection.  I recommend ice, elevation and reassessment in 3-4 weeks.

## 2023-09-11 ENCOUNTER — RX RENEWAL (OUTPATIENT)
Age: 63
End: 2023-09-11

## 2023-09-14 ENCOUNTER — APPOINTMENT (OUTPATIENT)
Dept: ORTHOPEDIC SURGERY | Facility: CLINIC | Age: 63
End: 2023-09-14
Payer: COMMERCIAL

## 2023-09-14 VITALS
WEIGHT: 295 LBS | HEIGHT: 73 IN | BODY MASS INDEX: 39.1 KG/M2 | DIASTOLIC BLOOD PRESSURE: 77 MMHG | SYSTOLIC BLOOD PRESSURE: 114 MMHG | HEART RATE: 62 BPM

## 2023-09-14 PROCEDURE — 99212 OFFICE O/P EST SF 10 MIN: CPT

## 2023-09-21 ENCOUNTER — APPOINTMENT (OUTPATIENT)
Dept: CARDIOLOGY | Facility: CLINIC | Age: 63
End: 2023-09-21
Payer: COMMERCIAL

## 2023-09-21 VITALS
DIASTOLIC BLOOD PRESSURE: 60 MMHG | HEART RATE: 63 BPM | WEIGHT: 292 LBS | BODY MASS INDEX: 38.52 KG/M2 | SYSTOLIC BLOOD PRESSURE: 106 MMHG | OXYGEN SATURATION: 96 %

## 2023-09-21 DIAGNOSIS — R06.09 OTHER FORMS OF DYSPNEA: ICD-10-CM

## 2023-09-21 DIAGNOSIS — G47.33 OBSTRUCTIVE SLEEP APNEA (ADULT) (PEDIATRIC): ICD-10-CM

## 2023-09-21 PROCEDURE — 93000 ELECTROCARDIOGRAM COMPLETE: CPT

## 2023-09-21 PROCEDURE — 99214 OFFICE O/P EST MOD 30 MIN: CPT

## 2023-10-23 DIAGNOSIS — N40.0 BENIGN PROSTATIC HYPERPLASIA WITHOUT LOWER URINARY TRACT SYMPMS: ICD-10-CM

## 2023-10-23 LAB
PSA SERPL-MCNC: 0.2 NG/ML
URATE SERPL-MCNC: 8.4 MG/DL

## 2023-10-25 RX ORDER — PAPAVERINE HYDROCHLORIDE 30 MG/ML
30 INJECTION, SOLUTION INTRAVENOUS
Qty: 5 | Refills: 3 | Status: DISCONTINUED | COMMUNITY
Start: 2022-01-13 | End: 2023-10-25

## 2023-11-01 ENCOUNTER — RX RENEWAL (OUTPATIENT)
Age: 63
End: 2023-11-01

## 2023-11-01 RX ORDER — ATORVASTATIN CALCIUM 40 MG/1
40 TABLET, FILM COATED ORAL DAILY
Qty: 90 | Refills: 3 | Status: ACTIVE | COMMUNITY
Start: 2021-03-30 | End: 1900-01-01

## 2023-11-03 NOTE — H&P PST ADULT - NS PRO AD PATIENT TYPE ON CHART
Infusion Nursing Note:  Adrianna Pereira presents today for Cisplatin C1D15.    Patient seen by provider today: Yes: Dr. Castillo; therefore, assessment deferred   present during visit today: Not Applicable.    Note: N/A.    Intravenous Access:  Implanted Port.    Treatment Conditions:  Lab Results   Component Value Date    HGB 8.9 (L) 11/03/2023    WBC 7.1 11/03/2023    ANEUTAUTO 5.2 11/03/2023     11/03/2023        Lab Results   Component Value Date     11/03/2023    POTASSIUM 4.9 11/03/2023    MAG 2.0 11/03/2023    CR 0.69 11/03/2023    RACHAEL 9.2 11/03/2023    BILITOTAL <0.2 11/03/2023    ALBUMIN 3.8 11/03/2023    ALT 21 11/03/2023    AST 16 11/03/2023   Results reviewed, labs MET treatment parameters, ok to proceed with treatment.    Post Infusion Assessment:  Patient tolerated infusion without incident.  Blood return noted pre and post infusion.  Site patent and intact, free from redness, edema or discomfort.  No evidence of extravasations.  Access discontinued per protocol.     Discharge Plan:   Discharge instructions reviewed with: Patient.  Patient and/or family verbalized understanding of discharge instructions and all questions answered.  AVS to patient via C-samT.  Patient will return 11/9/2023 for next appointment.   Patient discharged in stable condition accompanied by: self.  Departure Mode: Ambulatory.    La Monzon RN   Health Care Proxy (HCP)

## 2023-12-06 ENCOUNTER — APPOINTMENT (OUTPATIENT)
Dept: ORTHOPEDIC SURGERY | Facility: CLINIC | Age: 63
End: 2023-12-06
Payer: COMMERCIAL

## 2023-12-06 PROCEDURE — 99441: CPT

## 2023-12-11 ENCOUNTER — APPOINTMENT (OUTPATIENT)
Dept: ORTHOPEDIC SURGERY | Facility: CLINIC | Age: 63
End: 2023-12-11
Payer: COMMERCIAL

## 2023-12-11 PROCEDURE — 20610 DRAIN/INJ JOINT/BURSA W/O US: CPT | Mod: RT

## 2023-12-12 ENCOUNTER — APPOINTMENT (OUTPATIENT)
Dept: CARDIOLOGY | Facility: CLINIC | Age: 63
End: 2023-12-12
Payer: COMMERCIAL

## 2023-12-12 VITALS
HEART RATE: 61 BPM | OXYGEN SATURATION: 98 % | DIASTOLIC BLOOD PRESSURE: 80 MMHG | BODY MASS INDEX: 39.49 KG/M2 | SYSTOLIC BLOOD PRESSURE: 110 MMHG | HEIGHT: 73 IN | WEIGHT: 298 LBS

## 2023-12-12 DIAGNOSIS — Z95.1 PRESENCE OF AORTOCORONARY BYPASS GRAFT: ICD-10-CM

## 2023-12-12 DIAGNOSIS — I10 ESSENTIAL (PRIMARY) HYPERTENSION: ICD-10-CM

## 2023-12-12 PROCEDURE — 93000 ELECTROCARDIOGRAM COMPLETE: CPT

## 2023-12-12 PROCEDURE — 99214 OFFICE O/P EST MOD 30 MIN: CPT | Mod: 25

## 2023-12-13 LAB
ALBUMIN SERPL ELPH-MCNC: 4.7 G/DL
ALP BLD-CCNC: 60 U/L
ALT SERPL-CCNC: 34 U/L
ANION GAP SERPL CALC-SCNC: 19 MMOL/L
APTT 2H P 1:4 NP PPP: NORMAL
APTT 2H P INC PPP: NORMAL
APTT IMM NP/PRE NP PPP: NORMAL
APTT INV RATIO PPP: 33.1 SEC
AST SERPL-CCNC: 35 U/L
BILIRUB SERPL-MCNC: 0.4 MG/DL
BUN SERPL-MCNC: 12 MG/DL
CALCIUM SERPL-MCNC: 10.1 MG/DL
CHLORIDE SERPL-SCNC: 95 MMOL/L
CHOLEST SERPL-MCNC: 181 MG/DL
CO2 SERPL-SCNC: 28 MMOL/L
CREAT SERPL-MCNC: 0.98 MG/DL
EGFR: 87 ML/MIN/1.73M2
GLUCOSE SERPL-MCNC: 49 MG/DL
HCT VFR BLD CALC: 50 %
HDLC SERPL-MCNC: 60 MG/DL
HGB BLD-MCNC: 16.5 G/DL
INR PPP: 1.05 RATIO
LDLC SERPL CALC-MCNC: 82 MG/DL
MCHC RBC-ENTMCNC: 30.8 PG
MCHC RBC-ENTMCNC: 33 GM/DL
MCV RBC AUTO: 93.3 FL
NONHDLC SERPL-MCNC: 121 MG/DL
NPP NORMAL POOLED PLASMA: NORMAL SECS
PLATELET # BLD AUTO: 262 K/UL
POTASSIUM SERPL-SCNC: 4.1 MMOL/L
PROT SERPL-MCNC: 7.5 G/DL
PT BLD: 11.8 SEC
RBC # BLD: 5.36 M/UL
RBC # FLD: 14.4 %
SODIUM SERPL-SCNC: 141 MMOL/L
TRIGL SERPL-MCNC: 240 MG/DL
WBC # FLD AUTO: 9.63 K/UL

## 2023-12-13 RX ORDER — OMEGA-3 ACID ETHYL ESTERS 1 G
1 CAPSULE ORAL
Qty: 0 | Refills: 0 | DISCHARGE

## 2023-12-13 RX ORDER — ROSUVASTATIN CALCIUM 5 MG/1
1 TABLET ORAL
Qty: 0 | Refills: 0 | DISCHARGE

## 2023-12-13 RX ORDER — METOPROLOL TARTRATE 50 MG
1 TABLET ORAL
Qty: 0 | Refills: 0 | DISCHARGE

## 2023-12-13 RX ORDER — RNA INGREDIENT BNT-162B2 0.23 G/1.8ML
0.3 INJECTION, SUSPENSION INTRAMUSCULAR
Qty: 0 | Refills: 0 | DISCHARGE

## 2023-12-13 NOTE — H&P ADULT - NSHPPHYSICALEXAM_GEN_ALL_CORE
PHYSICAL EXAM:  Vital Signs Last 24 Hrs  T(C): 36.2 (14 Dec 2023 09:07), Max: 36.2 (14 Dec 2023 09:07)  T(F): 97.2 (14 Dec 2023 09:07), Max: 97.2 (14 Dec 2023 09:07)  HR: 63 (14 Dec 2023 09:07) (63 - 63)  BP: 130/77 (14 Dec 2023 09:07) (130/77 - 130/77)  RR: 16 (14 Dec 2023 09:07) (16 - 16)  SpO2: 100% (14 Dec 2023 09:07) (100% - 100%)    Parameters below as of 14 Dec 2023 09:07  Patient On (Oxygen Delivery Method): room air        Constitutional: NAD, well-groomed, well-developed  Neuro: Alert and oriented x 3 Gait steady Speech clear No focal deficits  Neck: No JVD No bruit  Respiratory: CTAB  Cardiovascular: S1 and S2, RRR,   Gastrointestinal: BS+, soft, NT/ND  Extremities: No clubbing cyanosis or edema No varicosities  Vascular: 2+ peripheral pulses  Psychiatric: Normal mood, normal affect  Musculoskeletal: 5/5 strength b/l upper and lower extremities

## 2023-12-13 NOTE — PHYSICAL EXAM
[de-identified] : Right Knee: Knee: Range of Motion in Degrees Claimant: Normal: Flexion Active 120 135-degrees Flexion Passive 120 135-degrees Extension Active 0 0-5-degrees Extension Passive 0 0-5-degrees  No weakness to flexion/extension. No evidence of instability in the AP plane or varus or valgus stress. Negative Lachman. Negative pivot shift. Negative anterior drawer test. Negative posterior drawer test. Negative Violetta. Negative Apley grind. No medial or lateral joint line tenderness. Positive tenderness over the medial and lateral facet of the patella. Positive patellofemoral crepitations. No lateral tilting patella. No patella apprehension. Positive crepitation in the medial and lateral femoral condyle. No proximal or distal tenderness. No gross motor or sensory deficits. Moderate effusion. 2+ DP and PT pulses. No varus or valgus malalignment. Skin is intact. No rashes, scars or lesions.

## 2023-12-13 NOTE — ADDENDUM
[FreeTextEntry1] : This note was written by Rolo Jaimes on 12/13/2023, acting as a scribe for Curt Sweeney III, MD

## 2023-12-13 NOTE — ASU PATIENT PROFILE, ADULT - FALL HARM RISK - UNIVERSAL INTERVENTIONS
Bed in lowest position, wheels locked, appropriate side rails in place/Call bell, personal items and telephone in reach/Instruct patient to call for assistance before getting out of bed or chair/Non-slip footwear when patient is out of bed/[place] to call system/Physically safe environment - no spills, clutter or unnecessary equipment/Purposeful Proactive Rounding/Room/bathroom lighting operational, light cord in reach Bed in lowest position, wheels locked, appropriate side rails in place/Call bell, personal items and telephone in reach/Instruct patient to call for assistance before getting out of bed or chair/Non-slip footwear when patient is out of bed/[place] to call system/Physically safe environment - no spills, clutter or unnecessary equipment/Purposeful Proactive Rounding/Room/bathroom lighting operational, light cord in reach

## 2023-12-13 NOTE — H&P ADULT - HISTORY OF PRESENT ILLNESS
This is 63 year old male with PMH of HLD, HTN, gout, AF/FLTR s/p ablation on Eliquis,  CAD, MI s/p PCIx4 s/p cardiac cath 3/22/21,post CABGx4(324/21) LIMA to LAD, SVG to OM-1,OM-2,RPDA, and HOPE  presented to cardiology with chest pressure/PRATT  while doing exercise had Nuclear stress test: on 4/20/23 with  Moderate to severe defect in the mid anterior,apical anterior, and apex walls that are fixed and hypokinesis  Referred for LHC  This is 63 year old male with PMH of HLD, HTN, gout, AF/FLTR s/p ablation on Eliquis,  CAD, MI s/p PCIx4 s/p cardiac cath 3/22/21,post CABGx4(324/21) LIMA to LAD, SVG to OM-1,OM-2,RPDA, and HOPE  presented to cardiology with chest pressure/PRATT  while doing exercise had Nuclear stress test: on 4/20/23 with  Moderate to severe defect in the mid anterior,apical anterior, and apex walls that are fixed and hypokinetic  Referred for LHC

## 2023-12-13 NOTE — H&P ADULT - PROBLEM SELECTOR PLAN 1
a/w with chest pain /PRATT  plan for left cardiac cath     -Consent obtained for cardiac catheterization w/ coronary angiogram and possible stent placement, with possible sedation and analgia. Pt is competent, has capacity, and understands risks and benefits of procedure. Risks and benefits discussed. Risk discussed included, but not limited to MI, stroke, mortality, major bleeding, arrythmia, or infection. All questions answered

## 2023-12-13 NOTE — H&P ADULT - NSHPREVIEWOFSYSTEMS_GEN_ALL_CORE
REVIEW OF SYSTEMS:    CONSTITUTIONAL: No weakness, fevers or chills  EYES/ENT: No visual changes;  No vertigo or throat pain   NECK: No pain or stiffness  RESPIRATORY: No cough, wheezing, hemoptysis; No shortness of breath  CARDIOVASCULAR: No chest pain or palpitations. Occasional twinge in left chest  GASTROINTESTINAL: No abdominal or epigastric pain. No nausea, vomiting, or hematemesis; No diarrhea or constipation. No melena or hematochezia.  GENITOURINARY: No dysuria, frequency or hematuria  NEUROLOGICAL: No numbness or weakness  SKIN: No itching, burning, rashes, or lesions   All other review of systems is negative unless indicated above.

## 2023-12-13 NOTE — H&P ADULT - ASSESSMENT
This is 63 year old male with PMH of HLD, HTN, gout, AF/FLTR s/p ablation on Eliquis,  CAD, MI s/p PCIx4 s/p cardiac cath 3/22/21,post CABGx4(324/21) LIMA to LAD, SVG to OM-1,OM-2,RPDA, and HOPE  presented to cardiology with chest pressure/PRATT  while doing exercise had Nuclear stress test: on 4/20/23 with  Moderate to severe defect in the mid anterior,apical anterior, and apex walls that are fixed and hypokinesis  Referred for LHC   ASA class:  Creatinine:  GFR:  Bleeding  Risk score:  Elpidio Score:  This is 63 year old male with PMH of HLD, HTN, gout, AF/FLTR s/p ablation on Eliquis,  CAD, MI s/p PCIx4 s/p cardiac cath 3/22/21,post CABGx4(324/21) LIMA to LAD, SVG to OM-1,OM-2,RPDA, and HOPE  presented to cardiology with chest pressure/PRATT  while doing exercise had Nuclear stress test: on 4/20/23 with  Moderate to severe defect in the mid anterior, apical anterior, and apex walls that are fixed and hypokinetic  Referred for LHC   ASA class:II  Creatinine:  GFR:  Bleeding  Risk score:  Elpidio Score: 1 point This is 63 year old male with PMH of HLD, HTN, gout, AF/FLTR s/p ablation on Eliquis,  CAD, MI s/p PCIx4 s/p cardiac cath 3/22/21,post CABGx4(324/21) LIMA to LAD, SVG to OM-1,OM-2,RPDA, and HOPE  presented to cardiology with chest pressure/PRATT  while doing exercise had Nuclear stress test: on 4/20/23 with  Moderate to severe defect in the mid anterior, apical anterior, and apex walls that are fixed and hypokinetic  Referred for LHC   ASA class:II  Creatinine:0.98  GFR:82  Bleeding  Risk score:0.6%  Elpidio Score: 1 point

## 2023-12-13 NOTE — ASU PATIENT PROFILE, ADULT - NS TRANSFER HEARING AID
Dr. Reyna notified via secure chat of patient's request for her Suboxone, patient very anxious. Waiting for response.   na

## 2023-12-13 NOTE — PROCEDURE
[de-identified] :   Indication: Osteoarthritis of right knee   Consent: The risks and benefits of the procedure were discussed with the patient in detail.  Upon verbal consent of the patient, we proceeded with the Euflexxa injection as noted below.    Description of Procedure: Under sterile conditions, the patient underwent a Euflexxa injection to the right knee of 20 mg sodium Hyaluronate, 17 mg sodium chloride, 1.12 mg disodium hydrogen phosphate dodecahydrate, .10 mg sodium dihydrogen phosphate dehydrate in a 2 mL syringe without any complications.  The patient tolerated this well.   :  [employer] NDC#:  [address]-3 Lot#:    [id number] Expiration Date:  11/24/2024   Plan: I have recommended ice and elevation.  The patient will be reassessed in one week for the next Euflexxa injection for the osteoarthritis of the right knee.

## 2023-12-14 ENCOUNTER — OUTPATIENT (OUTPATIENT)
Dept: OUTPATIENT SERVICES | Facility: HOSPITAL | Age: 63
LOS: 1 days | Discharge: HOME | End: 2023-12-14
Payer: COMMERCIAL

## 2023-12-14 VITALS
HEIGHT: 74 IN | TEMPERATURE: 97 F | WEIGHT: 289.91 LBS | DIASTOLIC BLOOD PRESSURE: 77 MMHG | HEART RATE: 63 BPM | OXYGEN SATURATION: 100 % | SYSTOLIC BLOOD PRESSURE: 130 MMHG | RESPIRATION RATE: 16 BRPM

## 2023-12-14 VITALS
SYSTOLIC BLOOD PRESSURE: 114 MMHG | OXYGEN SATURATION: 99 % | DIASTOLIC BLOOD PRESSURE: 68 MMHG | RESPIRATION RATE: 16 BRPM | HEART RATE: 68 BPM

## 2023-12-14 DIAGNOSIS — Z90.49 ACQUIRED ABSENCE OF OTHER SPECIFIED PARTS OF DIGESTIVE TRACT: Chronic | ICD-10-CM

## 2023-12-14 DIAGNOSIS — Z98.61 CORONARY ANGIOPLASTY STATUS: Chronic | ICD-10-CM

## 2023-12-14 DIAGNOSIS — Z98.890 OTHER SPECIFIED POSTPROCEDURAL STATES: Chronic | ICD-10-CM

## 2023-12-14 DIAGNOSIS — I25.10 ATHEROSCLEROTIC HEART DISEASE OF NATIVE CORONARY ARTERY WITHOUT ANGINA PECTORIS: ICD-10-CM

## 2023-12-14 PROCEDURE — 93455 CORONARY ART/GRFT ANGIO S&I: CPT

## 2023-12-14 PROCEDURE — C1894: CPT

## 2023-12-14 PROCEDURE — 93005 ELECTROCARDIOGRAM TRACING: CPT

## 2023-12-14 PROCEDURE — 99152 MOD SED SAME PHYS/QHP 5/>YRS: CPT

## 2023-12-14 PROCEDURE — 93455 CORONARY ART/GRFT ANGIO S&I: CPT | Mod: 26

## 2023-12-14 PROCEDURE — 93010 ELECTROCARDIOGRAM REPORT: CPT

## 2023-12-14 PROCEDURE — C1760: CPT

## 2023-12-14 PROCEDURE — 93571 IV DOP VEL&/PRESS C FLO 1ST: CPT | Mod: 26,52,LC

## 2023-12-14 PROCEDURE — C1887: CPT

## 2023-12-14 PROCEDURE — 93799 UNLISTED CV SVC/PROCEDURE: CPT

## 2023-12-14 PROCEDURE — C1769: CPT

## 2023-12-14 RX ORDER — SODIUM CHLORIDE 9 MG/ML
250 INJECTION INTRAMUSCULAR; INTRAVENOUS; SUBCUTANEOUS ONCE
Refills: 0 | Status: COMPLETED | OUTPATIENT
Start: 2023-12-14 | End: 2023-12-14

## 2023-12-14 RX ORDER — FLUTICASONE PROPIONATE 50 MCG
1 SPRAY, SUSPENSION NASAL
Qty: 0 | Refills: 0 | DISCHARGE

## 2023-12-14 RX ORDER — SODIUM CHLORIDE 9 MG/ML
1000 INJECTION INTRAMUSCULAR; INTRAVENOUS; SUBCUTANEOUS
Refills: 0 | Status: DISCONTINUED | OUTPATIENT
Start: 2023-12-14 | End: 2023-12-14

## 2023-12-14 RX ADMIN — SODIUM CHLORIDE 75 MILLILITER(S): 9 INJECTION INTRAMUSCULAR; INTRAVENOUS; SUBCUTANEOUS at 14:51

## 2023-12-14 RX ADMIN — SODIUM CHLORIDE 250 MILLILITER(S): 9 INJECTION INTRAMUSCULAR; INTRAVENOUS; SUBCUTANEOUS at 09:24

## 2023-12-14 NOTE — PACU DISCHARGE NOTE - COMMENTS
vss no s/s bleeding or hemaotoma .d/c instructions given with teachback, iv d/vance pt without c/o pain

## 2023-12-14 NOTE — PROGRESS NOTE ADULT - SUBJECTIVE AND OBJECTIVE BOX
s/p LHC revealing non obstructive CAD. Denied chest pain, shortness of breath, dizziness or palpitations post procedure  Vital Signs Last 24 Hrs  T(C): 36.2 (14 Dec 2023 09:07), Max: 36.2 (14 Dec 2023 09:07)  T(F): 97.2 (14 Dec 2023 09:07), Max: 97.2 (14 Dec 2023 09:07)  HR: 68 (14 Dec 2023 14:40) (63 - 72)  BP: 114/68 (14 Dec 2023 14:40) (100/61 - 130/77)    RR: 16 (14 Dec 2023 14:40) (16 - 16)  SpO2: 99% (14 Dec 2023 14:40) (98% - 100%)    Parameters below as of 14 Dec 2023 14:40  Patient On (Oxygen Delivery Method): room air  PHYSICAL EXAM:  Constitutional: NAD  Neuro: Alert and oriented x 3 Gait steady Speech clear No focal deficits  Neck: No JVD No bruit  Respiratory: CTAB  Cardiovascular: S1 and S2, RRR,   Gastrointestinal: BS+, soft, NT/ND  Extremities: No clubbing cyanosis or edema No varicosities  Vascular: 2+ peripheral pulses  Psychiatric: Normal mood, normal affect  Musculoskeletal: 5/5 strength b/l upper and lower extremities  Right femoral procedure site with Angioseal closure. No bleeding or hematoma    HPI:  This is 63 year old male with PMH of HLD, HTN, gout, AF/FLTR s/p ablation on Eliquis,  CAD, MI s/p PCIx4 s/p cardiac cath 3/22/21,post CABGx4(324/21) LIMA to LAD, SVG to OM-1,OM-2,RPDA, and HOPE  presented to cardiology with chest pressure/PRATT  while doing exercise had Nuclear stress test: on 4/20/23 with  Moderate to severe defect in the mid anterior,apical anterior, and apex walls that are fixed and hypokinetic  Referred for LHC  (13 Dec 2023 11:47)     s/p LHC revealing non obstructive CAD    -ambulate ad oswaldo post bedrest  -iv hydration-LELE prevention  -encourage PO fluids  -plan of care discussed with patient, family and MD  -d/c after bedrest if stable  -post procedure and d/c instructions reviewed  -follow up with MD in 1-2 weeks  -Discussed therapeutic lifestyle changes to reduce risk factors such as following a cardiac diet, weight loss, maintaining a healthy weight, exercise, smoking cessation, medication compliance, and regular follow-up  with MD

## 2023-12-15 DIAGNOSIS — Z95.1 PRESENCE OF AORTOCORONARY BYPASS GRAFT: ICD-10-CM

## 2023-12-15 DIAGNOSIS — I25.10 ATHEROSCLEROTIC HEART DISEASE OF NATIVE CORONARY ARTERY WITHOUT ANGINA PECTORIS: ICD-10-CM

## 2023-12-15 DIAGNOSIS — R94.39 ABNORMAL RESULT OF OTHER CARDIOVASCULAR FUNCTION STUDY: ICD-10-CM

## 2023-12-20 NOTE — ASU PATIENT PROFILE, ADULT - CAREGIVER
Quality 111:Pneumonia Vaccination Status For Older Adults: Pneumococcal vaccine (PPSV23) administered on or after patient’s 60th birthday and before the end of the measurement period
Quality 130: Documentation Of Current Medications In The Medical Record: Current Medications Documented
Quality 402: Tobacco Use And Help With Quitting Among Adolescents: Patient screened for tobacco and never smoked
Quality 431: Preventive Care And Screening: Unhealthy Alcohol Use - Screening: Patient not identified as an unhealthy alcohol user when screened for unhealthy alcohol use using a systematic screening method
Quality 110: Preventive Care And Screening: Influenza Immunization: Influenza Immunization previously received during influenza season
Quality 134: Screening For Clinical Depression And Follow-Up Plan: The patient was screened for depression and the screen was negative and no follow up required
Quality 226: Preventive Care And Screening: Tobacco Use: Screening And Cessation Intervention: Patient screened for tobacco use and is an ex/non-smoker
Detail Level: Detailed
No

## 2023-12-27 ENCOUNTER — APPOINTMENT (OUTPATIENT)
Dept: ORTHOPEDIC SURGERY | Facility: CLINIC | Age: 63
End: 2023-12-27
Payer: COMMERCIAL

## 2023-12-27 PROCEDURE — 20610 DRAIN/INJ JOINT/BURSA W/O US: CPT | Mod: 50

## 2024-01-02 NOTE — REASON FOR VISIT
[FreeTextEntry2] : the second Euflexxa injection to his right knee and first Euflexxa injection to his left knee

## 2024-01-02 NOTE — ADDENDUM
[FreeTextEntry1] : This note was written by Rolo Jaimes on 01/02/2024, acting as a scribe for Curt Sweeney III, MD

## 2024-01-02 NOTE — PROCEDURE
[de-identified] :  Indications: Osteoarthritis of the right knee Osteoarthritis of the left knee  Consent: The risks and benefits of the procedure were discussed with the patient in detail.  Upon verbal consent of the patient, we proceeded with the Euflexxa injections as noted below.    Description of Procedure: Under sterile conditions, the patient underwent a Euflexxa injection to the right and left knee of 20 mg sodium Hyaluronate, 17 mg sodium chloride, 1.12 mg disodium hydrogen phosphate dodecahydrate, .10 mg sodium dihydrogen phosphate dehydrate in a 2 mL syringe without any complications.  The patient tolerated this well.   :  [employer] NDC#:  [address]-3 Lot#:     [id number] Expiration Date:  11/24/2024  :  [employer] NDC#:  [address]-5 Lot#:     [id number] Expiration Date:  12/01/2024  Plan:  I have recommended ice and elevation.  The patient will be reassessed in one week for the next Euflexxa injection for osteoarthritis of the right and left knee.

## 2024-01-02 NOTE — PHYSICAL EXAM
[de-identified] : Right Knee: Knee: Range of Motion in Degrees Claimant: Normal: Flexion Active 120 135-degrees Flexion Passive 120 135-degrees Extension Active 0 0-5-degrees Extension Passive 0 0-5-degrees  No weakness to flexion/extension. No evidence of instability in the AP plane or varus or valgus stress. Negative Lachman. Negative pivot shift. Negative anterior drawer test. Negative posterior drawer test. Negative Violetta. Negative Apley grind. No medial or lateral joint line tenderness. Positive tenderness over the medial and lateral facet of the patella. Positive patellofemoral crepitations. No lateral tilting patella. No patella apprehension. Positive crepitation in the medial and lateral femoral condyle. No proximal or distal tenderness. No gross motor or sensory deficits. Moderate effusion. 2+ DP and PT pulses. No varus or valgus malalignment. Skin is intact. No rashes, scars or lesions.  Left Knee: Range of Motion in Degrees                     Claimant: Normal:  Flexion Active   135                  135-degrees  Flexion Passive   135                 135-degrees  Extension Active   0-5                 0-5-degrees  Extension Passive   0-5                 0-5-degrees    No weakness to flexion/extension. No evidence of instability in the AP plane or varus or valgus stress.  Negative Lachman.  Negative pivot shift.  Negative anterior drawer test.  Negative posterior drawer test.  Negative Violetta.  Negative Apley grind.  No medial or lateral joint line tenderness.  Positive tenderness over the medial and lateral facet of the patella.  Positive patellofemoral crepitations.  No lateral tilting patella.  No patella apprehension.  Positive crepitation in the medial and lateral femoral condyle.  No proximal or distal swelling, edema or tenderness.  No gross motor or sensory deficits. Mild intra-articular swelling.  2+ DP and PT pulses. No varus or valgus malalignment.  Skin is intact.  No rashes, scars or lesions.

## 2024-01-05 ENCOUNTER — APPOINTMENT (OUTPATIENT)
Dept: ORTHOPEDIC SURGERY | Facility: CLINIC | Age: 64
End: 2024-01-05
Payer: COMMERCIAL

## 2024-01-05 DIAGNOSIS — M17.0 BILATERAL PRIMARY OSTEOARTHRITIS OF KNEE: ICD-10-CM

## 2024-01-05 PROCEDURE — 20610 DRAIN/INJ JOINT/BURSA W/O US: CPT | Mod: 50

## 2024-01-08 ENCOUNTER — NON-APPOINTMENT (OUTPATIENT)
Age: 64
End: 2024-01-08

## 2024-01-08 RX ORDER — SULFAMETHOXAZOLE AND TRIMETHOPRIM 800; 160 MG/1; MG/1
800-160 TABLET ORAL TWICE DAILY
Qty: 14 | Refills: 0 | Status: DISCONTINUED | COMMUNITY
Start: 2023-06-19 | End: 2024-01-08

## 2024-01-08 NOTE — PHYSICAL EXAM
[de-identified] : Right Knee: Range of Motion in Degrees Claimant: Normal: Flexion Active 120 135-degrees Flexion Passive 120 135-degrees Extension Active 0 0-5-degrees Extension Passive 0 0-5-degrees  No weakness to flexion/extension. No evidence of instability in the AP plane or varus or valgus stress. Negative Lachman. Negative pivot shift. Negative anterior drawer test. Negative posterior drawer test. Negative Violetta. Negative Apley grind. No medial or lateral joint line tenderness. Positive tenderness over the medial and lateral facet of the patella. Positive patellofemoral crepitations. No lateral tilting patella. No patella apprehension. Positive crepitation in the medial and lateral femoral condyle. No proximal or distal tenderness. No gross motor or sensory deficits. Moderate effusion. 2+ DP and PT pulses. No varus or valgus malalignment. Skin is intact. No rashes, scars or lesions.  Left Knee: Range of Motion in Degrees   Claimant: Normal: Flexion Active 135   135-degrees Flexion Passive 135   135-degrees Extension Active 0-5   0-5-degrees Extension Passive 0-5   0-5-degrees  No weakness to flexion/extension. No evidence of instability in the AP plane or varus or valgus stress. Negative Lachman. Negative pivot shift. Negative anterior drawer test. Negative posterior drawer test. Negative Violetta. Negative Apley grind. No medial or lateral joint line tenderness. Positive tenderness over the medial and lateral facet of the patella. Positive patellofemoral crepitations. No lateral tilting patella. No patella apprehension. Positive crepitation in the medial and lateral femoral condyle. No proximal or distal swelling, edema or tenderness. No gross motor or sensory deficits. Mild intra-articular swelling. 2+ DP and PT pulses. No varus or valgus malalignment. Skin is intact. No rashes, scars or lesions.

## 2024-01-08 NOTE — ADDENDUM
[FreeTextEntry1] : This note was written by Amparo Segura on 01/08/2024 acting as scribe for Niharika Beckman, OTR/L, PA

## 2024-01-08 NOTE — DISCHARGE NOTE NURSING/CASE MANAGEMENT/SOCIAL WORK - NSTOBACCONEVERSMOKERY/N_GEN_A
[facility] 22-24)       Key: 

## 2024-01-08 NOTE — REASON FOR VISIT
[FreeTextEntry2] : the third Euflexxa injection to the right knee and the second Euflexxa injection to the left knee

## 2024-01-08 NOTE — PROCEDURE
[de-identified] : Indications: Osteoarthritis of the right knee Osteoarthritis of the left knee  Consent: The risks and benefits of the procedure were discussed with the patient in detail. Upon verbal consent of the patient, we proceeded with the Euflexxa injections as noted below.  Description of Procedure: Under sterile conditions, the patient underwent a Euflexxa injection to the right and left knee of 20 mg sodium Hyaluronate, 17 mg sodium chloride, 1.12 mg disodium hydrogen phosphate dodecahydrate,.10 mg sodium dihydrogen phosphate dehydrate in a 2 mL syringe without any complications. The patient tolerated this well.  : [employer] NDC#: [address]-4 Lot#: [id number] Expiration Date: 11/24/2024  : [employer] NDC#: [address]-5 Lot#: [id number] Expiration Date: 12/01/2024  Plan: I have recommended ice and elevation. The patient will be reassessed in one week for the next Euflexxa injection for osteoarthritis of the left knee and he will be reassessed in six to eight weeks for the osteoarthritis of the right knee.

## 2024-01-10 PROBLEM — M17.0 PRIMARY OSTEOARTHRITIS OF BOTH KNEES: Status: ACTIVE | Noted: 2019-01-16

## 2024-01-12 ENCOUNTER — APPOINTMENT (OUTPATIENT)
Dept: ORTHOPEDIC SURGERY | Facility: CLINIC | Age: 64
End: 2024-01-12
Payer: COMMERCIAL

## 2024-01-12 DIAGNOSIS — M17.12 UNILATERAL PRIMARY OSTEOARTHRITIS, LEFT KNEE: ICD-10-CM

## 2024-01-12 PROCEDURE — 20610 DRAIN/INJ JOINT/BURSA W/O US: CPT | Mod: LT

## 2024-01-12 RX ORDER — TADALAFIL 5 MG/1
5 TABLET ORAL
Qty: 30 | Refills: 3 | Status: ACTIVE | COMMUNITY
Start: 2022-01-05 | End: 1900-01-01

## 2024-01-17 NOTE — PHYSICAL EXAM
[de-identified] : Left Knee:  Range of Motion in Degrees                     Claimant: Normal: Flexion Active 135   135-degrees Flexion Passive 135   135-degrees Extension Active 0-5   0-5-degrees Extension Passive 0-5   0-5-degrees  No weakness to flexion/extension. No evidence of instability in the AP plane or varus or valgus stress. Negative Lachman. Negative pivot shift. Negative anterior drawer test. Negative posterior drawer test. Negative Violetta. Negative Apley grind. No medial or lateral joint line tenderness. Positive tenderness over the medial and lateral facet of the patella. Positive patellofemoral crepitations. No lateral tilting patella. No patella apprehension. Positive crepitation in the medial and lateral femoral condyle. No proximal or distal swelling, edema or tenderness. No gross motor or sensory deficits. Mild intra-articular swelling. 2+ DP and PT pulses. No varus or valgus malalignment. Skin is intact. No rashes, scars or lesions.

## 2024-01-17 NOTE — ADDENDUM
[FreeTextEntry1] : This note was written by Brenda Melvin on 01/17/2024 acting as scribe for Niharika Beckman OTR/JUDY, PA.

## 2024-01-17 NOTE — PROCEDURE
[de-identified] : Consent: The risks and benefits of the procedure were discussed with the patient in detail.  Upon verbal consent of the patient, we proceeded with the Euflexxa injection as noted below.    Indications: Osteoarthritis, left knee : [employer] NDC#: [address]-5 Lot#:   [id number] Expiration:   12/01/24  Procedure: Under sterile conditions, the patient underwent a Euflexxa injection into the left knee of 20 mg sodium hyaluronate, 17 mg sodium chloride, 1.12 mg disodium hydrogen phosphate dodecahydrate, .10 mg sodium dihydrogen phosphate dehydrate in a 2 mL syringe without any complications.  The patient tolerated this well.  Plan:  I have recommended ice and elevation.  The patient will be reassessed in six to eight weeks for the osteoarthritis of the left knee.

## 2024-01-20 ENCOUNTER — RX RENEWAL (OUTPATIENT)
Age: 64
End: 2024-01-20

## 2024-01-24 ENCOUNTER — RX RENEWAL (OUTPATIENT)
Age: 64
End: 2024-01-24

## 2024-01-24 RX ORDER — APIXABAN 5 MG/1
5 TABLET, FILM COATED ORAL
Qty: 180 | Refills: 1 | Status: ACTIVE | COMMUNITY
Start: 2021-03-30 | End: 1900-01-01

## 2024-01-25 ENCOUNTER — RX RENEWAL (OUTPATIENT)
Age: 64
End: 2024-01-25

## 2024-01-25 RX ORDER — TORSEMIDE 10 MG/1
10 TABLET ORAL DAILY
Qty: 90 | Refills: 2 | Status: ACTIVE | COMMUNITY
Start: 2022-03-17 | End: 1900-01-01

## 2024-02-01 ENCOUNTER — RX RENEWAL (OUTPATIENT)
Age: 64
End: 2024-02-01

## 2024-02-01 RX ORDER — FINASTERIDE 5 MG/1
5 TABLET, FILM COATED ORAL
Qty: 90 | Refills: 1 | Status: ACTIVE | COMMUNITY
Start: 2023-04-18 | End: 1900-01-01

## 2024-02-19 NOTE — ED PROVIDER NOTE - NS_[email or web address]_ED_ALL_ED
Griseofulvin Pregnancy And Lactation Text: This medication is Pregnancy Category X and is known to cause serious birth defects. It is unknown if this medication is excreted in breast milk but breast feeding should be avoided. Enbrel Pregnancy And Lactation Text: This medication is Pregnancy Category B and is considered safe during pregnancy. It is unknown if this medication is excreted in breast milk. Minoxidil Counseling: Minoxidil is a topical medication which can increase blood flow where it is applied. It is uncertain how this medication increases hair growth. Side effects are uncommon and include stinging and allergic reactions. Doxepin Counseling:  Patient advised that the medication is sedating and not to drive a car after taking this medication. Patient informed of potential adverse effects including but not limited to dry mouth, urinary retention, and blurry vision.  The patient verbalized understanding of the proper use and possible adverse effects of doxepin.  All of the patient's questions and concerns were addressed. Otezla Pregnancy And Lactation Text: This medication is Pregnancy Category C and it isn't known if it is safe during pregnancy. It is unknown if it is excreted in breast milk. Olumiant Counseling: I discussed with the patient the risks of Olumiant therapy including but not limited to upper respiratory tract infections, shingles, cold sores, and nausea. Live vaccines should be avoided.  This medication has been linked to serious infections; higher rate of mortality; malignancy and lymphoproliferative disorders; major adverse cardiovascular events; thrombosis; gastrointestinal perforations; neutropenia; lymphopenia; anemia; liver enzyme elevations; and lipid elevations. Tremfya Counseling: I discussed with the patient the risks of guselkumab including but not limited to immunosuppression, serious infections, and drug reactions.  The patient understands that monitoring is required including a PPD at baseline and must alert us or the primary physician if symptoms of infection or other concerning signs are noted. Azelaic Acid Pregnancy And Lactation Text: This medication is considered safe during pregnancy and breast feeding. Acitretin Counseling:  I discussed with the patient the risks of acitretin including but not limited to hair loss, dry lips/skin/eyes, liver damage, hyperlipidemia, depression/suicidal ideation, photosensitivity.  Serious rare side effects can include but are not limited to pancreatitis, pseudotumor cerebri, bony changes, clot formation/stroke/heart attack.  Patient understands that alcohol is contraindicated since it can result in liver toxicity and significantly prolong the elimination of the drug by many years. Mirvaso Counseling: Mirvaso is a topical medication which can decrease superficial blood flow where applied. Side effects are uncommon and include stinging, redness and allergic reactions. Dapsone Counseling: I discussed with the patient the risks of dapsone including but not limited to hemolytic anemia, agranulocytosis, rashes, methemoglobinemia, kidney failure, peripheral neuropathy, headaches, GI upset, and liver toxicity.  Patients who start dapsone require monitoring including baseline LFTs and weekly CBCs for the first month, then every month thereafter.  The patient verbalized understanding of the proper use and possible adverse effects of dapsone.  All of the patient's questions and concerns were addressed. Adbry Counseling: I discussed with the patient the risks of tralokinumab including but not limited to eye infection and irritation, cold sores, injection site reactions, worsening of asthma, allergic reactions and increased risk of parasitic infection.  Live vaccines should be avoided while taking tralokinumab. The patient understands that monitoring is required and they must alert us or the primary physician if symptoms of infection or other concerning signs are noted. Cyclosporine Counseling:  I discussed with the patient the risks of cyclosporine including but not limited to hypertension, gingival hyperplasia,myelosuppression, immunosuppression, liver damage, kidney damage, neurotoxicity, lymphoma, and serious infections. The patient understands that monitoring is required including baseline blood pressure, CBC, CMP, lipid panel and uric acid, and then 1-2 times monthly CMP and blood pressure. Dapsone Pregnancy And Lactation Text: This medication is Pregnancy Category C and is not considered safe during pregnancy or breast feeding. Mirvaso Pregnancy And Lactation Text: This medication has not been assigned a Pregnancy Risk Category. It is unknown if the medication is excreted in breast milk. Opioid Counseling: I discussed with the patient the potential side effects of opioids including but not limited to addiction, altered mental status, and depression. I stressed avoiding alcohol, benzodiazepines, muscle relaxants and sleep aids unless specifically okayed by a physician. The patient verbalized understanding of the proper use and possible adverse effects of opioids. All of the patient's questions and concerns were addressed. They were instructed to flush the remaining pills down the toilet if they did not need them for pain. Benzoyl Peroxide Counseling: Patient counseled that medicine may cause skin irritation and bleach clothing.  In the event of skin irritation, the patient was advised to reduce the amount of the drug applied or use it less frequently.   The patient verbalized understanding of the proper use and possible adverse effects of benzoyl peroxide.  All of the patient's questions and concerns were addressed. Solaraze Counseling:  I discussed with the patient the risks of Solaraze including but not limited to erythema, scaling, itching, weeping, crusting, and pain. Acitretin Pregnancy And Lactation Text: This medication is Pregnancy Category X and should not be given to women who are pregnant or may become pregnant in the future. This medication is excreted in breast milk. Niacinamide Counseling: I recommended taking niacin or niacinamide, also know as vitamin B3, twice daily. Recent evidence suggests that taking vitamin B3 (500 mg twice daily) can reduce the risk of actinic keratoses and non-melanoma skin cancers. Side effects of vitamin B3 include flushing and headache. Birth Control Pills Pregnancy And Lactation Text: This medication should be avoided if pregnant and for the first 30 days post-partum. Clindamycin Pregnancy And Lactation Text: This medication can be used in pregnancy if certain situations. Clindamycin is also present in breast milk. I personally performed the service described in the documentation recorded by the scribe in my presence, and it accurately and completely records my words and actions. Siliq Pregnancy And Lactation Text: The risk during pregnancy and breastfeeding is uncertain with this medication. Topical Ketoconazole Pregnancy And Lactation Text: This medication is Pregnancy Category B and is considered safe during pregnancy. It is unknown if it is excreted in breast milk. Quinolones Counseling:  I discussed with the patient the risks of fluoroquinolones including but not limited to GI upset, allergic reaction, drug rash, diarrhea, dizziness, photosensitivity, yeast infections, liver function test abnormalities, tendonitis/tendon rupture. Odomzo Pregnancy And Lactation Text: This medication is Pregnancy Category X and is absolutely contraindicated during pregnancy. It is unknown if it is excreted in breast milk. Quinolones Pregnancy And Lactation Text: This medication is Pregnancy Category C and it isn't know if it is safe during pregnancy. It is also excreted in breast milk. Doxepin Pregnancy And Lactation Text: This medication is Pregnancy Category C and it isn't known if it is safe during pregnancy. It is also excreted in breast milk and breast feeding isn't recommended. Adbry Pregnancy And Lactation Text: It is unknown if this medication will adversely affect pregnancy or breast feeding. Minoxidil Pregnancy And Lactation Text: This medication has not been assigned a Pregnancy Risk Category but animal studies failed to show danger with the topical medication. It is unknown if the medication is excreted in breast milk. Topical Sulfur Applications Counseling: Topical Sulfur Counseling: Patient counseled that this medication may cause skin irritation or allergic reactions.  In the event of skin irritation, the patient was advised to reduce the amount of the drug applied or use it less frequently.   The patient verbalized understanding of the proper use and possible adverse effects of topical sulfur application.  All of the patient's questions and concerns were addressed. Niacinamide Pregnancy And Lactation Text: These medications are considered safe during pregnancy. Oxybutynin Counseling:  I discussed with the patient the risks of oxybutynin including but not limited to skin rash, drowsiness, dry mouth, difficulty urinating, and blurred vision. Humira Counseling:  I discussed with the patient the risks of adalimumab including but not limited to myelosuppression, immunosuppression, autoimmune hepatitis, demyelinating diseases, lymphoma, and serious infections.  The patient understands that monitoring is required including a PPD at baseline and must alert us or the primary physician if symptoms of infection or other concerning signs are noted. Itraconazole Counseling:  I discussed with the patient the risks of itraconazole including but not limited to liver damage, nausea/vomiting, neuropathy, and severe allergy.  The patient understands that this medication is best absorbed when taken with acidic beverages such as non-diet cola or ginger ale.  The patient understands that monitoring is required including baseline LFTs and repeat LFTs at intervals.  The patient understands that they are to contact us or the primary physician if concerning signs are noted. Olumiant Pregnancy And Lactation Text: Based on animal studies, Olumiant may cause embryo-fetal harm when administered to pregnant women.  The medication should not be used in pregnancy.  Breastfeeding is not recommended during treatment. Opzelura Counseling:  I discussed with the patient the risks of Opzelura including but not limited to nasopharngitis, bronchitis, ear infection, eosinophila, hives, diarrhea, folliculitis, tonsillitis, and rhinorrhea.  Taken orally, this medication has been linked to serious infections; higher rate of mortality; malignancy and lymphoproliferative disorders; major adverse cardiovascular events; thrombosis; thrombocytopenia, anemia, and neutropenia; and lipid elevations. Benzoyl Peroxide Pregnancy And Lactation Text: This medication is Pregnancy Category C. It is unknown if benzoyl peroxide is excreted in breast milk. Gabapentin Counseling: I discussed with the patient the risks of gabapentin including but not limited to dizziness, somnolence, fatigue and ataxia. Solaraze Pregnancy And Lactation Text: This medication is Pregnancy Category B and is considered safe. There is some data to suggest avoiding during the third trimester. It is unknown if this medication is excreted in breast milk. Tranexamic Acid Counseling:  Patient advised of the small risk of bleeding problems with tranexamic acid. They were also instructed to call if they developed any nausea, vomiting or diarrhea. All of the patient's questions and concerns were addressed. Cyclosporine Pregnancy And Lactation Text: This medication is Pregnancy Category C and it isn't know if it is safe during pregnancy. This medication is excreted in breast milk. Spironolactone Counseling: Patient advised regarding risks of diarrhea, abdominal pain, hyperkalemia, birth defects (for female patients), liver toxicity and renal toxicity. The patient may need blood work to monitor liver and kidney function and potassium levels while on therapy. The patient verbalized understanding of the proper use and possible adverse effects of spironolactone.  All of the patient's questions and concerns were addressed. Simponi Counseling:  I discussed with the patient the risks of golimumab including but not limited to myelosuppression, immunosuppression, autoimmune hepatitis, demyelinating diseases, lymphoma, and serious infections.  The patient understands that monitoring is required including a PPD at baseline and must alert us or the primary physician if symptoms of infection or other concerning signs are noted. Doxycycline Counseling:  Patient counseled regarding possible photosensitivity and increased risk for sunburn.  Patient instructed to avoid sunlight, if possible.  When exposed to sunlight, patients should wear protective clothing, sunglasses, and sunscreen.  The patient was instructed to call the office immediately if the following severe adverse effects occur:  hearing changes, easy bruising/bleeding, severe headache, or vision changes.  The patient verbalized understanding of the proper use and possible adverse effects of doxycycline.  All of the patient's questions and concerns were addressed. Opioid Pregnancy And Lactation Text: These medications can lead to premature delivery and should be avoided during pregnancy. These medications are also present in breast milk in small amounts. Bexarotene Counseling:  I discussed with the patient the risks of bexarotene including but not limited to hair loss, dry lips/skin/eyes, liver abnormalities, hyperlipidemia, pancreatitis, depression/suicidal ideation, photosensitivity, drug rash/allergic reactions, hypothyroidism, anemia, leukopenia, infection, cataracts, and teratogenicity.  Patient understands that they will need regular blood tests to check lipid profile, liver function tests, white blood cell count, thyroid function tests and pregnancy test if applicable. Elidel Counseling: Patient may experience a mild burning sensation during topical application. Elidel is not approved in children less than 2 years of age. There have been case reports of hematologic and skin malignancies in patients using topical calcineurin inhibitors although causality is questionable. Spironolactone Pregnancy And Lactation Text: This medication can cause feminization of the male fetus and should be avoided during pregnancy. The active metabolite is also found in breast milk. Cimzia Counseling:  I discussed with the patient the risks of Cimzia including but not limited to immunosuppression, allergic reactions and infections.  The patient understands that monitoring is required including a PPD at baseline and must alert us or the primary physician if symptoms of infection or other concerning signs are noted. Nsaids Counseling: NSAID Counseling: I discussed with the patient that NSAIDs should be taken with food. Prolonged use of NSAIDs can result in the development of stomach ulcers.  Patient advised to stop taking NSAIDs if abdominal pain occurs.  The patient verbalized understanding of the proper use and possible adverse effects of NSAIDs.  All of the patient's questions and concerns were addressed. Elidel Pregnancy And Lactation Text: This medication is Pregnancy Category C. It is unknown if this medication is excreted in breast milk. Methotrexate Counseling:  Patient counseled regarding adverse effects of methotrexate including but not limited to nausea, vomiting, abnormalities in liver function tests. Patients may develop mouth sores, rash, diarrhea, and abnormalities in blood counts. The patient understands that monitoring is required including LFT's and blood counts.  There is a rare possibility of scarring of the liver and lung problems that can occur when taking methotrexate. Persistent nausea, loss of appetite, pale stools, dark urine, cough, and shortness of breath should be reported immediately. Patient advised to discontinue methotrexate treatment at least three months before attempting to become pregnant.  I discussed the need for folate supplements while taking methotrexate.  These supplements can decrease side effects during methotrexate treatment. The patient verbalized understanding of the proper use and possible adverse effects of methotrexate.  All of the patient's questions and concerns were addressed. Topical Sulfur Applications Pregnancy And Lactation Text: This medication is considered safe during pregnancy and breast feeding secondary to limited systemic absorption. Rinvoq Counseling: I discussed with the patient the risks of Rinvoq therapy including but not limited to upper respiratory tract infections, shingles, cold sores, bronchitis, nausea, cough, fever, acne, and headache. Live vaccines should be avoided.  This medication has been linked to serious infections; higher rate of mortality; malignancy and lymphoproliferative disorders; major adverse cardiovascular events; thrombosis; thrombocytopenia, anemia, and neutropenia; lipid elevations; liver enzyme elevations; and gastrointestinal perforations. Rifampin Counseling: I discussed with the patient the risks of rifampin including but not limited to liver damage, kidney damage, red-orange body fluids, nausea/vomiting and severe allergy. Xolair Counseling:  Patient informed of potential adverse effects including but not limited to fever, muscle aches, rash and allergic reactions.  The patient verbalized understanding of the proper use and possible adverse effects of Xolair.  All of the patient's questions and concerns were addressed. Hydroxyzine Counseling: Patient advised that the medication is sedating and not to drive a car after taking this medication.  Patient informed of potential adverse effects including but not limited to dry mouth, urinary retention, and blurry vision.  The patient verbalized understanding of the proper use and possible adverse effects of hydroxyzine.  All of the patient's questions and concerns were addressed. Carac Counseling:  I discussed with the patient the risks of Carac including but not limited to erythema, scaling, itching, weeping, crusting, and pain. Tranexamic Acid Pregnancy And Lactation Text: It is unknown if this medication is safe during pregnancy or breast feeding. Arava Counseling:  Patient counseled regarding adverse effects of Arava including but not limited to nausea, vomiting, abnormalities in liver function tests. Patients may develop mouth sores, rash, diarrhea, and abnormalities in blood counts. The patient understands that monitoring is required including LFTs and blood counts.  There is a rare possibility of scarring of the liver and lung problems that can occur when taking methotrexate. Persistent nausea, loss of appetite, pale stools, dark urine, cough, and shortness of breath should be reported immediately. Patient advised to discontinue Arava treatment and consult with a physician prior to attempting conception. The patient will have to undergo a treatment to eliminate Arava from the body prior to conception. Azithromycin Counseling:  I discussed with the patient the risks of azithromycin including but not limited to GI upset, allergic reaction, drug rash, diarrhea, and yeast infections. Propranolol Counseling:  I discussed with the patient the risks of propranolol including but not limited to low heart rate, low blood pressure, low blood sugar, restlessness and increased cold sensitivity. They should call the office if they experience any of these side effects. Ketoconazole Counseling:   Patient counseled regarding improving absorption with orange juice.  Adverse effects include but are not limited to breast enlargement, headache, diarrhea, nausea, upset stomach, liver function test abnormalities, taste disturbance, and stomach pain.  There is a rare possibility of liver failure that can occur when taking ketoconazole. The patient understands that monitoring of LFTs may be required, especially at baseline. The patient verbalized understanding of the proper use and possible adverse effects of ketoconazole.  All of the patient's questions and concerns were addressed. Rinvoq Pregnancy And Lactation Text: Based on animal studies, Rinvoq may cause embryo-fetal harm when administered to pregnant women.  The medication should not be used in pregnancy.  Breastfeeding is not recommended during treatment and for 6 days after the last dose. Doxycycline Pregnancy And Lactation Text: This medication is Pregnancy Category D and not consider safe during pregnancy. It is also excreted in breast milk but is considered safe for shorter treatment courses. Gabapentin Pregnancy And Lactation Text: This medication is Pregnancy Category C and isn't considered safe during pregnancy. It is excreted in breast milk. Bexarotene Pregnancy And Lactation Text: This medication is Pregnancy Category X and should not be given to women who are pregnant or may become pregnant. This medication should not be used if you are breast feeding. Opzelura Pregnancy And Lactation Text: There is insufficient data to evaluate drug-associated risk for major birth defects, miscarriage, or other adverse maternal or fetal outcomes.  There is a pregnancy registry that monitors pregnancy outcomes in pregnant persons exposed to the medication during pregnancy.  It is unknown if this medication is excreted in breast milk.  Do not breastfeed during treatment and for about 4 weeks after the last dose. Topical Retinoid counseling:  Patient advised to apply a pea-sized amount only at bedtime and wait 30 minutes after washing their face before applying.  If too drying, patient may add a non-comedogenic moisturizer. The patient verbalized understanding of the proper use and possible adverse effects of retinoids.  All of the patient's questions and concerns were addressed. Ilumya Counseling: I discussed with the patient the risks of tildrakizumab including but not limited to immunosuppression, malignancy, posterior leukoencephalopathy syndrome, and serious infections.  The patient understands that monitoring is required including a PPD at baseline and must alert us or the primary physician if symptoms of infection or other concerning signs are noted. Sarecycline Pregnancy And Lactation Text: This medication is Pregnancy Category D and not consider safe during pregnancy. It is also excreted in breast milk. Erythromycin Counseling:  I discussed with the patient the risks of erythromycin including but not limited to GI upset, allergic reaction, drug rash, diarrhea, increase in liver enzymes, and yeast infections. Wartpeel Counseling:  I discussed with the patient the risks of Wartpeel including but not limited to erythema, scaling, itching, weeping, crusting, and pain. Picato Counseling:  I discussed with the patient the risks of Picato including but not limited to erythema, scaling, itching, weeping, crusting, and pain. Glycopyrrolate Counseling:  I discussed with the patient the risks of glycopyrrolate including but not limited to skin rash, drowsiness, dry mouth, difficulty urinating, and blurred vision. Eucrisa Counseling: Patient may experience a mild burning sensation during topical application. Eucrisa is not approved in children less than 2 years of age. Nsaids Pregnancy And Lactation Text: These medications are considered safe up to 30 weeks gestation. It is excreted in breast milk. Hydroxyzine Pregnancy And Lactation Text: This medication is not safe during pregnancy and should not be taken. It is also excreted in breast milk and breast feeding isn't recommended. Cimzia Pregnancy And Lactation Text: This medication crosses the placenta but can be considered safe in certain situations. Cimzia may be excreted in breast milk. Xolair Pregnancy And Lactation Text: This medication is Pregnancy Category B and is considered safe during pregnancy. This medication is excreted in breast milk. Skyrizi Counseling: I discussed with the patient the risks of risankizumab-rzaa including but not limited to immunosuppression, and serious infections.  The patient understands that monitoring is required including a PPD at baseline and must alert us or the primary physician if symptoms of infection or other concerning signs are noted. Rifampin Pregnancy And Lactation Text: This medication is Pregnancy Category C and it isn't know if it is safe during pregnancy. It is also excreted in breast milk and should not be used if you are breast feeding. Ketoconazole Pregnancy And Lactation Text: This medication is Pregnancy Category C and it isn't know if it is safe during pregnancy. It is also excreted in breast milk and breast feeding isn't recommended. Sarecycline Counseling: Patient advised regarding possible photosensitivity and discoloration of the teeth, skin, lips, tongue and gums.  Patient instructed to avoid sunlight, if possible.  When exposed to sunlight, patients should wear protective clothing, sunglasses, and sunscreen.  The patient was instructed to call the office immediately if the following severe adverse effects occur:  hearing changes, easy bruising/bleeding, severe headache, or vision changes.  The patient verbalized understanding of the proper use and possible adverse effects of sarecycline.  All of the patient's questions and concerns were addressed. Valtrex Counseling: I discussed with the patient the risks of valacyclovir including but not limited to kidney damage, nausea, vomiting and severe allergy.  The patient understands that if the infection seems to be worsening or is not improving, they are to call. Propranolol Pregnancy And Lactation Text: This medication is Pregnancy Category C and it isn't known if it is safe during pregnancy. It is excreted in breast milk. Azithromycin Pregnancy And Lactation Text: This medication is considered safe during pregnancy and is also secreted in breast milk. Cosentyx Counseling:  I discussed with the patient the risks of Cosentyx including but not limited to worsening of Crohn's disease, immunosuppression, allergic reactions and infections.  The patient understands that monitoring is required including a PPD at baseline and must alert us or the primary physician if symptoms of infection or other concerning signs are noted. Olanzapine Counseling- I discussed with the patient the common side effects of olanzapine including but are not limited to: lack of energy, dry mouth, increased appetite, sleepiness, tremor, constipation, dizziness, changes in behavior, or restlessness.  Explained that teenagers are more likely to experience headaches, abdominal pain, pain in the arms or legs, tiredness, and sleepiness.  Serious side effects include but are not limited: increased risk of death in elderly patients who are confused, have memory loss, or dementia-related psychosis; hyperglycemia; increased cholesterol and triglycerides; and weight gain. Azathioprine Counseling:  I discussed with the patient the risks of azathioprine including but not limited to myelosuppression, immunosuppression, hepatotoxicity, lymphoma, and infections.  The patient understands that monitoring is required including baseline LFTs, Creatinine, possible TPMP genotyping and weekly CBCs for the first month and then every 2 weeks thereafter.  The patient verbalized understanding of the proper use and possible adverse effects of azathioprine.  All of the patient's questions and concerns were addressed. Tetracycline Counseling: Patient counseled regarding possible photosensitivity and increased risk for sunburn.  Patient instructed to avoid sunlight, if possible.  When exposed to sunlight, patients should wear protective clothing, sunglasses, and sunscreen.  The patient was instructed to call the office immediately if the following severe adverse effects occur:  hearing changes, easy bruising/bleeding, severe headache, or vision changes.  The patient verbalized understanding of the proper use and possible adverse effects of tetracycline.  All of the patient's questions and concerns were addressed. Patient understands to avoid pregnancy while on therapy due to potential birth defects. Isotretinoin Counseling: Patient should get monthly blood tests, not donate blood, not drive at night if vision affected, not share medication, and not undergo elective surgery for 6 months after tx completed. Side effects reviewed, pt to contact office should one occur. Sotyktu Counseling:  I discussed the most common side effects of Sotyktu including: common cold, sore throat, sinus infections, cold sores, canker sores, folliculitis, and acne.  I also discussed more serious side effects of Sotyktu including but not limited to: serious allergic reactions; increased risk for infections such as TB; cancers such as lymphomas; rhabdomyolysis and elevated CPK; and elevated triglycerides and liver enzymes.  Methotrexate Pregnancy And Lactation Text: This medication is Pregnancy Category X and is known to cause fetal harm. This medication is excreted in breast milk. Carac Pregnancy And Lactation Text: This medication is Pregnancy Category X and contraindicated in pregnancy and in women who may become pregnant. It is unknown if this medication is excreted in breast milk. Albendazole Counseling:  I discussed with the patient the risks of albendazole including but not limited to cytopenia, kidney damage, nausea/vomiting and severe allergy.  The patient understands that this medication is being used in an off-label manner. Dutasteride Male Counseling: Dustasteride Counseling:  I discussed with the patient the risks of use of dutasteride including but not limited to decreased libido, decreased ejaculate volume, and gynecomastia. Women who can become pregnant should not handle medication.  All of the patient's questions and concerns were addressed. Detail Level: Detailed Glycopyrrolate Pregnancy And Lactation Text: This medication is Pregnancy Category B and is considered safe during pregnancy. It is unknown if it is excreted breast milk. Erivedge Counseling- I discussed with the patient the risks of Erivedge including but not limited to nausea, vomiting, diarrhea, constipation, weight loss, changes in the sense of taste, decreased appetite, muscle spasms, and hair loss.  The patient verbalized understanding of the proper use and possible adverse effects of Erivedge.  All of the patient's questions and concerns were addressed. Prednisone Counseling:  I discussed with the patient the risks of prolonged use of prednisone including but not limited to weight gain, insomnia, osteoporosis, mood changes, diabetes, susceptibility to infection, glaucoma and high blood pressure.  In cases where prednisone use is prolonged, patients should be monitored with blood pressure checks, serum glucose levels and an eye exam.  Additionally, the patient may need to be placed on GI prophylaxis, PCP prophylaxis, and calcium and vitamin D supplementation and/or a bisphosphonate.  The patient verbalized understanding of the proper use and the possible adverse effects of prednisone.  All of the patient's questions and concerns were addressed. Calcipotriene Counseling:  I discussed with the patient the risks of calcipotriene including but not limited to erythema, scaling, itching, and irritation. Tazorac Counseling:  Patient advised that medication is irritating and drying.  Patient may need to apply sparingly and wash off after an hour before eventually leaving it on overnight.  The patient verbalized understanding of the proper use and possible adverse effects of tazorac.  All of the patient's questions and concerns were addressed. Infliximab Counseling:  I discussed with the patient the risks of infliximab including but not limited to myelosuppression, immunosuppression, autoimmune hepatitis, demyelinating diseases, lymphoma, and serious infections.  The patient understands that monitoring is required including a PPD at baseline and must alert us or the primary physician if symptoms of infection or other concerning signs are noted. Azathioprine Pregnancy And Lactation Text: This medication is Pregnancy Category D and isn't considered safe during pregnancy. It is unknown if this medication is excreted in breast milk. Isotretinoin Pregnancy And Lactation Text: This medication is Pregnancy Category X and is considered extremely dangerous during pregnancy. It is unknown if it is excreted in breast milk. Erythromycin Pregnancy And Lactation Text: This medication is Pregnancy Category B and is considered safe during pregnancy. It is also excreted in breast milk. Hydroquinone Counseling:  Patient advised that medication may result in skin irritation, lightening (hypopigmentation), dryness, and burning.  In the event of skin irritation, the patient was advised to reduce the amount of the drug applied or use it less frequently.  Rarely, spots that are treated with hydroquinone can become darker (pseudoochronosis).  Should this occur, patient instructed to stop medication and call the office. The patient verbalized understanding of the proper use and possible adverse effects of hydroquinone.  All of the patient's questions and concerns were addressed. Olanzapine Pregnancy And Lactation Text: This medication is pregnancy category C.   There are no adequate and well controlled trials with olanzapine in pregnant females.  Olanzapine should be used during pregnancy only if the potential benefit justifies the potential risk to the fetus.   In a study in lactating healthy women, olanzapine was excreted in breast milk.  It is recommended that women taking olanzapine should not breast feed. Winlevi Counseling:  I discussed with the patient the risks of topical clascoterone including but not limited to erythema, scaling, itching, and stinging. Patient voiced their understanding. SSKI Counseling:  I discussed with the patient the risks of SSKI including but not limited to thyroid abnormalities, metallic taste, GI upset, fever, headache, acne, arthralgias, paraesthesias, lymphadenopathy, easy bleeding, arrhythmias, and allergic reaction. Bactrim Counseling:  I discussed with the patient the risks of sulfa antibiotics including but not limited to GI upset, allergic reaction, drug rash, diarrhea, dizziness, photosensitivity, and yeast infections.  Rarely, more serious reactions can occur including but not limited to aplastic anemia, agranulocytosis, methemoglobinemia, blood dyscrasias, liver or kidney failure, lung infiltrates or desquamative/blistering drug rashes. Clofazimine Counseling:  I discussed with the patient the risks of clofazimine including but not limited to skin and eye pigmentation, liver damage, nausea/vomiting, gastrointestinal bleeding and allergy. Sotyktu Pregnancy And Lactation Text: There is insufficient data to evaluate whether or not Sotyktu is safe to use during pregnancy.   It is not known if Sotyktu passes into breast milk and whether or not it is safe to use when breastfeeding.   Terbinafine Counseling: Patient counseling regarding adverse effects of terbinafine including but not limited to headache, diarrhea, rash, upset stomach, liver function test abnormalities, itching, taste/smell disturbance, nausea, abdominal pain, and flatulence.  There is a rare possibility of liver failure that can occur when taking terbinafine.  The patient understands that a baseline LFT and kidney function test may be required. The patient verbalized understanding of the proper use and possible adverse effects of terbinafine.  All of the patient's questions and concerns were addressed. Protopic Counseling: Patient may experience a mild burning sensation during topical application. Protopic is not approved in children less than 2 years of age. There have been case reports of hematologic and skin malignancies in patients using topical calcineurin inhibitors although causality is questionable. Calcipotriene Pregnancy And Lactation Text: This medication has not been proven safe during pregnancy. It is unknown if this medication is excreted in breast milk. Cellcept Counseling:  I discussed with the patient the risks of mycophenolate mofetil including but not limited to infection/immunosuppression, GI upset, hypokalemia, hypercholesterolemia, bone marrow suppression, lymphoproliferative disorders, malignancy, GI ulceration/bleed/perforation, colitis, interstitial lung disease, kidney failure, progressive multifocal leukoencephalopathy, and birth defects.  The patient understands that monitoring is required including a baseline creatinine and regular CBC testing. In addition, patient must alert us immediately if symptoms of infection or other concerning signs are noted. Tazorac Pregnancy And Lactation Text: This medication is not safe during pregnancy. It is unknown if this medication is excreted in breast milk. Bactrim Pregnancy And Lactation Text: This medication is Pregnancy Category D and is known to cause fetal risk.  It is also excreted in breast milk. Xeljanz Counseling: I discussed with the patient the risks of Xeljanz therapy including increased risk of infection, liver issues, headache, diarrhea, or cold symptoms. Live vaccines should be avoided. They were instructed to call if they have any problems. Sski Pregnancy And Lactation Text: This medication is Pregnancy Category D and isn't considered safe during pregnancy. It is excreted in breast milk. Terbinafine Pregnancy And Lactation Text: This medication is Pregnancy Category B and is considered safe during pregnancy. It is also excreted in breast milk and breast feeding isn't recommended. Stelara Counseling:  I discussed with the patient the risks of ustekinumab including but not limited to immunosuppression, malignancy, posterior leukoencephalopathy syndrome, and serious infections.  The patient understands that monitoring is required including a PPD at baseline and must alert us or the primary physician if symptoms of infection or other concerning signs are noted. Valtrex Pregnancy And Lactation Text: this medication is Pregnancy Category B and is considered safe during pregnancy. This medication is not directly found in breast milk but it's metabolite acyclovir is present. Hydroxychloroquine Counseling:  I discussed with the patient that a baseline ophthalmologic exam is needed at the start of therapy and every year thereafter while on therapy. A CBC may also be warranted for monitoring.  The side effects of this medication were discussed with the patient, including but not limited to agranulocytosis, aplastic anemia, seizures, rashes, retinopathy, and liver toxicity. Patient instructed to call the office should any adverse effect occur.  The patient verbalized understanding of the proper use and possible adverse effects of Plaquenil.  All the patient's questions and concerns were addressed. Albendazole Pregnancy And Lactation Text: This medication is Pregnancy Category C and it isn't known if it is safe during pregnancy. It is also excreted in breast milk. Dutasteride Pregnancy And Lactation Text: This medication is absolutely contraindicated in women, especially during pregnancy and breast feeding. Feminization of male fetuses is possible if taking while pregnant. High Dose Vitamin A Counseling: Side effects reviewed, pt to contact office should one occur. Metronidazole Counseling:  I discussed with the patient the risks of metronidazole including but not limited to seizures, nausea/vomiting, a metallic taste in the mouth, nausea/vomiting and severe allergy. Metronidazole Pregnancy And Lactation Text: This medication is Pregnancy Category B and considered safe during pregnancy.  It is also excreted in breast milk. Winlevi Pregnancy And Lactation Text: This medication is considered safe during pregnancy and breastfeeding. Fluconazole Counseling:  Patient counseled regarding adverse effects of fluconazole including but not limited to headache, diarrhea, nausea, upset stomach, liver function test abnormalities, taste disturbance, and stomach pain.  There is a rare possibility of liver failure that can occur when taking fluconazole.  The patient understands that monitoring of LFTs and kidney function test may be required, especially at baseline. The patient verbalized understanding of the proper use and possible adverse effects of fluconazole.  All of the patient's questions and concerns were addressed. Oral Minoxidil Counseling- I discussed with the patient the risks of oral minoxidil including but not limited to shortness of breath, swelling of the feet or ankles, dizziness, lightheadedness, unwanted hair growth and allergic reaction.  The patient verbalized understanding of the proper use and possible adverse effects of oral minoxidil.  All of the patient's questions and concerns were addressed. Libtayo Counseling- I discussed with the patient the risks of Libtayo including but not limited to nausea, vomiting, diarrhea, and bone or muscle pain.  The patient verbalized understanding of the proper use and possible adverse effects of Libtayo.  All of the patient's questions and concerns were addressed. Ivermectin Counseling:  Patient instructed to take medication on an empty stomach with a full glass of water.  Patient informed of potential adverse effects including but not limited to nausea, diarrhea, dizziness, itching, and swelling of the extremities or lymph nodes.  The patient verbalized understanding of the proper use and possible adverse effects of ivermectin.  All of the patient's questions and concerns were addressed. Dupixent Counseling: I discussed with the patient the risks of dupilumab including but not limited to eye infection and irritation, cold sores, injection site reactions, worsening of asthma, allergic reactions and increased risk of parasitic infection.  Live vaccines should be avoided while taking dupilumab. Dupilumab will also interact with certain medications such as warfarin and cyclosporine. The patient understands that monitoring is required and they must alert us or the primary physician if symptoms of infection or other concerning signs are noted. Colchicine Counseling:  Patient counseled regarding adverse effects including but not limited to stomach upset (nausea, vomiting, stomach pain, or diarrhea).  Patient instructed to limit alcohol consumption while taking this medication.  Colchicine may reduce blood counts especially with prolonged use.  The patient understands that monitoring of kidney function and blood counts may be required, especially at baseline. The patient verbalized understanding of the proper use and possible adverse effects of colchicine.  All of the patient's questions and concerns were addressed. Use Enhanced Medication Counseling?: No Xellcz Pregnancy And Lactation Text: This medication is Pregnancy Category D and is not considered safe during pregnancy.  The risk during breast feeding is also uncertain. Hydroxychloroquine Pregnancy And Lactation Text: This medication has been shown to cause fetal harm but it isn't assigned a Pregnancy Risk Category. There are small amounts excreted in breast milk. Finasteride Male Counseling: Finasteride Counseling:  I discussed with the patient the risks of use of finasteride including but not limited to decreased libido, decreased ejaculate volume, gynecomastia, and depression. Women should not handle medication.  All of the patient's questions and concerns were addressed. High Dose Vitamin A Pregnancy And Lactation Text: High dose vitamin A therapy is contraindicated during pregnancy and breast feeding. Protopic Pregnancy And Lactation Text: This medication is Pregnancy Category C. It is unknown if this medication is excreted in breast milk when applied topically. Cephalexin Counseling: I counseled the patient regarding use of cephalexin as an antibiotic for prophylactic and/or therapeutic purposes. Cephalexin (commonly prescribed under brand name Keflex) is a cephalosporin antibiotic which is active against numerous classes of bacteria, including most skin bacteria. Side effects may include nausea, diarrhea, gastrointestinal upset, rash, hives, yeast infections, and in rare cases, hepatitis, kidney disease, seizures, fever, confusion, neurologic symptoms, and others. Patients with severe allergies to penicillin medications are cautioned that there is about a 10% incidence of cross-reactivity with cephalosporins. When possible, patients with penicillin allergies should use alternatives to cephalosporins for antibiotic therapy. Thalidomide Counseling: I discussed with the patient the risks of thalidomide including but not limited to birth defects, anxiety, weakness, chest pain, dizziness, cough and severe allergy. Topical Clindamycin Counseling: Patient counseled that this medication may cause skin irritation or allergic reactions.  In the event of skin irritation, the patient was advised to reduce the amount of the drug applied or use it less frequently.   The patient verbalized understanding of the proper use and possible adverse effects of clindamycin.  All of the patient's questions and concerns were addressed. Rituxan Counseling:  I discussed with the patient the risks of Rituxan infusions. Side effects can include infusion reactions, severe drug rashes including mucocutaneous reactions, reactivation of latent hepatitis and other infections and rarely progressive multifocal leukoencephalopathy.  All of the patient's questions and concerns were addressed. 5-Fu Counseling: 5-Fluorouracil Counseling:  I discussed with the patient the risks of 5-fluorouracil including but not limited to erythema, scaling, itching, weeping, crusting, and pain. Finasteride Pregnancy And Lactation Text: This medication is absolutely contraindicated during pregnancy. It is unknown if it is excreted in breast milk. Libtayo Pregnancy And Lactation Text: This medication is contraindicated in pregnancy and when breast feeding. Rhofade Counseling: Rhofade is a topical medication which can decrease superficial blood flow where applied. Side effects are uncommon and include stinging, redness and allergic reactions. Low Dose Naltrexone Counseling- I discussed with the patient the potential risks and side effects of low dose naltrexone including but not limited to: more vivid dreams, headaches, nausea, vomiting, abdominal pain, fatigue, dizziness, and anxiety. Cyclophosphamide Counseling:  I discussed with the patient the risks of cyclophosphamide including but not limited to hair loss, hormonal abnormalities, decreased fertility, abdominal pain, diarrhea, nausea and vomiting, bone marrow suppression and infection. The patient understands that monitoring is required while taking this medication. Dupixent Pregnancy And Lactation Text: This medication likely crosses the placenta but the risk for the fetus is uncertain. This medication is excreted in breast milk. Oral Minoxidil Pregnancy And Lactation Text: This medication should only be used when clearly needed if you are pregnant, attempting to become pregnant or breast feeding. Cibinqo Counseling: I discussed with the patient the risks of Cibinqo therapy including but not limited to common cold, nausea, headache, cold sores, increased blood CPK levels, dizziness, UTIs, fatigue, acne, and vomitting. Live vaccines should be avoided.  This medication has been linked to serious infections; higher rate of mortality; malignancy and lymphoproliferative disorders; major adverse cardiovascular events; thrombosis; thrombocytopenia and lymphopenia; lipid elevations; and retinal detachment. Minocycline Counseling: Patient advised regarding possible photosensitivity and discoloration of the teeth, skin, lips, tongue and gums.  Patient instructed to avoid sunlight, if possible.  When exposed to sunlight, patients should wear protective clothing, sunglasses, and sunscreen.  The patient was instructed to call the office immediately if the following severe adverse effects occur:  hearing changes, easy bruising/bleeding, severe headache, or vision changes.  The patient verbalized understanding of the proper use and possible adverse effects of minocycline.  All of the patient's questions and concerns were addressed. Taltz Counseling: I discussed with the patient the risks of ixekizumab including but not limited to immunosuppression, serious infections, worsening of inflammatory bowel disease and drug reactions.  The patient understands that monitoring is required including a PPD at baseline and must alert us or the primary physician if symptoms of infection or other concerning signs are noted. Zyclara Counseling:  I discussed with the patient the risks of imiquimod including but not limited to erythema, scaling, itching, weeping, crusting, and pain.  Patient understands that the inflammatory response to imiquimod is variable from person to person and was educated regarded proper titration schedule.  If flu-like symptoms develop, patient knows to discontinue the medication and contact us. Imiquimod Counseling:  I discussed with the patient the risks of imiquimod including but not limited to erythema, scaling, itching, weeping, crusting, and pain.  Patient understands that the inflammatory response to imiquimod is variable from person to person and was educated regarded proper titration schedule.  If flu-like symptoms develop, patient knows to discontinue the medication and contact us. Cimetidine Counseling:  I discussed with the patient the risks of Cimetidine including but not limited to gynecomastia, headache, diarrhea, nausea, drowsiness, arrhythmias, pancreatitis, skin rashes, psychosis, bone marrow suppression and kidney toxicity. Azelaic Acid Counseling: Patient counseled that medicine may cause skin irritation and to avoid applying near the eyes.  In the event of skin irritation, the patient was advised to reduce the amount of the drug applied or use it less frequently.   The patient verbalized understanding of the proper use and possible adverse effects of azelaic acid.  All of the patient's questions and concerns were addressed. Otezla Counseling: The side effects of Otezla were discussed with the patient, including but not limited to worsening or new depression, weight loss, diarrhea, nausea, upper respiratory tract infection, and headache. Patient instructed to call the office should any adverse effect occur.  The patient verbalized understanding of the proper use and possible adverse effects of Otezla.  All the patient's questions and concerns were addressed. Enbrel Counseling:  I discussed with the patient the risks of etanercept including but not limited to myelosuppression, immunosuppression, autoimmune hepatitis, demyelinating diseases, lymphoma, and infections.  The patient understands that monitoring is required including a PPD at baseline and must alert us or the primary physician if symptoms of infection or other concerning signs are noted. Griseofulvin Counseling:  I discussed with the patient the risks of griseofulvin including but not limited to photosensitivity, cytopenia, liver damage, nausea/vomiting and severe allergy.  The patient understands that this medication is best absorbed when taken with a fatty meal (e.g., ice cream or french fries). Rituxan Pregnancy And Lactation Text: This medication is Pregnancy Category C and it isn't know if it is safe during pregnancy. It is unknown if this medication is excreted in breast milk but similar antibodies are known to be excreted. Cephalexin Pregnancy And Lactation Text: This medication is Pregnancy Category B and considered safe during pregnancy.  It is also excreted in breast milk but can be used safely for shorter doses. Topical Ketoconazole Counseling: Patient counseled that this medication may cause skin irritation or allergic reactions.  In the event of skin irritation, the patient was advised to reduce the amount of the drug applied or use it less frequently.   The patient verbalized understanding of the proper use and possible adverse effects of ketoconazole.  All of the patient's questions and concerns were addressed. Low Dose Naltrexone Pregnancy And Lactation Text: Naltrexone is pregnancy category C.  There have been no adequate and well-controlled studies in pregnant women.  It should be used in pregnancy only if the potential benefit justifies the potential risk to the fetus.   Limited data indicates that naltrexone is minimally excreted into breastmilk. Drysol Counseling:  I discussed with the patient the risks of drysol/aluminum chloride including but not limited to skin rash, itching, irritation, burning. Siliq Counseling:  I discussed with the patient the risks of Siliq including but not limited to new or worsening depression, suicidal thoughts and behavior, immunosuppression, malignancy, posterior leukoencephalopathy syndrome, and serious infections.  The patient understands that monitoring is required including a PPD at baseline and must alert us or the primary physician if symptoms of infection or other concerning signs are noted. There is also a special program designed to monitor depression which is required with Siliq. Clindamycin Counseling: I counseled the patient regarding use of clindamycin as an antibiotic for prophylactic and/or therapeutic purposes. Clindamycin is active against numerous classes of bacteria, including skin bacteria. Side effects may include nausea, diarrhea, gastrointestinal upset, rash, hives, yeast infections, and in rare cases, colitis. Odomzo Counseling- I discussed with the patient the risks of Odomzo including but not limited to nausea, vomiting, diarrhea, constipation, weight loss, changes in the sense of taste, decreased appetite, muscle spasms, and hair loss.  The patient verbalized understanding of the proper use and possible adverse effects of Odomzo.  All of the patient's questions and concerns were addressed. Cibinqo Pregnancy And Lactation Text: It is unknown if this medication will adversely affect pregnancy or breast feeding.  You should not take this medication if you are currently pregnant or planning a pregnancy or while breastfeeding. Cyclophosphamide Pregnancy And Lactation Text: This medication is Pregnancy Category D and it isn't considered safe during pregnancy. This medication is excreted in breast milk. Birth Control Pills Counseling: Birth Control Pill Counseling: I discussed with the patient the potential side effects of OCPs including but not limited to increased risk of stroke, heart attack, thrombophlebitis, deep venous thrombosis, hepatic adenomas, breast changes, GI upset, headaches, and depression.  The patient verbalized understanding of the proper use and possible adverse effects of OCPs. All of the patient's questions and concerns were addressed.

## 2024-02-21 ENCOUNTER — RX RENEWAL (OUTPATIENT)
Age: 64
End: 2024-02-21

## 2024-02-21 RX ORDER — SACUBITRIL AND VALSARTAN 24; 26 MG/1; MG/1
24-26 TABLET, FILM COATED ORAL TWICE DAILY
Qty: 180 | Refills: 1 | Status: ACTIVE | COMMUNITY
Start: 2022-03-17 | End: 1900-01-01

## 2024-03-04 ENCOUNTER — APPOINTMENT (OUTPATIENT)
Dept: ORTHOPEDIC SURGERY | Facility: CLINIC | Age: 64
End: 2024-03-04
Payer: COMMERCIAL

## 2024-03-04 VITALS
DIASTOLIC BLOOD PRESSURE: 75 MMHG | HEIGHT: 73 IN | HEART RATE: 71 BPM | SYSTOLIC BLOOD PRESSURE: 112 MMHG | WEIGHT: 298 LBS | BODY MASS INDEX: 39.49 KG/M2

## 2024-03-04 PROCEDURE — 99213 OFFICE O/P EST LOW 20 MIN: CPT | Mod: 25

## 2024-03-04 PROCEDURE — 73560 X-RAY EXAM OF KNEE 1 OR 2: CPT | Mod: RT

## 2024-03-07 ENCOUNTER — APPOINTMENT (OUTPATIENT)
Dept: CARDIOLOGY | Facility: CLINIC | Age: 64
End: 2024-03-07
Payer: COMMERCIAL

## 2024-03-07 ENCOUNTER — NON-APPOINTMENT (OUTPATIENT)
Age: 64
End: 2024-03-07

## 2024-03-07 VITALS — DIASTOLIC BLOOD PRESSURE: 70 MMHG | SYSTOLIC BLOOD PRESSURE: 110 MMHG

## 2024-03-07 VITALS
RESPIRATION RATE: 16 BRPM | HEIGHT: 73 IN | HEART RATE: 77 BPM | OXYGEN SATURATION: 96 % | BODY MASS INDEX: 39.23 KG/M2 | WEIGHT: 296 LBS

## 2024-03-07 DIAGNOSIS — I25.10 ATHEROSCLEROTIC HEART DISEASE OF NATIVE CORONARY ARTERY W/OUT ANGINA PECTORIS: ICD-10-CM

## 2024-03-07 DIAGNOSIS — E78.5 HYPERLIPIDEMIA, UNSPECIFIED: ICD-10-CM

## 2024-03-07 PROCEDURE — 99214 OFFICE O/P EST MOD 30 MIN: CPT | Mod: 25

## 2024-03-07 PROCEDURE — 93000 ELECTROCARDIOGRAM COMPLETE: CPT

## 2024-03-07 RX ORDER — FLUTICASONE PROPIONATE 50 UG/1
50 SPRAY, METERED NASAL TWICE DAILY
Qty: 1 | Refills: 3 | Status: DISCONTINUED | COMMUNITY
Start: 2021-04-01 | End: 2024-03-07

## 2024-03-07 RX ORDER — OMEPRAZOLE 40 MG/1
40 CAPSULE, DELAYED RELEASE ORAL
Qty: 90 | Refills: 1 | Status: DISCONTINUED | COMMUNITY
Start: 2020-02-10 | End: 2024-03-07

## 2024-03-07 RX ORDER — HYALURONATE SODIUM 20 MG/2 ML
20 SYRINGE (ML) INTRAARTICULAR
Qty: 6 | Refills: 0 | Status: DISCONTINUED | OUTPATIENT
Start: 2023-12-06 | End: 2024-03-07

## 2024-03-07 NOTE — HISTORY OF PRESENT ILLNESS
[FreeTextEntry1] : 62 y/o male PMH: HTN, HLD, gout, AF/FLTR s/p ablation followed with EP maintained on oral AC, LVH, HTN, HLD, HOPE, Dilated AO, CAD/MI/PCI x4 S/P cardiac cath 3/22/21 Post CABG X 4 ( 3/24/21) LIMA to LAD, SVG to OM-1,OM-2,RPDA, sleep apnea remains untreated here today with CC of chest pressure yesterday at the gym on the exercise bike, chest pressure noted 5-10 min into exercise that resolved with rest, he previously c/o PRATT at work which has since improved, he notes these symptoms intermittently on and off over the past few months, cardiac testing reviewed Labs and recent cath were discussed. Pt remains obese although is trying to lose weight but is unable to exercise because of joint pain.

## 2024-03-07 NOTE — PHYSICAL EXAM
[Normal Venous Pressure] : normal venous pressure [Normal S1, S2] : normal S1, S2 [Soft] : abdomen soft [Non Tender] : non-tender [Normal Gait] : normal gait [No Edema] : no edema [Normal] : moves all extremities, no focal deficits, normal speech [Alert and Oriented] : alert and oriented [de-identified] : Obese

## 2024-03-07 NOTE — DISCUSSION/SUMMARY
[FreeTextEntry1] : Pt will continue current medications. BP lower and will continue current medications. Pt will consider Zepbound for weight loss. Follow up in 6 months.  [EKG obtained to assist in diagnosis and management of assessed problem(s)] : EKG obtained to assist in diagnosis and management of assessed problem(s)

## 2024-03-09 NOTE — H&P PST ADULT - NSANTHSNORERD_ENT_A_CORE
States that she has a \"pinched nerve\" in her neck that she has been trying to take care of since November, has seen a chiropractor, ortho, and is seeing pain management next week. States pain got worse yesterday morning. Has tried tylenol and flexeril with no relief. States pain is usually to the left lateral neck area but now pain has extended down middle of neck to upper back  
Yes

## 2024-03-13 NOTE — PHYSICAL EXAM
[de-identified] : Right Knee: Knee:  Range of Motion in Degrees Claimant: Normal: Flexion Active 120 135-degrees Flexion Passive 120 135-degrees Extension Active 0 0-5-degrees Extension Passive 0 0-5-degrees  No weakness to flexion/extension. No evidence of instability in the AP plane or varus or valgus stress. Negative Lachman. Negative pivot shift. Negative anterior drawer test. Negative posterior drawer test. Negative Violetta. Negative Apley grind. No medial or lateral joint line tenderness. Positive tenderness over the medial and lateral facet of the patella. Positive patellofemoral crepitations. No lateral tilting patella. No patella apprehension. Positive crepitation in the medial and lateral femoral condyle. No proximal or distal tenderness. No gross motor or sensory deficits. Moderate effusion. 2+ DP and PT pulses. No varus or valgus malalignment. Skin is intact. No rashes, scars or lesions.   [de-identified] : Gait and Station:  Ambulating with a slightly antalgic to antalgic gait.  Station:  Normal.  [de-identified] : Radiographs, one to two views of the right knee taken in the office today, show bone-on-bone medial compartment arthritis. [de-identified] : Appearance:  Well-developed, well-nourished male in no acute distress.

## 2024-03-13 NOTE — PROCEDURE
[de-identified] : Indication: Osteoarthritis right knee   Consent: At this time, I have recommended an injection to the right knee.  The risks and benefits of the procedure were discussed with the patient in detail.  Upon verbal consent of the patient, we proceeded with the injection as noted below.   Description of Procedure: After a sterile prep, the patient underwent an injection of approximately 9 mL of 1% Lidocaine (10 mg/mL) without epinephrine and 1 mL of triamcinolone acetonide (40 mg/mL) into the right knee.  The patient tolerated the procedure well.  There were no complications.   :  [employer] Drug Name:  Triamcinolone Acetonide Injectable Suspension USP NCD#:  [id number] Lot#:  [id number] Expiration Date:  08/31/2025

## 2024-03-13 NOTE — ADDENDUM
[FreeTextEntry1] : This note was written by Yumi Jalloh on 03/07/2024 acting as scribe for Curt Sweeney III, MD

## 2024-03-13 NOTE — DISCUSSION/SUMMARY
[de-identified] : At this time, due to osteoarthritis of the right knee, the patient was given a cortisone injection.  I recommend ice, elevation and reassessment in 3-4 weeks.

## 2024-03-27 ENCOUNTER — RX RENEWAL (OUTPATIENT)
Age: 64
End: 2024-03-27

## 2024-03-27 RX ORDER — METOPROLOL SUCCINATE 50 MG/1
50 TABLET, EXTENDED RELEASE ORAL
Qty: 180 | Refills: 2 | Status: ACTIVE | COMMUNITY
Start: 2021-08-06 | End: 1900-01-01

## 2024-04-16 ENCOUNTER — OUTPATIENT (OUTPATIENT)
Dept: OUTPATIENT SERVICES | Facility: HOSPITAL | Age: 64
LOS: 1 days | Discharge: HOME | End: 2024-04-16
Payer: COMMERCIAL

## 2024-04-16 VITALS
DIASTOLIC BLOOD PRESSURE: 90 MMHG | HEART RATE: 71 BPM | HEIGHT: 74 IN | SYSTOLIC BLOOD PRESSURE: 133 MMHG | RESPIRATION RATE: 16 BRPM | WEIGHT: 289.91 LBS | OXYGEN SATURATION: 96 % | TEMPERATURE: 98 F

## 2024-04-16 DIAGNOSIS — Z98.890 OTHER SPECIFIED POSTPROCEDURAL STATES: Chronic | ICD-10-CM

## 2024-04-16 DIAGNOSIS — K21.9 GASTRO-ESOPHAGEAL REFLUX DISEASE WITHOUT ESOPHAGITIS: ICD-10-CM

## 2024-04-16 DIAGNOSIS — Z86.010 PERSONAL HISTORY OF COLONIC POLYPS: ICD-10-CM

## 2024-04-16 DIAGNOSIS — Z90.49 ACQUIRED ABSENCE OF OTHER SPECIFIED PARTS OF DIGESTIVE TRACT: Chronic | ICD-10-CM

## 2024-04-16 DIAGNOSIS — Z98.61 CORONARY ANGIOPLASTY STATUS: Chronic | ICD-10-CM

## 2024-04-16 PROCEDURE — 88312 SPECIAL STAINS GROUP 1: CPT

## 2024-04-16 PROCEDURE — 88305 TISSUE EXAM BY PATHOLOGIST: CPT | Mod: 26

## 2024-04-16 PROCEDURE — 88312 SPECIAL STAINS GROUP 1: CPT | Mod: 26

## 2024-04-16 PROCEDURE — 88305 TISSUE EXAM BY PATHOLOGIST: CPT

## 2024-04-16 RX ORDER — MAGNESIUM OXIDE 400 MG ORAL TABLET 241.3 MG
1 TABLET ORAL
Qty: 0 | Refills: 0 | DISCHARGE

## 2024-04-16 RX ORDER — ATORVASTATIN CALCIUM 80 MG/1
1 TABLET, FILM COATED ORAL
Refills: 0 | DISCHARGE

## 2024-04-16 RX ORDER — UBIDECARENONE 100 MG
1 CAPSULE ORAL
Qty: 0 | Refills: 0 | DISCHARGE

## 2024-04-16 RX ORDER — SACUBITRIL AND VALSARTAN 24; 26 MG/1; MG/1
1 TABLET, FILM COATED ORAL
Refills: 0 | DISCHARGE

## 2024-04-16 RX ORDER — METOPROLOL TARTRATE 50 MG
1 TABLET ORAL
Refills: 0 | DISCHARGE

## 2024-04-16 RX ORDER — FINASTERIDE 5 MG/1
1 TABLET, FILM COATED ORAL
Refills: 0 | DISCHARGE

## 2024-04-16 RX ORDER — CHOLECALCIFEROL (VITAMIN D3) 125 MCG
1 CAPSULE ORAL
Qty: 0 | Refills: 0 | DISCHARGE

## 2024-04-16 RX ORDER — PAPAVERINE HYDROCHLORIDE 30 MG/ML
30 INJECTION, SOLUTION PARENTERAL
Refills: 0 | DISCHARGE

## 2024-04-16 RX ORDER — OMEPRAZOLE 10 MG/1
1 CAPSULE, DELAYED RELEASE ORAL
Refills: 0 | DISCHARGE

## 2024-04-16 NOTE — ASU PATIENT PROFILE, ADULT - FALL HARM RISK - UNIVERSAL INTERVENTIONS
Bed in lowest position, wheels locked, appropriate side rails in place/Call bell, personal items and telephone in reach/Instruct patient to call for assistance before getting out of bed or chair/Non-slip footwear when patient is out of bed/[place] to call system/Physically safe environment - no spills, clutter or unnecessary equipment/Purposeful Proactive Rounding/Room/bathroom lighting operational, light cord in reach

## 2024-04-16 NOTE — ASU PATIENT PROFILE, ADULT - FALL HARM RISK - CONCLUSION
Last lab 8/4/17 lst visit last year  Her next appointment is 11/2/17  She is out of her meds
Universal Safety Interventions

## 2024-04-18 LAB — SURGICAL PATHOLOGY STUDY: SIGNIFICANT CHANGE UP

## 2024-04-22 DIAGNOSIS — Z12.11 ENCOUNTER FOR SCREENING FOR MALIGNANT NEOPLASM OF COLON: ICD-10-CM

## 2024-04-22 DIAGNOSIS — K29.50 UNSPECIFIED CHRONIC GASTRITIS WITHOUT BLEEDING: ICD-10-CM

## 2024-04-22 DIAGNOSIS — K21.9 GASTRO-ESOPHAGEAL REFLUX DISEASE WITHOUT ESOPHAGITIS: ICD-10-CM

## 2024-04-22 DIAGNOSIS — K31.7 POLYP OF STOMACH AND DUODENUM: ICD-10-CM

## 2024-04-22 DIAGNOSIS — Z86.010 PERSONAL HISTORY OF COLONIC POLYPS: ICD-10-CM

## 2024-04-22 DIAGNOSIS — I25.2 OLD MYOCARDIAL INFARCTION: ICD-10-CM

## 2024-04-22 DIAGNOSIS — D13.0 BENIGN NEOPLASM OF ESOPHAGUS: ICD-10-CM

## 2024-04-22 DIAGNOSIS — Z87.891 PERSONAL HISTORY OF NICOTINE DEPENDENCE: ICD-10-CM

## 2024-04-22 DIAGNOSIS — K57.30 DIVERTICULOSIS OF LARGE INTESTINE WITHOUT PERFORATION OR ABSCESS WITHOUT BLEEDING: ICD-10-CM

## 2024-04-22 DIAGNOSIS — I25.10 ATHEROSCLEROTIC HEART DISEASE OF NATIVE CORONARY ARTERY WITHOUT ANGINA PECTORIS: ICD-10-CM

## 2024-04-22 DIAGNOSIS — I10 ESSENTIAL (PRIMARY) HYPERTENSION: ICD-10-CM

## 2024-04-22 DIAGNOSIS — G47.33 OBSTRUCTIVE SLEEP APNEA (ADULT) (PEDIATRIC): ICD-10-CM

## 2024-04-22 DIAGNOSIS — Z95.5 PRESENCE OF CORONARY ANGIOPLASTY IMPLANT AND GRAFT: ICD-10-CM

## 2024-04-22 DIAGNOSIS — Z79.82 LONG TERM (CURRENT) USE OF ASPIRIN: ICD-10-CM

## 2024-04-22 DIAGNOSIS — I48.92 UNSPECIFIED ATRIAL FLUTTER: ICD-10-CM

## 2024-04-22 DIAGNOSIS — Z79.01 LONG TERM (CURRENT) USE OF ANTICOAGULANTS: ICD-10-CM

## 2024-04-24 RX ORDER — PAPAVERINE HYDROCHLORIDE 30 MG/ML
30 INJECTION, SOLUTION INTRAVENOUS
Qty: 5 | Refills: 0 | Status: ACTIVE | COMMUNITY
Start: 2023-04-18 | End: 1900-01-01

## 2024-05-08 ENCOUNTER — APPOINTMENT (OUTPATIENT)
Dept: ORTHOPEDIC SURGERY | Facility: CLINIC | Age: 64
End: 2024-05-08
Payer: COMMERCIAL

## 2024-05-08 ENCOUNTER — RESULT CHARGE (OUTPATIENT)
Age: 64
End: 2024-05-08

## 2024-05-08 VITALS — WEIGHT: 296 LBS | HEIGHT: 73 IN | BODY MASS INDEX: 39.23 KG/M2

## 2024-05-08 DIAGNOSIS — M17.11 UNILATERAL PRIMARY OSTEOARTHRITIS, RIGHT KNEE: ICD-10-CM

## 2024-05-08 PROCEDURE — ZZZZZ: CPT

## 2024-05-08 PROCEDURE — 99203 OFFICE O/P NEW LOW 30 MIN: CPT

## 2024-05-11 PROBLEM — M17.11 PRIMARY OSTEOARTHRITIS OF RIGHT KNEE: Status: ACTIVE | Noted: 2023-08-23

## 2024-05-11 NOTE — ASSESSMENT
[FreeTextEntry1] : The patient is a 64-year-old male with chief complaint of right knee pain.  He has had discomfort in the knee for several years but worse over the last 3 to 4 months.  He had gel injections about 2 months ago.  He followed that up with a cortisone injection which seems to have helped.  He also has been wearing insoles which have helped the situation.  When his knee is bad he usually has pain at night when he walks and when he does stairs.  He is currently minimally symptomatic.  Examination of the right lower extremity reveals normal neurovascular exam.  Examination of right knee reveals limited range of motion from 5 to 125 degrees with varus deformity.  There is medial joint line pain with equivocal Violetta's sign.  There is no effusion.  There is no instability or apprehension.  There is normal patella tracking.  His right hip exam is normal.  X-rays done in the office today of the right knee 4 views weightbearing show near bone-on-bone arthritis of the medial compartment with varus deformity.  There is evidence of tricompartmental arthritis as well in the other 2 compartments but not as bad as medially.  There are no obvious tumors, masses or calcifications seen.  Plan at this time is activity modification.  He will take anti-inflammatories as needed.  The combination of gel and cortisone seems to work well for him.  He will see me back in the office as needed.

## 2024-05-11 NOTE — HISTORY OF PRESENT ILLNESS
[Constant] : constant [Nothing helps with pain getting better] : Nothing helps with pain getting better [Steroid] : Steroid [] : no [FreeTextEntry5] : 64 y.o patient is here for right knee pain. States he previously was seen by Dr Sweeney who had done many csi for his knee with no relief.  [de-identified] : 3/4/24 [de-identified] : right knee [de-identified] : csi

## 2024-08-01 ENCOUNTER — RX RENEWAL (OUTPATIENT)
Age: 64
End: 2024-08-01

## 2024-08-08 PROBLEM — N50.819 TESTICULAR PAIN: Status: ACTIVE | Noted: 2024-08-08

## 2024-08-09 ENCOUNTER — NON-APPOINTMENT (OUTPATIENT)
Age: 64
End: 2024-08-09

## 2024-08-14 ENCOUNTER — APPOINTMENT (OUTPATIENT)
Dept: UROLOGY | Facility: CLINIC | Age: 64
End: 2024-08-14
Payer: COMMERCIAL

## 2024-08-14 VITALS
WEIGHT: 271 LBS | HEART RATE: 94 BPM | SYSTOLIC BLOOD PRESSURE: 107 MMHG | HEIGHT: 73 IN | DIASTOLIC BLOOD PRESSURE: 64 MMHG | BODY MASS INDEX: 35.92 KG/M2

## 2024-08-14 DIAGNOSIS — N40.0 BENIGN PROSTATIC HYPERPLASIA WITHOUT LOWER URINARY TRACT SYMPMS: ICD-10-CM

## 2024-08-14 PROCEDURE — 99212 OFFICE O/P EST SF 10 MIN: CPT

## 2024-08-14 NOTE — ASSESSMENT
Stop aspirin.    Begin Xarelto 20 mg once daily with dinner starting today.    Cardioversion at [facility] on Tues Nov 21,2023.     [FreeTextEntry1] : Imp: Patient with epididymal cysts, otherwise doing well.   Recommendations: Follow up in 1 year or as needed.

## 2024-08-14 NOTE — ASSESSMENT
[FreeTextEntry1] : Imp: Patient with epididymal cysts, otherwise doing well.   Recommendations: Follow up in 1 year or as needed.

## 2024-08-14 NOTE — HISTORY OF PRESENT ILLNESS
[FreeTextEntry1] : Patient has a history of epididymal cysts and recently had a scrotal sonogram revealing the same. He recently had labs done, but his private doctor has told him that all was normal.

## 2024-08-14 NOTE — END OF VISIT
[FreeTextEntry3] : All medical record entries made by the Scribe were at my, Dr. Regan Hannah's, direction and personally dictated by me on 08/14/2024. I have reviewed the chart and agree that the record accurately reflects my personal performance of the history, physical exam, assessment and plan. I have also personally directed, reviewed, and agreed with the chart.

## 2024-09-10 ENCOUNTER — APPOINTMENT (OUTPATIENT)
Dept: ORTHOPEDIC SURGERY | Facility: CLINIC | Age: 64
End: 2024-09-10
Payer: COMMERCIAL

## 2024-09-10 DIAGNOSIS — M17.11 UNILATERAL PRIMARY OSTEOARTHRITIS, RIGHT KNEE: ICD-10-CM

## 2024-09-10 PROCEDURE — 99203 OFFICE O/P NEW LOW 30 MIN: CPT | Mod: 25

## 2024-09-10 PROCEDURE — 20610 DRAIN/INJ JOINT/BURSA W/O US: CPT | Mod: RT

## 2024-09-10 NOTE — HISTORY OF PRESENT ILLNESS
[Constant] : constant [Nothing helps with pain getting better] : Nothing helps with pain getting better [Steroid] : Steroid [] : no [de-identified] : 3/4/24 [de-identified] : right knee [de-identified] : csi

## 2024-09-10 NOTE — ASSESSMENT
[FreeTextEntry1] : The patient is here for right knee pain follow up. The patient is interested in a CSI today. The patient has had previous CSI with very good relief. He followed that up with a cortisone injection which seems to have helped.  He also has been wearing insoles which have helped the situation.  When his knee is bad he usually has pain at night when he walks and when he does stairs.  He is currently minimally symptomatic.  Right knee exam: Neurovascularly intact. Sensation intact.  Examination of right knee reveals limited range of motion from 5 to 125 degrees with varus deformity.  There is medial joint line pain with equivocal Violetta's sign.  There is no effusion.  There is no instability or apprehension.  There is normal patella tracking.  His right hip exam is normal.  The patient received a right knee CSI. He tolerated it well. He will return as needed to consider gel injections.

## 2024-09-10 NOTE — PROCEDURE
[Large Joint Injection] : Large joint injection [Right] : of the right [Knee] : knee [Pain] : pain [Inflammation] : inflammation [X-ray evidence of Osteoarthritis on this or prior visit] : x-ray evidence of Osteoarthritis on this or prior visit [Alcohol] : alcohol [Betadine] : betadine [Ethyl Chloride sprayed topically] : ethyl chloride sprayed topically [Sterile technique used] : sterile technique used [___ cc    1%] : Lidocaine ~Vcc of 1%  [___ cc    0.25%] : Bupivacaine (Marcaine) ~Vcc of 0.25%  [___ cc    80mg] : Methylprednisolone (Depomedrol) ~Vcc of 80 mg  [] : Patient tolerated procedure well [Previous OTC use and PT nontherapeutic] : patient has tried OTC's including aspirin, Ibuprofen, Aleve, etc or prescription NSAIDS, and/or exercises at home and/or physical therapy without satisfactory response [Patient had decreased mobility in the joint] : patient had decreased mobility in the joint [Risks, benefits, alternatives discussed / Verbal consent obtained] : the risks benefits, and alternatives have been discussed, and verbal consent was obtained

## 2024-10-25 ENCOUNTER — RX RENEWAL (OUTPATIENT)
Age: 64
End: 2024-10-25

## 2024-11-05 NOTE — H&P PST ADULT - NSSUBSTANCEUSE_GEN_ALL_CORE_SD
Clinic Administered Medication Documentation      Injectable Medication Documentation    Is there an active order (written within the past 365 days, with administrations remaining, not ) in the chart? Yes.     Patient was given Cyanocobalamin (B-12). Prior to medication administration, verified patient's identity using patient s name and date of birth. Please see MAR and medication order for additional information. Patient instructed to report any adverse reaction to staff immediately.    Vial/Syringe: Single dose vial. Was entire vial of medication used? Yes  Was this medication supplied by the patient? No  Is this a medication the patient will need to receive again? Yes. Verified that the patient has refills remaining in their prescription.    
Denies street drugs/never used

## 2024-11-14 ENCOUNTER — NON-APPOINTMENT (OUTPATIENT)
Age: 64
End: 2024-11-14

## 2024-11-14 ENCOUNTER — APPOINTMENT (OUTPATIENT)
Dept: CARDIOLOGY | Facility: CLINIC | Age: 64
End: 2024-11-14
Payer: COMMERCIAL

## 2024-11-14 VITALS
BODY MASS INDEX: 34.59 KG/M2 | DIASTOLIC BLOOD PRESSURE: 66 MMHG | HEART RATE: 73 BPM | WEIGHT: 261 LBS | OXYGEN SATURATION: 96 % | HEIGHT: 73 IN | SYSTOLIC BLOOD PRESSURE: 108 MMHG

## 2024-11-14 DIAGNOSIS — R06.09 OTHER FORMS OF DYSPNEA: ICD-10-CM

## 2024-11-14 DIAGNOSIS — I10 ESSENTIAL (PRIMARY) HYPERTENSION: ICD-10-CM

## 2024-11-14 DIAGNOSIS — I25.10 ATHEROSCLEROTIC HEART DISEASE OF NATIVE CORONARY ARTERY W/OUT ANGINA PECTORIS: ICD-10-CM

## 2024-11-14 DIAGNOSIS — E78.5 HYPERLIPIDEMIA, UNSPECIFIED: ICD-10-CM

## 2024-11-14 PROCEDURE — G2211 COMPLEX E/M VISIT ADD ON: CPT | Mod: NC

## 2024-11-14 PROCEDURE — 93000 ELECTROCARDIOGRAM COMPLETE: CPT

## 2024-11-14 PROCEDURE — 99214 OFFICE O/P EST MOD 30 MIN: CPT

## 2024-11-14 NOTE — PHYSICAL EXAM
[Normal Venous Pressure] : normal venous pressure [Normal S1, S2] : normal S1, S2 [Soft] : abdomen soft [Non Tender] : non-tender [Normal Gait] : normal gait [No Edema] : no edema [Normal] : moves all extremities, no focal deficits, normal speech [Alert and Oriented] : alert and oriented [de-identified] : Obese

## 2024-11-14 NOTE — DISCUSSION/SUMMARY
[FreeTextEntry1] : Pt will continue current medications. BP lower and will continue current medications. Pt will continue Zepbound for weight loss. Pt will have a MCOT for 2 weeks. CÉSAR schroeder. Echo and nuclear stress.  [EKG obtained to assist in diagnosis and management of assessed problem(s)] : EKG obtained to assist in diagnosis and management of assessed problem(s)

## 2024-11-14 NOTE — HISTORY OF PRESENT ILLNESS
[FreeTextEntry1] : 63 y/o male PMH: HTN, HLD, gout, AF/FLTR s/p ablation followed with EP maintained on oral AC, LVH, HTN, HLD, MARRY, Dilated AO, CAD/MI/PCI x4 S/P cardiac cath 3/22/21 Post CABG X 4 ( 3/24/21) LIMA to LAD, SVG to OM-1,OM-2,RPDA, sleep apnea remains untreated here today after noticing atrial arrhythmia. H/O ablation.

## 2024-11-15 ENCOUNTER — APPOINTMENT (OUTPATIENT)
Dept: CARDIOLOGY | Facility: CLINIC | Age: 64
End: 2024-11-15

## 2024-11-17 ENCOUNTER — EMERGENCY (EMERGENCY)
Facility: HOSPITAL | Age: 64
LOS: 0 days | Discharge: HOME | End: 2024-11-17
Attending: STUDENT IN AN ORGANIZED HEALTH CARE EDUCATION/TRAINING PROGRAM
Payer: COMMERCIAL

## 2024-11-17 VITALS
HEART RATE: 69 BPM | DIASTOLIC BLOOD PRESSURE: 76 MMHG | TEMPERATURE: 98 F | RESPIRATION RATE: 16 BRPM | SYSTOLIC BLOOD PRESSURE: 112 MMHG | OXYGEN SATURATION: 98 %

## 2024-11-17 VITALS — HEIGHT: 73 IN | WEIGHT: 259.7 LBS

## 2024-11-17 DIAGNOSIS — Z95.5 PRESENCE OF CORONARY ANGIOPLASTY IMPLANT AND GRAFT: ICD-10-CM

## 2024-11-17 DIAGNOSIS — Z95.1 PRESENCE OF AORTOCORONARY BYPASS GRAFT: ICD-10-CM

## 2024-11-17 DIAGNOSIS — Z79.82 LONG TERM (CURRENT) USE OF ASPIRIN: ICD-10-CM

## 2024-11-17 DIAGNOSIS — I25.10 ATHEROSCLEROTIC HEART DISEASE OF NATIVE CORONARY ARTERY WITHOUT ANGINA PECTORIS: ICD-10-CM

## 2024-11-17 DIAGNOSIS — Z79.01 LONG TERM (CURRENT) USE OF ANTICOAGULANTS: ICD-10-CM

## 2024-11-17 DIAGNOSIS — Z86.39 PERSONAL HISTORY OF OTHER ENDOCRINE, NUTRITIONAL AND METABOLIC DISEASE: ICD-10-CM

## 2024-11-17 DIAGNOSIS — I10 ESSENTIAL (PRIMARY) HYPERTENSION: ICD-10-CM

## 2024-11-17 DIAGNOSIS — R07.89 OTHER CHEST PAIN: ICD-10-CM

## 2024-11-17 DIAGNOSIS — I48.91 UNSPECIFIED ATRIAL FIBRILLATION: ICD-10-CM

## 2024-11-17 DIAGNOSIS — Z90.49 ACQUIRED ABSENCE OF OTHER SPECIFIED PARTS OF DIGESTIVE TRACT: Chronic | ICD-10-CM

## 2024-11-17 DIAGNOSIS — Z98.890 OTHER SPECIFIED POSTPROCEDURAL STATES: Chronic | ICD-10-CM

## 2024-11-17 DIAGNOSIS — G47.33 OBSTRUCTIVE SLEEP APNEA (ADULT) (PEDIATRIC): ICD-10-CM

## 2024-11-17 DIAGNOSIS — Z98.61 CORONARY ANGIOPLASTY STATUS: Chronic | ICD-10-CM

## 2024-11-17 DIAGNOSIS — I25.2 OLD MYOCARDIAL INFARCTION: ICD-10-CM

## 2024-11-17 DIAGNOSIS — E78.5 HYPERLIPIDEMIA, UNSPECIFIED: ICD-10-CM

## 2024-11-17 DIAGNOSIS — I48.92 UNSPECIFIED ATRIAL FLUTTER: ICD-10-CM

## 2024-11-17 DIAGNOSIS — M10.9 GOUT, UNSPECIFIED: ICD-10-CM

## 2024-11-17 LAB
ALBUMIN SERPL ELPH-MCNC: 3.7 G/DL — SIGNIFICANT CHANGE UP (ref 3.3–5)
ALP SERPL-CCNC: 65 U/L — SIGNIFICANT CHANGE UP (ref 40–120)
ALT FLD-CCNC: 26 U/L — SIGNIFICANT CHANGE UP (ref 12–78)
ANION GAP SERPL CALC-SCNC: 2 MMOL/L — LOW (ref 5–17)
APTT BLD: 36.8 SEC — HIGH (ref 24.5–35.6)
AST SERPL-CCNC: 24 U/L — SIGNIFICANT CHANGE UP (ref 15–37)
BASOPHILS # BLD AUTO: 0.05 K/UL — SIGNIFICANT CHANGE UP (ref 0–0.2)
BASOPHILS NFR BLD AUTO: 0.4 % — SIGNIFICANT CHANGE UP (ref 0–2)
BILIRUB SERPL-MCNC: 0.6 MG/DL — SIGNIFICANT CHANGE UP (ref 0.2–1.2)
BUN SERPL-MCNC: 16 MG/DL — SIGNIFICANT CHANGE UP (ref 7–23)
CALCIUM SERPL-MCNC: 9.4 MG/DL — SIGNIFICANT CHANGE UP (ref 8.5–10.1)
CHLORIDE SERPL-SCNC: 104 MMOL/L — SIGNIFICANT CHANGE UP (ref 96–108)
CO2 SERPL-SCNC: 29 MMOL/L — SIGNIFICANT CHANGE UP (ref 22–31)
CREAT SERPL-MCNC: 1.24 MG/DL — SIGNIFICANT CHANGE UP (ref 0.5–1.3)
D DIMER BLD IA.RAPID-MCNC: 161 NG/ML DDU — SIGNIFICANT CHANGE UP
EGFR: 65 ML/MIN/1.73M2 — SIGNIFICANT CHANGE UP
EOSINOPHIL # BLD AUTO: 0.15 K/UL — SIGNIFICANT CHANGE UP (ref 0–0.5)
EOSINOPHIL NFR BLD AUTO: 1.3 % — SIGNIFICANT CHANGE UP (ref 0–6)
FLUAV AG NPH QL: SIGNIFICANT CHANGE UP
FLUBV AG NPH QL: SIGNIFICANT CHANGE UP
GLUCOSE SERPL-MCNC: 100 MG/DL — HIGH (ref 70–99)
HCT VFR BLD CALC: 49 % — SIGNIFICANT CHANGE UP (ref 39–50)
HGB BLD-MCNC: 16.8 G/DL — SIGNIFICANT CHANGE UP (ref 13–17)
IMM GRANULOCYTES NFR BLD AUTO: 0.3 % — SIGNIFICANT CHANGE UP (ref 0–0.9)
INR BLD: 1.22 RATIO — HIGH (ref 0.85–1.16)
LIDOCAIN IGE QN: 51 U/L — SIGNIFICANT CHANGE UP (ref 13–75)
LYMPHOCYTES # BLD AUTO: 19.6 % — SIGNIFICANT CHANGE UP (ref 13–44)
LYMPHOCYTES # BLD AUTO: 2.34 K/UL — SIGNIFICANT CHANGE UP (ref 1–3.3)
MAGNESIUM SERPL-MCNC: 1.9 MG/DL — SIGNIFICANT CHANGE UP (ref 1.6–2.6)
MANUAL SMEAR VERIFICATION: SIGNIFICANT CHANGE UP
MCHC RBC-ENTMCNC: 31.5 PG — SIGNIFICANT CHANGE UP (ref 27–34)
MCHC RBC-ENTMCNC: 34.3 G/DL — SIGNIFICANT CHANGE UP (ref 32–36)
MCV RBC AUTO: 91.8 FL — SIGNIFICANT CHANGE UP (ref 80–100)
MONOCYTES # BLD AUTO: 0.81 K/UL — SIGNIFICANT CHANGE UP (ref 0–0.9)
MONOCYTES NFR BLD AUTO: 6.8 % — SIGNIFICANT CHANGE UP (ref 2–14)
NEUTROPHILS # BLD AUTO: 8.53 K/UL — HIGH (ref 1.8–7.4)
NEUTROPHILS NFR BLD AUTO: 71.6 % — SIGNIFICANT CHANGE UP (ref 43–77)
NT-PROBNP SERPL-SCNC: 847 PG/ML — HIGH (ref 0–125)
PLAT MORPH BLD: NORMAL — SIGNIFICANT CHANGE UP
PLATELET # BLD AUTO: 274 K/UL — SIGNIFICANT CHANGE UP (ref 150–400)
POTASSIUM SERPL-MCNC: 3.9 MMOL/L — SIGNIFICANT CHANGE UP (ref 3.5–5.3)
POTASSIUM SERPL-SCNC: 3.9 MMOL/L — SIGNIFICANT CHANGE UP (ref 3.5–5.3)
PROT SERPL-MCNC: 7.9 GM/DL — SIGNIFICANT CHANGE UP (ref 6–8.3)
PROTHROM AB SERPL-ACNC: 14 SEC — HIGH (ref 9.9–13.4)
RBC # BLD: 5.34 M/UL — SIGNIFICANT CHANGE UP (ref 4.2–5.8)
RBC # FLD: 14 % — SIGNIFICANT CHANGE UP (ref 10.3–14.5)
RBC BLD AUTO: NORMAL — SIGNIFICANT CHANGE UP
RSV RNA NPH QL NAA+NON-PROBE: SIGNIFICANT CHANGE UP
SARS-COV-2 RNA SPEC QL NAA+PROBE: SIGNIFICANT CHANGE UP
SODIUM SERPL-SCNC: 135 MMOL/L — SIGNIFICANT CHANGE UP (ref 135–145)
TROPONIN I, HIGH SENSITIVITY RESULT: 21.44 NG/L — SIGNIFICANT CHANGE UP
TROPONIN I, HIGH SENSITIVITY RESULT: 21.47 NG/L — SIGNIFICANT CHANGE UP
WBC # BLD: 11.91 K/UL — HIGH (ref 3.8–10.5)
WBC # FLD AUTO: 11.91 K/UL — HIGH (ref 3.8–10.5)

## 2024-11-17 PROCEDURE — 71046 X-RAY EXAM CHEST 2 VIEWS: CPT

## 2024-11-17 PROCEDURE — 83690 ASSAY OF LIPASE: CPT

## 2024-11-17 PROCEDURE — 85610 PROTHROMBIN TIME: CPT

## 2024-11-17 PROCEDURE — 84484 ASSAY OF TROPONIN QUANT: CPT

## 2024-11-17 PROCEDURE — 83880 ASSAY OF NATRIURETIC PEPTIDE: CPT

## 2024-11-17 PROCEDURE — 85025 COMPLETE CBC W/AUTO DIFF WBC: CPT

## 2024-11-17 PROCEDURE — 36415 COLL VENOUS BLD VENIPUNCTURE: CPT

## 2024-11-17 PROCEDURE — 93005 ELECTROCARDIOGRAM TRACING: CPT

## 2024-11-17 PROCEDURE — 71046 X-RAY EXAM CHEST 2 VIEWS: CPT | Mod: 26

## 2024-11-17 PROCEDURE — 93010 ELECTROCARDIOGRAM REPORT: CPT

## 2024-11-17 PROCEDURE — 85730 THROMBOPLASTIN TIME PARTIAL: CPT

## 2024-11-17 PROCEDURE — 80053 COMPREHEN METABOLIC PANEL: CPT

## 2024-11-17 PROCEDURE — 85379 FIBRIN DEGRADATION QUANT: CPT

## 2024-11-17 PROCEDURE — 99285 EMERGENCY DEPT VISIT HI MDM: CPT | Mod: 25

## 2024-11-17 PROCEDURE — 0241U: CPT

## 2024-11-17 PROCEDURE — 99285 EMERGENCY DEPT VISIT HI MDM: CPT

## 2024-11-17 PROCEDURE — 83735 ASSAY OF MAGNESIUM: CPT

## 2024-11-17 PROCEDURE — 36000 PLACE NEEDLE IN VEIN: CPT

## 2024-11-17 NOTE — ED STATDOCS - CLINICAL SUMMARY MEDICAL DECISION MAKING FREE TEXT BOX
64-year-old female with cardiac history including CABG x 4 presents to the emergency department for chest pressure and general malaise.  Patient recently told he is in A-fib, on Eliquis/aspirin/metoprolol and pending EP and cardiology evaluation this week.  Patient overall well-appearing, EKG with rate controlled A-fib without any ST changes.  Plan for cardiac workup, chest x-ray, labs, cardiology consult, reassess.

## 2024-11-17 NOTE — ED STATDOCS - ATTENDING CONTRIBUTION TO CARE
I, Drew Green DO, personally made/approved the management plan and take responsibility for the patient management. I performed the initial face to face bedside interview with this patient regarding history of present illness, review of symptoms and relevant past medical, social and family history.  I completed an independent physical examination.  I was the initial provider who evaluated this patient. I have signed out the follow up of any pending tests (i.e. labs, radiological studies) to the resident.  I have communicated the patient’s plan of care and disposition with the resident.

## 2024-11-17 NOTE — ED ADULT NURSE NOTE - OBJECTIVE STATEMENT
Pt c/o intermittent midsternal chest pain radiating to the R chest and the back starting this afternoon. HX of 4 stents and a quadruple bypass on Eliquis and ASA. Was supposed to have ECHO and stress test on Thursday. Denies SOB.

## 2024-11-17 NOTE — ED STATDOCS - PROGRESS NOTE DETAILS
[place] PGY3 - 64-year-old male with a history of a flutter s/p ablation on Eliquis and metoprolol, CAD s/p MI and PCI stents x 4 and CABG, gout, HTN, HLD , obesity, HOPE presenting with intermittent chest pain radiating to his back starting later this afternoon.  Patient states he reports is more GI gurgling than it is similar to his prior chest pain.  Discussed with the PA at Dr. Kathleen's office.  He is scheduled for an EP appointment tomorrow and a stress test and echo on Thursday.  Patient offered admission to the hospital to expedite testing given his high risk chest pain.  Patient declines admission at this time.  Shared decision making with the patient given 2 negative troponins, anticoagulated, rate controlled that he is to follow-up with EP tomorrow.  Patient is to return immediately to the emergency department should his symptoms return or worsen.  Patient acknowledged understanding.  Dispo to home with outpatient cardiology follow-up.

## 2024-11-17 NOTE — ED ADULT TRIAGE NOTE - CHIEF COMPLAINT QUOTE
Pt c/o intermittent midsternal chest pain radiating to the R chest and the back starting this afternoon. HX of 4 stents and a quadruple bypass on Eliquis and ASA. Was supposed to have ECHO and stress test on Thursday. Denies SOB. STAT EKG to be completed.

## 2024-11-17 NOTE — ED STATDOCS - OBJECTIVE STATEMENT
65 y/o M with PMHx of A-Flutter s/p ablation on Eliquis and Metoprolol, CAD s/p MI, PCI x4 stents and CABG, gout, HLD, HTN on Torsemide, obesity, HOPE presents to the ED c/o intermittent chest pain radiating to his back starting this afternoon. Last episode 30 minutes ago. States he saw Cardiologist Dr Kathleen last week for lethargy and his EKG said he was either in afib. States he has an appointment in x4 days for an echocardiogram and another appointment in x5 days for a stress test. States that he is unsure if Dr Kathleen is planning on cardioverting him. Notes he is currently on Zepbound and he was recently increased to 10mg. States he barely ate today. States he has an appointment with Dr Burnett tomorrow. Pt currently wearing holter monitor.

## 2024-11-17 NOTE — ED STATDOCS - PHYSICAL EXAMINATION
GENERAL: A&Ox4, non-toxic appearing, no acute distress  HEENT: NCAT, EOMI, oral mucosa moist, normal conjunctiva  RESP: CTAB, no respiratory distress, no wheezes/rhonchi/rales, speaking in full sentences  CV: irregularly irregular, no murmurs/rubs/gallops  ABDOMEN: soft, non-tender, mildly distended, no guarding, no rebound tenderness  MSK: no visible deformities  NEURO: no focal sensory or motor deficits, CN 2-12 grossly intact  SKIN: warm, normal color, well perfused, no rash  PSYCH: normal affect

## 2024-11-17 NOTE — ED STATDOCS - PATIENT PORTAL LINK FT
You can access the FollowMyHealth Patient Portal offered by [facility] by registering at the following website: [email or web address]. By joining [facility]’s FollowMyHealth portal, you will also be able to view your health information using other applications (apps) compatible with our system.

## 2024-11-17 NOTE — ED STATDOCS - NSFOLLOWUPINSTRUCTIONS_ED_ALL_ED_FT
Today in the emergency department you were evaluated for your chest pain.  Given your risk factors please return to the emergency department immediately should your symptoms return or worsen.  Please follow-up with the electrophysiologist as well as your cardiologist later this week.  Please continue take all of your medications as prescribed.    Please follow up with your primary care physician within 1-2 weeks of discharge from the emergency department.  Please bring a copy of your results with you.  Please return to the emergency department for worsening of your symptoms.    You may take Acetaminophen over the counter as needed for pain and/or fever. Use as directed and see medication warnings.  You may take Ibuprofen over the counter as needed for pain and/or fever. Use as directed and see medication warnings.

## 2024-11-18 ENCOUNTER — APPOINTMENT (OUTPATIENT)
Dept: ELECTROPHYSIOLOGY | Facility: CLINIC | Age: 64
End: 2024-11-18
Payer: COMMERCIAL

## 2024-11-18 VITALS
SYSTOLIC BLOOD PRESSURE: 111 MMHG | BODY MASS INDEX: 34.06 KG/M2 | OXYGEN SATURATION: 98 % | HEIGHT: 73 IN | HEART RATE: 67 BPM | DIASTOLIC BLOOD PRESSURE: 75 MMHG | WEIGHT: 257 LBS

## 2024-11-18 DIAGNOSIS — Z98.890 OTHER SPECIFIED POSTPROCEDURAL STATES: ICD-10-CM

## 2024-11-18 DIAGNOSIS — I48.91 UNSPECIFIED ATRIAL FIBRILLATION: ICD-10-CM

## 2024-11-18 DIAGNOSIS — I48.92 UNSPECIFIED ATRIAL FLUTTER: ICD-10-CM

## 2024-11-18 DIAGNOSIS — G47.33 OBSTRUCTIVE SLEEP APNEA (ADULT) (PEDIATRIC): ICD-10-CM

## 2024-11-18 PROCEDURE — 93000 ELECTROCARDIOGRAM COMPLETE: CPT

## 2024-11-18 PROCEDURE — G2211 COMPLEX E/M VISIT ADD ON: CPT | Mod: NC

## 2024-11-18 PROCEDURE — 99214 OFFICE O/P EST MOD 30 MIN: CPT

## 2024-11-18 PROCEDURE — 99204 OFFICE O/P NEW MOD 45 MIN: CPT

## 2024-11-18 RX ORDER — TIRZEPATIDE 10 MG/.5ML
10 INJECTION, SOLUTION SUBCUTANEOUS
Qty: 2 | Refills: 0 | Status: ACTIVE | COMMUNITY
Start: 2024-10-30

## 2024-11-18 NOTE — PHYSICAL EXAM
[Normal] : alert and oriented, normal memory [No Murmur] : no murmur [No Rub] : no rub [No Gallop] : no gallop [de-identified] : irregular

## 2024-11-18 NOTE — REVIEW OF SYSTEMS
[Feeling Fatigued] : feeling fatigued [Weight Loss (___ Lbs)] : [unfilled] ~Ulb weight loss [Palpitations] : palpitations [Negative] : Neurological [Fever] : no fever [Headache] : no headache [Weight Gain (___ Lbs)] : no recent weight gain [Chills] : no chills [SOB] : no shortness of breath [Dyspnea on exertion] : not dyspnea during exertion [Chest Discomfort] : no chest discomfort [Lower Ext Edema] : no extremity edema [Leg Claudication] : no intermittent leg claudication [Orthopnea] : no orthopnea [PND] : no PND [Syncope] : no syncope

## 2024-11-18 NOTE — CARDIOLOGY SUMMARY
[de-identified] : 11.14.24 Atrial fibrillation 74bpm with occasional ectopic ventricular beats [de-identified] : TTE 5/2023 1. The left ventricular wall thickness is mildly increased. The left ventricular systolic function is normal with an ejection fraction visually estimated at 55 %. 2. Normal right ventricular cavity size. 3. The left atrium is moderately dilated. 4. Mild mitral regurgitation. 5. Fibrocalcific aortic valve sclerosis without stenosis. 6. Mild aortic regurgitation. 7. Mild tricuspid regurgitation. 8. aortic root mildly dilated, prox ascending aorta mildly dilated  [de-identified] : 12/2023 LIMA to LAD and SVG to RCA PATENT. occuled SVG to OM1 and OM2 but flow reserve in both branches was normal iFR 0.97. SVG likely closed due to competitive flow  [de-identified] : CABG X 4 (3/24/21) LIMA to LAD, SVG to OM-1,OM-2,RPDA,

## 2024-11-18 NOTE — HISTORY OF PRESENT ILLNESS
[FreeTextEntry1] : Mr. Juaquin Pal is a 64-year-old male with past medical history of HTN, HLD, gout, Atrial flutter s/p ablation (~2018) maintained on Eliquis 5mg BID, LVH, MARRY (intermittent use of CPAP), CAD/MI/PCI x4 severe multivessel disease with ISR of multiple prior ALLEN s/p CABG X 4 (3/2021- LIMA to LAD, SVG to OM-1,OM-2,RPDA). Pt found himself in atrial fibrillation with use of "[employer]" mobile last week. F/u with Dr. Olmos 11/14/2024 did confirm Afib on 12lead ECG. He is currently wearing a two week Zio monitor and pending a nuclear stress test and echo this coming week. Pt endorses lethargy, tiredness, intermittent palpitations that correlates to onset of Afib. He denies lightheadedness, dizziness, OJEDA/SOB, chest pain/discomfort orthopnea.   11/17/2024: Recent admission to  ED with atypical chest pain/epigastric discomfort. Negative cardiac workup. Pt states he just increased dose of Zepbound and was experiencing indigestion however wanted to be evaluated in the setting of new onset atrial fibrillation.

## 2024-11-18 NOTE — ASSESSMENT
[FreeTextEntry1] : Mr. Juaquin Pal is a 64-year-old male with past medical history of HTN, HLD, gout, Atrial flutter s/p ablation (~2018) maintained on Eliquis 5mg BID, LVH, MARRY (intermittent use of CPAP), CAD/MI/PCI x4 severe multivessel disease with ISR of multiple prior ALLEN s/p CABG X 4 (3/2021- LIMA to LAD, SVG to OM-1,OM-2,RPDA). Pt found himself in atrial fibrillation with use of [employer] mobile last week. F/u with Dr. Olmos 11/14/2024 did confirm new onset Atrial fibrillation, rate controlled, on 12lead ECG.    H/o Atrial flutter s/p ablation 2018 now new onset Atrial Fibrillation  - continue Eliquis 5mg BID - continue metoprolol succ 50mg  - wearing 2-week Zio patch from Dr. Olmos, pending nuclear stress test and echo this week - elective DCCV in two weeks, ensured uninterrupted Eliquis 5mg BID 3 weeks prior to CV - encouraged use of CPAP machine in the setting of Afib, recommended to f/u with Dr. Jordan - lifestyle factors reviewed; pt is a non-smoker, no alcohol use, BP managed, and currently down 30+ lbs w/ Zepbound - consult with Dr. Rojas for PFA/RFCA During this visit, JUAQUIN PAL  and I had a comprehensive discussion of arrhythmia management using principles of shared decision-making. This included a discussion of anti-arrhythmic medications including class I agents (e.g. flecainide), class III agents (e.g. amiodarone, dofetilide, sotalol, etc.), and both class II and IV agents (beta-blockers and calcium channel blockers). We reviewed the potentially life-threatening side effects of these medications, including (but not limited to) fatal tachyarrhythmias, pulmonary toxicity, liver toxicity, thyroid disorders. We also reviewed the requisite monitoring required of some of these medications. In addition, we reviewed ablative therapy, including the potential benefits and risks of catheter ablation. These risks include death, myocardial infarction, stroke, cardiac perforation, vascular injury, and other less severe complications. Mr. GRAMMERSTORF  demonstrated a clear understanding of these issues during our discussion.

## 2024-11-19 ENCOUNTER — APPOINTMENT (OUTPATIENT)
Dept: CARDIOLOGY | Facility: CLINIC | Age: 64
End: 2024-11-19

## 2024-11-20 ENCOUNTER — APPOINTMENT (OUTPATIENT)
Dept: CARDIOLOGY | Facility: CLINIC | Age: 64
End: 2024-11-20
Payer: COMMERCIAL

## 2024-11-20 PROCEDURE — 93306 TTE W/DOPPLER COMPLETE: CPT

## 2024-11-26 ENCOUNTER — APPOINTMENT (OUTPATIENT)
Dept: CARDIOLOGY | Facility: CLINIC | Age: 64
End: 2024-11-26
Payer: COMMERCIAL

## 2024-11-26 PROCEDURE — 93015 CV STRESS TEST SUPVJ I&R: CPT

## 2024-11-26 PROCEDURE — 78452 HT MUSCLE IMAGE SPECT MULT: CPT

## 2024-11-26 PROCEDURE — A9500: CPT

## 2024-11-30 ENCOUNTER — OFFICE (OUTPATIENT)
Dept: URBAN - METROPOLITAN AREA CLINIC 38 | Facility: CLINIC | Age: 64
Setting detail: OPHTHALMOLOGY
End: 2024-11-30
Payer: COMMERCIAL

## 2024-11-30 DIAGNOSIS — H00.12: ICD-10-CM

## 2024-11-30 DIAGNOSIS — H02.89: ICD-10-CM

## 2024-11-30 PROCEDURE — 92012 INTRM OPH EXAM EST PATIENT: CPT | Performed by: OPTOMETRIST

## 2024-11-30 ASSESSMENT — CONFRONTATIONAL VISUAL FIELD TEST (CVF)
OD_FINDINGS: FULL
OS_FINDINGS: FULL

## 2024-11-30 ASSESSMENT — REFRACTION_MANIFEST
OS_SPHERE: 0.00
OD_VA1: 20/100
OD_AXIS: 095
OD_CYLINDER: -3.25
OS_AXIS: 091
OS_CYLINDER: -0.75
OD_SPHERE: -0.75

## 2024-11-30 ASSESSMENT — VISUAL ACUITY
OD_BCVA: 20/30-1
OS_BCVA: 20/20-1

## 2024-12-03 ENCOUNTER — OUTPATIENT (OUTPATIENT)
Dept: OUTPATIENT SERVICES | Facility: HOSPITAL | Age: 64
LOS: 1 days | End: 2024-12-03
Payer: COMMERCIAL

## 2024-12-03 DIAGNOSIS — Z98.61 CORONARY ANGIOPLASTY STATUS: Chronic | ICD-10-CM

## 2024-12-03 DIAGNOSIS — Z90.49 ACQUIRED ABSENCE OF OTHER SPECIFIED PARTS OF DIGESTIVE TRACT: Chronic | ICD-10-CM

## 2024-12-03 DIAGNOSIS — Z98.890 OTHER SPECIFIED POSTPROCEDURAL STATES: Chronic | ICD-10-CM

## 2024-12-03 PROCEDURE — 93005 ELECTROCARDIOGRAM TRACING: CPT

## 2024-12-03 PROCEDURE — 92960 CARDIOVERSION ELECTRIC EXT: CPT

## 2024-12-04 ENCOUNTER — RX RENEWAL (OUTPATIENT)
Age: 64
End: 2024-12-04

## 2024-12-05 DIAGNOSIS — I48.91 UNSPECIFIED ATRIAL FIBRILLATION: ICD-10-CM

## 2024-12-06 DIAGNOSIS — I48.91 UNSPECIFIED ATRIAL FIBRILLATION: ICD-10-CM

## 2024-12-10 ENCOUNTER — APPOINTMENT (OUTPATIENT)
Dept: ELECTROPHYSIOLOGY | Facility: CLINIC | Age: 64
End: 2024-12-10
Payer: COMMERCIAL

## 2024-12-10 ENCOUNTER — NON-APPOINTMENT (OUTPATIENT)
Age: 64
End: 2024-12-10

## 2024-12-10 VITALS
DIASTOLIC BLOOD PRESSURE: 70 MMHG | BODY MASS INDEX: 34.19 KG/M2 | OXYGEN SATURATION: 98 % | HEIGHT: 73 IN | SYSTOLIC BLOOD PRESSURE: 102 MMHG | HEART RATE: 81 BPM | WEIGHT: 258 LBS

## 2024-12-10 DIAGNOSIS — I48.91 UNSPECIFIED ATRIAL FIBRILLATION: ICD-10-CM

## 2024-12-10 DIAGNOSIS — I48.92 UNSPECIFIED ATRIAL FLUTTER: ICD-10-CM

## 2024-12-10 PROCEDURE — 93000 ELECTROCARDIOGRAM COMPLETE: CPT

## 2024-12-10 PROCEDURE — 99213 OFFICE O/P EST LOW 20 MIN: CPT | Mod: 25

## 2024-12-10 RX ORDER — DRONEDARONE 400 MG/1
400 TABLET, FILM COATED ORAL
Qty: 180 | Refills: 0 | Status: ACTIVE | COMMUNITY
Start: 2024-12-10 | End: 1900-01-01

## 2024-12-10 NOTE — HISTORY OF PRESENT ILLNESS
[FreeTextEntry1] : Mr. Juaquin Pal is a 64-year-old male with past medical history of HTN, HLD, gout, Atrial flutter s/p ablation (~2018) maintained on Eliquis 5mg BID, LVH, MARRY (intermittent use of CPAP), CAD/MI/PCI x4 severe multivessel disease with ISR of multiple prior ALLEN s/p CABG X 4 (3/2021- LIMA to LAD, SVG to OM-1,OM-2,RPDA). Pt found himself in atrial fibrillation with use of [employer] mobile last week. F/u with Dr. Olmos 11/14/2024 did confirm Afib on 12lead ECG. Pt underwent DCCV 12/3/24 w/ conversion to NSR. His [employer] mobile showed recurrence of Afib 3 days later 12/6. Pt endorses lethargy, tiredness, intermittent palpitations. He denies lightheadedness, dizziness, OJEDA/SOB, chest pain/discomfort orthopnea.    11/17/2024: Recent admission to  ED with atypical chest pain/epigastric discomfort. Negative cardiac workup. Pt states he just increased dose of Zepbound and was experiencing indigestion however wanted to be evaluated in the setting of new onset atrial fibrillation.

## 2024-12-10 NOTE — PHYSICAL EXAM
[No Murmur] : no murmur [No Rub] : no rub [No Gallop] : no gallop [Normal] : alert and oriented, normal memory [de-identified] : irregular

## 2024-12-10 NOTE — CARDIOLOGY SUMMARY
[de-identified] : 12.3.24 Atrial fibrillation 79bpm (3) frequent ectopic ventricular beats Nonspecific T-abnormality. 11.14.24 Atrial fibrillation 74bpm with occasional ectopic ventricular beats [de-identified] : Nuclear ST: 1. Myocardial Perfusion: Abnormal. 2. The ECG is negative for ischemia. 3. Qualitative Perfusion: - medium to large-sized, severe defect(s) in the apical, mid anterior and distal anterior walls that are fixed, partially normalizes with prone imaging suggestive of an infarction. 4. The left ventricle is moderately decreased in function and severely enlarged in size. The post stress left ventricular EF is 44 %. The stress end diastolic volume is 168 ml and systolic volume is 94 ml. 5. Baseline electrocardiogram: atrial fibrillation at a rate of 55 bpm with nonspecific ST-T wave abnormalities. 6. Stress electrocardiogram: No ischemic ST segment changes. 7. Chest pain prior to test: no chest pain. Chest pain during pharmocologic test: no chest pain. 8. Normal pharmocologic heart rate response. 9. Normal pharmocologic blood pressure response. 10. Arrhythmias: Frequent PVC's and couplets noted. 11. Hypokinesis of the apical and anterior walls. 12. This study was compared to prior report performed on 4/23/2024. no significant change. [de-identified] : TTE 11/20/24: 1. Technically difficult image quality. 2. Left ventricular cavity is mildly dilated. Left ventricular wall thickness is normal. Left ventricular systolic function is normal with an ejection fraction visually estimated at 50 to 55 %. 3. Normal right ventricular systolic function. 4. Left atrium is severely dilated, indexed volume 55.41ml/m2 5. The right atrium is dilated. 6. Mild to moderate mitral regurgitation. 7. Mild tricuspid regurgitation. 8. Mild pulmonary hypertension. 9. Mild aortic regurgitation. 10. Mild pulmonic regurgitation. 11. Aortic root at the sinuses of Valsalva is dilated, measuring 3.80 cm (indexed 1.62 cm/m). 12. The inferior vena cava is dilated measuring 2.20 cm in diameter, (dilated >2.1cm) with normal inspiratory collapse (normal >50%) consistent with mildly elevated right atrial pressure ( R 8, range 5-10mmHg).  TTE 5/2023 1. The left ventricular wall thickness is mildly increased. The left ventricular systolic function is normal with an ejection fraction visually estimated at 55 %. 2. Normal right ventricular cavity size. 3. The left atrium is moderately dilated. 4. Mild mitral regurgitation. 5. Fibrocalcific aortic valve sclerosis without stenosis. 6. Mild aortic regurgitation. 7. Mild tricuspid regurgitation. 8. aortic root mildly dilated, prox ascending aorta mildly dilated  [de-identified] : 12/2023 LIMA to LAD and SVG to RCA PATENT. occuled SVG to OM1 and OM2 but flow reserve in both branches was normal iFR 0.97. SVG likely closed due to competitive flow  [de-identified] : CABG X 4 (3/24/21) LIMA to LAD, SVG to OM-1,OM-2,RPDA,

## 2024-12-10 NOTE — ASSESSMENT
[FreeTextEntry1] : Mr. Juaquin Pal is a 64-year-old male with past medical history of HTN, HLD, gout, Atrial flutter s/p ablation (~2018) maintained on Eliquis 5mg BID, LVH, MARRY (intermittent use of CPAP), CAD/MI/PCI x4 severe multivessel disease with ISR of multiple prior ALLEN s/p CABG X 4 (3/2021- LIMA to LAD, SVG to OM-1,OM-2,RPDA). Pt diagnosed with persistent atrial fibrillation 11/2024, s/p DCCV 12/3 and has recurrence of afib 3 days later.   H/o Atrial flutter s/p ablation 2018 now persistent Atrial Fibrillation  - s/p DCCV 12/3, recurrence of Afib, rate controlled - start Multaq 400mg BID for rhythm control to bridge to ablation in the next few months - continue Eliquis 5mg BID - continue metoprolol succ 50mg, consider decreasing if bradycardic - encouraged use of CPAP machine in the setting of Afib, recommended to f/u with Dr. Jordan - lifestyle factors reviewed; pt is a non-smoker, no alcohol use, BP managed, and currently down 30+ lbs w/ Zepbound  During this visit, JUAQUIN PAL and ANITA had a comprehensive discussion of arrhythmia management using principles of shared decision-making. This included a discussion of anti-arrhythmic medications including class I agents (e.g. flecainide), class III agents (e.g. amiodarone, dofetilide, sotalol, etc.), and both class II and IV agents (beta-blockers and calcium channel blockers). We reviewed the potentially life-threatening side effects of these medications, including (but not limited to) fatal tachyarrhythmias, pulmonary toxicity, liver toxicity, thyroid disorders. We also reviewed the requisite monitoring required of some of these medications. In addition, we reviewed ablative therapy, including the potential benefits and risks of catheter ablation. These risks include death, myocardial infarction, stroke, cardiac perforation, vascular injury, and other less severe complications. Mr. PAL  demonstrated a clear understanding of these issues during our discussion.  Notes reviewed from Dr. Olmos, nuclear stress test, zio monitor, ECG, TTE.

## 2024-12-10 NOTE — PHYSICAL EXAM
[No Murmur] : no murmur [No Rub] : no rub [No Gallop] : no gallop [Normal] : alert and oriented, normal memory [de-identified] : irregular

## 2024-12-10 NOTE — REVIEW OF SYSTEMS
[Fever] : no fever [Headache] : no headache [Weight Gain (___ Lbs)] : no recent weight gain [Chills] : no chills [Feeling Fatigued] : feeling fatigued [Weight Loss (___ Lbs)] : [unfilled] ~Ulb weight loss [SOB] : no shortness of breath [Dyspnea on exertion] : not dyspnea during exertion [Chest Discomfort] : no chest discomfort [Lower Ext Edema] : no extremity edema [Leg Claudication] : no intermittent leg claudication [Palpitations] : palpitations [Orthopnea] : no orthopnea [PND] : no PND [Syncope] : no syncope [Negative] : Neurological

## 2024-12-10 NOTE — CARDIOLOGY SUMMARY
[de-identified] : 12.3.24 Atrial fibrillation 79bpm (3) frequent ectopic ventricular beats Nonspecific T-abnormality. 11.14.24 Atrial fibrillation 74bpm with occasional ectopic ventricular beats [de-identified] : Nuclear ST: 1. Myocardial Perfusion: Abnormal. 2. The ECG is negative for ischemia. 3. Qualitative Perfusion: - medium to large-sized, severe defect(s) in the apical, mid anterior and distal anterior walls that are fixed, partially normalizes with prone imaging suggestive of an infarction. 4. The left ventricle is moderately decreased in function and severely enlarged in size. The post stress left ventricular EF is 44 %. The stress end diastolic volume is 168 ml and systolic volume is 94 ml. 5. Baseline electrocardiogram: atrial fibrillation at a rate of 55 bpm with nonspecific ST-T wave abnormalities. 6. Stress electrocardiogram: No ischemic ST segment changes. 7. Chest pain prior to test: no chest pain. Chest pain during pharmocologic test: no chest pain. 8. Normal pharmocologic heart rate response. 9. Normal pharmocologic blood pressure response. 10. Arrhythmias: Frequent PVC's and couplets noted. 11. Hypokinesis of the apical and anterior walls. 12. This study was compared to prior report performed on 4/23/2024. no significant change. [de-identified] : TTE 11/20/24: 1. Technically difficult image quality. 2. Left ventricular cavity is mildly dilated. Left ventricular wall thickness is normal. Left ventricular systolic function is normal with an ejection fraction visually estimated at 50 to 55 %. 3. Normal right ventricular systolic function. 4. Left atrium is severely dilated, indexed volume 55.41ml/m2 5. The right atrium is dilated. 6. Mild to moderate mitral regurgitation. 7. Mild tricuspid regurgitation. 8. Mild pulmonary hypertension. 9. Mild aortic regurgitation. 10. Mild pulmonic regurgitation. 11. Aortic root at the sinuses of Valsalva is dilated, measuring 3.80 cm (indexed 1.62 cm/m). 12. The inferior vena cava is dilated measuring 2.20 cm in diameter, (dilated >2.1cm) with normal inspiratory collapse (normal >50%) consistent with mildly elevated right atrial pressure ( R 8, range 5-10mmHg).  TTE 5/2023 1. The left ventricular wall thickness is mildly increased. The left ventricular systolic function is normal with an ejection fraction visually estimated at 55 %. 2. Normal right ventricular cavity size. 3. The left atrium is moderately dilated. 4. Mild mitral regurgitation. 5. Fibrocalcific aortic valve sclerosis without stenosis. 6. Mild aortic regurgitation. 7. Mild tricuspid regurgitation. 8. aortic root mildly dilated, prox ascending aorta mildly dilated  [de-identified] : 12/2023 LIMA to LAD and SVG to RCA PATENT. occuled SVG to OM1 and OM2 but flow reserve in both branches was normal iFR 0.97. SVG likely closed due to competitive flow  [de-identified] : CABG X 4 (3/24/21) LIMA to LAD, SVG to OM-1,OM-2,RPDA,

## 2024-12-10 NOTE — HISTORY OF PRESENT ILLNESS
Occupational Therapy Evaluation  Observation/Bedded OP    ASSESSMENT:   Patient seen in Day Surgery room 113. Patient presents near baseline which was independent with functional household mobility and activities of daily living.  For safe return to prior living situation the patient needs to be modified independent  for ADLs and modified independent  for functional transfers.     Pt is s/p left L4-5 micro lumbar discectomy 5/18.    Pt was seen for OT evaluation with focus on education for log rolling technique during mobility in and out of bed, education on safety with toilet transfers including recommendation for raised toilet seat with arm rest, education and demonstration of use of reacher for donning lower body clothing and safe room mobility with use of two wheeled walker. Patient's wife, Charlotte was present for education. Patient is currently functioning at a supervision level with use of 2WW for transfers and room mobility and modified independent with management of back brace as well as LB dressing when using adaptive equipment.    Recommending discharge to home with wife as patient presenting near baseline .             PLAN AND RECOMMENDATIONS:   Objective Measures Impacting Discharge Recommendation:   No formal objective measures completed this session    Recommendations for Discharge:  Recommendations for Discharge: OT WI: Home      Plan:   Continue skilled OT, including the following        , Frequency Comments: d/c OT 5/18 (05/18/20 8352)     Amount: 31-60 minutes  Duration: 1 day                      DIAGNOSIS:   No diagnosis found.       PRECAUTIONS:   Precautions  Back Brace: When out of bed, Yes  Lumbar Precautions: Yes  Other Precautions: S/p LEFT L4-5 MICRO LUMBAR DISCECTOMY        EDUCATION:   On this date, the patient and patient's spouse was educated on self-care activities, bathroom transfers, household mobility, safety with instrumental activities of daily living and reason for splint use.   The response to education was: Verbalizes understanding and Demonstrates understanding.    Discussed with Patient and Family member the need for adequate help at home upon discharge.  Patient currently has sufficient help at their previous living situation.  Please see above for discharge recommendations.  Family/caregiver teaching was performed during this session.       SUBJECTIVE:   Subjective: Youve been very helpful (05/18/20 1329)       OBJECTIVE:   Self Cares/ADLs:    Self Cares/ADL's  Upper Body Dressing Assistance: Modified independent(to don and doff back brace) (05/18/20 1329)  Lower Body Clothing Assistance: Supervision (05/18/20 1329)  Footwear Assistance: Supervision (05/18/20 1329)  Lower Body Dressing Deficit: Use of adaptive equipment;Thread RLE into pants;Thread LLE into pants;Thread RLE into underwear;Thread LLE into underwear;Pull up over hips (05/18/20 1329)  Dressing Equipment Used: Reacher (05/18/20 1329)  Self Cares/ADL's Comments #1: educated on reacher and sock aid for LB dressing (05/18/20 1329)      Household Mobility:    Household Mobility  Rolling: Supervision (05/18/20 1329)  Supine to Sit: Supervision (05/18/20 1329)  Sit to Supine: Supervision (05/18/20 1329)  Sit to Stand: Modified Independent (05/18/20 1329)  Stand to Sit: Modified Independent (05/18/20 1329)  Toilet Transfers: Supervision (05/18/20 1329)  Transfer Equipment: 2WW (05/18/20 1329)  Sitting - Static: Modified Independent (05/18/20 1329)  Sitting - Dynamic: Modified Independent (05/18/20 1329)  Standing - Static: Modified Independent (05/18/20 1329)  Standing - Dynamic: Supervision (05/18/20 1329)  Household Mobility Comments #1: mod I to supervision wiht mobility using 2WW- educated on placing WW over toilet for arm rests (05/18/20 1329)    Home Management:            EQUIPMENT   Equipment:  PT/OT Mobility Equipment for Discharge: 2 ww issued and adjusted to pt's height on 5/18 (05/18/20 1330)  PT/OT ADL Equipment for  Discharge: Owns a sock aid, provided resources for a raised toilet seat and reacher (05/18/20 1329)       GOALS:   Short Term Goals to Be Reviewed On: 05/18/20 (05/18/20 1329)  Short Term Goals Are The Same as Discharge Goals: Yes (05/18/20 1329)  Goal Agreement: Patient agrees with goals and treatment plan (05/18/20 1329)  Dressing Discharge Goal 1: mod I (05/18/20 1329)  Dressing Discharge Goal Progress 1: Outcome met, complete goal (05/18/20 1329)  Home Setting Transfer Discharge Goal 1: mod I (05/18/20 1329)  Home Setting Transfer Discharge Goal Progress 1: Outcome met, complete goal (05/18/20 1329)       EVALUATION CODE JUSTIFICATION:   Eval Code:  $ OT Eval:  Low Complexity: 1 Procedure  Problems/Co-morbidities: There is no problem list on file for this patient.    Personal Occupations Profile Affected: bathing/showering, toileting/toilet hygiene, lower body dressing, functional mobility/transfers  Task Modification: No task modification  Clinical decision making: Low - Patient has few limitations (1-3), comorbidities and/or complexities, as noted in problem focused assessment noted above, that impact their occupational profile.  Resulting in few treatment options and no task modification consistent with low clinical decision making complexity.      INSURANCE:   Primary Insurance: [employer]  Secondary Insurance: N/A     BILLING INFORMATION:   Timed Procedures: $ ADL/Self Care : 23-37 mins   Untimed Procedures: $ OT Eval:  Low Complexity: 1 Procedure   G-codes:     Total Treatment Time: OT Time Spent: 60 minutes   Timed Treatment Minutes: OBS Patients Only:  OT Timed Code TX Minutes: 45 minutes     Note sent for required physician co-signature: Yes         Is the patient currently receiving skilled home care services (RN or therapy): No    The co-signature indicates that the physician certifies the need for OT furnished under this plan of treatment while under his/her care.       [FreeTextEntry1] : Mr. Juaquin Pal is a 64-year-old male with past medical history of HTN, HLD, gout, Atrial flutter s/p ablation (~2018) maintained on Eliquis 5mg BID, LVH, MARRY (intermittent use of CPAP), CAD/MI/PCI x4 severe multivessel disease with ISR of multiple prior ALLEN s/p CABG X 4 (3/2021- LIMA to LAD, SVG to OM-1,OM-2,RPDA). Pt found himself in atrial fibrillation with use of [employer] mobile last week. F/u with Dr. Olmos 11/14/2024 did confirm Afib on 12lead ECG. Pt underwent DCCV 12/3/24 w/ conversion to NSR. His [employer] mobile showed recurrence of Afib 3 days later 12/6. Pt endorses lethargy, tiredness, intermittent palpitations. He denies lightheadedness, dizziness, OJEDA/SOB, chest pain/discomfort orthopnea.    11/17/2024: Recent admission to  ED with atypical chest pain/epigastric discomfort. Negative cardiac workup. Pt states he just increased dose of Zepbound and was experiencing indigestion however wanted to be evaluated in the setting of new onset atrial fibrillation.

## 2024-12-28 ENCOUNTER — EMERGENCY (EMERGENCY)
Facility: HOSPITAL | Age: 64
LOS: 0 days | Discharge: HOME | End: 2024-12-28
Attending: FAMILY MEDICINE
Payer: COMMERCIAL

## 2024-12-28 VITALS
HEIGHT: 74 IN | DIASTOLIC BLOOD PRESSURE: 92 MMHG | HEART RATE: 78 BPM | SYSTOLIC BLOOD PRESSURE: 129 MMHG | RESPIRATION RATE: 16 BRPM | OXYGEN SATURATION: 95 % | TEMPERATURE: 98 F | WEIGHT: 259.93 LBS

## 2024-12-28 VITALS
RESPIRATION RATE: 18 BRPM | OXYGEN SATURATION: 95 % | HEART RATE: 67 BPM | TEMPERATURE: 98 F | SYSTOLIC BLOOD PRESSURE: 118 MMHG | DIASTOLIC BLOOD PRESSURE: 74 MMHG

## 2024-12-28 DIAGNOSIS — Z98.890 OTHER SPECIFIED POSTPROCEDURAL STATES: Chronic | ICD-10-CM

## 2024-12-28 DIAGNOSIS — Z79.01 LONG TERM (CURRENT) USE OF ANTICOAGULANTS: ICD-10-CM

## 2024-12-28 DIAGNOSIS — I48.91 UNSPECIFIED ATRIAL FIBRILLATION: ICD-10-CM

## 2024-12-28 DIAGNOSIS — L76.21 POSTPROCEDURAL HEMORRHAGE OF SKIN AND SUBCUTANEOUS TISSUE FOLLOWING A DERMATOLOGIC PROCEDURE: ICD-10-CM

## 2024-12-28 DIAGNOSIS — Z98.61 CORONARY ANGIOPLASTY STATUS: Chronic | ICD-10-CM

## 2024-12-28 DIAGNOSIS — Z90.49 ACQUIRED ABSENCE OF OTHER SPECIFIED PARTS OF DIGESTIVE TRACT: Chronic | ICD-10-CM

## 2024-12-28 DIAGNOSIS — I48.92 UNSPECIFIED ATRIAL FLUTTER: ICD-10-CM

## 2024-12-28 LAB
BASOPHILS # BLD AUTO: 0.03 K/UL — SIGNIFICANT CHANGE UP (ref 0–0.2)
BASOPHILS NFR BLD AUTO: 0.2 % — SIGNIFICANT CHANGE UP (ref 0–2)
EOSINOPHIL # BLD AUTO: 0.02 K/UL — SIGNIFICANT CHANGE UP (ref 0–0.5)
EOSINOPHIL NFR BLD AUTO: 0.1 % — SIGNIFICANT CHANGE UP (ref 0–6)
HCT VFR BLD CALC: 41.6 % — SIGNIFICANT CHANGE UP (ref 39–50)
HGB BLD-MCNC: 13.9 G/DL — SIGNIFICANT CHANGE UP (ref 13–17)
IMM GRANULOCYTES NFR BLD AUTO: 0.6 % — SIGNIFICANT CHANGE UP (ref 0–0.9)
LYMPHOCYTES # BLD AUTO: 11.2 % — LOW (ref 13–44)
LYMPHOCYTES # BLD AUTO: 2.08 K/UL — SIGNIFICANT CHANGE UP (ref 1–3.3)
MCHC RBC-ENTMCNC: 31.2 PG — SIGNIFICANT CHANGE UP (ref 27–34)
MCHC RBC-ENTMCNC: 33.4 G/DL — SIGNIFICANT CHANGE UP (ref 32–36)
MCV RBC AUTO: 93.3 FL — SIGNIFICANT CHANGE UP (ref 80–100)
MONOCYTES # BLD AUTO: 1.26 K/UL — HIGH (ref 0–0.9)
MONOCYTES NFR BLD AUTO: 6.8 % — SIGNIFICANT CHANGE UP (ref 2–14)
NEUTROPHILS # BLD AUTO: 15.14 K/UL — HIGH (ref 1.8–7.4)
NEUTROPHILS NFR BLD AUTO: 81.1 % — HIGH (ref 43–77)
PLATELET # BLD AUTO: 240 K/UL — SIGNIFICANT CHANGE UP (ref 150–400)
RBC # BLD: 4.46 M/UL — SIGNIFICANT CHANGE UP (ref 4.2–5.8)
RBC # FLD: 13.9 % — SIGNIFICANT CHANGE UP (ref 10.3–14.5)
WBC # BLD: 18.65 K/UL — HIGH (ref 3.8–10.5)
WBC # FLD AUTO: 18.65 K/UL — HIGH (ref 3.8–10.5)

## 2024-12-28 PROCEDURE — 93926 LOWER EXTREMITY STUDY: CPT | Mod: 26,RT

## 2024-12-28 PROCEDURE — 99284 EMERGENCY DEPT VISIT MOD MDM: CPT

## 2024-12-28 PROCEDURE — 36415 COLL VENOUS BLD VENIPUNCTURE: CPT

## 2024-12-28 PROCEDURE — 85025 COMPLETE CBC W/AUTO DIFF WBC: CPT

## 2024-12-28 PROCEDURE — 99284 EMERGENCY DEPT VISIT MOD MDM: CPT | Mod: 25

## 2024-12-28 PROCEDURE — 93926 LOWER EXTREMITY STUDY: CPT | Mod: RT

## 2024-12-30 ENCOUNTER — APPOINTMENT (OUTPATIENT)
Facility: CLINIC | Age: 64
End: 2024-12-30

## 2025-01-03 ENCOUNTER — APPOINTMENT (OUTPATIENT)
Dept: ELECTROPHYSIOLOGY | Facility: CLINIC | Age: 65
End: 2025-01-03

## 2025-01-07 ENCOUNTER — RX RENEWAL (OUTPATIENT)
Age: 65
End: 2025-01-07

## 2025-01-10 ENCOUNTER — APPOINTMENT (OUTPATIENT)
Dept: ELECTROPHYSIOLOGY | Facility: CLINIC | Age: 65
End: 2025-01-10

## 2025-01-13 ENCOUNTER — APPOINTMENT (OUTPATIENT)
Facility: CLINIC | Age: 65
End: 2025-01-13
Payer: COMMERCIAL

## 2025-01-13 VITALS — WEIGHT: 258 LBS | BODY MASS INDEX: 33.11 KG/M2 | HEIGHT: 74 IN

## 2025-01-13 DIAGNOSIS — M17.11 UNILATERAL PRIMARY OSTEOARTHRITIS, RIGHT KNEE: ICD-10-CM

## 2025-01-13 PROCEDURE — 20610 DRAIN/INJ JOINT/BURSA W/O US: CPT | Mod: RT

## 2025-01-13 PROCEDURE — 99213 OFFICE O/P EST LOW 20 MIN: CPT | Mod: 25

## 2025-01-13 PROCEDURE — J3490M: CUSTOM

## 2025-01-13 RX ORDER — FAMOTIDINE 40 MG/1
TABLET, FILM COATED ORAL
Refills: 0 | Status: ACTIVE | COMMUNITY

## 2025-01-13 RX ORDER — AMIODARONE HYDROCHLORIDE 400 MG/1
TABLET ORAL
Refills: 0 | Status: ACTIVE | COMMUNITY

## 2025-01-13 RX ORDER — PANTOPRAZOLE 40 MG/1
TABLET, DELAYED RELEASE ORAL
Refills: 0 | Status: ACTIVE | COMMUNITY

## 2025-01-17 ENCOUNTER — RESULT REVIEW (OUTPATIENT)
Age: 65
End: 2025-01-17

## 2025-01-17 DIAGNOSIS — M79.669 PAIN IN UNSPECIFIED LOWER LEG: ICD-10-CM

## 2025-01-17 RX ORDER — PREDNISONE 10 MG/1
10 TABLET ORAL
Qty: 10 | Refills: 0 | Status: ACTIVE | COMMUNITY
Start: 2025-01-17 | End: 1900-01-01

## 2025-03-01 ENCOUNTER — EMERGENCY (EMERGENCY)
Facility: HOSPITAL | Age: 65
LOS: 0 days | Discharge: HOME | End: 2025-03-01
Attending: STUDENT IN AN ORGANIZED HEALTH CARE EDUCATION/TRAINING PROGRAM
Payer: COMMERCIAL

## 2025-03-01 VITALS
OXYGEN SATURATION: 97 % | RESPIRATION RATE: 18 BRPM | DIASTOLIC BLOOD PRESSURE: 72 MMHG | SYSTOLIC BLOOD PRESSURE: 102 MMHG | HEART RATE: 60 BPM | TEMPERATURE: 98 F

## 2025-03-01 VITALS — WEIGHT: 274.92 LBS

## 2025-03-01 DIAGNOSIS — Z98.890 OTHER SPECIFIED POSTPROCEDURAL STATES: Chronic | ICD-10-CM

## 2025-03-01 DIAGNOSIS — I10 ESSENTIAL (PRIMARY) HYPERTENSION: ICD-10-CM

## 2025-03-01 DIAGNOSIS — E78.5 HYPERLIPIDEMIA, UNSPECIFIED: ICD-10-CM

## 2025-03-01 DIAGNOSIS — Z90.49 ACQUIRED ABSENCE OF OTHER SPECIFIED PARTS OF DIGESTIVE TRACT: Chronic | ICD-10-CM

## 2025-03-01 DIAGNOSIS — Z79.01 LONG TERM (CURRENT) USE OF ANTICOAGULANTS: ICD-10-CM

## 2025-03-01 DIAGNOSIS — I25.10 ATHEROSCLEROTIC HEART DISEASE OF NATIVE CORONARY ARTERY WITHOUT ANGINA PECTORIS: ICD-10-CM

## 2025-03-01 DIAGNOSIS — Z87.891 PERSONAL HISTORY OF NICOTINE DEPENDENCE: ICD-10-CM

## 2025-03-01 DIAGNOSIS — M10.9 GOUT, UNSPECIFIED: ICD-10-CM

## 2025-03-01 DIAGNOSIS — Z95.1 PRESENCE OF AORTOCORONARY BYPASS GRAFT: ICD-10-CM

## 2025-03-01 DIAGNOSIS — I25.2 OLD MYOCARDIAL INFARCTION: ICD-10-CM

## 2025-03-01 DIAGNOSIS — Z95.5 PRESENCE OF CORONARY ANGIOPLASTY IMPLANT AND GRAFT: ICD-10-CM

## 2025-03-01 DIAGNOSIS — Z98.61 CORONARY ANGIOPLASTY STATUS: Chronic | ICD-10-CM

## 2025-03-01 LAB
ALBUMIN SERPL ELPH-MCNC: 4.1 G/DL — SIGNIFICANT CHANGE UP (ref 3.3–5)
ALP SERPL-CCNC: 64 U/L — SIGNIFICANT CHANGE UP (ref 40–120)
ALT FLD-CCNC: 36 U/L — SIGNIFICANT CHANGE UP (ref 12–78)
ANION GAP SERPL CALC-SCNC: 7 MMOL/L — SIGNIFICANT CHANGE UP (ref 5–17)
AST SERPL-CCNC: 27 U/L — SIGNIFICANT CHANGE UP (ref 15–37)
BASOPHILS # BLD AUTO: 0.06 K/UL — SIGNIFICANT CHANGE UP (ref 0–0.2)
BASOPHILS NFR BLD AUTO: 0.7 % — SIGNIFICANT CHANGE UP (ref 0–2)
BILIRUB SERPL-MCNC: 0.7 MG/DL — SIGNIFICANT CHANGE UP (ref 0.2–1.2)
BUN SERPL-MCNC: 16 MG/DL — SIGNIFICANT CHANGE UP (ref 7–23)
CALCIUM SERPL-MCNC: 9.7 MG/DL — SIGNIFICANT CHANGE UP (ref 8.5–10.1)
CHLORIDE SERPL-SCNC: 100 MMOL/L — SIGNIFICANT CHANGE UP (ref 96–108)
CO2 SERPL-SCNC: 30 MMOL/L — SIGNIFICANT CHANGE UP (ref 22–31)
CREAT SERPL-MCNC: 1.27 MG/DL — SIGNIFICANT CHANGE UP (ref 0.5–1.3)
EGFR: 63 ML/MIN/1.73M2 — SIGNIFICANT CHANGE UP
EGFR: 63 ML/MIN/1.73M2 — SIGNIFICANT CHANGE UP
EOSINOPHIL # BLD AUTO: 0.04 K/UL — SIGNIFICANT CHANGE UP (ref 0–0.5)
EOSINOPHIL NFR BLD AUTO: 0.4 % — SIGNIFICANT CHANGE UP (ref 0–6)
GIANT PLATELETS BLD QL SMEAR: PRESENT
GLUCOSE SERPL-MCNC: 95 MG/DL — SIGNIFICANT CHANGE UP (ref 70–99)
HCT VFR BLD CALC: 48.3 % — SIGNIFICANT CHANGE UP (ref 39–50)
HGB BLD-MCNC: 16.5 G/DL — SIGNIFICANT CHANGE UP (ref 13–17)
IMM GRANULOCYTES # BLD AUTO: 0.11 K/UL — HIGH (ref 0–0.07)
IMM GRANULOCYTES NFR BLD AUTO: 1.2 % — HIGH (ref 0–0.9)
LYMPHOCYTES # BLD AUTO: 1.88 K/UL — SIGNIFICANT CHANGE UP (ref 1–3.3)
LYMPHOCYTES NFR BLD AUTO: 21 % — SIGNIFICANT CHANGE UP (ref 13–44)
MAGNESIUM SERPL-MCNC: 2.1 MG/DL — SIGNIFICANT CHANGE UP (ref 1.6–2.6)
MANUAL SMEAR VERIFICATION: SIGNIFICANT CHANGE UP
MCHC RBC-ENTMCNC: 31.1 PG — SIGNIFICANT CHANGE UP (ref 27–34)
MCHC RBC-ENTMCNC: 34.2 G/DL — SIGNIFICANT CHANGE UP (ref 32–36)
MCV RBC AUTO: 91 FL — SIGNIFICANT CHANGE UP (ref 80–100)
MONOCYTES # BLD AUTO: 0.67 K/UL — SIGNIFICANT CHANGE UP (ref 0–0.9)
MONOCYTES NFR BLD AUTO: 7.5 % — SIGNIFICANT CHANGE UP (ref 2–14)
NEUTROPHILS # BLD AUTO: 6.2 K/UL — SIGNIFICANT CHANGE UP (ref 1.8–7.4)
NEUTROPHILS NFR BLD AUTO: 69.2 % — SIGNIFICANT CHANGE UP (ref 43–77)
NRBC # BLD AUTO: 0 K/UL — SIGNIFICANT CHANGE UP (ref 0–0)
NRBC # FLD: 0 K/UL — SIGNIFICANT CHANGE UP (ref 0–0)
NRBC BLD AUTO-RTO: 0 /100 WBCS — SIGNIFICANT CHANGE UP (ref 0–0)
PHOSPHATE SERPL-MCNC: 3.3 MG/DL — SIGNIFICANT CHANGE UP (ref 2.5–4.5)
PLAT MORPH BLD: ABNORMAL
PLATELET # BLD AUTO: 259 K/UL — SIGNIFICANT CHANGE UP (ref 150–400)
PMV BLD: 10.6 FL — SIGNIFICANT CHANGE UP (ref 7–13)
POTASSIUM SERPL-MCNC: 4.3 MMOL/L — SIGNIFICANT CHANGE UP (ref 3.5–5.3)
POTASSIUM SERPL-SCNC: 4.3 MMOL/L — SIGNIFICANT CHANGE UP (ref 3.5–5.3)
PROT SERPL-MCNC: 8 GM/DL — SIGNIFICANT CHANGE UP (ref 6–8.3)
RBC # BLD: 5.31 M/UL — SIGNIFICANT CHANGE UP (ref 4.2–5.8)
RBC # FLD: 14.1 % — SIGNIFICANT CHANGE UP (ref 10.3–14.5)
RBC BLD AUTO: NORMAL — SIGNIFICANT CHANGE UP
SODIUM SERPL-SCNC: 137 MMOL/L — SIGNIFICANT CHANGE UP (ref 135–145)
TROPONIN I, HIGH SENSITIVITY RESULT: 37.59 NG/L — SIGNIFICANT CHANGE UP
WBC # BLD: 8.96 K/UL — SIGNIFICANT CHANGE UP (ref 3.8–10.5)
WBC # FLD AUTO: 8.96 K/UL — SIGNIFICANT CHANGE UP (ref 3.8–10.5)
WBC MORPHOLOGY: NORMAL — SIGNIFICANT CHANGE UP

## 2025-03-01 PROCEDURE — 36415 COLL VENOUS BLD VENIPUNCTURE: CPT

## 2025-03-01 PROCEDURE — 36000 PLACE NEEDLE IN VEIN: CPT

## 2025-03-01 PROCEDURE — 71045 X-RAY EXAM CHEST 1 VIEW: CPT | Mod: 26

## 2025-03-01 PROCEDURE — 71045 X-RAY EXAM CHEST 1 VIEW: CPT

## 2025-03-01 PROCEDURE — 84484 ASSAY OF TROPONIN QUANT: CPT

## 2025-03-01 PROCEDURE — 99285 EMERGENCY DEPT VISIT HI MDM: CPT

## 2025-03-01 PROCEDURE — 84100 ASSAY OF PHOSPHORUS: CPT

## 2025-03-01 PROCEDURE — 80053 COMPREHEN METABOLIC PANEL: CPT

## 2025-03-01 PROCEDURE — 85025 COMPLETE CBC W/AUTO DIFF WBC: CPT

## 2025-03-01 PROCEDURE — 83735 ASSAY OF MAGNESIUM: CPT

## 2025-03-01 PROCEDURE — 99285 EMERGENCY DEPT VISIT HI MDM: CPT | Mod: 25

## 2025-03-01 PROCEDURE — 93005 ELECTROCARDIOGRAM TRACING: CPT

## 2025-03-01 PROCEDURE — 93010 ELECTROCARDIOGRAM REPORT: CPT

## 2025-03-25 ENCOUNTER — APPOINTMENT (OUTPATIENT)
Facility: CLINIC | Age: 65
End: 2025-03-25

## 2025-03-25 ENCOUNTER — NON-APPOINTMENT (OUTPATIENT)
Age: 65
End: 2025-03-25

## 2025-03-25 DIAGNOSIS — M17.11 UNILATERAL PRIMARY OSTEOARTHRITIS, RIGHT KNEE: ICD-10-CM

## 2025-03-25 PROCEDURE — 20611 DRAIN/INJ JOINT/BURSA W/US: CPT | Mod: RT

## 2025-04-01 ENCOUNTER — APPOINTMENT (OUTPATIENT)
Facility: CLINIC | Age: 65
End: 2025-04-01

## 2025-04-01 RX ORDER — METHYLPREDNISOLONE 4 MG/1
4 TABLET ORAL
Qty: 1 | Refills: 0 | Status: ACTIVE | COMMUNITY
Start: 2025-04-01 | End: 1900-01-01

## 2025-04-03 ENCOUNTER — APPOINTMENT (OUTPATIENT)
Dept: CARDIOLOGY | Facility: CLINIC | Age: 65
End: 2025-04-03
Payer: COMMERCIAL

## 2025-04-03 ENCOUNTER — NON-APPOINTMENT (OUTPATIENT)
Age: 65
End: 2025-04-03

## 2025-04-03 VITALS
OXYGEN SATURATION: 96 % | WEIGHT: 261 LBS | HEART RATE: 69 BPM | SYSTOLIC BLOOD PRESSURE: 122 MMHG | BODY MASS INDEX: 33.5 KG/M2 | DIASTOLIC BLOOD PRESSURE: 78 MMHG | HEIGHT: 74 IN

## 2025-04-03 DIAGNOSIS — I48.91 UNSPECIFIED ATRIAL FIBRILLATION: ICD-10-CM

## 2025-04-03 DIAGNOSIS — G47.33 OBSTRUCTIVE SLEEP APNEA (ADULT) (PEDIATRIC): ICD-10-CM

## 2025-04-03 DIAGNOSIS — I25.10 ATHEROSCLEROTIC HEART DISEASE OF NATIVE CORONARY ARTERY W/OUT ANGINA PECTORIS: ICD-10-CM

## 2025-04-03 DIAGNOSIS — E78.5 HYPERLIPIDEMIA, UNSPECIFIED: ICD-10-CM

## 2025-04-03 DIAGNOSIS — I10 ESSENTIAL (PRIMARY) HYPERTENSION: ICD-10-CM

## 2025-04-03 PROCEDURE — 93000 ELECTROCARDIOGRAM COMPLETE: CPT

## 2025-04-03 PROCEDURE — 99214 OFFICE O/P EST MOD 30 MIN: CPT

## 2025-04-03 PROCEDURE — G2211 COMPLEX E/M VISIT ADD ON: CPT | Mod: NC

## 2025-04-15 ENCOUNTER — APPOINTMENT (OUTPATIENT)
Facility: CLINIC | Age: 65
End: 2025-04-15

## 2025-04-15 PROCEDURE — 20611 DRAIN/INJ JOINT/BURSA W/US: CPT | Mod: RT

## 2025-04-24 ENCOUNTER — APPOINTMENT (OUTPATIENT)
Facility: CLINIC | Age: 65
End: 2025-04-24

## 2025-04-24 DIAGNOSIS — M17.11 UNILATERAL PRIMARY OSTEOARTHRITIS, RIGHT KNEE: ICD-10-CM

## 2025-04-24 PROCEDURE — 20611 DRAIN/INJ JOINT/BURSA W/US: CPT | Mod: RT

## 2025-05-13 ENCOUNTER — OFFICE (OUTPATIENT)
Dept: URBAN - METROPOLITAN AREA CLINIC 102 | Facility: CLINIC | Age: 65
Setting detail: OPHTHALMOLOGY
End: 2025-05-13
Payer: COMMERCIAL

## 2025-05-13 DIAGNOSIS — H25.12: ICD-10-CM

## 2025-05-13 DIAGNOSIS — H43.813: ICD-10-CM

## 2025-05-13 DIAGNOSIS — H33.311: ICD-10-CM

## 2025-05-13 DIAGNOSIS — H35.373: ICD-10-CM

## 2025-05-13 DIAGNOSIS — Z96.1: ICD-10-CM

## 2025-05-13 PROCEDURE — 92250 FUNDUS PHOTOGRAPHY W/I&R: CPT | Performed by: OPHTHALMOLOGY

## 2025-05-13 PROCEDURE — 92014 COMPRE OPH EXAM EST PT 1/>: CPT | Performed by: OPHTHALMOLOGY

## 2025-05-13 ASSESSMENT — TONOMETRY
OS_IOP_MMHG: 16
OD_IOP_MMHG: 18

## 2025-05-13 ASSESSMENT — VISUAL ACUITY
OD_BCVA: 20/20-1
OS_BCVA: 20/20-1

## 2025-05-13 ASSESSMENT — CONFRONTATIONAL VISUAL FIELD TEST (CVF)
OS_FINDINGS: FULL
OD_FINDINGS: FULL

## 2025-05-13 ASSESSMENT — REFRACTION_MANIFEST
OS_CYLINDER: -0.75
OS_AXIS: 091
OD_CYLINDER: -3.25
OD_SPHERE: -0.75
OD_VA1: 20/100
OD_AXIS: 095
OS_SPHERE: 0.00

## 2025-05-13 ASSESSMENT — KERATOMETRY
OD_K1POWER_DIOPTERS: 40.75
OS_K1POWER_DIOPTERS: 41.75
METHOD_AUTO_MANUAL: MANUAL
OD_K2POWER_DIOPTERS: 41.75
OD_AXISANGLE_DEGREES: 178
OS_AXISANGLE_DEGREES: 090
OS_K2POWER_DIOPTERS: 41.75

## 2025-05-13 ASSESSMENT — REFRACTION_AUTOREFRACTION
OD_CYLINDER: -0.25
OS_CYLINDER: -1.00
OS_SPHERE: +0.25
OD_SPHERE: +0.25
OS_AXIS: 090
OD_AXIS: 121

## 2025-05-22 ENCOUNTER — APPOINTMENT (OUTPATIENT)
Dept: CARDIOLOGY | Facility: CLINIC | Age: 65
End: 2025-05-22
Payer: COMMERCIAL

## 2025-05-22 ENCOUNTER — NON-APPOINTMENT (OUTPATIENT)
Age: 65
End: 2025-05-22

## 2025-05-22 VITALS
BODY MASS INDEX: 33.11 KG/M2 | HEIGHT: 74 IN | DIASTOLIC BLOOD PRESSURE: 76 MMHG | SYSTOLIC BLOOD PRESSURE: 104 MMHG | WEIGHT: 258 LBS | HEART RATE: 76 BPM | OXYGEN SATURATION: 97 %

## 2025-05-22 DIAGNOSIS — I25.10 ATHEROSCLEROTIC HEART DISEASE OF NATIVE CORONARY ARTERY W/OUT ANGINA PECTORIS: ICD-10-CM

## 2025-05-22 DIAGNOSIS — I48.92 UNSPECIFIED ATRIAL FLUTTER: ICD-10-CM

## 2025-05-22 DIAGNOSIS — R07.89 OTHER CHEST PAIN: ICD-10-CM

## 2025-05-22 PROCEDURE — G2211 COMPLEX E/M VISIT ADD ON: CPT | Mod: NC

## 2025-05-22 PROCEDURE — 93000 ELECTROCARDIOGRAM COMPLETE: CPT

## 2025-05-22 PROCEDURE — 99214 OFFICE O/P EST MOD 30 MIN: CPT

## 2025-05-22 RX ORDER — OMEPRAZOLE 20 MG/1
20 CAPSULE, DELAYED RELEASE ORAL
Qty: 90 | Refills: 1 | Status: ACTIVE | COMMUNITY
Start: 2025-05-22

## 2025-06-04 ENCOUNTER — APPOINTMENT (OUTPATIENT)
Dept: INTERNAL MEDICINE | Facility: CLINIC | Age: 65
End: 2025-06-04
Payer: COMMERCIAL

## 2025-06-04 VITALS
BODY MASS INDEX: 34.4 KG/M2 | HEIGHT: 72 IN | DIASTOLIC BLOOD PRESSURE: 69 MMHG | HEART RATE: 73 BPM | WEIGHT: 254 LBS | OXYGEN SATURATION: 97 % | SYSTOLIC BLOOD PRESSURE: 107 MMHG | RESPIRATION RATE: 16 BRPM | TEMPERATURE: 97.3 F

## 2025-06-04 DIAGNOSIS — Z87.891 PERSONAL HISTORY OF NICOTINE DEPENDENCE: ICD-10-CM

## 2025-06-04 DIAGNOSIS — G47.33 OBSTRUCTIVE SLEEP APNEA (ADULT) (PEDIATRIC): ICD-10-CM

## 2025-06-04 DIAGNOSIS — F51.12 INSUFFICIENT SLEEP SYNDROME: ICD-10-CM

## 2025-06-04 DIAGNOSIS — G47.19 OTHER HYPERSOMNIA: ICD-10-CM

## 2025-06-04 PROCEDURE — 94727 GAS DIL/WSHOT DETER LNG VOL: CPT

## 2025-06-04 PROCEDURE — 99204 OFFICE O/P NEW MOD 45 MIN: CPT | Mod: 25

## 2025-06-04 PROCEDURE — 94729 DIFFUSING CAPACITY: CPT

## 2025-06-04 PROCEDURE — 94060 EVALUATION OF WHEEZING: CPT

## 2025-06-04 PROCEDURE — ZZZZZ: CPT

## 2025-06-05 ENCOUNTER — NON-APPOINTMENT (OUTPATIENT)
Age: 65
End: 2025-06-05

## 2025-07-12 ENCOUNTER — RX RENEWAL (OUTPATIENT)
Age: 65
End: 2025-07-12

## 2025-07-23 ENCOUNTER — OUTPATIENT (OUTPATIENT)
Dept: OUTPATIENT SERVICES | Facility: HOSPITAL | Age: 65
LOS: 1 days | End: 2025-07-23

## 2025-07-23 ENCOUNTER — APPOINTMENT (OUTPATIENT)
Dept: CARDIOLOGY | Facility: CLINIC | Age: 65
End: 2025-07-23
Payer: COMMERCIAL

## 2025-07-23 VITALS
HEIGHT: 72 IN | HEART RATE: 61 BPM | WEIGHT: 253 LBS | SYSTOLIC BLOOD PRESSURE: 108 MMHG | DIASTOLIC BLOOD PRESSURE: 70 MMHG | OXYGEN SATURATION: 98 % | BODY MASS INDEX: 34.27 KG/M2

## 2025-07-23 DIAGNOSIS — Z98.890 OTHER SPECIFIED POSTPROCEDURAL STATES: Chronic | ICD-10-CM

## 2025-07-23 DIAGNOSIS — I10 ESSENTIAL (PRIMARY) HYPERTENSION: ICD-10-CM

## 2025-07-23 DIAGNOSIS — Z98.61 CORONARY ANGIOPLASTY STATUS: Chronic | ICD-10-CM

## 2025-07-23 DIAGNOSIS — E78.5 HYPERLIPIDEMIA, UNSPECIFIED: ICD-10-CM

## 2025-07-23 DIAGNOSIS — I48.91 UNSPECIFIED ATRIAL FIBRILLATION: ICD-10-CM

## 2025-07-23 DIAGNOSIS — I25.10 ATHEROSCLEROTIC HEART DISEASE OF NATIVE CORONARY ARTERY W/OUT ANGINA PECTORIS: ICD-10-CM

## 2025-07-23 DIAGNOSIS — G47.33 OBSTRUCTIVE SLEEP APNEA (ADULT) (PEDIATRIC): ICD-10-CM

## 2025-07-23 DIAGNOSIS — Z90.49 ACQUIRED ABSENCE OF OTHER SPECIFIED PARTS OF DIGESTIVE TRACT: Chronic | ICD-10-CM

## 2025-07-23 PROCEDURE — 99214 OFFICE O/P EST MOD 30 MIN: CPT

## 2025-07-23 PROCEDURE — 93000 ELECTROCARDIOGRAM COMPLETE: CPT

## 2025-07-23 PROCEDURE — 95810 POLYSOM 6/> YRS 4/> PARAM: CPT | Mod: 26

## 2025-07-26 ENCOUNTER — NON-APPOINTMENT (OUTPATIENT)
Age: 65
End: 2025-07-26

## 2025-08-12 ENCOUNTER — APPOINTMENT (OUTPATIENT)
Dept: INTERNAL MEDICINE | Facility: CLINIC | Age: 65
End: 2025-08-12
Payer: COMMERCIAL

## 2025-08-12 VITALS
TEMPERATURE: 98 F | DIASTOLIC BLOOD PRESSURE: 70 MMHG | WEIGHT: 253 LBS | BODY MASS INDEX: 34.27 KG/M2 | SYSTOLIC BLOOD PRESSURE: 100 MMHG | HEART RATE: 58 BPM | HEIGHT: 72 IN | OXYGEN SATURATION: 95 %

## 2025-08-12 DIAGNOSIS — G47.33 OBSTRUCTIVE SLEEP APNEA (ADULT) (PEDIATRIC): ICD-10-CM

## 2025-08-12 PROCEDURE — G2211 COMPLEX E/M VISIT ADD ON: CPT | Mod: NC

## 2025-08-12 PROCEDURE — 99214 OFFICE O/P EST MOD 30 MIN: CPT
